# Patient Record
Sex: MALE | Race: WHITE | NOT HISPANIC OR LATINO | URBAN - METROPOLITAN AREA
[De-identification: names, ages, dates, MRNs, and addresses within clinical notes are randomized per-mention and may not be internally consistent; named-entity substitution may affect disease eponyms.]

---

## 2017-05-11 ENCOUNTER — OUTPATIENT (OUTPATIENT)
Dept: OUTPATIENT SERVICES | Facility: HOSPITAL | Age: 82
LOS: 1 days | Discharge: HOME | End: 2017-05-11

## 2017-06-28 DIAGNOSIS — M54.5 LOW BACK PAIN: ICD-10-CM

## 2017-07-13 ENCOUNTER — INPATIENT (INPATIENT)
Facility: HOSPITAL | Age: 82
LOS: 25 days | Discharge: NURSING, REHAB OR LONG-TERM CARE | End: 2017-08-08
Attending: INTERNAL MEDICINE

## 2017-08-08 PROBLEM — Z00.00 ENCOUNTER FOR PREVENTIVE HEALTH EXAMINATION: Status: ACTIVE | Noted: 2017-08-08

## 2017-08-11 DIAGNOSIS — I63.9 CEREBRAL INFARCTION, UNSPECIFIED: ICD-10-CM

## 2017-08-11 DIAGNOSIS — I63.232 CEREBRAL INFARCTION DUE TO UNSPECIFIED OCCLUSION OR STENOSIS OF LEFT CAROTID ARTERIES: ICD-10-CM

## 2017-08-11 DIAGNOSIS — J69.0 PNEUMONITIS DUE TO INHALATION OF FOOD AND VOMIT: ICD-10-CM

## 2017-08-11 DIAGNOSIS — R51 HEADACHE: ICD-10-CM

## 2017-08-11 DIAGNOSIS — R13.10 DYSPHAGIA, UNSPECIFIED: ICD-10-CM

## 2017-08-11 DIAGNOSIS — R47.01 APHASIA: ICD-10-CM

## 2017-08-11 DIAGNOSIS — E78.5 HYPERLIPIDEMIA, UNSPECIFIED: ICD-10-CM

## 2017-08-11 DIAGNOSIS — R47.81 SLURRED SPEECH: ICD-10-CM

## 2017-08-11 DIAGNOSIS — R29.810 FACIAL WEAKNESS: ICD-10-CM

## 2017-08-11 DIAGNOSIS — N40.0 BENIGN PROSTATIC HYPERPLASIA WITHOUT LOWER URINARY TRACT SYMPTOMS: ICD-10-CM

## 2017-08-11 DIAGNOSIS — H40.9 UNSPECIFIED GLAUCOMA: ICD-10-CM

## 2017-08-11 DIAGNOSIS — Z95.1 PRESENCE OF AORTOCORONARY BYPASS GRAFT: ICD-10-CM

## 2017-08-11 DIAGNOSIS — I25.10 ATHEROSCLEROTIC HEART DISEASE OF NATIVE CORONARY ARTERY WITHOUT ANGINA PECTORIS: ICD-10-CM

## 2017-08-11 DIAGNOSIS — G20 PARKINSON'S DISEASE: ICD-10-CM

## 2017-08-11 DIAGNOSIS — J96.01 ACUTE RESPIRATORY FAILURE WITH HYPOXIA: ICD-10-CM

## 2017-08-12 DIAGNOSIS — E46 UNSPECIFIED PROTEIN-CALORIE MALNUTRITION: ICD-10-CM

## 2017-08-12 DIAGNOSIS — R74.0 NONSPECIFIC ELEVATION OF LEVELS OF TRANSAMINASE AND LACTIC ACID DEHYDROGENASE [LDH]: ICD-10-CM

## 2017-08-12 DIAGNOSIS — R33.8 OTHER RETENTION OF URINE: ICD-10-CM

## 2017-08-12 DIAGNOSIS — N40.1 BENIGN PROSTATIC HYPERPLASIA WITH LOWER URINARY TRACT SYMPTOMS: ICD-10-CM

## 2017-08-12 DIAGNOSIS — Z99.11 DEPENDENCE ON RESPIRATOR [VENTILATOR] STATUS: ICD-10-CM

## 2017-08-14 DIAGNOSIS — I65.22 OCCLUSION AND STENOSIS OF LEFT CAROTID ARTERY: ICD-10-CM

## 2017-08-14 DIAGNOSIS — G81.91 HEMIPLEGIA, UNSPECIFIED AFFECTING RIGHT DOMINANT SIDE: ICD-10-CM

## 2017-09-06 ENCOUNTER — EMERGENCY (EMERGENCY)
Facility: HOSPITAL | Age: 82
LOS: 1 days | Discharge: HOME | End: 2017-09-06
Attending: EMERGENCY MEDICINE | Admitting: EMERGENCY MEDICINE
Payer: MEDICARE

## 2017-09-06 ENCOUNTER — TRANSCRIPTION ENCOUNTER (OUTPATIENT)
Age: 82
End: 2017-09-06

## 2017-09-06 VITALS
OXYGEN SATURATION: 100 % | HEART RATE: 64 BPM | DIASTOLIC BLOOD PRESSURE: 67 MMHG | SYSTOLIC BLOOD PRESSURE: 112 MMHG | RESPIRATION RATE: 16 BRPM

## 2017-09-06 VITALS
HEART RATE: 65 BPM | DIASTOLIC BLOOD PRESSURE: 70 MMHG | RESPIRATION RATE: 16 BRPM | TEMPERATURE: 98 F | OXYGEN SATURATION: 99 % | SYSTOLIC BLOOD PRESSURE: 109 MMHG

## 2017-09-06 DIAGNOSIS — Z93.1 GASTROSTOMY STATUS: Chronic | ICD-10-CM

## 2017-09-06 PROCEDURE — 99285 EMERGENCY DEPT VISIT HI MDM: CPT | Mod: GC

## 2017-09-06 PROCEDURE — 74000: CPT | Mod: 26,76

## 2017-09-06 NOTE — ED PROVIDER NOTE - OBJECTIVE STATEMENT
83 y/o M with h/o CVA, HTN, trach, PEG BIBEMS from NH for PEG tube dislodgement. Patient was dislodged PEG tube during therapy 2-3 hours prior to arrival. A lubin catheter was placed into the stoma to keep the stoma patent. Has 18 Albanian PEG. 83 y/o M with h/o CVA one month ago with residual R sided weakness, HTN, trach, PEG BIBEMS from NH for PEG tube dislodgement. Patient was dislodged PEG tube during therapy 2-3 hours prior to arrival. A lubin catheter was placed into the stoma to keep the stoma patent. Has 18 Venezuelan PEG. Patient denies any abdominal pain, nausea, vomiting or diarrhea.

## 2017-09-06 NOTE — ED PROVIDER NOTE - ATTENDING CONTRIBUTION TO CARE
Clarissa; PEG placement following stroke one month ago, dislodged after vigorous PT, Alexander in place currently. Sent for replacement. Site without erythema.

## 2017-09-06 NOTE — ED PROVIDER NOTE - MEDICAL DECISION MAKING DETAILS
81 y/o M with h/o CVA, HTN, trach, PEG presents with PEG tube dislodgement with 18 Fr lubin in place to keep stoma patent. Otherwise patient well appearing. Benign abdominal exam. Will replace PEG and get abdominal xray for confirmation.

## 2017-09-06 NOTE — ED ADULT TRIAGE NOTE - CHIEF COMPLAINT QUOTE
Pt from NH sent for pt pulling out peg tube Pt from NH sent for pt pulling out peg tube.  Pt has trach

## 2017-09-06 NOTE — ED ADULT NURSE NOTE - OBJECTIVE STATEMENT
Patient received in RM 24 A&Ox3. Arrives from NH with HHA and wife. Sent in for peg tube dislodgement during therapy today. Patient arrives with temporary lubin catheter in stoma to keep patent. Patient denies any abdominal pain, n/v. Providers at bedside. NAD at time. PE and history as noted below.

## 2017-09-06 NOTE — ED PROVIDER NOTE - GASTROINTESTINAL, MLM
Abdomen soft, non-tender, no guarding. PEG stoma patent with no surrounding erythema or warmth. No purulent discharge. 18 Uzbek lubin in place removed and 18 Fr PEG easily replaced

## 2017-09-24 ENCOUNTER — INPATIENT (INPATIENT)
Facility: HOSPITAL | Age: 82
LOS: 8 days | Discharge: NURSING, REHAB OR LONG-TERM CARE | End: 2017-10-03
Attending: INTERNAL MEDICINE | Admitting: INTERNAL MEDICINE
Payer: MEDICARE

## 2017-09-24 VITALS
HEART RATE: 92 BPM | SYSTOLIC BLOOD PRESSURE: 77 MMHG | OXYGEN SATURATION: 95 % | RESPIRATION RATE: 38 BRPM | DIASTOLIC BLOOD PRESSURE: 47 MMHG | WEIGHT: 147.05 LBS | TEMPERATURE: 104 F

## 2017-09-24 DIAGNOSIS — Z93.1 GASTROSTOMY STATUS: Chronic | ICD-10-CM

## 2017-09-24 DIAGNOSIS — A41.9 SEPSIS, UNSPECIFIED ORGANISM: ICD-10-CM

## 2017-09-24 LAB
ALBUMIN SERPL ELPH-MCNC: 2.4 G/DL — LOW (ref 3.3–5)
ALP SERPL-CCNC: 83 U/L — SIGNIFICANT CHANGE UP (ref 40–120)
ALT FLD-CCNC: 6 U/L — SIGNIFICANT CHANGE UP (ref 4–41)
APPEARANCE UR: SIGNIFICANT CHANGE UP
APTT BLD: 28.8 SEC — SIGNIFICANT CHANGE UP (ref 27.5–37.4)
AST SERPL-CCNC: 25 U/L — SIGNIFICANT CHANGE UP (ref 4–40)
BACTERIA # UR AUTO: HIGH
BASE EXCESS BLDA CALC-SCNC: -4.3 MMOL/L — SIGNIFICANT CHANGE UP
BASE EXCESS BLDA CALC-SCNC: -4.9 MMOL/L — SIGNIFICANT CHANGE UP
BASE EXCESS BLDV CALC-SCNC: -2 MMOL/L — SIGNIFICANT CHANGE UP
BASE EXCESS BLDV CALC-SCNC: -3.3 MMOL/L — SIGNIFICANT CHANGE UP
BASOPHILS # BLD AUTO: 0.04 K/UL — SIGNIFICANT CHANGE UP (ref 0–0.2)
BASOPHILS NFR BLD AUTO: 0.2 % — SIGNIFICANT CHANGE UP (ref 0–2)
BASOPHILS NFR SPEC: 0 % — SIGNIFICANT CHANGE UP (ref 0–2)
BILIRUB SERPL-MCNC: 0.9 MG/DL — SIGNIFICANT CHANGE UP (ref 0.2–1.2)
BILIRUB UR-MCNC: NEGATIVE — SIGNIFICANT CHANGE UP
BLD GP AB SCN SERPL QL: NEGATIVE — SIGNIFICANT CHANGE UP
BLOOD GAS VENOUS - CREATININE: 1.99 MG/DL — HIGH (ref 0.5–1.3)
BLOOD GAS VENOUS - CREATININE: 2.11 MG/DL — HIGH (ref 0.5–1.3)
BLOOD UR QL VISUAL: HIGH
BUN SERPL-MCNC: 53 MG/DL — HIGH (ref 7–23)
BURR CELLS BLD QL SMEAR: PRESENT — SIGNIFICANT CHANGE UP
CALCIUM SERPL-MCNC: 8.2 MG/DL — LOW (ref 8.4–10.5)
CHLORIDE BLDA-SCNC: 108 MMOL/L — SIGNIFICANT CHANGE UP (ref 96–108)
CHLORIDE BLDA-SCNC: 110 MMOL/L — HIGH (ref 96–108)
CHLORIDE BLDV-SCNC: 105 MMOL/L — SIGNIFICANT CHANGE UP (ref 96–108)
CHLORIDE BLDV-SCNC: 99 MMOL/L — SIGNIFICANT CHANGE UP (ref 96–108)
CHLORIDE SERPL-SCNC: 97 MMOL/L — LOW (ref 98–107)
CO2 SERPL-SCNC: 19 MMOL/L — LOW (ref 22–31)
COLOR SPEC: HIGH
CREAT BLDA-MCNC: 1.08 MG/DL — SIGNIFICANT CHANGE UP (ref 0.5–1.3)
CREAT BLDA-MCNC: 1.34 MG/DL — HIGH (ref 0.5–1.3)
CREAT SERPL-MCNC: 2.07 MG/DL — HIGH (ref 0.5–1.3)
EOSINOPHIL # BLD AUTO: 0 K/UL — SIGNIFICANT CHANGE UP (ref 0–0.5)
EOSINOPHIL NFR BLD AUTO: 0 % — SIGNIFICANT CHANGE UP (ref 0–6)
EOSINOPHIL NFR FLD: 0 % — SIGNIFICANT CHANGE UP (ref 0–6)
GAS PNL BLDV: 128 MMOL/L — LOW (ref 136–146)
GAS PNL BLDV: 130 MMOL/L — LOW (ref 136–146)
GIANT PLATELETS BLD QL SMEAR: PRESENT — SIGNIFICANT CHANGE UP
GLUCOSE BLDA-MCNC: 119 MG/DL — HIGH (ref 70–99)
GLUCOSE BLDA-MCNC: 126 MG/DL — HIGH (ref 70–99)
GLUCOSE BLDV-MCNC: 171 — HIGH (ref 70–99)
GLUCOSE BLDV-MCNC: 171 — HIGH (ref 70–99)
GLUCOSE SERPL-MCNC: 167 MG/DL — HIGH (ref 70–99)
GLUCOSE UR-MCNC: NEGATIVE — SIGNIFICANT CHANGE UP
HCO3 BLDA-SCNC: 21 MMOL/L — LOW (ref 22–26)
HCO3 BLDA-SCNC: 22 MMOL/L — SIGNIFICANT CHANGE UP (ref 22–26)
HCO3 BLDV-SCNC: 21 MMOL/L — SIGNIFICANT CHANGE UP (ref 20–27)
HCO3 BLDV-SCNC: 23 MMOL/L — SIGNIFICANT CHANGE UP (ref 20–27)
HCT VFR BLD CALC: 36.3 % — LOW (ref 39–50)
HCT VFR BLD CALC: 42.5 % — SIGNIFICANT CHANGE UP (ref 39–50)
HCT VFR BLDA CALC: 37.9 % — LOW (ref 39–51)
HCT VFR BLDA CALC: 38.5 % — LOW (ref 39–51)
HCT VFR BLDV CALC: 37.8 % — LOW (ref 39–51)
HCT VFR BLDV CALC: 44.8 % — SIGNIFICANT CHANGE UP (ref 39–51)
HGB BLD-MCNC: 12.4 G/DL — LOW (ref 13–17)
HGB BLD-MCNC: 14.5 G/DL — SIGNIFICANT CHANGE UP (ref 13–17)
HGB BLDA-MCNC: 12.3 G/DL — LOW (ref 13–17)
HGB BLDA-MCNC: 12.5 G/DL — LOW (ref 13–17)
HGB BLDV-MCNC: 12.3 G/DL — LOW (ref 13–17)
HGB BLDV-MCNC: 14.6 G/DL — SIGNIFICANT CHANGE UP (ref 13–17)
IMM GRANULOCYTES # BLD AUTO: 0.27 # — SIGNIFICANT CHANGE UP
IMM GRANULOCYTES NFR BLD AUTO: 1.3 % — SIGNIFICANT CHANGE UP (ref 0–1.5)
INR BLD: 1.31 — HIGH (ref 0.88–1.17)
KETONES UR-MCNC: NEGATIVE — SIGNIFICANT CHANGE UP
LACTATE BLDA-SCNC: 2 MMOL/L — SIGNIFICANT CHANGE UP (ref 0.5–2)
LACTATE BLDA-SCNC: 2.3 MMOL/L — HIGH (ref 0.5–2)
LACTATE BLDV-MCNC: 2.9 MMOL/L — HIGH (ref 0.5–2)
LACTATE BLDV-MCNC: 4.1 MMOL/L — CRITICAL HIGH (ref 0.5–2)
LEUKOCYTE ESTERASE UR-ACNC: HIGH
LYMPHOCYTES # BLD AUTO: 0.64 K/UL — LOW (ref 1–3.3)
LYMPHOCYTES # BLD AUTO: 3.2 % — LOW (ref 13–44)
LYMPHOCYTES NFR SPEC AUTO: 1.7 % — LOW (ref 13–44)
MCHC RBC-ENTMCNC: 31.1 PG — SIGNIFICANT CHANGE UP (ref 27–34)
MCHC RBC-ENTMCNC: 31.5 PG — SIGNIFICANT CHANGE UP (ref 27–34)
MCHC RBC-ENTMCNC: 34.1 % — SIGNIFICANT CHANGE UP (ref 32–36)
MCHC RBC-ENTMCNC: 34.2 % — SIGNIFICANT CHANGE UP (ref 32–36)
MCV RBC AUTO: 91.2 FL — SIGNIFICANT CHANGE UP (ref 80–100)
MCV RBC AUTO: 92.1 FL — SIGNIFICANT CHANGE UP (ref 80–100)
MONOCYTES # BLD AUTO: 1.04 K/UL — HIGH (ref 0–0.9)
MONOCYTES NFR BLD AUTO: 5.2 % — SIGNIFICANT CHANGE UP (ref 2–14)
MONOCYTES NFR BLD: 7 % — SIGNIFICANT CHANGE UP (ref 2–9)
MUCOUS THREADS # UR AUTO: SIGNIFICANT CHANGE UP
MYELOCYTES NFR BLD: 0.9 % — HIGH (ref 0–0)
NEUTROPHIL AB SER-ACNC: 81.6 % — HIGH (ref 43–77)
NEUTROPHILS # BLD AUTO: 18.11 K/UL — HIGH (ref 1.8–7.4)
NEUTROPHILS NFR BLD AUTO: 90.1 % — HIGH (ref 43–77)
NEUTS BAND # BLD: 8.8 % — HIGH (ref 0–6)
NITRITE UR-MCNC: NEGATIVE — SIGNIFICANT CHANGE UP
NRBC # FLD: 0 — SIGNIFICANT CHANGE UP
NRBC # FLD: 0 — SIGNIFICANT CHANGE UP
OB PNL STL: POSITIVE — SIGNIFICANT CHANGE UP
PCO2 BLDA: 24 MMHG — LOW (ref 35–48)
PCO2 BLDA: 25 MMHG — LOW (ref 35–48)
PCO2 BLDV: 27 MMHG — LOW (ref 41–51)
PCO2 BLDV: 36 MMHG — LOW (ref 41–51)
PH BLDA: 7.49 PH — HIGH (ref 7.35–7.45)
PH BLDA: 7.49 PH — HIGH (ref 7.35–7.45)
PH BLDV: 7.39 PH — SIGNIFICANT CHANGE UP (ref 7.32–7.43)
PH BLDV: 7.5 PH — HIGH (ref 7.32–7.43)
PH UR: 8.5 — HIGH (ref 4.6–8)
PLATELET # BLD AUTO: 206 K/UL — SIGNIFICANT CHANGE UP (ref 150–400)
PLATELET # BLD AUTO: 259 K/UL — SIGNIFICANT CHANGE UP (ref 150–400)
PLATELET COUNT - ESTIMATE: NORMAL — SIGNIFICANT CHANGE UP
PMV BLD: 10.2 FL — SIGNIFICANT CHANGE UP (ref 7–13)
PMV BLD: 10.3 FL — SIGNIFICANT CHANGE UP (ref 7–13)
PO2 BLDA: 114 MMHG — HIGH (ref 83–108)
PO2 BLDA: 91 MMHG — SIGNIFICANT CHANGE UP (ref 83–108)
PO2 BLDV: 24 MMHG — LOW (ref 35–40)
PO2 BLDV: 42 MMHG — HIGH (ref 35–40)
POIKILOCYTOSIS BLD QL AUTO: SLIGHT — SIGNIFICANT CHANGE UP
POLYCHROMASIA BLD QL SMEAR: SLIGHT — SIGNIFICANT CHANGE UP
POTASSIUM BLDA-SCNC: 4 MMOL/L — SIGNIFICANT CHANGE UP (ref 3.4–4.5)
POTASSIUM BLDA-SCNC: 4.2 MMOL/L — SIGNIFICANT CHANGE UP (ref 3.4–4.5)
POTASSIUM BLDV-SCNC: 4 MMOL/L — SIGNIFICANT CHANGE UP (ref 3.4–4.5)
POTASSIUM BLDV-SCNC: 5.1 MMOL/L — HIGH (ref 3.4–4.5)
POTASSIUM SERPL-MCNC: 5.5 MMOL/L — HIGH (ref 3.5–5.3)
POTASSIUM SERPL-SCNC: 5.5 MMOL/L — HIGH (ref 3.5–5.3)
PROT SERPL-MCNC: 6.2 G/DL — SIGNIFICANT CHANGE UP (ref 6–8.3)
PROT UR-MCNC: >600 — SIGNIFICANT CHANGE UP
PROTHROM AB SERPL-ACNC: 14.7 SEC — HIGH (ref 9.8–13.1)
RBC # BLD: 3.94 M/UL — LOW (ref 4.2–5.8)
RBC # BLD: 4.66 M/UL — SIGNIFICANT CHANGE UP (ref 4.2–5.8)
RBC # FLD: 15.1 % — HIGH (ref 10.3–14.5)
RBC # FLD: 15.2 % — HIGH (ref 10.3–14.5)
RBC CASTS # UR COMP ASSIST: >50 — HIGH (ref 0–?)
REVIEW TO FOLLOW: YES — SIGNIFICANT CHANGE UP
RH IG SCN BLD-IMP: POSITIVE — SIGNIFICANT CHANGE UP
SAO2 % BLDA: 98.1 % — SIGNIFICANT CHANGE UP (ref 95–99)
SAO2 % BLDA: 99.1 % — HIGH (ref 95–99)
SAO2 % BLDV: 34.1 % — LOW (ref 60–85)
SAO2 % BLDV: 76.8 % — SIGNIFICANT CHANGE UP (ref 60–85)
SODIUM BLDA-SCNC: 130 MMOL/L — LOW (ref 136–146)
SODIUM BLDA-SCNC: 133 MMOL/L — LOW (ref 136–146)
SODIUM SERPL-SCNC: 133 MMOL/L — LOW (ref 135–145)
SP GR SPEC: 1.02 — SIGNIFICANT CHANGE UP (ref 1–1.03)
SQUAMOUS # UR AUTO: SIGNIFICANT CHANGE UP
TOXIC GRANULES BLD QL SMEAR: PRESENT — SIGNIFICANT CHANGE UP
TRI-PHOS CRY # UR COMP ASSIST: SIGNIFICANT CHANGE UP (ref 0–0)
UROBILINOGEN FLD QL: NORMAL E.U. — SIGNIFICANT CHANGE UP (ref 0.1–0.2)
WBC # BLD: 12.68 K/UL — HIGH (ref 3.8–10.5)
WBC # BLD: 20.1 K/UL — HIGH (ref 3.8–10.5)
WBC # FLD AUTO: 12.68 K/UL — HIGH (ref 3.8–10.5)
WBC # FLD AUTO: 20.1 K/UL — HIGH (ref 3.8–10.5)
WBC UR QL: >50 — HIGH (ref 0–?)

## 2017-09-24 PROCEDURE — 71010: CPT | Mod: 26

## 2017-09-24 RX ORDER — HEPARIN SODIUM 5000 [USP'U]/ML
5000 INJECTION INTRAVENOUS; SUBCUTANEOUS EVERY 8 HOURS
Qty: 0 | Refills: 0 | Status: DISCONTINUED | OUTPATIENT
Start: 2017-09-24 | End: 2017-09-28

## 2017-09-24 RX ORDER — CEFEPIME 1 G/1
2000 INJECTION, POWDER, FOR SOLUTION INTRAMUSCULAR; INTRAVENOUS ONCE
Qty: 0 | Refills: 0 | Status: COMPLETED | OUTPATIENT
Start: 2017-09-24 | End: 2017-09-24

## 2017-09-24 RX ORDER — ACETAMINOPHEN 500 MG
1000 TABLET ORAL ONCE
Qty: 0 | Refills: 0 | Status: COMPLETED | OUTPATIENT
Start: 2017-09-24 | End: 2017-09-24

## 2017-09-24 RX ORDER — LATANOPROST 0.05 MG/ML
1 SOLUTION/ DROPS OPHTHALMIC; TOPICAL AT BEDTIME
Qty: 0 | Refills: 0 | Status: DISCONTINUED | OUTPATIENT
Start: 2017-09-24 | End: 2017-10-03

## 2017-09-24 RX ORDER — DORZOLAMIDE HYDROCHLORIDE TIMOLOL MALEATE 20; 5 MG/ML; MG/ML
1 SOLUTION/ DROPS OPHTHALMIC
Qty: 0 | Refills: 0 | Status: DISCONTINUED | OUTPATIENT
Start: 2017-09-24 | End: 2017-09-26

## 2017-09-24 RX ORDER — INFLUENZA VIRUS VACCINE 15; 15; 15; 15 UG/.5ML; UG/.5ML; UG/.5ML; UG/.5ML
0.5 SUSPENSION INTRAMUSCULAR ONCE
Qty: 0 | Refills: 0 | Status: COMPLETED | OUTPATIENT
Start: 2017-09-24 | End: 2017-10-03

## 2017-09-24 RX ORDER — CEFEPIME 1 G/1
2000 INJECTION, POWDER, FOR SOLUTION INTRAMUSCULAR; INTRAVENOUS EVERY 24 HOURS
Qty: 0 | Refills: 0 | Status: DISCONTINUED | OUTPATIENT
Start: 2017-09-25 | End: 2017-09-25

## 2017-09-24 RX ORDER — SODIUM CHLORIDE 9 MG/ML
1000 INJECTION INTRAMUSCULAR; INTRAVENOUS; SUBCUTANEOUS ONCE
Qty: 0 | Refills: 0 | Status: COMPLETED | OUTPATIENT
Start: 2017-09-24 | End: 2017-09-24

## 2017-09-24 RX ORDER — METOPROLOL TARTRATE 50 MG
12.5 TABLET ORAL
Qty: 0 | Refills: 0 | Status: DISCONTINUED | OUTPATIENT
Start: 2017-09-24 | End: 2017-09-24

## 2017-09-24 RX ORDER — PROPOFOL 10 MG/ML
40 INJECTION, EMULSION INTRAVENOUS
Qty: 1000 | Refills: 0 | Status: DISCONTINUED | OUTPATIENT
Start: 2017-09-24 | End: 2017-09-26

## 2017-09-24 RX ORDER — PROPOFOL 10 MG/ML
30 INJECTION, EMULSION INTRAVENOUS
Qty: 1000 | Refills: 0 | Status: DISCONTINUED | OUTPATIENT
Start: 2017-09-24 | End: 2017-09-24

## 2017-09-24 RX ORDER — CARBIDOPA AND LEVODOPA 25; 100 MG/1; MG/1
1.5 TABLET ORAL THREE TIMES A DAY
Qty: 0 | Refills: 0 | Status: DISCONTINUED | OUTPATIENT
Start: 2017-09-24 | End: 2017-10-03

## 2017-09-24 RX ORDER — VANCOMYCIN HCL 1 G
1000 VIAL (EA) INTRAVENOUS ONCE
Qty: 0 | Refills: 0 | Status: COMPLETED | OUTPATIENT
Start: 2017-09-24 | End: 2017-09-24

## 2017-09-24 RX ORDER — ATORVASTATIN CALCIUM 80 MG/1
80 TABLET, FILM COATED ORAL AT BEDTIME
Qty: 0 | Refills: 0 | Status: DISCONTINUED | OUTPATIENT
Start: 2017-09-24 | End: 2017-10-03

## 2017-09-24 RX ORDER — PILOCARPINE HCL 4 %
1 DROPS OPHTHALMIC (EYE)
Qty: 0 | Refills: 0 | Status: DISCONTINUED | OUTPATIENT
Start: 2017-09-24 | End: 2017-10-03

## 2017-09-24 RX ORDER — SODIUM CHLORIDE 9 MG/ML
2000 INJECTION INTRAMUSCULAR; INTRAVENOUS; SUBCUTANEOUS ONCE
Qty: 0 | Refills: 0 | Status: COMPLETED | OUTPATIENT
Start: 2017-09-24 | End: 2017-09-24

## 2017-09-24 RX ORDER — NOREPINEPHRINE BITARTRATE/D5W 8 MG/250ML
0.01 PLASTIC BAG, INJECTION (ML) INTRAVENOUS
Qty: 8 | Refills: 0 | Status: DISCONTINUED | OUTPATIENT
Start: 2017-09-24 | End: 2017-09-25

## 2017-09-24 RX ADMIN — CARBIDOPA AND LEVODOPA 1.5 TABLET(S): 25; 100 TABLET ORAL at 22:06

## 2017-09-24 RX ADMIN — CEFEPIME 100 MILLIGRAM(S): 1 INJECTION, POWDER, FOR SOLUTION INTRAMUSCULAR; INTRAVENOUS at 11:22

## 2017-09-24 RX ADMIN — Medication 400 MILLIGRAM(S): at 10:11

## 2017-09-24 RX ADMIN — Medication 1 DROP(S): at 21:02

## 2017-09-24 RX ADMIN — LATANOPROST 1 DROP(S): 0.05 SOLUTION/ DROPS OPHTHALMIC; TOPICAL at 22:07

## 2017-09-24 RX ADMIN — SODIUM CHLORIDE 2000 MILLILITER(S): 9 INJECTION INTRAMUSCULAR; INTRAVENOUS; SUBCUTANEOUS at 11:39

## 2017-09-24 RX ADMIN — HEPARIN SODIUM 5000 UNIT(S): 5000 INJECTION INTRAVENOUS; SUBCUTANEOUS at 16:20

## 2017-09-24 RX ADMIN — HEPARIN SODIUM 5000 UNIT(S): 5000 INJECTION INTRAVENOUS; SUBCUTANEOUS at 22:07

## 2017-09-24 RX ADMIN — Medication 1.25 MICROGRAM(S)/KG/MIN: at 12:39

## 2017-09-24 RX ADMIN — Medication 1.25 MICROGRAM(S)/KG/MIN: at 21:02

## 2017-09-24 RX ADMIN — ATORVASTATIN CALCIUM 80 MILLIGRAM(S): 80 TABLET, FILM COATED ORAL at 22:06

## 2017-09-24 RX ADMIN — SODIUM CHLORIDE 2000 MILLILITER(S): 9 INJECTION INTRAMUSCULAR; INTRAVENOUS; SUBCUTANEOUS at 10:11

## 2017-09-24 RX ADMIN — PROPOFOL 12.6 MICROGRAM(S)/KG/MIN: 10 INJECTION, EMULSION INTRAVENOUS at 22:30

## 2017-09-24 RX ADMIN — Medication 250 MILLIGRAM(S): at 11:22

## 2017-09-24 NOTE — ED PROVIDER NOTE - OBJECTIVE STATEMENT
82 M hx CVA/PEG tube/trach/lubin cath/aspiration pna p/w fever for 2 days. Wife reports he has also become more confused, and has an episode of coffee ground emesis this AM. BP 80s/40s upon EMS arrival. Pt is a resident at a long-term rehab facility s/p CVA w/ chronic R sided deficits. Lubin was last changed 1 month ago. PEG tube place 8/6/17, requiring open approach d/t intra-op difficulties. Pt unable to provide any additional history.

## 2017-09-24 NOTE — H&P ADULT - HISTORY OF PRESENT ILLNESS
83 yo M with hx of CVA s/p PEG, trach, presents for a fever from ([facility]) presents for a fever. Patient is altered currently and 83 yo M with hx of CVA with R. sided residual deficits, s/p PEG, trach, CAD s/p CABG (28yo), Parkinson's disease, presents for a fever from ([facility]) presents for a fever. Patient is altered currently and history obtained from chart review and family at bedside. Per family, patient was independent until two months ago when he had a stroke. At that hospitalization, he had an aspiration PNA, which family said he needed to get the trach at that time and PEG. Patient's baseline mental status waxes and wanes, at times patient is reading a magazine. Patient had been at [facility] since last hospitalization and receiving PT. Per family, patient did not seem at baseline yesterday, when he seemed "gray" per daughter and not well. Today, patient had an episode of coffee ground emesis.   VS at NH: BP: 108/67, HR of 94, T of 101.6F, RR of 21.   Patient arrived in the ED: VS: 72/47, HR of 92, T of 103.7F, RR of 38 with O2 saturation of 95%. She is s/p cefepime, vancomycin, NaCl 1L x3, and tylenol.

## 2017-09-24 NOTE — H&P ADULT - NSHPLABSRESULTS_GEN_ALL_CORE
LABS:                        14.5   20.10 )-----------( 259      ( 24 Sep 2017 10:04 )             42.5     WBC Trend: 20.10<--      133<L>  |  97<L>  |  53<H>  ----------------------------<  167<H>  5.5<H>   |  19<L>  |  2.07<H>    Ca    8.2<L>      24 Sep 2017 10:04    TPro  6.2  /  Alb  2.4<L>  /  TBili  0.9  /  DBili  x   /  AST  25  /  ALT  6   /  AlkPhos  83      Creatinine Trend: 2.07<--  PT/INR - ( 24 Sep 2017 10:04 )   PT: 14.7 SEC;   INR: 1.31          PTT - ( 24 Sep 2017 10:04 )  PTT:28.8 SEC      Urinalysis Basic - ( 24 Sep 2017 10:05 )    Color: RED / Appearance: TURBID / S.018 / pH: 8.5  Gluc: NEGATIVE / Ketone: NEGATIVE  / Bili: NEGATIVE / Urobili: NORMAL E.U.   Blood: LARGE / Protein: >600 / Nitrite: NEGATIVE   Leuk Esterase: LARGE / RBC: >50 / WBC >50   Sq Epi: FEW / Non Sq Epi: x / Bacteria: MODERATE          RADIOLOGY & ADDITIONAL TESTS:    Imaging Personally Reviewed:    Consultant(s) Notes Reviewed:      Care Discussed with Consultants/Other Providers:

## 2017-09-24 NOTE — ED PROVIDER NOTE - ATTENDING CONTRIBUTION TO CARE
ann: hx from pt, transfer note.  PMH includes CVA with hemiparesis and trach placement. Currently in rehab at [facility].   Usually awake and able to cooperate with PT. yesterday became weaker and today vomited coffee grounds. temp ann: hx from pt, transfer note.  PMH includes CVA with hemiparesis and trach placement. Currently in rehab at [facility].   Usually awake and able to cooperate with PT. yesterday became weaker and today vomited coffee grounds. temp 101.6 and pt transferred to ED; on route EMS found pt to be hypotensive.   In ED temp 103 and systolic BP 70.  Pt awake but not communicating. abd distended and tender. sacral ulcer stage 2. staples/scar to abdomen from tube placement procedure more than one month ago. skin not appear infected.    CXR: no pneumonia noted. suction ?feedings from trach.   elevated wbc and lactate. pyuria.  lubin replaced and more than one liter thick cloudy urine passed before lubin clamped.   impression: sepsis, urine currently as most likely source. After lubin replaced, abd no longer tender or distended.   recc: fluids, antibiotics started soon after arrival. PCN allergy many years ago with unknown reaction. started vanco and cefipime as cross reactivity unlikely.   will monitor BP and if needed start pressors.

## 2017-09-24 NOTE — ED ADULT NURSE NOTE - OBJECTIVE STATEMENT
pt is an 82 yr old male sent from nh d/t ams/ decreased loc/ abd pain. pt p/t ed with low bp, elevated temp, lubin with minimal dark beba urine, peg tube with mild redness around site, abd staples with approximated wound- according to wife surgery done in august 2017. pt trached with 40% O2 at this time. lubin changed, piv x 2 placed, ivf infusing, labs done and sent. pt guacac done. pt suctioned for thick bown sputum- tube feed like. productive cough noted with stimulation. pt opening eyes, weakly responsive to commands. wife at bedside, pt's son called, pt full code. will ctm

## 2017-09-24 NOTE — ED ADULT NURSE NOTE - ED STAT RN HANDOFF DETAILS
report given to micu rn, pt remains on levophed, rate increased to .06 mcg/ kg/ min to maintain bp > 90. pt stable for transport

## 2017-09-24 NOTE — H&P ADULT - ATTENDING COMMENTS
Pt with hx of CVA with rigth hemiparesis 2 months ago, s/p trach/PEG, has been eating by mouth regular food per family, mentating well. Trach collar capped 24/7,was last on vent support ~last week of july, and TC doing well. Admitted with urosepsis, hypotensive shock requiring pressor support, also SHREYAS, possibly postobstructive uropathy - chronic lubin changed and drained out purulent urine. Broad spectrum antibiotics - cefepime and vanco.. ?coffee-ground emesis and + FOBT - will check CBC Q8.   Tachypnea, trach Shiley 6 cuffless changed to shiley 6 cuffed and pt placed on ventilatory support for work of breathing.  GOC discussion with family - pt is full code as he has good mental status.

## 2017-09-24 NOTE — ED PROVIDER NOTE - CRITICAL CARE PROVIDED
direct patient care (not related to procedure)/consult w/ pt's family directly relating to pts condition/interpretation of diagnostic studies/additional history taking/consultation with other physicians/documentation

## 2017-09-24 NOTE — ED ADULT NURSE REASSESSMENT NOTE - NS ED NURSE REASSESS COMMENT FT1
pt received 3 l Ns, started on levophed for sbp <90, pt suctioned for minimal brown sputum, pt remains on 40% O2 via trach collar, rr 30s, temp decreasing s/p tylenol. family at bedside updated to poc, micu resident eval in progress. plan to repeat vbg. will ctm

## 2017-09-24 NOTE — H&P ADULT - NSHPPHYSICALEXAM_GEN_ALL_CORE
I&O's Summary    24 Sep 2017 07:01  -  24 Sep 2017 15:53  --------------------------------------------------------  IN: 9 mL / OUT: 134 mL / NET: -125 mL    ICU Vital Signs Last 24 Hrs  T(C): 37.9 (24 Sep 2017 12:40), Max: 39.8 (24 Sep 2017 09:40)  T(F): 100.2 (24 Sep 2017 12:40), Max: 103.7 (24 Sep 2017 09:40)  HR: 91 (24 Sep 2017 15:32) (86 - 96)  BP: 94/53 (24 Sep 2017 15:00) (66/39 - 104/51)  BP(mean): 63 (24 Sep 2017 15:00) (63 - 67)  ABP: --  ABP(mean): --  RR: 32 (24 Sep 2017 15:00) (28 - 40)  SpO2: 99% (24 Sep 2017 15:32) (92% - 99%)      PHYSICAL EXAM:  GENERAL: ill-appearing  HEAD:  Atraumatic, Normocephalic  NECK: Supple  CHEST/LUNG: Mild scattered rhonchi  HEART: Regular rate and rhythm; No murmurs, rubs, or gallops  ABDOMEN: Soft, Nontender, BS present  EXTREMITIES:  No clubbing, cyanosis, or edema  NEUROLOGY: non-focal, AA0x0 -   SKIN: No rashes or lesions

## 2017-09-24 NOTE — ED ADULT TRIAGE NOTE - CHIEF COMPLAINT QUOTE
pt ENMANUEL from [facility], as per EMS, pt has been AMS x 3 days, vomiting coffee grounds, and tachypneic.  pt has a trach to 10 liters O2.  pt is responsive to painful stimuli.  pt taken directly to TR B

## 2017-09-24 NOTE — H&P ADULT - ASSESSMENT
83 yo M with hx of CVA with R. sided residual deficits, s/p PEG, trach, CAD s/p CABG (26yo), Parkinson's disease, presents for a fever from ([facility]) presents for a fever, admitted with septic shock likely 2/2 UTI.    #Neuro: Patient currently with encephalopathy, likely 2/2 sepsis. Family says patient waxes and wanes, per patient is not at baseline. Continue to monitor while infection is treated.  #CV: Patient with septic shock. Continue with levophed at this time for MAP>65. Will continue to monitor VS closely.   #Pulm: Patient with trach uncapped, and will connect to mechanical vent. Continue with monitoring respiratory status closely.   #ID: Patient with septic shock likely 2/2 UTI. Patient with cloudy urine and urine with signs of UTI. Will treat broadly with cefepime and vancomycin.  #Renal: Patient with SHREYAS, likely 2/2 sepsis ATN vs pre-renal. F/u urine lytes. Trend BMP daily, avoid nephrotoxins, renally dose medications.  #Heme: Patient with h/H currently stable. Continue to trend qdaily.   #GI: Holding TF at this time. Patient with occult positive but no melanotic stools. Will trend CBC.  #DVT ppx: HSQ at this time. Will continue to trend CBC closely, if any signs of bleeding, will d/c HSQ.     Emilie Rosas MD  PGY3 Internal Medicine Resident  Pager: [phone]

## 2017-09-24 NOTE — ED PROVIDER NOTE - PROGRESS NOTE DETAILS
Jonathan Weil, PGY1 - BP remains 80s/40s after 2L NS. Will give additional fluids and start norepi gtt. MICU consulted for septic shock w/ suspected urinary source.

## 2017-09-24 NOTE — ED PROVIDER NOTE - MEDICAL DECISION MAKING DETAILS
81 yo M w/ CVA/lubin/PEG/trach febrile T103 rectally, tachypneic, hypotensive to 60s/30s on arrival. Abdomen distended and diffusely tender, improved after exchanging lubin catheter which had blood at the meatus, drained 1L cloudy red urine. BP still 81 yo M w/ CVA/lubin/PEG/trach febrile T103 rectally, tachypneic, hypotensive to 60s/30s on arrival. Abdomen distended and diffusely tender, improved after exchanging lubin catheter which had blood at the meatus, drained 1L cloudy red urine. BP 80s/40s after 2L NS. Started norepi gtt w/ titration to MAP > 65. Vancomycin and cefepime (penicillin allergy noted) given for broad spectrum coverage of suspected UTI w/ septic shock. MICU consulted for admission.

## 2017-09-25 DIAGNOSIS — I63.9 CEREBRAL INFARCTION, UNSPECIFIED: ICD-10-CM

## 2017-09-25 DIAGNOSIS — A41.9 SEPSIS, UNSPECIFIED ORGANISM: ICD-10-CM

## 2017-09-25 DIAGNOSIS — J96.01 ACUTE RESPIRATORY FAILURE WITH HYPOXIA: ICD-10-CM

## 2017-09-25 LAB
BACTERIA UR CULT: SIGNIFICANT CHANGE UP
BASE EXCESS BLDA CALC-SCNC: -3.2 MMOL/L — SIGNIFICANT CHANGE UP
BASOPHILS # BLD AUTO: 0.03 K/UL — SIGNIFICANT CHANGE UP (ref 0–0.2)
BASOPHILS NFR BLD AUTO: 0.2 % — SIGNIFICANT CHANGE UP (ref 0–2)
BUN SERPL-MCNC: 43 MG/DL — HIGH (ref 7–23)
CALCIUM SERPL-MCNC: 7.9 MG/DL — LOW (ref 8.4–10.5)
CHLORIDE BLDA-SCNC: 112 MMOL/L — HIGH (ref 96–108)
CHLORIDE SERPL-SCNC: 109 MMOL/L — HIGH (ref 98–107)
CO2 SERPL-SCNC: 21 MMOL/L — LOW (ref 22–31)
CREAT BLDA-MCNC: 0.82 MG/DL — SIGNIFICANT CHANGE UP (ref 0.5–1.3)
CREAT SERPL-MCNC: 0.94 MG/DL — SIGNIFICANT CHANGE UP (ref 0.5–1.3)
CULTURE - ACID FAST SMEAR CONCENTRATED: SIGNIFICANT CHANGE UP
EOSINOPHIL # BLD AUTO: 0.01 K/UL — SIGNIFICANT CHANGE UP (ref 0–0.5)
EOSINOPHIL NFR BLD AUTO: 0.1 % — SIGNIFICANT CHANGE UP (ref 0–6)
GLUCOSE BLDA-MCNC: 113 MG/DL — HIGH (ref 70–99)
GLUCOSE SERPL-MCNC: 113 MG/DL — HIGH (ref 70–99)
HCO3 BLDA-SCNC: 22 MMOL/L — SIGNIFICANT CHANGE UP (ref 22–26)
HCT VFR BLD CALC: 37 % — LOW (ref 39–50)
HCT VFR BLDA CALC: 35.5 % — LOW (ref 39–51)
HGB BLD-MCNC: 12.3 G/DL — LOW (ref 13–17)
HGB BLDA-MCNC: 11.5 G/DL — LOW (ref 13–17)
IMM GRANULOCYTES # BLD AUTO: 0.13 # — SIGNIFICANT CHANGE UP
IMM GRANULOCYTES NFR BLD AUTO: 0.8 % — SIGNIFICANT CHANGE UP (ref 0–1.5)
LACTATE BLDA-SCNC: 1.2 MMOL/L — SIGNIFICANT CHANGE UP (ref 0.5–2)
LACTATE SERPL-SCNC: 1.8 MMOL/L — SIGNIFICANT CHANGE UP (ref 0.5–2)
LYMPHOCYTES # BLD AUTO: 1.16 K/UL — SIGNIFICANT CHANGE UP (ref 1–3.3)
LYMPHOCYTES # BLD AUTO: 6.9 % — LOW (ref 13–44)
MAGNESIUM SERPL-MCNC: 2 MG/DL — SIGNIFICANT CHANGE UP (ref 1.6–2.6)
MCHC RBC-ENTMCNC: 30.7 PG — SIGNIFICANT CHANGE UP (ref 27–34)
MCHC RBC-ENTMCNC: 33.2 % — SIGNIFICANT CHANGE UP (ref 32–36)
MCV RBC AUTO: 92.3 FL — SIGNIFICANT CHANGE UP (ref 80–100)
MONOCYTES # BLD AUTO: 1.25 K/UL — HIGH (ref 0–0.9)
MONOCYTES NFR BLD AUTO: 7.4 % — SIGNIFICANT CHANGE UP (ref 2–14)
NEUTROPHILS # BLD AUTO: 14.35 K/UL — HIGH (ref 1.8–7.4)
NEUTROPHILS NFR BLD AUTO: 84.6 % — HIGH (ref 43–77)
NRBC # FLD: 0 — SIGNIFICANT CHANGE UP
PCO2 BLDA: 31 MMHG — LOW (ref 35–48)
PH BLDA: 7.43 PH — SIGNIFICANT CHANGE UP (ref 7.35–7.45)
PHOSPHATE SERPL-MCNC: 3.9 MG/DL — SIGNIFICANT CHANGE UP (ref 2.5–4.5)
PLATELET # BLD AUTO: 226 K/UL — SIGNIFICANT CHANGE UP (ref 150–400)
PMV BLD: 10.2 FL — SIGNIFICANT CHANGE UP (ref 7–13)
PO2 BLDA: 129 MMHG — HIGH (ref 83–108)
POTASSIUM BLDA-SCNC: 4 MMOL/L — SIGNIFICANT CHANGE UP (ref 3.4–4.5)
POTASSIUM SERPL-MCNC: 4.4 MMOL/L — SIGNIFICANT CHANGE UP (ref 3.5–5.3)
POTASSIUM SERPL-SCNC: 4.4 MMOL/L — SIGNIFICANT CHANGE UP (ref 3.5–5.3)
RBC # BLD: 4.01 M/UL — LOW (ref 4.2–5.8)
RBC # FLD: 15.4 % — HIGH (ref 10.3–14.5)
SAO2 % BLDA: 99.3 % — HIGH (ref 95–99)
SODIUM BLDA-SCNC: 136 MMOL/L — SIGNIFICANT CHANGE UP (ref 136–146)
SODIUM SERPL-SCNC: 140 MMOL/L — SIGNIFICANT CHANGE UP (ref 135–145)
SPECIMEN SOURCE: SIGNIFICANT CHANGE UP
VANCOMYCIN FLD-MCNC: 6.1 UG/ML — SIGNIFICANT CHANGE UP
WBC # BLD: 16.93 K/UL — HIGH (ref 3.8–10.5)
WBC # FLD AUTO: 16.93 K/UL — HIGH (ref 3.8–10.5)

## 2017-09-25 PROCEDURE — 99291 CRITICAL CARE FIRST HOUR: CPT | Mod: 25

## 2017-09-25 PROCEDURE — 93308 TTE F-UP OR LMTD: CPT | Mod: 26

## 2017-09-25 PROCEDURE — 76604 US EXAM CHEST: CPT | Mod: 26

## 2017-09-25 RX ORDER — LOTEPREDNOL ETABONATE 2 MG/ML
1 SUSPENSION/ DROPS OPHTHALMIC
Qty: 0 | Refills: 0 | Status: DISCONTINUED | OUTPATIENT
Start: 2017-09-25 | End: 2017-10-03

## 2017-09-25 RX ORDER — AZITHROMYCIN 500 MG/1
500 TABLET, FILM COATED ORAL EVERY 24 HOURS
Qty: 0 | Refills: 0 | Status: DISCONTINUED | OUTPATIENT
Start: 2017-09-26 | End: 2017-09-26

## 2017-09-25 RX ORDER — VANCOMYCIN HCL 1 G
1000 VIAL (EA) INTRAVENOUS EVERY 12 HOURS
Qty: 0 | Refills: 0 | Status: DISCONTINUED | OUTPATIENT
Start: 2017-09-25 | End: 2017-09-28

## 2017-09-25 RX ORDER — MEROPENEM 1 G/30ML
1000 INJECTION INTRAVENOUS EVERY 8 HOURS
Qty: 0 | Refills: 0 | Status: DISCONTINUED | OUTPATIENT
Start: 2017-09-25 | End: 2017-10-03

## 2017-09-25 RX ORDER — AZITHROMYCIN 500 MG/1
500 TABLET, FILM COATED ORAL ONCE
Qty: 0 | Refills: 0 | Status: COMPLETED | OUTPATIENT
Start: 2017-09-25 | End: 2017-09-25

## 2017-09-25 RX ORDER — PANTOPRAZOLE SODIUM 20 MG/1
40 TABLET, DELAYED RELEASE ORAL EVERY 12 HOURS
Qty: 0 | Refills: 0 | Status: DISCONTINUED | OUTPATIENT
Start: 2017-09-25 | End: 2017-09-26

## 2017-09-25 RX ORDER — MEROPENEM 1 G/30ML
1000 INJECTION INTRAVENOUS ONCE
Qty: 0 | Refills: 0 | Status: COMPLETED | OUTPATIENT
Start: 2017-09-25 | End: 2017-09-25

## 2017-09-25 RX ORDER — VANCOMYCIN HCL 1 G
1000 VIAL (EA) INTRAVENOUS ONCE
Qty: 0 | Refills: 0 | Status: COMPLETED | OUTPATIENT
Start: 2017-09-25 | End: 2017-09-25

## 2017-09-25 RX ORDER — AZITHROMYCIN 500 MG/1
TABLET, FILM COATED ORAL
Qty: 0 | Refills: 0 | Status: DISCONTINUED | OUTPATIENT
Start: 2017-09-25 | End: 2017-09-26

## 2017-09-25 RX ORDER — PHENYLEPHRINE HYDROCHLORIDE 10 MG/ML
0.5 INJECTION INTRAVENOUS
Qty: 80 | Refills: 0 | Status: DISCONTINUED | OUTPATIENT
Start: 2017-09-25 | End: 2017-09-28

## 2017-09-25 RX ORDER — MEROPENEM 1 G/30ML
INJECTION INTRAVENOUS
Qty: 0 | Refills: 0 | Status: DISCONTINUED | OUTPATIENT
Start: 2017-09-25 | End: 2017-10-03

## 2017-09-25 RX ADMIN — Medication 1 APPLICATION(S): at 17:36

## 2017-09-25 RX ADMIN — PROPOFOL 16.8 MICROGRAM(S)/KG/MIN: 10 INJECTION, EMULSION INTRAVENOUS at 21:14

## 2017-09-25 RX ADMIN — HEPARIN SODIUM 5000 UNIT(S): 5000 INJECTION INTRAVENOUS; SUBCUTANEOUS at 05:24

## 2017-09-25 RX ADMIN — MEROPENEM 200 MILLIGRAM(S): 1 INJECTION INTRAVENOUS at 22:09

## 2017-09-25 RX ADMIN — CARBIDOPA AND LEVODOPA 1.5 TABLET(S): 25; 100 TABLET ORAL at 05:20

## 2017-09-25 RX ADMIN — Medication 1.25 MICROGRAM(S)/KG/MIN: at 08:24

## 2017-09-25 RX ADMIN — LATANOPROST 1 DROP(S): 0.05 SOLUTION/ DROPS OPHTHALMIC; TOPICAL at 22:12

## 2017-09-25 RX ADMIN — LOTEPREDNOL ETABONATE 1 DROP(S): 2 SUSPENSION/ DROPS OPHTHALMIC at 22:11

## 2017-09-25 RX ADMIN — HEPARIN SODIUM 5000 UNIT(S): 5000 INJECTION INTRAVENOUS; SUBCUTANEOUS at 22:10

## 2017-09-25 RX ADMIN — PHENYLEPHRINE HYDROCHLORIDE 13.12 MICROGRAM(S)/KG/MIN: 10 INJECTION INTRAVENOUS at 21:14

## 2017-09-25 RX ADMIN — PHENYLEPHRINE HYDROCHLORIDE 13.12 MICROGRAM(S)/KG/MIN: 10 INJECTION INTRAVENOUS at 13:54

## 2017-09-25 RX ADMIN — AZITHROMYCIN 250 MILLIGRAM(S): 500 TABLET, FILM COATED ORAL at 13:52

## 2017-09-25 RX ADMIN — PANTOPRAZOLE SODIUM 40 MILLIGRAM(S): 20 TABLET, DELAYED RELEASE ORAL at 17:37

## 2017-09-25 RX ADMIN — ATORVASTATIN CALCIUM 80 MILLIGRAM(S): 80 TABLET, FILM COATED ORAL at 22:09

## 2017-09-25 RX ADMIN — Medication 1 DROP(S): at 05:20

## 2017-09-25 RX ADMIN — MEROPENEM 200 MILLIGRAM(S): 1 INJECTION INTRAVENOUS at 13:06

## 2017-09-25 RX ADMIN — PROPOFOL 16.8 MICROGRAM(S)/KG/MIN: 10 INJECTION, EMULSION INTRAVENOUS at 08:24

## 2017-09-25 RX ADMIN — CARBIDOPA AND LEVODOPA 1.5 TABLET(S): 25; 100 TABLET ORAL at 22:09

## 2017-09-25 RX ADMIN — Medication 1 DROP(S): at 17:38

## 2017-09-25 RX ADMIN — HEPARIN SODIUM 5000 UNIT(S): 5000 INJECTION INTRAVENOUS; SUBCUTANEOUS at 13:13

## 2017-09-25 RX ADMIN — CARBIDOPA AND LEVODOPA 1.5 TABLET(S): 25; 100 TABLET ORAL at 13:53

## 2017-09-25 RX ADMIN — Medication 250 MILLIGRAM(S): at 06:34

## 2017-09-25 RX ADMIN — CEFEPIME 100 MILLIGRAM(S): 1 INJECTION, POWDER, FOR SOLUTION INTRAMUSCULAR; INTRAVENOUS at 09:28

## 2017-09-25 RX ADMIN — Medication 250 MILLIGRAM(S): at 17:36

## 2017-09-25 NOTE — PROGRESS NOTE ADULT - SUBJECTIVE AND OBJECTIVE BOX
CHIEF COMPLAINT:    Interval Events:  Patient's Trach uncapped, now on vent support , increased prop, with improved respiratory alkalosis. No additional GIB. HG stable.   REVIEW OF SYSTEMS:  Constitutional: [ ] negative [ ] fevers [ ] chills [ ] weight loss [ ] weight gain  HEENT: [ ] negative [ ] dry eyes [ ] eye irritation [ ] postnasal drip [ ] nasal congestion  CV: [ ] negative  [ ] chest pain [ ] orthopnea [ ] palpitations [ ] murmur  Resp: [ ] negative [ ] cough [ ] shortness of breath [ ] dyspnea [ ] wheezing [ ] sputum [ ] hemoptysis  GI: [ ] negative [ ] nausea [ ] vomiting [ ] diarrhea [ ] constipation [ ] abd pain [ ] dysphagia   : [ ] negative [ ] dysuria [ ] nocturia [ ] hematuria [ ] increased urinary frequency  Musculoskeletal: [ ] negative [ ] back pain [ ] myalgias [ ] arthralgias [ ] fracture  Skin: [ ] negative [ ] rash [ ] itch  Neurological: [ ] negative [ ] headache [ ] dizziness [ ] syncope [ ] weakness [ ] numbness  Psychiatric: [ ] negative [ ] anxiety [ ] depression  Endocrine: [ ] negative [ ] diabetes [ ] thyroid problem  Hematologic/Lymphatic: [ ] negative [ ] anemia [ ] bleeding problem  Allergic/Immunologic: [ ] negative [ ] itchy eyes [ ] nasal discharge [ ] hives [ ] angioedema  [ ] All other systems negative  [x] Unable to assess ROS because Patient is intubated and sedated.     OBJECTIVE:  ICU Vital Signs Last 24 Hrs  T(C): 37.3 (25 Sep 2017 04:00), Max: 39.8 (24 Sep 2017 09:40)  T(F): 99.1 (25 Sep 2017 04:00), Max: 103.7 (24 Sep 2017 09:40)  HR: 96 (25 Sep 2017 07:06) (86 - 113)  BP: 105/61 (25 Sep 2017 07:00) (66/39 - 111/68)  BP(mean): 71 (25 Sep 2017 07:00) (59 - 79)  ABP: --  ABP(mean): --  RR: 18 (25 Sep 2017 07:00) (17 - 40)  SpO2: 98% (25 Sep 2017 07:06) (92% - 100%)    Mode: AC/ CMV (Assist Control/ Continuous Mandatory Ventilation), RR (machine): 12, TV (machine): 500, FiO2: 40, PEEP: 5, MAP: 8, PIP: 18    - @ 07:01  -   @ 07:00  --------------------------------------------------------  IN: 767.7 mL / OUT: 1990 mL / NET: -1222.3 mL      CAPILLARY BLOOD GLUCOSE          PHYSICAL EXAM:  PHYSICAL EXAM:    Constitutional: well-developed; well-groomed; thin male; no distress,  Eyes: PERRL; Pinpoint.   ENMT: Normal oropharnxy, no oral lesions, no erythema, no exudates  Neck:  Supple; no JVD; normal thyroid gland  MSK/Back: normal shape; No rigidity, no tenderenss, no joint erythema, no effusions. No tremors.   Respiratory: airway patent; breath sounds equal; good air movement, no wheezing, no crackes, no rhonchi. no increase in WOB  Cardiovascular: regular rate and rhythm  no rub , no murmur, no gallops.   Gastrointestinal: soft; no distention, normal BS, no TTP, no rebound, no guarding, no mass, no organomegaly, no ascites.  Genitourinary:  Extremities: no clubbing; no cyanosis; no pedal edema, non-tender to palpation, DP and Radial pulses intact.  Neurological: Does not follow commands. Only responsive to pain/ grimace no withdrawing of limbs.   Skin: warm and dry; color normal: no rash: no ulcers   Psychiatric: Calm, no SI/HI        LINES:    HOSPITAL MEDICATIONS:  heparin  Injectable 5000 Unit(s) SubCutaneous every 8 hours    cefepime  IVPB 2000 milliGRAM(s) IV Intermittent every 24 hours    norepinephrine Infusion 0.01 MICROgram(s)/kG/Min IV Continuous <Continuous>    atorvastatin 80 milliGRAM(s) Oral at bedtime    carbidopa/levodopa  25/100 1.5 Tablet(s) Oral three times a day  propofol Infusion 40 MICROgram(s)/kG/Min IV Continuous <Continuous>    influenza   Vaccine 0.5 milliLiter(s) IntraMuscular once    pilocarpine 4% Solution 1 Drop(s) Left EYE two times a day  latanoprost 0.005% Ophthalmic Solution 1 Drop(s) Left EYE at bedtime  dorzolamide 2%/timolol 0.5% Ophthalmic Solution 1 Drop(s) Left EYE two times a day        LABS:                        12.3   16.93 )-----------( 226      ( 25 Sep 2017 03:30 )             37.0     Hgb Trend: 12.3<--, 12.4<--, 14.5<--      140  |  109<H>  |  43<H>  ----------------------------<  113<H>  4.4   |  21<L>  |  0.94    Ca    7.9<L>      25 Sep 2017 03:30  Phos  3.9       Mg     2.0         TPro  6.2  /  Alb  2.4<L>  /  TBili  0.9  /  DBili  x   /  AST  25  /  ALT  6   /  AlkPhos  83      Creatinine Trend: 0.94<--, 2.07<--  PT/INR - ( 24 Sep 2017 10:04 )   PT: 14.7 SEC;   INR: 1.31          PTT - ( 24 Sep 2017 10:04 )  PTT:28.8 SEC  Urinalysis Basic - ( 24 Sep 2017 10:05 )    Color: RED / Appearance: TURBID / S.018 / pH: 8.5  Gluc: NEGATIVE / Ketone: NEGATIVE  / Bili: NEGATIVE / Urobili: NORMAL E.U.   Blood: LARGE / Protein: >600 / Nitrite: NEGATIVE   Leuk Esterase: LARGE / RBC: >50 / WBC >50   Sq Epi: FEW / Non Sq Epi: x / Bacteria: MODERATE      Arterial Blood Gas:   @ 05:50  7.43/31/129/22/99.3/-3.2  ABG lactate: 1.2  Arterial Blood Gas:   @ 22:10  7.49/25/114/22/99.1/-4.3  ABG lactate: 2.0  Arterial Blood Gas:   @ 17:30  7.49/24/91/21/98.1/-4.9  ABG lactate: 2.3    Venous Blood Gas:   @ 12:00  7.39/36/24/21/34.1  VBG Lactate: 2.9  Venous Blood Gas:   @ 10:04  7.50//42/23/76.8  VBG Lactate: 4.1      MICROBIOLOGY:     RADIOLOGY:  [ ] Reviewed and interpreted by me    EKG: CHIEF COMPLAINT:    Interval Events:  Patient's Trach uncapped, now on vent support , increased prop, with improved respiratory alkalosis. No additional GIB. HG stable.   REVIEW OF SYSTEMS:  Constitutional: [ ] negative [ ] fevers [ ] chills [ ] weight loss [ ] weight gain  HEENT: [ ] negative [ ] dry eyes [ ] eye irritation [ ] postnasal drip [ ] nasal congestion  CV: [ ] negative  [ ] chest pain [ ] orthopnea [ ] palpitations [ ] murmur  Resp: [ ] negative [ ] cough [ ] shortness of breath [ ] dyspnea [ ] wheezing [ ] sputum [ ] hemoptysis  GI: [ ] negative [ ] nausea [ ] vomiting [ ] diarrhea [ ] constipation [ ] abd pain [ ] dysphagia   : [ ] negative [ ] dysuria [ ] nocturia [ ] hematuria [ ] increased urinary frequency  Musculoskeletal: [ ] negative [ ] back pain [ ] myalgias [ ] arthralgias [ ] fracture  Skin: [ ] negative [ ] rash [ ] itch  Neurological: [ ] negative [ ] headache [ ] dizziness [ ] syncope [ ] weakness [ ] numbness  Psychiatric: [ ] negative [ ] anxiety [ ] depression  Endocrine: [ ] negative [ ] diabetes [ ] thyroid problem  Hematologic/Lymphatic: [ ] negative [ ] anemia [ ] bleeding problem  Allergic/Immunologic: [ ] negative [ ] itchy eyes [ ] nasal discharge [ ] hives [ ] angioedema  [ ] All other systems negative  [x] Unable to assess ROS because Patient is intubated and sedated.     OBJECTIVE:  ICU Vital Signs Last 24 Hrs  T(C): 37.3 (25 Sep 2017 04:00), Max: 39.8 (24 Sep 2017 09:40)  T(F): 99.1 (25 Sep 2017 04:00), Max: 103.7 (24 Sep 2017 09:40)  HR: 96 (25 Sep 2017 07:06) (86 - 113)  BP: 105/61 (25 Sep 2017 07:00) (66/39 - 111/68)  BP(mean): 71 (25 Sep 2017 07:00) (59 - 79)  ABP: --  ABP(mean): --  RR: 18 (25 Sep 2017 07:00) (17 - 40)  SpO2: 98% (25 Sep 2017 07:06) (92% - 100%)    Mode: AC/ CMV (Assist Control/ Continuous Mandatory Ventilation), RR (machine): 12, TV (machine): 500, FiO2: 40, PEEP: 5, MAP: 8, PIP: 18    - @ 07:01  -   @ 07:00  --------------------------------------------------------  IN: 767.7 mL / OUT: 1990 mL / NET: -1222.3 mL      CAPILLARY BLOOD GLUCOSE          PHYSICAL EXAM:    Constitutional: well-developed; well-groomed; thin male; no distress,  Eyes: PERRL; Pinpoint.   ENMT: Normal oropharnxy, no oral lesions, no erythema, no exudates, pin point pupils.   Neck:  Supple; no JVD; normal thyroid gland  MSK/Back: normal shape; No rigidity, no tenderenss, no joint erythema, no effusions. No tremors.   Respiratory: airway patent; breath sounds equal; good air movement, no wheezing, no crackes, no rhonchi. no increase in WOB  Cardiovascular: regular rate and rhythm  no rub , no murmur, no gallops.   Gastrointestinal: soft; no distention, normal BS, no TTP, no rebound, no guarding, no mass, no organomegaly, no ascites. Peg in plcea d/c/i  Genitourinary: Alexander in place  Extremities: no clubbing; no cyanosis; no pedal edema, non-tender to palpation, DP and Radial pulses intact.  Neurological: Does not follow commands. Only responsive to pain/ grimace no withdrawing of limbs.   Skin: warm and dry; color normal: no rash: no ulcers   Psychiatric: Calm, no SI/HI        LINES:    HOSPITAL MEDICATIONS:  heparin  Injectable 5000 Unit(s) SubCutaneous every 8 hours    cefepime  IVPB 2000 milliGRAM(s) IV Intermittent every 24 hours    norepinephrine Infusion 0.01 MICROgram(s)/kG/Min IV Continuous <Continuous>    atorvastatin 80 milliGRAM(s) Oral at bedtime    carbidopa/levodopa  25/100 1.5 Tablet(s) Oral three times a day  propofol Infusion 40 MICROgram(s)/kG/Min IV Continuous <Continuous>    influenza   Vaccine 0.5 milliLiter(s) IntraMuscular once    pilocarpine 4% Solution 1 Drop(s) Left EYE two times a day  latanoprost 0.005% Ophthalmic Solution 1 Drop(s) Left EYE at bedtime  dorzolamide 2%/timolol 0.5% Ophthalmic Solution 1 Drop(s) Left EYE two times a day        LABS:                        12.3   16.93 )-----------( 226      ( 25 Sep 2017 03:30 )             37.0     Hgb Trend: 12.3<--, 12.4<--, 14.5<--      140  |  109<H>  |  43<H>  ----------------------------<  113<H>  4.4   |  21<L>  |  0.94    Ca    7.9<L>      25 Sep 2017 03:30  Phos  3.9       Mg     2.0         TPro  6.2  /  Alb  2.4<L>  /  TBili  0.9  /  DBili  x   /  AST  25  /  ALT  6   /  AlkPhos  83      Creatinine Trend: 0.94<--, 2.07<--  PT/INR - ( 24 Sep 2017 10:04 )   PT: 14.7 SEC;   INR: 1.31          PTT - ( 24 Sep 2017 10:04 )  PTT:28.8 SEC  Urinalysis Basic - ( 24 Sep 2017 10:05 )    Color: RED / Appearance: TURBID / S.018 / pH: 8.5  Gluc: NEGATIVE / Ketone: NEGATIVE  / Bili: NEGATIVE / Urobili: NORMAL E.U.   Blood: LARGE / Protein: >600 / Nitrite: NEGATIVE   Leuk Esterase: LARGE / RBC: >50 / WBC >50   Sq Epi: FEW / Non Sq Epi: x / Bacteria: MODERATE      Arterial Blood Gas:   @ 05:50  7.43/31/129/22/99.3/-3.2  ABG lactate: 1.2  Arterial Blood Gas:   @ 22:10  7.49/25/114/22/99.1/-4.3  ABG lactate: 2.0  Arterial Blood Gas:   @ 17:30  7.49/24/91/21/98.1/-4.9  ABG lactate: 2.3    Venous Blood Gas:   @ 12:00  7.39/36/24/21/34.1  VBG Lactate: 2.9  Venous Blood Gas:   @ 10:04  7.50/27/42/23/76.8  VBG Lactate: 4.1      MICROBIOLOGY:     RADIOLOGY:  [ ] Reviewed and interpreted by me    EKG:

## 2017-09-25 NOTE — PROGRESS NOTE ADULT - ASSESSMENT
83 yo M with hx of CVA with R. sided residual deficits, s/p PEG, trach, CAD s/p CABG (28yo), Parkinson's disease, presents for a fever from ([facility]) presents for a fever, admitted with septic shock likely 2/2 UTI.

## 2017-09-25 NOTE — PROGRESS NOTE ADULT - SUBJECTIVE AND OBJECTIVE BOX
:  Dr. La    INDICATION: CP Failure    PROCEDURE:  [X] LIMITED ECHO  [X] LIMITED CHEST  [ ] LIMITED RETROPERITONEAL  [ ] LIMITED ABDOMINAL  [ ] LIMITED DVT  [ ] NEEDLE GUIDANCE VASCULAR  [ ] NEEDLE GUIDANCE THORACENTESIS  [ ] NEEDLE GUIDANCE PARACENTESIS  [ ] NEEDLE GUIDANCE PERICARDIOCENTESIS  [ ] OTHER    FINDINGS:     LUNG: Predominately A-line pattern, LLL consolidation with small PLEFF,   CARDIAC: Normal LV function, No RV enlargement, No PEF, distal ventricular septal wall abnormality seen in apical 4      INTERPRETATION:  Normal aeration pattern with LLL consolidation and distal septal ventricular wall abnormality, otherwise no cardiac limitation. :  Dr. La    INDICATION: CP Failure    PROCEDURE:  [X] LIMITED ECHO  [X] LIMITED CHEST  [ ] LIMITED RETROPERITONEAL  [ ] LIMITED ABDOMINAL  [ ] LIMITED DVT  [ ] NEEDLE GUIDANCE VASCULAR  [ ] NEEDLE GUIDANCE THORACENTESIS  [ ] NEEDLE GUIDANCE PARACENTESIS  [ ] NEEDLE GUIDANCE PERICARDIOCENTESIS  [ ] OTHER    FINDINGS:     LUNG: Predominately A-line pattern, LLL consolidation with small PLEFF  CARDIAC: Normal LV function, No RV enlargement, No PEF, distal ventricular septal wall abnormality seen in apical 4      INTERPRETATION:  Normal aeration pattern with LLL consolidation and distal septal ventricular wall abnormality, otherwise no cardiac limitation. :  Dr. La    INDICATION: Cardiopulmonary Failure    PROCEDURE:  [X] LIMITED ECHO  [X] LIMITED CHEST  [ ] LIMITED RETROPERITONEAL  [ ] LIMITED ABDOMINAL  [ ] LIMITED DVT  [ ] NEEDLE GUIDANCE VASCULAR  [ ] NEEDLE GUIDANCE THORACENTESIS  [ ] NEEDLE GUIDANCE PARACENTESIS  [ ] NEEDLE GUIDANCE PERICARDIOCENTESIS  [ ] OTHER    FINDINGS:     LUNG: Predominately A-line pattern, LLL consolidation with small PLEFF  CARDIAC: Normal LV function, No RV enlargement, No PEF, distal ventricular septal wall abnormality seen in apical 4      INTERPRETATION:  Normal aeration pattern with LLL consolidation consistent with PNA.  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

## 2017-09-25 NOTE — PROGRESS NOTE ADULT - SUBJECTIVE AND OBJECTIVE BOX
Patient is a 82y old  Male who presents with a chief complaint of Emesis (24 Sep 2017 13:49)    pt. seen and examined, remained on [facility] vent     INTERVAL HPI/OVERNIGHT EVENTS:  T(C): 37.1 (17 @ 16:00), Max: 37.4 (17 @ 00:00)  HR: 71 (17 @ 19:19) (63 - 113)  BP: 93/50 (17 @ 19:00) (82/49 - 122/72)  RR: 20 (17 @ 19:00) (15 - 27)  SpO2: 100% (17 @ 19:19) (86% - 100%)  Wt(kg): --  I&O's Summary    24 Sep 2017 07:  -  25 Sep 2017 07:00  --------------------------------------------------------  IN: 767.7 mL / OUT: 1990 mL / NET: -1222.3 mL    25 Sep 2017 07:  -  25 Sep 2017 20:26  --------------------------------------------------------  IN: 821.5 mL / OUT: 610 mL / NET: 211.5 mL        PAST MEDICAL & SURGICAL HISTORY:  Tracheostomy in place  Hypertension  CVA (cerebral vascular accident)  S/P percutaneous endoscopic gastrostomy (PEG) tube placement      SOCIAL HISTORY  Alcohol:  Tobacco:  Illicit substance use:    FAMILY HISTORY:    REVIEW OF SYSTEMS:  CONSTITUTIONAL: No fever, weight loss, or fatigue  EYES: No eye pain, visual disturbances, or discharge  ENMT:  No difficulty hearing, tinnitus, vertigo; No sinus or throat pain  NECK: No pain or stiffness trach+  RESPIRATORY: No cough, wheezing, chills or hemoptysis; No shortness of breath  CARDIOVASCULAR: No chest pain, palpitations, dizziness, or leg swelling  GASTROINTESTINAL: No abdominal or epigastric pain. No nausea, vomiting, or hematemesis; No diarrhea or constipation. No melena or hematochezia.  GENITOURINARY: No dysuria, frequency, hematuria, or incontinence  NEUROLOGICAL: No headaches, memory loss, loss of strength, numbness, or tremors  SKIN: No itching, burning, rashes, or lesions   LYMPH NODES: No enlarged glands  ENDOCRINE: No heat or cold intolerance; No hair loss  MUSCULOSKELETAL: No joint pain or swelling; No muscle, back, or extremity pain  PSYCHIATRIC: No depression, anxiety, mood swings, or difficulty sleeping  HEME/LYMPH: No easy bruising, or bleeding gums  ALLERY AND IMMUNOLOGIC: No hives or eczema    RADIOLOGY & ADDITIONAL TESTS:    Imaging Personally Reviewed:  [ ] YES  [ ] NO    Consultant(s) Notes Reviewed:  [ ] YES  [ ] NO    PHYSICAL EXAM:  GENERAL: NAD, well-groomed, well-developed  HEAD:  Atraumatic, Normocephalic  EYES: EOMI, PERRLA, conjunctiva and sclera clear  ENMT: No tonsillar erythema, exudates, or enlargement; Moist mucous membranes, Good dentition, No lesions  NECK: Supple, No JVD, Normal thyroid  NERVOUS SYSTEM:  Alert & Oriented X3, Good concentration; Motor Strength 5/5 B/L upper and lower extremities; DTRs 2+ intact and symmetric  CHEST/LUNG: B/L mech BS+  HEART: Regular rate and rhythm; No murmurs, rubs, or gallops  ABDOMEN: Soft, Nontender, Nondistended; Bowel sounds present  EXTREMITIES:  2+ Peripheral Pulses, No clubbing, cyanosis, or edema  LYMPH: No lymphadenopathy noted  SKIN: No rashes or lesions    LABS:                        12.3   16.93 )-----------( 226      ( 25 Sep 2017 03:30 )             37.0     -    140  |  109<H>  |  43<H>  ----------------------------<  113<H>  4.4   |  21<L>  |  0.94    Ca    7.9<L>      25 Sep 2017 03:30  Phos  3.9       Mg     2.0         TPro  6.2  /  Alb  2.4<L>  /  TBili  0.9  /  DBili  x   /  AST  25  /  ALT  6   /  AlkPhos  83      PT/INR - ( 24 Sep 2017 10:04 )   PT: 14.7 SEC;   INR: 1.31          PTT - ( 24 Sep 2017 10:04 )  PTT:28.8 SEC  Urinalysis Basic - ( 24 Sep 2017 10:05 )    Color: RED / Appearance: TURBID / S.018 / pH: 8.5  Gluc: NEGATIVE / Ketone: NEGATIVE  / Bili: NEGATIVE / Urobili: NORMAL E.U.   Blood: LARGE / Protein: >600 / Nitrite: NEGATIVE   Leuk Esterase: LARGE / RBC: >50 / WBC >50   Sq Epi: FEW / Non Sq Epi: x / Bacteria: MODERATE      CAPILLARY BLOOD GLUCOSE        ABG - ( 25 Sep 2017 05:50 )  pH: 7.43  /  pCO2: 31    /  pO2: 129   / HCO3: 22    / Base Excess: -3.2  /  SaO2: 99.3                Urinalysis Basic - ( 24 Sep 2017 10:05 )    Color: RED / Appearance: TURBID / S.018 / pH: 8.5  Gluc: NEGATIVE / Ketone: NEGATIVE  / Bili: NEGATIVE / Urobili: NORMAL E.U.   Blood: LARGE / Protein: >600 / Nitrite: NEGATIVE   Leuk Esterase: LARGE / RBC: >50 / WBC >50   Sq Epi: FEW / Non Sq Epi: x / Bacteria: MODERATE        MEDICATIONS  (STANDING):  heparin  Injectable 5000 Unit(s) SubCutaneous every 8 hours  influenza   Vaccine 0.5 milliLiter(s) IntraMuscular once  atorvastatin 80 milliGRAM(s) Oral at bedtime  carbidopa/levodopa  25/100 1.5 Tablet(s) Oral three times a day  pilocarpine 4% Solution 1 Drop(s) Left EYE two times a day  latanoprost 0.005% Ophthalmic Solution 1 Drop(s) Left EYE at bedtime  dorzolamide 2%/timolol 0.5% Ophthalmic Solution 1 Drop(s) Left EYE two times a day  propofol Infusion 40 MICROgram(s)/kG/Min (16.8 mL/Hr) IV Continuous <Continuous>  pantoprazole  Injectable 40 milliGRAM(s) IV Push every 12 hours  vancomycin  IVPB 1000 milliGRAM(s) IV Intermittent every 12 hours  meropenem IVPB      azithromycin  IVPB      meropenem IVPB 1000 milliGRAM(s) IV Intermittent every 8 hours  petrolatum Ophthalmic Ointment 1 Application(s) Both EYES two times a day  phenylephrine    Infusion 0.5 MICROgram(s)/kG/Min (13.125 mL/Hr) IV Continuous <Continuous>  loteprednol 0.5% Ophthalmic Suspension 1 Drop(s) Right EYE <User Schedule>    MEDICATIONS  (PRN):      Care Discussed with Consultants/Other Providers [ ] YES  [ ] NO

## 2017-09-26 LAB
BASOPHILS # BLD AUTO: 0.02 K/UL — SIGNIFICANT CHANGE UP (ref 0–0.2)
BASOPHILS NFR BLD AUTO: 0.1 % — SIGNIFICANT CHANGE UP (ref 0–2)
BUN SERPL-MCNC: 33 MG/DL — HIGH (ref 7–23)
CALCIUM SERPL-MCNC: 7.9 MG/DL — LOW (ref 8.4–10.5)
CHLORIDE SERPL-SCNC: 109 MMOL/L — HIGH (ref 98–107)
CO2 SERPL-SCNC: 18 MMOL/L — LOW (ref 22–31)
CREAT SERPL-MCNC: 0.62 MG/DL — SIGNIFICANT CHANGE UP (ref 0.5–1.3)
CULTURE - ACID FAST SMEAR CONCENTRATED: SIGNIFICANT CHANGE UP
EOSINOPHIL # BLD AUTO: 0.46 K/UL — SIGNIFICANT CHANGE UP (ref 0–0.5)
EOSINOPHIL NFR BLD AUTO: 3.3 % — SIGNIFICANT CHANGE UP (ref 0–6)
GLUCOSE SERPL-MCNC: 84 MG/DL — SIGNIFICANT CHANGE UP (ref 70–99)
GRAM STN SPT: SIGNIFICANT CHANGE UP
HCT VFR BLD CALC: 30.4 % — LOW (ref 39–50)
HGB BLD-MCNC: 10 G/DL — LOW (ref 13–17)
IMM GRANULOCYTES # BLD AUTO: 0.22 # — SIGNIFICANT CHANGE UP
IMM GRANULOCYTES NFR BLD AUTO: 1.6 % — HIGH (ref 0–1.5)
L PNEUMO AG UR QL: NEGATIVE — SIGNIFICANT CHANGE UP
LYMPHOCYTES # BLD AUTO: 0.88 K/UL — LOW (ref 1–3.3)
LYMPHOCYTES # BLD AUTO: 6.3 % — LOW (ref 13–44)
MAGNESIUM SERPL-MCNC: 1.4 MG/DL — LOW (ref 1.6–2.6)
MCHC RBC-ENTMCNC: 30.5 PG — SIGNIFICANT CHANGE UP (ref 27–34)
MCHC RBC-ENTMCNC: 32.9 % — SIGNIFICANT CHANGE UP (ref 32–36)
MCV RBC AUTO: 92.7 FL — SIGNIFICANT CHANGE UP (ref 80–100)
MONOCYTES # BLD AUTO: 1.31 K/UL — HIGH (ref 0–0.9)
MONOCYTES NFR BLD AUTO: 9.4 % — SIGNIFICANT CHANGE UP (ref 2–14)
NEUTROPHILS # BLD AUTO: 11.08 K/UL — HIGH (ref 1.8–7.4)
NEUTROPHILS NFR BLD AUTO: 79.3 % — HIGH (ref 43–77)
NRBC # FLD: 0 — SIGNIFICANT CHANGE UP
PHOSPHATE SERPL-MCNC: 2.5 MG/DL — SIGNIFICANT CHANGE UP (ref 2.5–4.5)
PLATELET # BLD AUTO: 217 K/UL — SIGNIFICANT CHANGE UP (ref 150–400)
PMV BLD: 10 FL — SIGNIFICANT CHANGE UP (ref 7–13)
POTASSIUM SERPL-MCNC: 3.8 MMOL/L — SIGNIFICANT CHANGE UP (ref 3.5–5.3)
POTASSIUM SERPL-SCNC: 3.8 MMOL/L — SIGNIFICANT CHANGE UP (ref 3.5–5.3)
RBC # BLD: 3.28 M/UL — LOW (ref 4.2–5.8)
RBC # FLD: 15.4 % — HIGH (ref 10.3–14.5)
SODIUM SERPL-SCNC: 139 MMOL/L — SIGNIFICANT CHANGE UP (ref 135–145)
SPECIMEN SOURCE: SIGNIFICANT CHANGE UP
VANCOMYCIN TROUGH SERPL-MCNC: 16.7 UG/ML — SIGNIFICANT CHANGE UP (ref 10–20)
WBC # BLD: 13.97 K/UL — HIGH (ref 3.8–10.5)
WBC # FLD AUTO: 13.97 K/UL — HIGH (ref 3.8–10.5)

## 2017-09-26 PROCEDURE — 99291 CRITICAL CARE FIRST HOUR: CPT

## 2017-09-26 RX ORDER — DORZOLAMIDE HYDROCHLORIDE TIMOLOL MALEATE 20; 5 MG/ML; MG/ML
1 SOLUTION/ DROPS OPHTHALMIC
Qty: 0 | Refills: 0 | Status: DISCONTINUED | OUTPATIENT
Start: 2017-09-26 | End: 2017-10-03

## 2017-09-26 RX ORDER — SODIUM CHLORIDE 9 MG/ML
3 INJECTION INTRAMUSCULAR; INTRAVENOUS; SUBCUTANEOUS
Qty: 0 | Refills: 0 | Status: CANCELLED | OUTPATIENT
Start: 2018-08-26 | End: 2017-10-03

## 2017-09-26 RX ORDER — DOCUSATE SODIUM 100 MG
100 CAPSULE ORAL THREE TIMES A DAY
Qty: 0 | Refills: 0 | Status: DISCONTINUED | OUTPATIENT
Start: 2017-09-26 | End: 2017-09-27

## 2017-09-26 RX ORDER — PANTOPRAZOLE SODIUM 20 MG/1
40 TABLET, DELAYED RELEASE ORAL
Qty: 0 | Refills: 0 | Status: DISCONTINUED | OUTPATIENT
Start: 2017-09-26 | End: 2017-10-03

## 2017-09-26 RX ORDER — SODIUM CHLORIDE 9 MG/ML
3 INJECTION INTRAMUSCULAR; INTRAVENOUS; SUBCUTANEOUS DAILY
Qty: 0 | Refills: 0 | Status: DISCONTINUED | OUTPATIENT
Start: 2017-09-26 | End: 2017-09-26

## 2017-09-26 RX ORDER — MAGNESIUM SULFATE 500 MG/ML
2 VIAL (ML) INJECTION ONCE
Qty: 0 | Refills: 0 | Status: COMPLETED | OUTPATIENT
Start: 2017-09-26 | End: 2017-09-26

## 2017-09-26 RX ORDER — PANTOPRAZOLE SODIUM 20 MG/1
40 TABLET, DELAYED RELEASE ORAL DAILY
Qty: 0 | Refills: 0 | Status: DISCONTINUED | OUTPATIENT
Start: 2017-09-26 | End: 2017-09-26

## 2017-09-26 RX ORDER — SENNA PLUS 8.6 MG/1
2 TABLET ORAL AT BEDTIME
Qty: 0 | Refills: 0 | Status: DISCONTINUED | OUTPATIENT
Start: 2017-09-26 | End: 2017-10-03

## 2017-09-26 RX ORDER — CHLORHEXIDINE GLUCONATE 213 G/1000ML
1 SOLUTION TOPICAL DAILY
Qty: 0 | Refills: 0 | Status: DISCONTINUED | OUTPATIENT
Start: 2017-09-26 | End: 2017-09-28

## 2017-09-26 RX ORDER — POLYETHYLENE GLYCOL 3350 17 G/17G
17 POWDER, FOR SOLUTION ORAL
Qty: 0 | Refills: 0 | Status: DISCONTINUED | OUTPATIENT
Start: 2017-09-26 | End: 2017-10-03

## 2017-09-26 RX ADMIN — MEROPENEM 200 MILLIGRAM(S): 1 INJECTION INTRAVENOUS at 22:22

## 2017-09-26 RX ADMIN — SENNA PLUS 2 TABLET(S): 8.6 TABLET ORAL at 22:23

## 2017-09-26 RX ADMIN — Medication 100 MILLIGRAM(S): at 13:54

## 2017-09-26 RX ADMIN — CARBIDOPA AND LEVODOPA 1.5 TABLET(S): 25; 100 TABLET ORAL at 06:02

## 2017-09-26 RX ADMIN — HEPARIN SODIUM 5000 UNIT(S): 5000 INJECTION INTRAVENOUS; SUBCUTANEOUS at 06:05

## 2017-09-26 RX ADMIN — PANTOPRAZOLE SODIUM 40 MILLIGRAM(S): 20 TABLET, DELAYED RELEASE ORAL at 06:02

## 2017-09-26 RX ADMIN — ATORVASTATIN CALCIUM 80 MILLIGRAM(S): 80 TABLET, FILM COATED ORAL at 22:22

## 2017-09-26 RX ADMIN — HEPARIN SODIUM 5000 UNIT(S): 5000 INJECTION INTRAVENOUS; SUBCUTANEOUS at 22:22

## 2017-09-26 RX ADMIN — DORZOLAMIDE HYDROCHLORIDE TIMOLOL MALEATE 1 DROP(S): 20; 5 SOLUTION/ DROPS OPHTHALMIC at 17:08

## 2017-09-26 RX ADMIN — CHLORHEXIDINE GLUCONATE 1 APPLICATION(S): 213 SOLUTION TOPICAL at 11:37

## 2017-09-26 RX ADMIN — PANTOPRAZOLE SODIUM 40 MILLIGRAM(S): 20 TABLET, DELAYED RELEASE ORAL at 17:09

## 2017-09-26 RX ADMIN — Medication 50 GRAM(S): at 04:45

## 2017-09-26 RX ADMIN — Medication 1 DROP(S): at 06:07

## 2017-09-26 RX ADMIN — POLYETHYLENE GLYCOL 3350 17 GRAM(S): 17 POWDER, FOR SOLUTION ORAL at 17:07

## 2017-09-26 RX ADMIN — MEROPENEM 200 MILLIGRAM(S): 1 INJECTION INTRAVENOUS at 13:54

## 2017-09-26 RX ADMIN — Medication 1 DROP(S): at 17:07

## 2017-09-26 RX ADMIN — Medication 250 MILLIGRAM(S): at 06:08

## 2017-09-26 RX ADMIN — CARBIDOPA AND LEVODOPA 1.5 TABLET(S): 25; 100 TABLET ORAL at 22:23

## 2017-09-26 RX ADMIN — LATANOPROST 1 DROP(S): 0.05 SOLUTION/ DROPS OPHTHALMIC; TOPICAL at 22:25

## 2017-09-26 RX ADMIN — Medication 250 MILLIGRAM(S): at 18:01

## 2017-09-26 RX ADMIN — MEROPENEM 200 MILLIGRAM(S): 1 INJECTION INTRAVENOUS at 05:41

## 2017-09-26 RX ADMIN — PHENYLEPHRINE HYDROCHLORIDE 13.12 MICROGRAM(S)/KG/MIN: 10 INJECTION INTRAVENOUS at 08:18

## 2017-09-26 RX ADMIN — Medication 1 APPLICATION(S): at 06:07

## 2017-09-26 RX ADMIN — HEPARIN SODIUM 5000 UNIT(S): 5000 INJECTION INTRAVENOUS; SUBCUTANEOUS at 13:54

## 2017-09-26 RX ADMIN — PHENYLEPHRINE HYDROCHLORIDE 13.12 MICROGRAM(S)/KG/MIN: 10 INJECTION INTRAVENOUS at 22:22

## 2017-09-26 RX ADMIN — CARBIDOPA AND LEVODOPA 1.5 TABLET(S): 25; 100 TABLET ORAL at 13:54

## 2017-09-26 RX ADMIN — Medication 1 APPLICATION(S): at 17:07

## 2017-09-26 NOTE — PROGRESS NOTE ADULT - ASSESSMENT
#Neuro:  #CV:   #Pulm:   #ID:   #Renal/Fluid:   #Heme:   #Endo:   #GI:    #DVT ppx:   # Dispo:     Dae Hyeon Kim MD-PGY3  Department of Internal Medicine  Pager [phone] #Neuro: Off sedation, likely in worsening mental status 2/2 to PD flare, has been getting meds but may not be absorbing. Will consider increasing parkinson's meds. Possible recovery with correction of underlying sepsis.   #CV: On increasing dose of josh, no stable at near 1. Likely 2/2 to vasoplegic shock from sepsis. Will check IVC, may need fluids. Was in afib, yesterday in setting of levophed. history of stroke with elevated chads 2.   #Pulm:   #ID:   #Renal/Fluid:   #Heme:   #Endo:   #GI:    #DVT ppx:   # Dispo:     Dae Hyeon Kim MD-PGY3  Department of Internal Medicine  Pager [phone] #Neuro: Off sedation, likely in worsening mental status 2/2 to PD flare, has been getting meds but may not be absorbing. Will consider increasing parkinson's meds. Possible recovery with correction of underlying sepsis.   #CV: On increasing dose of josh, no stable at near 1. Likely 2/2 to vasoplegic shock from sepsis. Will check IVC, may need fluids. Was in afib, yesterday in setting of levophed. history of stroke with elevated [id number]. Will hold a/c for now.   #Pulm: On vent, likely needed 2/2 to PD/sepsis. C/W current settings.   #ID: Consolidation on U/S, positive UA, urine culture grossly contaminated, repeat with new Alexander pending.   #Renal/Fluid:   #Heme:   #Endo:   #GI:    #DVT ppx:   # Dispo:     Dae Hyeon Kim MD-PGY3  Department of Internal Medicine  Pager [phone] #Neuro: Off sedation, likely in worsening mental status 2/2 to PD flare, has been getting meds but may not be absorbing. Will consider increasing parkinson's meds. Possible recovery with correction of underlying sepsis.   #CV: On increasing dose of josh, no stable at near 1. Likely 2/2 to vasoplegic shock from sepsis. Will check IVC, may need fluids. Was in afib, yesterday in setting of levophed. history of stroke with elevated [id number]. Will hold a/c for now.   #Pulm: On vent, likely needed 2/2 to PD/sepsis. C/W current settings.   #ID: Consolidation on U/S, positive UA, urine culture grossly contaminated, repeat with new Alexander pending. Now on Vanc and meropenem. d/c azithromycin given urine legionella is negative.   #Renal/Fluid: Cr, stable, will consider standing fluids net positive likely now resolving.   #Heme:   #Endo:   #GI:    #DVT ppx:   # Dispo:     Dae Hyeon Kim MD-PGY3  Department of Internal Medicine  Pager [phone] #Neuro: Off sedation, likely in worsening mental status 2/2 to PD flare, has been getting meds but may not be absorbing. Will consider increasing parkinson's meds. Possible recovery with correction of underlying sepsis.   #CV: On increasing dose of josh, no stable at near 1. Likely 2/2 to vasoplegic shock from sepsis. Will check IVC, may need fluids. Was in afib, yesterday in setting of levophed. history of stroke with elevated [id number]. Will hold a/c for now.   #Pulm: On vent, likely needed 2/2 to PD/sepsis. C/W current settings.   #ID: Consolidation on U/S, positive UA, urine culture grossly contaminated, repeat with new Alexander pending. Now on Vanc and meropenem. d/c azithromycin given urine legionella is negative. WBC downtrending  #Renal/Fluid: Cr, stable, will consider standing fluids net positive likely now resolving.   #Heme: developing anemia, likely 2/2 to sepsis, no evidence of GIB currently. FOBT was positive no GIB. WIll need GI eval once stable on floors. c/w PPI bid for now.   #Endo: No issues currently.   #GI:  Will start feeds today, c/w ppi for anemia and possible hx GIB.   #DVT ppx: SQH  # Dispo: Floors when stable. Full code currently. Was having improving functional status as out patient.     Dae Hyeon Kim MD-PGY3  Department of Internal Medicine  Pager [phone]

## 2017-09-26 NOTE — DIETITIAN INITIAL EVALUATION ADULT. - OTHER INFO
Pt referred for nutrition consult 2/2 chewing/swallowing issues, Stage II or greater pressure ulcer.  Pt w/Stage II and DTI on sacrum.  Pt transferred from NH, where he was receiving PEG feeds of Jevity 1.2 1400ml/d delivered @65ml/h starting 4PM until completion + Chopped diet with thin consistency fluids + LPS protein supplement 2 x/d.  TF provided 1680 kcal/d  w/107gms/d.  Pt also received 40ml/h H2O flushes.  Wife denies pt w/food allergies  Wt loss since pt had stroke in July 2017.

## 2017-09-26 NOTE — CHART NOTE - NSCHARTNOTEFT_GEN_A_CORE
NUTRITION SERVICES     Upon Nutritional Assessment by the Registered Dietitian your patient was determined to meet criteria/ has evidence of the following diagnosis/diagnoses:  [ ] Mild Protein Calorie Malnutrition   [ ] Moderate Protein Calorie Malnutrition   [x ] Severe Protein Calorie Malnutrition   [ ] Unspecified Protein Calorie Malnutrition   [ ] Underweight / BMI <19  [ ] Morbid Obesity / BMI >40    Findings as based on:  •  Comprehensive nutrition assessment and consultation

## 2017-09-26 NOTE — PROGRESS NOTE ADULT - SUBJECTIVE AND OBJECTIVE BOX
Patient is a 82y old  Male who presents with a chief complaint of Emesis (24 Sep 2017 13:49)    pt. seen and examined , condition unchanged    INTERVAL HPI/OVERNIGHT EVENTS:  T(C): 37.7 (09-26-17 @ 20:00), Max: 38 (09-26-17 @ 08:00)  HR: 57 (09-26-17 @ 23:18) (56 - 74)  BP: 108/56 (09-26-17 @ 23:00) (82/45 - 132/80)  RR: 17 (09-26-17 @ 23:00) (14 - 31)  SpO2: 100% (09-26-17 @ 23:18) (94% - 100%)  Wt(kg): --  I&O's Summary    25 Sep 2017 07:01  -  26 Sep 2017 07:00  --------------------------------------------------------  IN: 1917.2 mL / OUT: 1077 mL / NET: 840.2 mL    26 Sep 2017 07:01  -  27 Sep 2017 00:02  --------------------------------------------------------  IN: 721 mL / OUT: 564 mL / NET: 157 mL        PAST MEDICAL & SURGICAL HISTORY:  Tracheostomy in place  Hypertension  CVA (cerebral vascular accident)  S/P percutaneous endoscopic gastrostomy (PEG) tube placement      SOCIAL HISTORY  Alcohol:  Tobacco:  Illicit substance use:    FAMILY HISTORY:    REVIEW OF SYSTEMS:  CONSTITUTIONAL: No fever, weight loss, or fatigue  EYES: No eye pain, visual disturbances, or discharge  ENMT:  No difficulty hearing, tinnitus, vertigo; No sinus or throat pain  NECK: No pain or stiffness  RESPIRATORY: No cough, wheezing, chills or hemoptysis; No shortness of breath  CARDIOVASCULAR: No chest pain, palpitations, dizziness, or leg swelling  GASTROINTESTINAL: No abdominal or epigastric pain. No nausea, vomiting, or hematemesis; No diarrhea or constipation. No melena or hematochezia.  GENITOURINARY: No dysuria, frequency, hematuria, or incontinence  NEUROLOGICAL: No headaches, memory loss, loss of strength, numbness, or tremors  SKIN: No itching, burning, rashes, or lesions   LYMPH NODES: No enlarged glands  ENDOCRINE: No heat or cold intolerance; No hair loss  MUSCULOSKELETAL: No joint pain or swelling; No muscle, back, or extremity pain  PSYCHIATRIC: No depression, anxiety, mood swings, or difficulty sleeping  HEME/LYMPH: No easy bruising, or bleeding gums  ALLERY AND IMMUNOLOGIC: No hives or eczema    RADIOLOGY & ADDITIONAL TESTS:    Imaging Personally Reviewed:  [ ] YES  [ ] NO    Consultant(s) Notes Reviewed:  [ ] YES  [ ] NO    PHYSICAL EXAM:  GENERAL: NAD, well-groomed, well-developed  HEAD:  Atraumatic, Normocephalic  EYES: EOMI, PERRLA, conjunctiva and sclera clear  ENMT: No tonsillar erythema, exudates, or enlargement; Moist mucous membranes, Good dentition, No lesions  NECK: Supple, No JVD, Normal thyroid  NERVOUS SYSTEM:  Alert & Oriented X3, Good concentration; Motor Strength 5/5 B/L upper and lower extremities; DTRs 2+ intact and symmetric  CHEST/LUNG: Clear to percussion bilaterally; No rales, rhonchi, wheezing, or rubs  HEART: Regular rate and rhythm; No murmurs, rubs, or gallops  ABDOMEN: Soft, Nontender, Nondistended; Bowel sounds present  EXTREMITIES:  2+ Peripheral Pulses, No clubbing, cyanosis, or edema  LYMPH: No lymphadenopathy noted  SKIN: No rashes or lesions    LABS:                        10.0   13.97 )-----------( 217      ( 26 Sep 2017 03:48 )             30.4     09-26    139  |  109<H>  |  33<H>  ----------------------------<  84  3.8   |  18<L>  |  0.62    Ca    7.9<L>      26 Sep 2017 03:48  Phos  2.5     09-26  Mg     1.4     09-26          CAPILLARY BLOOD GLUCOSE        ABG - ( 25 Sep 2017 05:50 )  pH: 7.43  /  pCO2: 31    /  pO2: 129   / HCO3: 22    / Base Excess: -3.2  /  SaO2: 99.3                    MEDICATIONS  (STANDING):  heparin  Injectable 5000 Unit(s) SubCutaneous every 8 hours  influenza   Vaccine 0.5 milliLiter(s) IntraMuscular once  atorvastatin 80 milliGRAM(s) Oral at bedtime  carbidopa/levodopa  25/100 1.5 Tablet(s) Oral three times a day  pilocarpine 4% Solution 1 Drop(s) Left EYE two times a day  latanoprost 0.005% Ophthalmic Solution 1 Drop(s) Left EYE at bedtime  vancomycin  IVPB 1000 milliGRAM(s) IV Intermittent every 12 hours  meropenem IVPB      meropenem IVPB 1000 milliGRAM(s) IV Intermittent every 8 hours  petrolatum Ophthalmic Ointment 1 Application(s) Both EYES two times a day  phenylephrine    Infusion 0.5 MICROgram(s)/kG/Min (13.125 mL/Hr) IV Continuous <Continuous>  loteprednol 0.5% Ophthalmic Suspension 1 Drop(s) Right EYE <User Schedule>  chlorhexidine 4% Liquid 1 Application(s) Topical daily  polyethylene glycol 3350 17 Gram(s) Oral two times a day  pantoprazole   Suspension 40 milliGRAM(s) Oral before breakfast  senna 2 Tablet(s) Oral at bedtime  docusate sodium 100 milliGRAM(s) Oral three times a day  dorzolamide 2%/timolol 0.5% Ophthalmic Solution 1 Drop(s) Left EYE two times a day    MEDICATIONS  (PRN):      Care Discussed with Consultants/Other Providers [ ] YES  [ ] NO

## 2017-09-26 NOTE — PROGRESS NOTE ADULT - SUBJECTIVE AND OBJECTIVE BOX
CHIEF COMPLAINT:    Interval Events:    Sedation d/c patient more awake but now more rigid. Had afib with RVR, known stroke in the past but in setting of coffee ground emesis and levophed not being currently a/c.     REVIEW OF SYSTEMS:  Constitutional: [ ] negative [ ] fevers [ ] chills [ ] weight loss [ ] weight gain  HEENT: [ ] negative [ ] dry eyes [ ] eye irritation [ ] postnasal drip [ ] nasal congestion  CV: [ ] negative  [ ] chest pain [ ] orthopnea [ ] palpitations [ ] murmur  Resp: [ ] negative [ ] cough [ ] shortness of breath [ ] dyspnea [ ] wheezing [ ] sputum [ ] hemoptysis  GI: [ ] negative [ ] nausea [ ] vomiting [ ] diarrhea [ ] constipation [ ] abd pain [ ] dysphagia   : [ ] negative [ ] dysuria [ ] nocturia [ ] hematuria [ ] increased urinary frequency  Musculoskeletal: [ ] negative [ ] back pain [ ] myalgias [ ] arthralgias [ ] fracture  Skin: [ ] negative [ ] rash [ ] itch  Neurological: [ ] negative [ ] headache [ ] dizziness [ ] syncope [ ] weakness [ ] numbness  Psychiatric: [ ] negative [ ] anxiety [ ] depression  Endocrine: [ ] negative [ ] diabetes [ ] thyroid problem  Hematologic/Lymphatic: [ ] negative [ ] anemia [ ] bleeding problem  Allergic/Immunologic: [ ] negative [ ] itchy eyes [ ] nasal discharge [ ] hives [ ] angioedema  [ ] All other systems negative  [x] Unable to assess ROS because AMS, just coming off sedation.     OBJECTIVE:  ICU Vital Signs Last 24 Hrs  T(C): 38 (26 Sep 2017 08:00), Max: 38 (26 Sep 2017 08:00)  T(F): 100.4 (26 Sep 2017 08:00), Max: 100.4 (26 Sep 2017 08:00)  HR: 70 (26 Sep 2017 08:00) (63 - 104)  BP: 118/69 (26 Sep 2017 08:00) (82/45 - 122/72)  BP(mean): 79 (26 Sep 2017 08:00) (53 - 701)  ABP: --  ABP(mean): --  RR: 21 (26 Sep 2017 08:00) (14 - 31)  SpO2: 100% (26 Sep 2017 08:00) (86% - 100%)    Mode: AC/ CMV (Assist Control/ Continuous Mandatory Ventilation), RR (machine): 12, TV (machine): 500, FiO2: 40, PEEP: 5, MAP: 8, PIP: 20     @ 07:01  -   @ 07:00  --------------------------------------------------------  IN: 1917.2 mL / OUT: 1077 mL / NET: 840.2 mL      CAPILLARY BLOOD GLUCOSE      PHYSICAL EXAM:  General:   HEENT:   Lymph Nodes:  Neck:   Respiratory:   Cardiovascular:   Abdomen:   Extremities:   Skin:   Neurological:  Psychiatry:    LINES:    HOSPITAL MEDICATIONS:  heparin  Injectable 5000 Unit(s) SubCutaneous every 8 hours    vancomycin  IVPB 1000 milliGRAM(s) IV Intermittent every 12 hours  meropenem IVPB      azithromycin  IVPB      meropenem IVPB 1000 milliGRAM(s) IV Intermittent every 8 hours  azithromycin  IVPB 500 milliGRAM(s) IV Intermittent every 24 hours    phenylephrine    Infusion 0.5 MICROgram(s)/kG/Min IV Continuous <Continuous>    atorvastatin 80 milliGRAM(s) Oral at bedtime      carbidopa/levodopa  25/100 1.5 Tablet(s) Oral three times a day  propofol Infusion 40 MICROgram(s)/kG/Min IV Continuous <Continuous>    pantoprazole  Injectable 40 milliGRAM(s) IV Push every 12 hours          influenza   Vaccine 0.5 milliLiter(s) IntraMuscular once    pilocarpine 4% Solution 1 Drop(s) Left EYE two times a day  latanoprost 0.005% Ophthalmic Solution 1 Drop(s) Left EYE at bedtime  dorzolamide 2%/timolol 0.5% Ophthalmic Solution 1 Drop(s) Left EYE two times a day  petrolatum Ophthalmic Ointment 1 Application(s) Both EYES two times a day  loteprednol 0.5% Ophthalmic Suspension 1 Drop(s) Right EYE <User Schedule>  chlorhexidine 4% Liquid 1 Application(s) Topical daily        LABS:                        10.0   13.97 )-----------( 217      ( 26 Sep 2017 03:48 )             30.4     Hgb Trend: 10.0<--, 12.3<--, 12.4<--, 14.5<--      139  |  109<H>  |  33<H>  ----------------------------<  84  3.8   |  18<L>  |  0.62    Ca    7.9<L>      26 Sep 2017 03:48  Phos  2.5       Mg     1.4         TPro  6.2  /  Alb  2.4<L>  /  TBili  0.9  /  DBili  x   /  AST  25  /  ALT  6   /  AlkPhos  83      Creatinine Trend: 0.62<--, 0.94<--, 2.07<--  PT/INR - ( 24 Sep 2017 10:04 )   PT: 14.7 SEC;   INR: 1.31          PTT - ( 24 Sep 2017 10:04 )  PTT:28.8 SEC  Urinalysis Basic - ( 24 Sep 2017 10:05 )    Color: RED / Appearance: TURBID / S.018 / pH: 8.5  Gluc: NEGATIVE / Ketone: NEGATIVE  / Bili: NEGATIVE / Urobili: NORMAL E.U.   Blood: LARGE / Protein: >600 / Nitrite: NEGATIVE   Leuk Esterase: LARGE / RBC: >50 / WBC >50   Sq Epi: FEW / Non Sq Epi: x / Bacteria: MODERATE      Arterial Blood Gas:   @ 05:50  7.43/31/129/22/99.3/-3.2  ABG lactate: 1.2  Arterial Blood Gas:   @ 22:10  7.49/25/114/22/99.1/-4.3  ABG lactate: 2.0  Arterial Blood Gas:   @ 17:30  7.49/24/91/21/98.1/-4.9  ABG lactate: 2.3    Venous Blood Gas:   @ 12:00  7.39/36/24/21/34.1  VBG Lactate: 2.9  Venous Blood Gas:   @ 10:04  7.50/27/42/23/76.8  VBG Lactate: 4.1      MICROBIOLOGY:     RADIOLOGY:  [ ] Reviewed and interpreted by me    EKG: CHIEF COMPLAINT:    Interval Events:    Sedation d/c patient more awake but now more rigid. Had afib with RVR, known stroke in the past but in setting of coffee ground emesis and levophed not being currently a/c.     REVIEW OF SYSTEMS:  Constitutional: [ ] negative [ ] fevers [ ] chills [ ] weight loss [ ] weight gain  HEENT: [ ] negative [ ] dry eyes [ ] eye irritation [ ] postnasal drip [ ] nasal congestion  CV: [ ] negative  [ ] chest pain [ ] orthopnea [ ] palpitations [ ] murmur  Resp: [ ] negative [ ] cough [ ] shortness of breath [ ] dyspnea [ ] wheezing [ ] sputum [ ] hemoptysis  GI: [ ] negative [ ] nausea [ ] vomiting [ ] diarrhea [ ] constipation [ ] abd pain [ ] dysphagia   : [ ] negative [ ] dysuria [ ] nocturia [ ] hematuria [ ] increased urinary frequency  Musculoskeletal: [ ] negative [ ] back pain [ ] myalgias [ ] arthralgias [ ] fracture  Skin: [ ] negative [ ] rash [ ] itch  Neurological: [ ] negative [ ] headache [ ] dizziness [ ] syncope [ ] weakness [ ] numbness  Psychiatric: [ ] negative [ ] anxiety [ ] depression  Endocrine: [ ] negative [ ] diabetes [ ] thyroid problem  Hematologic/Lymphatic: [ ] negative [ ] anemia [ ] bleeding problem  Allergic/Immunologic: [ ] negative [ ] itchy eyes [ ] nasal discharge [ ] hives [ ] angioedema  [ ] All other systems negative  [x] Unable to assess ROS because AMS, just coming off sedation.     OBJECTIVE:  ICU Vital Signs Last 24 Hrs  T(C): 38 (26 Sep 2017 08:00), Max: 38 (26 Sep 2017 08:00)  T(F): 100.4 (26 Sep 2017 08:00), Max: 100.4 (26 Sep 2017 08:00)  HR: 70 (26 Sep 2017 08:00) (63 - 104)  BP: 118/69 (26 Sep 2017 08:00) (82/45 - 122/72)  BP(mean): 79 (26 Sep 2017 08:00) (53 - 701)  ABP: --  ABP(mean): --  RR: 21 (26 Sep 2017 08:00) (14 - 31)  SpO2: 100% (26 Sep 2017 08:00) (86% - 100%)    Mode: AC/ CMV (Assist Control/ Continuous Mandatory Ventilation), RR (machine): 12, TV (machine): 500, FiO2: 40, PEEP: 5, MAP: 8, PIP: 20    - @ 07:01  -   @ 07:00  --------------------------------------------------------  IN: 1917.2 mL / OUT: 1077 mL / NET: 840.2 mL      CAPILLARY BLOOD GLUCOSE      PHYSICAL EXAM:  Constitutional: well-developed; well-groomed; thin male; no distress,  Eyes: PERRL; EMOI  ENMT: Normal oropharnxy, no oral lesions, no erythema, no exudates, pin point pupils.   Neck:  Supple; no JVD; normal thyroid gland. Vent in place.   MSK/Back: normal shape; no tenderenss, no joint erythema, no effusions. No tremors. Cogwheel rigidity.  Respiratory: airway patent; breath sounds equal; good air movement, no wheezing, no crackles, no rhonchi. no increase in WOB  Cardiovascular: regular rate and rhythm  no rub , no murmur, no gallops.   Gastrointestinal: soft; no distention, normal BS, no TTP, no rebound, no guarding, no mass, no organomegaly, no ascites. Peg in place d/c/i  Genitourinary: Alexander in place, chronic  Extremities: no clubbing; no cyanosis; no pedal edema, non-tender to palpation, DP and Radial pulses intact.  Neurological: Only responsive to pain/ grimace no withdrawing of limbs. Delay in following commands, Bradykinesis.   Skin: warm and dry; color normal: no rash: no ulcers       LINES:    HOSPITAL MEDICATIONS:  heparin  Injectable 5000 Unit(s) SubCutaneous every 8 hours    vancomycin  IVPB 1000 milliGRAM(s) IV Intermittent every 12 hours  meropenem IVPB      azithromycin  IVPB      meropenem IVPB 1000 milliGRAM(s) IV Intermittent every 8 hours  azithromycin  IVPB 500 milliGRAM(s) IV Intermittent every 24 hours    phenylephrine    Infusion 0.5 MICROgram(s)/kG/Min IV Continuous <Continuous>    atorvastatin 80 milliGRAM(s) Oral at bedtime      carbidopa/levodopa  25/100 1.5 Tablet(s) Oral three times a day  propofol Infusion 40 MICROgram(s)/kG/Min IV Continuous <Continuous>    pantoprazole  Injectable 40 milliGRAM(s) IV Push every 12 hours          influenza   Vaccine 0.5 milliLiter(s) IntraMuscular once    pilocarpine 4% Solution 1 Drop(s) Left EYE two times a day  latanoprost 0.005% Ophthalmic Solution 1 Drop(s) Left EYE at bedtime  dorzolamide 2%/timolol 0.5% Ophthalmic Solution 1 Drop(s) Left EYE two times a day  petrolatum Ophthalmic Ointment 1 Application(s) Both EYES two times a day  loteprednol 0.5% Ophthalmic Suspension 1 Drop(s) Right EYE <User Schedule>  chlorhexidine 4% Liquid 1 Application(s) Topical daily        LABS:                        10.0   13.97 )-----------( 217      ( 26 Sep 2017 03:48 )             30.4     Hgb Trend: 10.0<--, 12.3<--, 12.4<--, 14.5<--      139  |  109<H>  |  33<H>  ----------------------------<  84  3.8   |  18<L>  |  0.62    Ca    7.9<L>      26 Sep 2017 03:48  Phos  2.5       Mg     1.4         TPro  6.2  /  Alb  2.4<L>  /  TBili  0.9  /  DBili  x   /  AST  25  /  ALT  6   /  AlkPhos  83      Creatinine Trend: 0.62<--, 0.94<--, 2.07<--  PT/INR - ( 24 Sep 2017 10:04 )   PT: 14.7 SEC;   INR: 1.31          PTT - ( 24 Sep 2017 10:04 )  PTT:28.8 SEC  Urinalysis Basic - ( 24 Sep 2017 10:05 )    Color: RED / Appearance: TURBID / S.018 / pH: 8.5  Gluc: NEGATIVE / Ketone: NEGATIVE  / Bili: NEGATIVE / Urobili: NORMAL E.U.   Blood: LARGE / Protein: >600 / Nitrite: NEGATIVE   Leuk Esterase: LARGE / RBC: >50 / WBC >50   Sq Epi: FEW / Non Sq Epi: x / Bacteria: MODERATE      Arterial Blood Gas:   @ 05:50  7.43/31/129/22/99.3/-3.2  ABG lactate: 1.2  Arterial Blood Gas:   @ 22:10  7.49/25/114/22/99.1/-4.3  ABG lactate: 2.0  Arterial Blood Gas:   @ 17:30  7.49/24/91/21/98.1/-4.9  ABG lactate: 2.3    Venous Blood Gas:   @ 12:00  7.39/36/24/21/34.1  VBG Lactate: 2.9  Venous Blood Gas:   @ 10:04  7.50/27/42/23/76.8  VBG Lactate: 4.1      MICROBIOLOGY:     RADIOLOGY:  [ ] Reviewed and interpreted by me    EKG:

## 2017-09-26 NOTE — CHART NOTE - NSCHARTNOTEFT_GEN_A_CORE
:  Dr. La    INDICATION:  Respiratory failure    PROCEDURE:  [ ] LIMITED ECHO  [X] LIMITED CHEST  [ ] LIMITED RETROPERITONEAL  [ ] LIMITED ABDOMINAL  [ ] LIMITED DVT  [ ] NEEDLE GUIDANCE VASCULAR  [ ] NEEDLE GUIDANCE THORACENTESIS  [ ] NEEDLE GUIDANCE PARACENTESIS  [ ] NEEDLE GUIDANCE PERICARDIOCENTESIS  [ ] OTHER    FINDINGS:  LUNGS:       INTERPRETATION: :  Dr. La    INDICATION:  Respiratory failure    PROCEDURE:  [ ] LIMITED ECHO  [X] LIMITED CHEST  [ ] LIMITED RETROPERITONEAL  [ ] LIMITED ABDOMINAL  [ ] LIMITED DVT  [ ] NEEDLE GUIDANCE VASCULAR  [ ] NEEDLE GUIDANCE THORACENTESIS  [ ] NEEDLE GUIDANCE PARACENTESIS  [ ] NEEDLE GUIDANCE PERICARDIOCENTESIS  [ ] OTHER    FINDINGS:  LUNGS:  A-line predominant.  B-lines posterior with small consolidation.       INTERPRETATION:  B-lines and small consolidation likely 2/2 to PNA. :  Dr. La    INDICATION:  Respiratory failure    PROCEDURE:  [ ] LIMITED ECHO  [X] LIMITED CHEST  [ ] LIMITED RETROPERITONEAL  [ ] LIMITED ABDOMINAL  [ ] LIMITED DVT  [ ] NEEDLE GUIDANCE VASCULAR  [ ] NEEDLE GUIDANCE THORACENTESIS  [ ] NEEDLE GUIDANCE PARACENTESIS  [ ] NEEDLE GUIDANCE PERICARDIOCENTESIS  [ ] OTHER    FINDINGS:  LUNGS:  A-line predominant.  Bilateral B-lines posteriorly with small right-sided consolidation.       INTERPRETATION:  B-lines and small consolidation likely 2/2 to PNA in stetting of leukocytosis, fevers, shock state. :  Dr. La    INDICATION:  Respiratory failure    PROCEDURE:  [ ] LIMITED ECHO  [X] LIMITED CHEST  [ ] LIMITED RETROPERITONEAL  [ ] LIMITED ABDOMINAL  [ ] LIMITED DVT  [ ] NEEDLE GUIDANCE VASCULAR  [ ] NEEDLE GUIDANCE THORACENTESIS  [ ] NEEDLE GUIDANCE PARACENTESIS  [ ] NEEDLE GUIDANCE PERICARDIOCENTESIS  [ ] OTHER    FINDINGS:  LUNGS:  A-line predominant.  Bilateral B-lines posteriorly with small right-sided consolidation.       INTERPRETATION:  B-lines and small consolidation likely 2/2 to PNA in setting of leukocytosis, fevers, shock state.

## 2017-09-26 NOTE — DIETITIAN INITIAL EVALUATION ADULT. - MD RECOMMEND
Initiate EN support w/Jevity 1.2 @20ml/h and advance as tolerated to goal of 60ml/h to [id number] kcal w/70gm protein and add 2 packs prosource/d to increase protein intake to 109gms/d to meet current needs./other

## 2017-09-27 LAB
-  AMIKACIN: SIGNIFICANT CHANGE UP
-  AMPICILLIN/SULBACTAM: SIGNIFICANT CHANGE UP
-  AMPICILLIN: SIGNIFICANT CHANGE UP
-  AZTREONAM: SIGNIFICANT CHANGE UP
-  CEFAZOLIN: SIGNIFICANT CHANGE UP
-  CEFEPIME: SIGNIFICANT CHANGE UP
-  CEFOXITIN: SIGNIFICANT CHANGE UP
-  CEFTAZIDIME: SIGNIFICANT CHANGE UP
-  CEFTRIAXONE: SIGNIFICANT CHANGE UP
-  CIPROFLOXACIN: SIGNIFICANT CHANGE UP
-  ERTAPENEM: SIGNIFICANT CHANGE UP
-  IMIPENEM: SIGNIFICANT CHANGE UP
-  LEVOFLOXACIN: SIGNIFICANT CHANGE UP
-  MEROPENEM: SIGNIFICANT CHANGE UP
-  NITROFURANTOIN: SIGNIFICANT CHANGE UP
-  PIPERACILLIN/TAZOBACTAM: SIGNIFICANT CHANGE UP
-  TIGECYCLINE: SIGNIFICANT CHANGE UP
-  TRIMETHOPRIM/SULFAMETHOXAZOLE: SIGNIFICANT CHANGE UP
BACTERIA UR CULT: SIGNIFICANT CHANGE UP
BASOPHILS # BLD AUTO: 0.02 K/UL — SIGNIFICANT CHANGE UP (ref 0–0.2)
BASOPHILS NFR BLD AUTO: 0.2 % — SIGNIFICANT CHANGE UP (ref 0–2)
BUN SERPL-MCNC: 27 MG/DL — HIGH (ref 7–23)
CALCIUM SERPL-MCNC: 7.5 MG/DL — LOW (ref 8.4–10.5)
CHLORIDE SERPL-SCNC: 104 MMOL/L — SIGNIFICANT CHANGE UP (ref 98–107)
CO2 SERPL-SCNC: 21 MMOL/L — LOW (ref 22–31)
CREAT SERPL-MCNC: 0.57 MG/DL — SIGNIFICANT CHANGE UP (ref 0.5–1.3)
EOSINOPHIL # BLD AUTO: 0.41 K/UL — SIGNIFICANT CHANGE UP (ref 0–0.5)
EOSINOPHIL NFR BLD AUTO: 4.2 % — SIGNIFICANT CHANGE UP (ref 0–6)
FERRITIN SERPL-MCNC: 1390 NG/ML — HIGH (ref 30–400)
GLUCOSE SERPL-MCNC: 77 MG/DL — SIGNIFICANT CHANGE UP (ref 70–99)
HCT VFR BLD CALC: 31.1 % — LOW (ref 39–50)
HGB BLD-MCNC: 10.1 G/DL — LOW (ref 13–17)
IMM GRANULOCYTES # BLD AUTO: 0.17 # — SIGNIFICANT CHANGE UP
IMM GRANULOCYTES NFR BLD AUTO: 1.8 % — HIGH (ref 0–1.5)
IRON SATN MFR SERPL: 110 UG/DL — LOW (ref 155–535)
IRON SATN MFR SERPL: 33 UG/DL — LOW (ref 45–165)
LYMPHOCYTES # BLD AUTO: 0.96 K/UL — LOW (ref 1–3.3)
LYMPHOCYTES # BLD AUTO: 9.9 % — LOW (ref 13–44)
MAGNESIUM SERPL-MCNC: 1.9 MG/DL — SIGNIFICANT CHANGE UP (ref 1.6–2.6)
MCHC RBC-ENTMCNC: 30.8 PG — SIGNIFICANT CHANGE UP (ref 27–34)
MCHC RBC-ENTMCNC: 32.5 % — SIGNIFICANT CHANGE UP (ref 32–36)
MCV RBC AUTO: 94.8 FL — SIGNIFICANT CHANGE UP (ref 80–100)
METHOD TYPE: SIGNIFICANT CHANGE UP
MONOCYTES # BLD AUTO: 1.07 K/UL — HIGH (ref 0–0.9)
MONOCYTES NFR BLD AUTO: 11 % — SIGNIFICANT CHANGE UP (ref 2–14)
NEUTROPHILS # BLD AUTO: 7.06 K/UL — SIGNIFICANT CHANGE UP (ref 1.8–7.4)
NEUTROPHILS NFR BLD AUTO: 72.9 % — SIGNIFICANT CHANGE UP (ref 43–77)
NRBC # FLD: 0 — SIGNIFICANT CHANGE UP
ORGANISM # SPEC MICROSCOPIC CNT: SIGNIFICANT CHANGE UP
ORGANISM # SPEC MICROSCOPIC CNT: SIGNIFICANT CHANGE UP
PHOSPHATE SERPL-MCNC: 2.5 MG/DL — SIGNIFICANT CHANGE UP (ref 2.5–4.5)
PLATELET # BLD AUTO: 169 K/UL — SIGNIFICANT CHANGE UP (ref 150–400)
PMV BLD: 11 FL — SIGNIFICANT CHANGE UP (ref 7–13)
POTASSIUM SERPL-MCNC: 3.9 MMOL/L — SIGNIFICANT CHANGE UP (ref 3.5–5.3)
POTASSIUM SERPL-SCNC: 3.9 MMOL/L — SIGNIFICANT CHANGE UP (ref 3.5–5.3)
RBC # BLD: 3.28 M/UL — LOW (ref 4.2–5.8)
RBC # FLD: 15.1 % — HIGH (ref 10.3–14.5)
SODIUM SERPL-SCNC: 137 MMOL/L — SIGNIFICANT CHANGE UP (ref 135–145)
UIBC SERPL-MCNC: 77 UG/DL — LOW (ref 110–370)
WBC # BLD: 9.69 K/UL — SIGNIFICANT CHANGE UP (ref 3.8–10.5)
WBC # FLD AUTO: 9.69 K/UL — SIGNIFICANT CHANGE UP (ref 3.8–10.5)

## 2017-09-27 PROCEDURE — 99291 CRITICAL CARE FIRST HOUR: CPT

## 2017-09-27 RX ADMIN — ATORVASTATIN CALCIUM 80 MILLIGRAM(S): 80 TABLET, FILM COATED ORAL at 22:35

## 2017-09-27 RX ADMIN — DORZOLAMIDE HYDROCHLORIDE TIMOLOL MALEATE 1 DROP(S): 20; 5 SOLUTION/ DROPS OPHTHALMIC at 05:55

## 2017-09-27 RX ADMIN — Medication 1 APPLICATION(S): at 05:57

## 2017-09-27 RX ADMIN — POLYETHYLENE GLYCOL 3350 17 GRAM(S): 17 POWDER, FOR SOLUTION ORAL at 17:44

## 2017-09-27 RX ADMIN — DORZOLAMIDE HYDROCHLORIDE TIMOLOL MALEATE 1 DROP(S): 20; 5 SOLUTION/ DROPS OPHTHALMIC at 17:46

## 2017-09-27 RX ADMIN — Medication 250 MILLIGRAM(S): at 05:51

## 2017-09-27 RX ADMIN — CHLORHEXIDINE GLUCONATE 1 APPLICATION(S): 213 SOLUTION TOPICAL at 12:07

## 2017-09-27 RX ADMIN — CARBIDOPA AND LEVODOPA 1.5 TABLET(S): 25; 100 TABLET ORAL at 22:35

## 2017-09-27 RX ADMIN — PANTOPRAZOLE SODIUM 40 MILLIGRAM(S): 20 TABLET, DELAYED RELEASE ORAL at 07:11

## 2017-09-27 RX ADMIN — HEPARIN SODIUM 5000 UNIT(S): 5000 INJECTION INTRAVENOUS; SUBCUTANEOUS at 13:17

## 2017-09-27 RX ADMIN — SENNA PLUS 2 TABLET(S): 8.6 TABLET ORAL at 22:35

## 2017-09-27 RX ADMIN — LOTEPREDNOL ETABONATE 1 DROP(S): 2 SUSPENSION/ DROPS OPHTHALMIC at 12:07

## 2017-09-27 RX ADMIN — Medication 1 DROP(S): at 05:58

## 2017-09-27 RX ADMIN — MEROPENEM 200 MILLIGRAM(S): 1 INJECTION INTRAVENOUS at 22:34

## 2017-09-27 RX ADMIN — PHENYLEPHRINE HYDROCHLORIDE 13.12 MICROGRAM(S)/KG/MIN: 10 INJECTION INTRAVENOUS at 22:35

## 2017-09-27 RX ADMIN — MEROPENEM 200 MILLIGRAM(S): 1 INJECTION INTRAVENOUS at 05:52

## 2017-09-27 RX ADMIN — HEPARIN SODIUM 5000 UNIT(S): 5000 INJECTION INTRAVENOUS; SUBCUTANEOUS at 22:35

## 2017-09-27 RX ADMIN — CARBIDOPA AND LEVODOPA 1.5 TABLET(S): 25; 100 TABLET ORAL at 05:57

## 2017-09-27 RX ADMIN — HEPARIN SODIUM 5000 UNIT(S): 5000 INJECTION INTRAVENOUS; SUBCUTANEOUS at 05:52

## 2017-09-27 RX ADMIN — PHENYLEPHRINE HYDROCHLORIDE 13.12 MICROGRAM(S)/KG/MIN: 10 INJECTION INTRAVENOUS at 12:07

## 2017-09-27 RX ADMIN — LATANOPROST 1 DROP(S): 0.05 SOLUTION/ DROPS OPHTHALMIC; TOPICAL at 22:36

## 2017-09-27 RX ADMIN — MEROPENEM 200 MILLIGRAM(S): 1 INJECTION INTRAVENOUS at 13:17

## 2017-09-27 RX ADMIN — Medication 250 MILLIGRAM(S): at 17:46

## 2017-09-27 RX ADMIN — POLYETHYLENE GLYCOL 3350 17 GRAM(S): 17 POWDER, FOR SOLUTION ORAL at 05:52

## 2017-09-27 RX ADMIN — Medication 1 DROP(S): at 17:47

## 2017-09-27 RX ADMIN — CARBIDOPA AND LEVODOPA 1.5 TABLET(S): 25; 100 TABLET ORAL at 13:18

## 2017-09-27 NOTE — PROGRESS NOTE ADULT - ASSESSMENT
81 yo M with hx of CVA with R. sided residual deficits, s/p PEG, trach, CAD s/p CABG (28yo), Parkinson's disease, presents for a fever from ([facility]) presents for a fever, admitted with septic shock likely 2/2 UTI.

## 2017-09-27 NOTE — PROGRESS NOTE ADULT - ASSESSMENT
81 yo M with hx of CVA with R. sided residual deficits, s/p PEG, trach, CAD s/p CABG (26yo), Parkinson's disease, presents for a fever from ([facility]) presents for a fever, admitted with septic shock likely 2/2 UTI and PNA.    #Neuro: Off sedation, likely in worsening mental status 2/2 to PD flare, has been getting meds but may not be absorbing. Will consider increasing parkinson's meds. Possible recovery with correction of underlying sepsis.   #CV: On increasing dose of josh, no stable at near 1. Likely 2/2 to vasoplegic shock from sepsis. Will check IVC, may need fluids. Was in afib, yesterday in setting of levophed. history of stroke with elevated [id number]. Will hold a/c for now.   #Pulm: On vent, likely needed 2/2 to PD/sepsis. C/W current settings.   #ID: Consolidation on U/S, positive UA, urine culture grossly contaminated, repeat with new Alexander pending. Now on Vanc and meropenem. d/c azithromycin given urine legionella is negative. WBC downtrending  #Renal/Fluid: Cr, stable, will consider standing fluids net positive likely now resolving.   #Heme: developing anemia, likely 2/2 to sepsis, no evidence of GIB currently. FOBT was positive no GIB. WIll need GI eval once stable on floors. c/w PPI bid for now.   #Endo: No issues currently.   #GI:  Will start feeds today, c/w ppi for anemia and possible hx GIB.   #DVT ppx: SQH  # Dispo: Floors when stable. Full code currently. Was having improving functional status as out patient. 83 yo M with hx of CVA with R. sided residual deficits, s/p PEG, trach, CAD s/p CABG (28yo), Parkinson's disease, presents for a fever from ([facility]) presents for a fever, admitted with septic shock likely 2/2 UTI and PNA.    #Neuro: Off sedation, improving mental status, more awake. Continue to monitor with improvement of underlying infection. Patient with underlying PD, and will c/w sinemet.   #CV: On decreasing dose of josh, 2/2 septic shock. Will continue to wean down josh down as tolerated. Patient during hospitalization was in afib in setting of levophed, with hx of stroke with elevated [id number]. Will hold a/c for now. Patient with also multiple PVCs, EKG checked and electrolytes wnl.  #Pulm: On vent, likely needed 2/2 to PD/sepsis. C/W current settings.   #ID: Consolidation on U/S, positive UA, urine culture grossly contaminated. Second Ucx showing 10-50k GNR. Continue with Vanc and meropenem at this time. Azithromycin had been d/c given urine legionella is negative. WBC wnl. Sputum cx with no speciation.   #Renal/Fluid: Cr, stable, continue to closely monitor volume status.   #Heme: Patient with anemia, currently stable. Labs consistent with ACD, but ferritin may also be elevated 2/2 inflammation state. FOBT positive, but no evidence of GIB. C/w PPI qd, no ac  #Endo: No issues currently.   #GI:  Will start feeds today, c/w ppi for anemia and possible hx GIB.   #DVT ppx: HSQ  # Dispo: Floors when stable. Full code currently. Was having improving functional status as out patient.     Emilie Rosas MD  PGY3 Internal Medicine Resident  Pager: [phone]

## 2017-09-27 NOTE — PROGRESS NOTE ADULT - SUBJECTIVE AND OBJECTIVE BOX
Patient is a 82y old  Male who presents with a chief complaint of Emesis (24 Sep 2017 13:49)    pt. seen and examined. remained in MICU      INTERVAL HPI/OVERNIGHT EVENTS:  T(C): 36.6 (09-27-17 @ 23:00), Max: 37.2 (09-27-17 @ 08:00)  HR: 57 (09-28-17 @ 01:00) (53 - 65)  BP: 111/53 (09-28-17 @ 01:00) (85/52 - 131/53)  RR: 24 (09-28-17 @ 01:00) (13 - 24)  SpO2: 100% (09-28-17 @ 01:00) (98% - 100%)  Wt(kg): --  I&O's Summary    26 Sep 2017 07:01  -  27 Sep 2017 07:00  --------------------------------------------------------  IN: 1123.5 mL / OUT: 814 mL / NET: 309.5 mL    27 Sep 2017 07:01  -  28 Sep 2017 02:08  --------------------------------------------------------  IN: 1055 mL / OUT: 480 mL / NET: 575 mL        PAST MEDICAL & SURGICAL HISTORY:  Tracheostomy in place  Hypertension  CVA (cerebral vascular accident)  S/P percutaneous endoscopic gastrostomy (PEG) tube placement      SOCIAL HISTORY  Alcohol:  Tobacco:  Illicit substance use:    FAMILY HISTORY:    REVIEW OF SYSTEMS:  CONSTITUTIONAL: No fever, weight loss, or fatigue  EYES: No eye pain, visual disturbances, or discharge  ENMT:  No difficulty hearing, tinnitus, vertigo; No sinus or throat pain  NECK: No pain or stiffness  RESPIRATORY: No cough, wheezing, chills or hemoptysis; No shortness of breath  CARDIOVASCULAR: No chest pain, palpitations, dizziness, or leg swelling  GASTROINTESTINAL: No abdominal or epigastric pain. No nausea, vomiting, or hematemesis; No diarrhea or constipation. No melena or hematochezia.  GENITOURINARY: No dysuria, frequency, hematuria, or incontinence  NEUROLOGICAL: No headaches, memory loss, loss of strength, numbness, or tremors  SKIN: No itching, burning, rashes, or lesions   LYMPH NODES: No enlarged glands  ENDOCRINE: No heat or cold intolerance; No hair loss  MUSCULOSKELETAL: No joint pain or swelling; No muscle, back, or extremity pain  PSYCHIATRIC: No depression, anxiety, mood swings, or difficulty sleeping  HEME/LYMPH: No easy bruising, or bleeding gums  ALLERY AND IMMUNOLOGIC: No hives or eczema    RADIOLOGY & ADDITIONAL TESTS:    Imaging Personally Reviewed:  [ ] YES  [ ] NO    Consultant(s) Notes Reviewed:  [ ] YES  [ ] NO    PHYSICAL EXAM:  GENERAL: NAD, well-groomed, well-developed  HEAD:  Atraumatic, Normocephalic  EYES: EOMI, PERRLA, conjunctiva and sclera clear  ENMT: No tonsillar erythema, exudates, or enlargement; Moist mucous membranes, Good dentition, No lesions  NECK: Supple, No JVD, Normal thyroid  NERVOUS SYSTEM:  Alert & Oriented X3, Good concentration; Motor Strength 5/5 B/L upper and lower extremities; DTRs 2+ intact and symmetric  CHEST/LUNG: Clear to percussion bilaterally; No rales, rhonchi, wheezing, or rubs  HEART: Regular rate and rhythm; No murmurs, rubs, or gallops  ABDOMEN: Soft, Nontender, Nondistended; Bowel sounds present  EXTREMITIES:  2+ Peripheral Pulses, No clubbing, cyanosis, or edema  LYMPH: No lymphadenopathy noted  SKIN: No rashes or lesions    LABS:                        10.1   9.69  )-----------( 169      ( 27 Sep 2017 02:36 )             31.1     09-27    137  |  104  |  27<H>  ----------------------------<  77  3.9   |  21<L>  |  0.57    Ca    7.5<L>      27 Sep 2017 02:36  Phos  2.5     09-27  Mg     1.9     09-27          CAPILLARY BLOOD GLUCOSE                MEDICATIONS  (STANDING):  heparin  Injectable 5000 Unit(s) SubCutaneous every 8 hours  influenza   Vaccine 0.5 milliLiter(s) IntraMuscular once  atorvastatin 80 milliGRAM(s) Oral at bedtime  carbidopa/levodopa  25/100 1.5 Tablet(s) Oral three times a day  pilocarpine 4% Solution 1 Drop(s) Left EYE two times a day  latanoprost 0.005% Ophthalmic Solution 1 Drop(s) Left EYE at bedtime  vancomycin  IVPB 1000 milliGRAM(s) IV Intermittent every 12 hours  meropenem IVPB      meropenem IVPB 1000 milliGRAM(s) IV Intermittent every 8 hours  phenylephrine    Infusion 0.5 MICROgram(s)/kG/Min (13.125 mL/Hr) IV Continuous <Continuous>  loteprednol 0.5% Ophthalmic Suspension 1 Drop(s) Right EYE <User Schedule>  chlorhexidine 4% Liquid 1 Application(s) Topical daily  polyethylene glycol 3350 17 Gram(s) Oral two times a day  pantoprazole   Suspension 40 milliGRAM(s) Oral before breakfast  senna 2 Tablet(s) Oral at bedtime  dorzolamide 2%/timolol 0.5% Ophthalmic Solution 1 Drop(s) Left EYE two times a day    MEDICATIONS  (PRN):      Care Discussed with Consultants/Other Providers [ ] YES  [ ] NO

## 2017-09-27 NOTE — PROGRESS NOTE ADULT - SUBJECTIVE AND OBJECTIVE BOX
Patient is a 82y old  Male who presents with a chief complaint of Emesis (24 Sep 2017 13:49)      SUBJECTIVE / OVERNIGHT EVENTS:  Patient seen and examined at bedside. No acute events overnight.     MEDICATIONS  (STANDING):  heparin  Injectable 5000 Unit(s) SubCutaneous every 8 hours  influenza   Vaccine 0.5 milliLiter(s) IntraMuscular once  atorvastatin 80 milliGRAM(s) Oral at bedtime  carbidopa/levodopa  25/100 1.5 Tablet(s) Oral three times a day  pilocarpine 4% Solution 1 Drop(s) Left EYE two times a day  latanoprost 0.005% Ophthalmic Solution 1 Drop(s) Left EYE at bedtime  vancomycin  IVPB 1000 milliGRAM(s) IV Intermittent every 12 hours  meropenem IVPB      meropenem IVPB 1000 milliGRAM(s) IV Intermittent every 8 hours  petrolatum Ophthalmic Ointment 1 Application(s) Both EYES two times a day  phenylephrine    Infusion 0.5 MICROgram(s)/kG/Min (13.125 mL/Hr) IV Continuous <Continuous>  loteprednol 0.5% Ophthalmic Suspension 1 Drop(s) Right EYE <User Schedule>  chlorhexidine 4% Liquid 1 Application(s) Topical daily  polyethylene glycol 3350 17 Gram(s) Oral two times a day  pantoprazole   Suspension 40 milliGRAM(s) Oral before breakfast  senna 2 Tablet(s) Oral at bedtime  docusate sodium 100 milliGRAM(s) Oral three times a day  dorzolamide 2%/timolol 0.5% Ophthalmic Solution 1 Drop(s) Left EYE two times a day    MEDICATIONS  (PRN):        CAPILLARY BLOOD GLUCOSE        I&O's Summary    26 Sep 2017 07:01  -  27 Sep 2017 07:00  --------------------------------------------------------  IN: 1123.5 mL / OUT: 814 mL / NET: 309.5 mL    ICU Vital Signs Last 24 Hrs  T(C): 37.7 (26 Sep 2017 20:00), Max: 38 (26 Sep 2017 08:00)  T(F): 99.8 (26 Sep 2017 20:00), Max: 100.4 (26 Sep 2017 08:00)  HR: 57 (27 Sep 2017 07:01) (54 - 71)  BP: 107/57 (27 Sep 2017 07:00) (94/54 - 132/80)  BP(mean): 68 (27 Sep 2017 07:00) (64 - 104)  ABP: --  ABP(mean): --  RR: 14 (27 Sep 2017 07:00) (13 - 30)  SpO2: 100% (27 Sep 2017 07:01) (94% - 100%)      PHYSICAL EXAM:  GENERAL: NAD, well-developed  HEAD:  Atraumatic, Normocephalic  EYES: EOMI,  conjunctiva and sclera clear  NECK: Supple  CHEST/LUNG: Clear to auscultation bilaterally; No wheeze  HEART: Regular rate and rhythm; No murmurs, rubs, or gallops  ABDOMEN: Soft, Nontender, Nondistended; Bowel sounds present  EXTREMITIES:  No clubbing, cyanosis, or edema  PSYCH: AAOx3  NEUROLOGY: non-focal  SKIN: No rashes or lesions    LABS:                        10.1   9.69  )-----------( 169      ( 27 Sep 2017 02:36 )             31.1     WBC Trend: 9.69<--, 13.97<--, 16.93<--  09-27    137  |  104  |  27<H>  ----------------------------<  77  3.9   |  21<L>  |  0.57    Ca    7.5<L>      27 Sep 2017 02:36  Phos  2.5     09-27  Mg     1.9     09-27      Creatinine Trend: 0.57<--, 0.62<--, 0.94<--, 2.07<--    Culture:   Sputum: WBC, many cells  Ucx: 10-50k Patient is a 82y old  Male who presents with a chief complaint of Emesis (24 Sep 2017 13:49)      SUBJECTIVE / OVERNIGHT EVENTS:  Patient seen and examined at bedside. No acute events overnight. Patient seems awake and alert, tracking movements, but not following commands at bedside.     MEDICATIONS  (STANDING):  heparin  Injectable 5000 Unit(s) SubCutaneous every 8 hours  influenza   Vaccine 0.5 milliLiter(s) IntraMuscular once  atorvastatin 80 milliGRAM(s) Oral at bedtime  carbidopa/levodopa  25/100 1.5 Tablet(s) Oral three times a day  pilocarpine 4% Solution 1 Drop(s) Left EYE two times a day  latanoprost 0.005% Ophthalmic Solution 1 Drop(s) Left EYE at bedtime  vancomycin  IVPB 1000 milliGRAM(s) IV Intermittent every 12 hours  meropenem IVPB      meropenem IVPB 1000 milliGRAM(s) IV Intermittent every 8 hours  petrolatum Ophthalmic Ointment 1 Application(s) Both EYES two times a day  phenylephrine    Infusion 0.5 MICROgram(s)/kG/Min (13.125 mL/Hr) IV Continuous <Continuous>  loteprednol 0.5% Ophthalmic Suspension 1 Drop(s) Right EYE <User Schedule>  chlorhexidine 4% Liquid 1 Application(s) Topical daily  polyethylene glycol 3350 17 Gram(s) Oral two times a day  pantoprazole   Suspension 40 milliGRAM(s) Oral before breakfast  senna 2 Tablet(s) Oral at bedtime  docusate sodium 100 milliGRAM(s) Oral three times a day  dorzolamide 2%/timolol 0.5% Ophthalmic Solution 1 Drop(s) Left EYE two times a day    MEDICATIONS  (PRN):        CAPILLARY BLOOD GLUCOSE        I&O's Summary    26 Sep 2017 07:01  -  27 Sep 2017 07:00  --------------------------------------------------------  IN: 1123.5 mL / OUT: 814 mL / NET: 309.5 mL    ICU Vital Signs Last 24 Hrs  T(C): 37.7 (26 Sep 2017 20:00), Max: 38 (26 Sep 2017 08:00)  T(F): 99.8 (26 Sep 2017 20:00), Max: 100.4 (26 Sep 2017 08:00)  HR: 57 (27 Sep 2017 07:01) (54 - 71)  BP: 107/57 (27 Sep 2017 07:00) (94/54 - 132/80)  BP(mean): 68 (27 Sep 2017 07:00) (64 - 104)  ABP: --  ABP(mean): --  RR: 14 (27 Sep 2017 07:00) (13 - 30)  SpO2: 100% (27 Sep 2017 07:01) (94% - 100%)      PHYSICAL EXAM:  GENERAL: NAD, chronically ill-appearing  HEAD:  Atraumatic, Normocephalic  EYES: EOMI,  conjunctiva and sclera clear  NECK: Supple  CHEST/LUNG: Clear to auscultation anteriorly   HEART: +S1, S2, RRR  ABDOMEN: Soft, Nontender, Nondistended; Bowel sounds present  EXTREMITIES:  No clubbing, cyanosis, or edema  NEUROLOGY: alert, awake, tracking movements, but not following commands    LABS:                        10.1   9.69  )-----------( 169      ( 27 Sep 2017 02:36 )             31.1     WBC Trend: 9.69<--, 13.97<--, 16.93<--  09-27    137  |  104  |  27<H>  ----------------------------<  77  3.9   |  21<L>  |  0.57    Ca    7.5<L>      27 Sep 2017 02:36  Phos  2.5     09-27  Mg     1.9     09-27      Creatinine Trend: 0.57<--, 0.62<--, 0.94<--, 2.07<--    Culture:   Sputum: WBC, many cells  Ucx: 10-50k GNR

## 2017-09-28 DIAGNOSIS — R13.10 DYSPHAGIA, UNSPECIFIED: ICD-10-CM

## 2017-09-28 DIAGNOSIS — Z51.5 ENCOUNTER FOR PALLIATIVE CARE: ICD-10-CM

## 2017-09-28 DIAGNOSIS — R53.81 OTHER MALAISE: ICD-10-CM

## 2017-09-28 DIAGNOSIS — G20 PARKINSON'S DISEASE: ICD-10-CM

## 2017-09-28 LAB
BASOPHILS # BLD AUTO: 0.02 K/UL — SIGNIFICANT CHANGE UP (ref 0–0.2)
BASOPHILS NFR BLD AUTO: 0.2 % — SIGNIFICANT CHANGE UP (ref 0–2)
BUN SERPL-MCNC: 29 MG/DL — HIGH (ref 7–23)
CALCIUM SERPL-MCNC: 7.3 MG/DL — LOW (ref 8.4–10.5)
CHLORIDE SERPL-SCNC: 105 MMOL/L — SIGNIFICANT CHANGE UP (ref 98–107)
CO2 SERPL-SCNC: 22 MMOL/L — SIGNIFICANT CHANGE UP (ref 22–31)
CREAT SERPL-MCNC: 0.49 MG/DL — LOW (ref 0.5–1.3)
EOSINOPHIL # BLD AUTO: 0.36 K/UL — SIGNIFICANT CHANGE UP (ref 0–0.5)
EOSINOPHIL NFR BLD AUTO: 4.4 % — SIGNIFICANT CHANGE UP (ref 0–6)
GLUCOSE SERPL-MCNC: 122 MG/DL — HIGH (ref 70–99)
HCT VFR BLD CALC: 30.4 % — LOW (ref 39–50)
HGB BLD-MCNC: 10 G/DL — LOW (ref 13–17)
IMM GRANULOCYTES # BLD AUTO: 0.31 # — SIGNIFICANT CHANGE UP
IMM GRANULOCYTES NFR BLD AUTO: 3.8 % — HIGH (ref 0–1.5)
LYMPHOCYTES # BLD AUTO: 1.01 K/UL — SIGNIFICANT CHANGE UP (ref 1–3.3)
LYMPHOCYTES # BLD AUTO: 12.4 % — LOW (ref 13–44)
MAGNESIUM SERPL-MCNC: 1.8 MG/DL — SIGNIFICANT CHANGE UP (ref 1.6–2.6)
MANUAL SMEAR VERIFICATION: SIGNIFICANT CHANGE UP
MCHC RBC-ENTMCNC: 30.9 PG — SIGNIFICANT CHANGE UP (ref 27–34)
MCHC RBC-ENTMCNC: 32.9 % — SIGNIFICANT CHANGE UP (ref 32–36)
MCV RBC AUTO: 93.8 FL — SIGNIFICANT CHANGE UP (ref 80–100)
MONOCYTES # BLD AUTO: 0.94 K/UL — HIGH (ref 0–0.9)
MONOCYTES NFR BLD AUTO: 11.5 % — SIGNIFICANT CHANGE UP (ref 2–14)
NEUTROPHILS # BLD AUTO: 5.5 K/UL — SIGNIFICANT CHANGE UP (ref 1.8–7.4)
NEUTROPHILS NFR BLD AUTO: 67.7 % — SIGNIFICANT CHANGE UP (ref 43–77)
NRBC # FLD: 0 — SIGNIFICANT CHANGE UP
PHOSPHATE SERPL-MCNC: 2 MG/DL — LOW (ref 2.5–4.5)
PLATELET # BLD AUTO: 176 K/UL — SIGNIFICANT CHANGE UP (ref 150–400)
PMV BLD: 10.6 FL — SIGNIFICANT CHANGE UP (ref 7–13)
POTASSIUM SERPL-MCNC: 4.1 MMOL/L — SIGNIFICANT CHANGE UP (ref 3.5–5.3)
POTASSIUM SERPL-SCNC: 4.1 MMOL/L — SIGNIFICANT CHANGE UP (ref 3.5–5.3)
RBC # BLD: 3.24 M/UL — LOW (ref 4.2–5.8)
RBC # FLD: 14.9 % — HIGH (ref 10.3–14.5)
SODIUM SERPL-SCNC: 137 MMOL/L — SIGNIFICANT CHANGE UP (ref 135–145)
VANCOMYCIN TROUGH SERPL-MCNC: 16.7 UG/ML — SIGNIFICANT CHANGE UP (ref 10–20)
WBC # BLD: 8.14 K/UL — SIGNIFICANT CHANGE UP (ref 3.8–10.5)
WBC # FLD AUTO: 8.14 K/UL — SIGNIFICANT CHANGE UP (ref 3.8–10.5)

## 2017-09-28 PROCEDURE — 99233 SBSQ HOSP IP/OBS HIGH 50: CPT

## 2017-09-28 RX ORDER — SODIUM CHLORIDE 9 MG/ML
500 INJECTION INTRAMUSCULAR; INTRAVENOUS; SUBCUTANEOUS ONCE
Qty: 0 | Refills: 0 | Status: COMPLETED | OUTPATIENT
Start: 2017-09-28 | End: 2017-09-28

## 2017-09-28 RX ORDER — SODIUM,POTASSIUM PHOSPHATES 278-250MG
1 POWDER IN PACKET (EA) ORAL
Qty: 0 | Refills: 0 | Status: DISCONTINUED | OUTPATIENT
Start: 2017-09-28 | End: 2017-09-28

## 2017-09-28 RX ORDER — ENOXAPARIN SODIUM 100 MG/ML
40 INJECTION SUBCUTANEOUS DAILY
Qty: 0 | Refills: 0 | Status: DISCONTINUED | OUTPATIENT
Start: 2017-09-28 | End: 2017-10-03

## 2017-09-28 RX ORDER — SODIUM,POTASSIUM PHOSPHATES 278-250MG
1 POWDER IN PACKET (EA) ORAL EVERY 4 HOURS
Qty: 0 | Refills: 0 | Status: COMPLETED | OUTPATIENT
Start: 2017-09-28 | End: 2017-09-28

## 2017-09-28 RX ADMIN — MEROPENEM 200 MILLIGRAM(S): 1 INJECTION INTRAVENOUS at 05:10

## 2017-09-28 RX ADMIN — Medication 1 TABLET(S): at 05:10

## 2017-09-28 RX ADMIN — POLYETHYLENE GLYCOL 3350 17 GRAM(S): 17 POWDER, FOR SOLUTION ORAL at 05:10

## 2017-09-28 RX ADMIN — Medication 1 DROP(S): at 05:11

## 2017-09-28 RX ADMIN — LATANOPROST 1 DROP(S): 0.05 SOLUTION/ DROPS OPHTHALMIC; TOPICAL at 21:14

## 2017-09-28 RX ADMIN — CARBIDOPA AND LEVODOPA 1.5 TABLET(S): 25; 100 TABLET ORAL at 06:43

## 2017-09-28 RX ADMIN — Medication 250 MILLIGRAM(S): at 05:10

## 2017-09-28 RX ADMIN — CHLORHEXIDINE GLUCONATE 1 APPLICATION(S): 213 SOLUTION TOPICAL at 17:34

## 2017-09-28 RX ADMIN — MEROPENEM 200 MILLIGRAM(S): 1 INJECTION INTRAVENOUS at 21:13

## 2017-09-28 RX ADMIN — ENOXAPARIN SODIUM 40 MILLIGRAM(S): 100 INJECTION SUBCUTANEOUS at 12:18

## 2017-09-28 RX ADMIN — DORZOLAMIDE HYDROCHLORIDE TIMOLOL MALEATE 1 DROP(S): 20; 5 SOLUTION/ DROPS OPHTHALMIC at 17:34

## 2017-09-28 RX ADMIN — HEPARIN SODIUM 5000 UNIT(S): 5000 INJECTION INTRAVENOUS; SUBCUTANEOUS at 05:10

## 2017-09-28 RX ADMIN — DORZOLAMIDE HYDROCHLORIDE TIMOLOL MALEATE 1 DROP(S): 20; 5 SOLUTION/ DROPS OPHTHALMIC at 06:44

## 2017-09-28 RX ADMIN — MEROPENEM 200 MILLIGRAM(S): 1 INJECTION INTRAVENOUS at 14:10

## 2017-09-28 RX ADMIN — CARBIDOPA AND LEVODOPA 1.5 TABLET(S): 25; 100 TABLET ORAL at 21:13

## 2017-09-28 RX ADMIN — Medication 1 TABLET(S): at 10:14

## 2017-09-28 RX ADMIN — POLYETHYLENE GLYCOL 3350 17 GRAM(S): 17 POWDER, FOR SOLUTION ORAL at 17:34

## 2017-09-28 RX ADMIN — Medication 1 DROP(S): at 17:33

## 2017-09-28 RX ADMIN — SODIUM CHLORIDE 1000 MILLILITER(S): 9 INJECTION INTRAMUSCULAR; INTRAVENOUS; SUBCUTANEOUS at 10:14

## 2017-09-28 RX ADMIN — SENNA PLUS 2 TABLET(S): 8.6 TABLET ORAL at 21:13

## 2017-09-28 RX ADMIN — PANTOPRAZOLE SODIUM 40 MILLIGRAM(S): 20 TABLET, DELAYED RELEASE ORAL at 06:44

## 2017-09-28 RX ADMIN — CARBIDOPA AND LEVODOPA 1.5 TABLET(S): 25; 100 TABLET ORAL at 14:10

## 2017-09-28 RX ADMIN — ATORVASTATIN CALCIUM 80 MILLIGRAM(S): 80 TABLET, FILM COATED ORAL at 21:14

## 2017-09-28 NOTE — CONSULT NOTE ADULT - SUBJECTIVE AND OBJECTIVE BOX
HPI:  82 year old man with CVA with R. sided residual deficits, s/p PEG, trach, CAD s/p CABG (26yo), Parkinson's disease, who presents for a fever from ([facility]).  As per chart review patient was independent up until he had a stroke. While he was hospitalized for the stroke he had an aspiration PNA and family stated that he needed a trach and PEG. Patient examined while in bed with trach collar.       PERTINENT PMH REVIEWED:  [x ] YES [ ] NO           SOCIAL HISTORY:  Significant other/partner:  [ ] YES  [ ] NO            Children:  [ x] YES  [ ] NO                   Pentecostalism/Spirituality: Shinto  Subtance hx:  [ ] YES   [x ] NO           Tobacco hx:  [ ] YES  [ x] NO             Alcohol hx: [ ] YES  [x ] NO        Home Opioid hx:  [ ] YES  [x ] NO   Living Situation: [ ] Home  [x ] Long term care  [ ] Rehab    REFERRALS:   [ ] Chaplaincy  [ ] Hospice  [ ] Child Life  [ ] Social Work  [ ] Case management [ ] Holistic Therapy     FAMILY HISTORY:  No pertinent family history in first degree relatives    [ ] Family history non contributory     BASELINE ADLs (prior to admission):  Independent [ ] moderately [ ] fully   Dependent   [x ] moderately [ ] fully    ADVANCE DIRECTIVES:  [ ] YES [ x] NO   DNR [ ] YES [x ] NO                      MOLST  [ ] YES [x ] NO    Living Will  [ ] YES [x ] NO    Health Care Proxy [ ] YES  [ x] NO      [x ] Surrogate  [ ] HCP  [ ] Guardian:      Hodan Santana                              Phone#: [phone]    Allergies    penicillin (Hives)    Intolerances    MEDICATIONS  (STANDING):  influenza   Vaccine 0.5 milliLiter(s) IntraMuscular once  atorvastatin 80 milliGRAM(s) Oral at bedtime  carbidopa/levodopa  25/100 1.5 Tablet(s) Oral three times a day  pilocarpine 4% Solution 1 Drop(s) Left EYE two times a day  latanoprost 0.005% Ophthalmic Solution 1 Drop(s) Left EYE at bedtime  meropenem IVPB      meropenem IVPB 1000 milliGRAM(s) IV Intermittent every 8 hours  loteprednol 0.5% Ophthalmic Suspension 1 Drop(s) Right EYE <User Schedule>  chlorhexidine 4% Liquid 1 Application(s) Topical daily  polyethylene glycol 3350 17 Gram(s) Oral two times a day  pantoprazole   Suspension 40 milliGRAM(s) Oral before breakfast  senna 2 Tablet(s) Oral at bedtime  dorzolamide 2%/timolol 0.5% Ophthalmic Solution 1 Drop(s) Left EYE two times a day  enoxaparin Injectable 40 milliGRAM(s) SubCutaneous daily    MEDICATIONS  (PRN):  bisacodyl Suppository 10 milliGRAM(s) Rectal daily PRN Constipation      PRESENT SYMPTOMS:  Source: [ ] Patient   [ ] Family   [ ] Team     Pain: [ ] YES [] NO  OLDCARTS:     Dyspnea: [ ] YES [ ] NO   Anxiety: [ ] YES [ ] NO  Fatigue: [ ] YES [ ] NO   Nausea: [ ] YES [ ] NO  Loss of appetite: [ ] YES [ ] NO   Constipation: [ ] YES [ ] NO     Other Symptoms:  [ ] All other review of systems negative   [x] Unable to obtain due to poor mentation     Karnofsky Performance Score/Palliative Performance Status Version 2:        40 %  Protein Calorie Malnutrition:  [ ] Mild   [ ] Moderate   [ ] Severe     Vital Signs Last 24 Hrs  T(C): 36.6 (28 Sep 2017 16:00), Max: 37 (28 Sep 2017 08:00)  T(F): 97.8 (28 Sep 2017 16:00), Max: 98.6 (28 Sep 2017 08:00)  HR: 74 (28 Sep 2017 16:00) (54 - 74)  BP: 102/52 (28 Sep 2017 16:00) (84/46 - 125/99)  BP(mean): 65 (28 Sep 2017 16:00) (55 - 105)  RR: 12 (28 Sep 2017 16:00) (0 - 25)  SpO2: 99% (28 Sep 2017 16:00) (97% - 100%)    Physical Exam:    General: [ x] Alert,  A&O x     [ ] lethargic   [ ] Agitated   [ ] Cachexia   HEENT: [ ] Normal   [ ] Dry mouth   [ ] ET Tube    [x ] Trach   Lungs: [x ] Clear [ ] Rhonchi  [ ] Crackles [ ] Wheezing [ ] Tachypnea  [ ] Audible excessive secretions   Cardiovascular:  [x ] Regular rate and rhythm  [ ] Irregular [ ] Tachycardia   [ ] Bradycardia   Abdomen: [ ] Soft  [ ] Distended  [ ]  [ ] +BS  [ ] Non tender [ ] Tender  [x ]PEG   [ ] NGT   Last BM:     Genitourinary: [ ] Normal   [ ] Incontinent   [ ] Oliguria/Anuria   [x ] Alexander  Musculoskeletal:  [ ] Normal   [ ] Generalized weakness  [x ] Bedbound   Neurological: [ ] No focal deficits  [x ] Cognitive impairment     Skin: [x ] Normal   [ ] Pressure ulcers     LABS:                        10.0   8.14  )-----------( 176      ( 28 Sep 2017 02:00 )             30.4     09-28    137  |  105  |  29<H>  ----------------------------<  122<H>  4.1   |  22  |  0.49<L>    Ca    7.3<L>      28 Sep 2017 03:00  Phos  2.0     09-28  Mg     1.8     09-28          I&O's Summary    27 Sep 2017 07:01  -  28 Sep 2017 07:00  --------------------------------------------------------  IN: 1307 mL / OUT: 700 mL / NET: 607 mL    28 Sep 2017 07:01  -  28 Sep 2017 16:44  --------------------------------------------------------  IN: 770 mL / OUT: 370 mL / NET: 400 mL        RADIOLOGY & ADDITIONAL STUDIES:  < from: Xray Chest 1 View AP- PORTABLE-Urgent (09.24.17 @ 10:20) >  EXAM:  RAD CHEST PORTABLE URGENT        PROCEDURE DATE:  Sep 24 2017 IMPRESSION:  Patient's overlying chin obscures left apex. Skinfolds superimpose medial   right and peripheral mid left lung.    Tracheostomy tube present with tip over tracheal air column.     Dense left basilar/retrocardiac opacification could be due to combination   of pleural effusion and/or underlying airspace consolidation including   pneumonia in the proper clinical context versus other parenchymal   pathology including a space-occupying mass lesion, chest CT would be   helpful for more definitive evaluation.    Sharp right CP angle. Clear remaining visualized lungs. No pneumothorax.    Sternal wires and mediastinal surgical clips present. Prominent appearing   cardiac and mediastinal silhouettes however inaccurately assessed on this   projection.    Generalized osteopenia and spinal degenerative changes.

## 2017-09-28 NOTE — CHART NOTE - NSCHARTNOTEFT_GEN_A_CORE
81 yo M with hx of CVA with R. sided residual deficits, s/p PEG, trach, CAD s/p CABG (28yo), Parkinson's disease, presents for a fever from ([facility]) presents for a fever. patient was found to be in shock, 81 yo M with hx of CVA with R. sided residual deficits, s/p PEG, trach, CAD s/p CABG (26yo), Parkinson's disease, presents for a fever from ([facility]) presents for a fever. patient was found to be in shock and was admitted to MICU. 83 yo M with hx of CVA with R. sided residual deficits, s/p PEG, trach, CAD s/p CABG (26yo), Parkinson's disease, presents for a fever from ([facility]) presents for a fever. patient was found to be in shock and was admitted to MICU.  Patient required pressors, initially on levophed started on atb. Was initially on vanc/cefepime/azithro for consolidation found on u.s and UTI. Initial urine cultures showed multiple bugs. 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 Sputum showed pseudomonas pna. Patient was also found to have AFIB with given strokes in the past has a [id number] but given possible coffee ground emesis x1 initially fobt and also in setting of levophed was not a/c. Changed to josh and no longer had any afib. Patient was also placed on vent 2/2 to sepsis and pna. Was also placed on a trach collar without need for pressure support.   - C/W meropenem for total of 7-10 days  - Monitor for bowel movements  - c/w parkinson meds to prevent locking in, monitor mental status  - Pt eval  - monitor respiratory status  - consider starting a/c for afib if h/h stable.     Dae Hyeon Kim [id number]  Department of Internal Medicine  Pager [phone]

## 2017-09-28 NOTE — PROGRESS NOTE ADULT - ASSESSMENT
83 yo M with hx of CVA with R. sided residual deficits, s/p PEG, trach, CAD s/p CABG (26yo), Parkinson's disease, presents for a fever from ([facility]) presents for a fever, admitted with septic shock likely 2/2 UTI.

## 2017-09-28 NOTE — PROGRESS NOTE ADULT - SUBJECTIVE AND OBJECTIVE BOX
CHIEF COMPLAINT:    Interval Events:    REVIEW OF SYSTEMS:  Constitutional: [ ] negative [ ] fevers [ ] chills [ ] weight loss [ ] weight gain  HEENT: [ ] negative [ ] dry eyes [ ] eye irritation [ ] postnasal drip [ ] nasal congestion  CV: [ ] negative  [ ] chest pain [ ] orthopnea [ ] palpitations [ ] murmur  Resp: [ ] negative [ ] cough [ ] shortness of breath [ ] dyspnea [ ] wheezing [ ] sputum [ ] hemoptysis  GI: [ ] negative [ ] nausea [ ] vomiting [ ] diarrhea [ ] constipation [ ] abd pain [ ] dysphagia   : [ ] negative [ ] dysuria [ ] nocturia [ ] hematuria [ ] increased urinary frequency  Musculoskeletal: [ ] negative [ ] back pain [ ] myalgias [ ] arthralgias [ ] fracture  Skin: [ ] negative [ ] rash [ ] itch  Neurological: [ ] negative [ ] headache [ ] dizziness [ ] syncope [ ] weakness [ ] numbness  Psychiatric: [ ] negative [ ] anxiety [ ] depression  Endocrine: [ ] negative [ ] diabetes [ ] thyroid problem  Hematologic/Lymphatic: [ ] negative [ ] anemia [ ] bleeding problem  Allergic/Immunologic: [ ] negative [ ] itchy eyes [ ] nasal discharge [ ] hives [ ] angioedema  [ ] All other systems negative  [ ] Unable to assess ROS because ________    OBJECTIVE:  ICU Vital Signs Last 24 Hrs  T(C): 36.7 (28 Sep 2017 04:00), Max: 37.2 (27 Sep 2017 08:00)  T(F): 98 (28 Sep 2017 04:00), Max: 99 (27 Sep 2017 08:00)  HR: 60 (28 Sep 2017 06:19) (53 - 65)  BP: 90/46 (28 Sep 2017 06:19) (84/46 - 127/65)  BP(mean): 56 (28 Sep 2017 06:19) (55 - 81)  ABP: --  ABP(mean): --  RR: 17 (28 Sep 2017 06:19) (13 - 24)  SpO2: 100% (28 Sep 2017 06:19) (98% - 100%)    Mode: CPAP with PS, FiO2: 40, PEEP: 5, PS: 8, MAP: 7    09-27 @ 07:01  -  09-28 @ 07:00  --------------------------------------------------------  IN: 1307 mL / OUT: 700 mL / NET: 607 mL      CAPILLARY BLOOD GLUCOSE          PHYSICAL EXAM:  General:   HEENT:   Lymph Nodes:  Neck:   Respiratory:   Cardiovascular:   Abdomen:   Extremities:   Skin:   Neurological:  Psychiatry:    LINES:    HOSPITAL MEDICATIONS:  heparin  Injectable 5000 Unit(s) SubCutaneous every 8 hours    vancomycin  IVPB 1000 milliGRAM(s) IV Intermittent every 12 hours  meropenem IVPB      meropenem IVPB 1000 milliGRAM(s) IV Intermittent every 8 hours    phenylephrine    Infusion 0.5 MICROgram(s)/kG/Min IV Continuous <Continuous>    atorvastatin 80 milliGRAM(s) Oral at bedtime      carbidopa/levodopa  25/100 1.5 Tablet(s) Oral three times a day    polyethylene glycol 3350 17 Gram(s) Oral two times a day  pantoprazole   Suspension 40 milliGRAM(s) Oral before breakfast  senna 2 Tablet(s) Oral at bedtime  bisacodyl Suppository 10 milliGRAM(s) Rectal daily PRN        potassium acid phosphate/sodium acid phosphate tablet (K-PHOS No. 2) 1 Tablet(s) Oral every 4 hours    influenza   Vaccine 0.5 milliLiter(s) IntraMuscular once    pilocarpine 4% Solution 1 Drop(s) Left EYE two times a day  latanoprost 0.005% Ophthalmic Solution 1 Drop(s) Left EYE at bedtime  loteprednol 0.5% Ophthalmic Suspension 1 Drop(s) Right EYE <User Schedule>  chlorhexidine 4% Liquid 1 Application(s) Topical daily  dorzolamide 2%/timolol 0.5% Ophthalmic Solution 1 Drop(s) Left EYE two times a day        LABS:                        10.0   8.14  )-----------( 176      ( 28 Sep 2017 02:00 )             30.4     Hgb Trend: 10.0<--, 10.1<--, 10.0<--, 12.3<--, 12.4<--  09-28    137  |  105  |  29<H>  ----------------------------<  122<H>  4.1   |  22  |  0.49<L>    Ca    7.3<L>      28 Sep 2017 03:00  Phos  2.0     09-28  Mg     1.8     09-28      Creatinine Trend: 0.49<--, 0.57<--, 0.62<--, 0.94<--, 2.07<--            MICROBIOLOGY:     RADIOLOGY:  [ ] Reviewed and interpreted by me    EKG: CHIEF COMPLAINT: Septic shock, ams    Interval Events:    Cpaping well overnight, was off of josh for several hours, now back on. mental status improving, no documented bm's.     REVIEW OF SYSTEMS:  Constitutional: [ ] negative [ ] fevers [ ] chills [ ] weight loss [ ] weight gain  HEENT: [ ] negative [ ] dry eyes [ ] eye irritation [ ] postnasal drip [ ] nasal congestion  CV: [ ] negative  [ ] chest pain [ ] orthopnea [ ] palpitations [ ] murmur  Resp: [ ] negative [ ] cough [ ] shortness of breath [ ] dyspnea [ ] wheezing [ ] sputum [ ] hemoptysis  GI: [ ] negative [ ] nausea [ ] vomiting [ ] diarrhea [ ] constipation [ ] abd pain [ ] dysphagia   : [ ] negative [ ] dysuria [ ] nocturia [ ] hematuria [ ] increased urinary frequency  Musculoskeletal: [ ] negative [ ] back pain [ ] myalgias [ ] arthralgias [ ] fracture  Skin: [ ] negative [ ] rash [ ] itch  Neurological: [ ] negative [ ] headache [ ] dizziness [ ] syncope [ ] weakness [ ] numbness  Psychiatric: [ ] negative [ ] anxiety [ ] depression  Endocrine: [ ] negative [ ] diabetes [ ] thyroid problem  Hematologic/Lymphatic: [ ] negative [ ] anemia [ ] bleeding problem  Allergic/Immunologic: [ ] negative [ ] itchy eyes [ ] nasal discharge [ ] hives [ ] angioedema  [ ] All other systems negative  [x] Unable to assess ROS because AMS    OBJECTIVE:  ICU Vital Signs Last 24 Hrs  T(C): 36.7 (28 Sep 2017 04:00), Max: 37.2 (27 Sep 2017 08:00)  T(F): 98 (28 Sep 2017 04:00), Max: 99 (27 Sep 2017 08:00)  HR: 60 (28 Sep 2017 06:19) (53 - 65)  BP: 90/46 (28 Sep 2017 06:19) (84/46 - 127/65)  BP(mean): 56 (28 Sep 2017 06:19) (55 - 81)  ABP: --  ABP(mean): --  RR: 17 (28 Sep 2017 06:19) (13 - 24)  SpO2: 100% (28 Sep 2017 06:19) (98% - 100%)    Mode: CPAP with PS, FiO2: 40, PEEP: 5, PS: 8, MAP: 7    09-27 @ 07:01  -  09-28 @ 07:00  --------------------------------------------------------  IN: 1307 mL / OUT: 700 mL / NET: 607 mL      CAPILLARY BLOOD GLUCOSE          PHYSICAL EXAM:  Constitutional: well-developed; well-groomed; thin male; no distress,  Eyes: PERRL; EMOI  ENMT: Normal oropharnxy, no oral lesions, no erythema, no exudates  Neck:  Supple; no JVD; normal thyroid gland. Vent in place.   MSK/Back: normal shape; no tenderenss, no joint erythema, no effusions. No tremors. Rigidity improved.   Respiratory: airway patent; breath sounds equal; good air movement, no wheezing, no crackles, no rhonchi. no increase in WOB  Cardiovascular: regular rate and rhythm  no rub , no murmur, no gallops.   Gastrointestinal: soft; no distention, normal BS, no TTP, no rebound, no guarding, no mass, no organomegaly, no ascites. Peg in place d/c/i, stables in place.   Genitourinary: Alexander in place, chronic  Extremities: no clubbing; no cyanosis; no pedal edema, non-tender to palpation, DP and Radial pulses intact.  Neurological: Only responsive to pain/ grimace no withdrawing of limbs. Delay in following commands, Bradykinesis.   Skin: warm and dry; color normal: no rash: no ulcers     LINES:    HOSPITAL MEDICATIONS:  heparin  Injectable 5000 Unit(s) SubCutaneous every 8 hours    vancomycin  IVPB 1000 milliGRAM(s) IV Intermittent every 12 hours  meropenem IVPB      meropenem IVPB 1000 milliGRAM(s) IV Intermittent every 8 hours    phenylephrine    Infusion 0.5 MICROgram(s)/kG/Min IV Continuous <Continuous>    atorvastatin 80 milliGRAM(s) Oral at bedtime      carbidopa/levodopa  25/100 1.5 Tablet(s) Oral three times a day    polyethylene glycol 3350 17 Gram(s) Oral two times a day  pantoprazole   Suspension 40 milliGRAM(s) Oral before breakfast  senna 2 Tablet(s) Oral at bedtime  bisacodyl Suppository 10 milliGRAM(s) Rectal daily PRN        potassium acid phosphate/sodium acid phosphate tablet (K-PHOS No. 2) 1 Tablet(s) Oral every 4 hours    influenza   Vaccine 0.5 milliLiter(s) IntraMuscular once    pilocarpine 4% Solution 1 Drop(s) Left EYE two times a day  latanoprost 0.005% Ophthalmic Solution 1 Drop(s) Left EYE at bedtime  loteprednol 0.5% Ophthalmic Suspension 1 Drop(s) Right EYE <User Schedule>  chlorhexidine 4% Liquid 1 Application(s) Topical daily  dorzolamide 2%/timolol 0.5% Ophthalmic Solution 1 Drop(s) Left EYE two times a day      LABS:                        10.0   8.14  )-----------( 176      ( 28 Sep 2017 02:00 )             30.4     Hgb Trend: 10.0<--, 10.1<--, 10.0<--, 12.3<--, 12.4<--  09-28    137  |  105  |  29<H>  ----------------------------<  122<H>  4.1   |  22  |  0.49<L>    Ca    7.3<L>      28 Sep 2017 03:00  Phos  2.0     09-28  Mg     1.8     09-28      Creatinine Trend: 0.49<--, 0.57<--, 0.62<--, 0.94<--, 2.07<--    MICROBIOLOGY:    RADIOLOGY:  [ ] Reviewed and interpreted by me    EKG:

## 2017-09-28 NOTE — PROGRESS NOTE ADULT - ASSESSMENT
83 yo M with hx of CVA with R. sided residual deficits, s/p PEG, trach, CAD s/p CABG (28yo), Parkinson's disease, presents for a fever from ([facility] presents for a fever, admitted with septic shock likely 2/2 UTI and PNA, acute respiratory failure.     #Neuro: Mental status improving.  Possible recovery with correction of underlying sepsis. C/W PD meds, rigidity improving, more awake.   #CV: hypotension likely 2/2 to vasoplegic shock from sepsis. Will check IVC, may need fluids. Was in afib, briefly history of stroke with elevated [id number]. Will hold a/c for now. Real was titrated off, now back on real. Will try IVF if bedside us exam is normal.   #Pulm: On vent, likely needed 2/2 to PD/sepsis. C/W current settings. Cpaping well. Will try off vent in the afternoon.   #ID: Consolidation on U/S, positive UA, urine culture grossly contaminated, repeat with new Alexander pending showing providenca stuartii Vanc d/c, white count improving, no growth in the sputum. likely treat pna and uti for at least 7-10 days.   #Renal/Fluid: Cr, stable, will consider standing fluids net positive, leandra now resolving.   #Heme: developing anemia, likely 2/2 to sepsis, no evidence of GIB currently. FOBT was positive no GIB. WIll need GI eval once stable on floors. PPI.  #Endo: No issues currently.   #GI:  Will start feeds today, c/w ppi for anemia and possible hx GIB currently no active bleeding. Added ducolax for bowel movements.   #DVT ppx: SQH  # Dispo: Floors when stable. Full code currently. Was having improving functional status as out patient.     Dae Hyeon Kim [id number]  Department of Internal Medicine  Pager [phone]

## 2017-09-28 NOTE — CHART NOTE - NSCHARTNOTEFT_GEN_A_CORE
Patient is a 83 yo M w/ CVA w/ residual R deficit, s/p PEG, s/p Trach, CAD s/p CABG, Parkinson's initially presented to hospital on 9/24/17 for fever and admitted to MICU for septic shock. Urine positive for resistant Provendecia. Sputum positive for pseudomonas. POCUS showed consolidation. Patient currently on Day 4 of Meropenem. Course c/b Afib although not on AC currently despite CHADSVASC 2 due to ? coffee ground emesis and intial positive FOBT. Leukocytosis resolved. HD stable off pressors    To Do  - c/w Meropenem for total 7 to 10 days  - c/w Parkinsons meds  - PT eval  - Consider AC if HH stable given CHADSVASC 2    Dayton Kim PGY 3  [id number]

## 2017-09-28 NOTE — PROGRESS NOTE ADULT - SUBJECTIVE AND OBJECTIVE BOX
Patient is a 82y old  Male who presents with a chief complaint of Emesis (24 Sep 2017 13:49)    pt. seen and examined.     INTERVAL HPI/OVERNIGHT EVENTS:  T(C): 36.8 (09-28-17 @ 22:16), Max: 37 (09-28-17 @ 08:00)  HR: 60 (09-28-17 @ 22:16) (54 - 74)  BP: 117/67 (09-28-17 @ 22:16) (84/46 - 125/99)  RR: 25 (09-28-17 @ 22:16) (0 - 27)  SpO2: 98% (09-28-17 @ 22:16) (97% - 100%)  Wt(kg): --  I&O's Summary    27 Sep 2017 07:01  -  28 Sep 2017 07:00  --------------------------------------------------------  IN: 1307 mL / OUT: 700 mL / NET: 607 mL    28 Sep 2017 07:01  -  29 Sep 2017 03:10  --------------------------------------------------------  IN: 940 mL / OUT: 525 mL / NET: 415 mL        PAST MEDICAL & SURGICAL HISTORY:  Tracheostomy in place  Hypertension  CVA (cerebral vascular accident)  S/P percutaneous endoscopic gastrostomy (PEG) tube placement      SOCIAL HISTORY  Alcohol:  Tobacco:  Illicit substance use:    FAMILY HISTORY:    REVIEW OF SYSTEMS:  CONSTITUTIONAL: No fever, weight loss, or fatigue  EYES: No eye pain, visual disturbances, or discharge  ENMT:  No difficulty hearing, tinnitus, vertigo; No sinus or throat pain  NECK: No pain or stiffness  RESPIRATORY: No cough, wheezing, chills or hemoptysis; No shortness of breath  CARDIOVASCULAR: No chest pain, palpitations, dizziness, or leg swelling  GASTROINTESTINAL: No abdominal or epigastric pain. No nausea, vomiting, or hematemesis; No diarrhea or constipation. No melena or hematochezia.  GENITOURINARY: No dysuria, frequency, hematuria, or incontinence  NEUROLOGICAL: No headaches, memory loss, loss of strength, numbness, or tremors  SKIN: No itching, burning, rashes, or lesions   LYMPH NODES: No enlarged glands  ENDOCRINE: No heat or cold intolerance; No hair loss  MUSCULOSKELETAL: No joint pain or swelling; No muscle, back, or extremity pain  PSYCHIATRIC: No depression, anxiety, mood swings, or difficulty sleeping  HEME/LYMPH: No easy bruising, or bleeding gums  ALLERY AND IMMUNOLOGIC: No hives or eczema    RADIOLOGY & ADDITIONAL TESTS:    Imaging Personally Reviewed:  [ ] YES  [ ] NO    Consultant(s) Notes Reviewed:  [ ] YES  [ ] NO    PHYSICAL EXAM:  GENERAL: NAD, well-groomed, well-developed  HEAD:  Atraumatic, Normocephalic  EYES: EOMI, PERRLA, conjunctiva and sclera clear  ENMT: No tonsillar erythema, exudates, or enlargement; Moist mucous membranes, Good dentition, No lesions  NECK: Supple, No JVD, Normal thyroid  NERVOUS SYSTEM:  Alert & Oriented X3, Good concentration; Motor Strength 5/5 B/L upper and lower extremities; DTRs 2+ intact and symmetric  CHEST/LUNG: Clear to percussion bilaterally; No rales, rhonchi, wheezing, or rubs  HEART: Regular rate and rhythm; No murmurs, rubs, or gallops  ABDOMEN: Soft, Nontender, Nondistended; Bowel sounds present  EXTREMITIES:  2+ Peripheral Pulses, No clubbing, cyanosis, or edema  LYMPH: No lymphadenopathy noted  SKIN: No rashes or lesions    LABS:                        10.0   8.14  )-----------( 176      ( 28 Sep 2017 02:00 )             30.4     09-28    137  |  105  |  29<H>  ----------------------------<  122<H>  4.1   |  22  |  0.49<L>    Ca    7.3<L>      28 Sep 2017 03:00  Phos  2.0     09-28  Mg     1.8     09-28          CAPILLARY BLOOD GLUCOSE                MEDICATIONS  (STANDING):  influenza   Vaccine 0.5 milliLiter(s) IntraMuscular once  atorvastatin 80 milliGRAM(s) Oral at bedtime  carbidopa/levodopa  25/100 1.5 Tablet(s) Oral three times a day  pilocarpine 4% Solution 1 Drop(s) Left EYE two times a day  latanoprost 0.005% Ophthalmic Solution 1 Drop(s) Left EYE at bedtime  meropenem IVPB      meropenem IVPB 1000 milliGRAM(s) IV Intermittent every 8 hours  loteprednol 0.5% Ophthalmic Suspension 1 Drop(s) Right EYE <User Schedule>  polyethylene glycol 3350 17 Gram(s) Oral two times a day  pantoprazole   Suspension 40 milliGRAM(s) Oral before breakfast  senna 2 Tablet(s) Oral at bedtime  dorzolamide 2%/timolol 0.5% Ophthalmic Solution 1 Drop(s) Left EYE two times a day  enoxaparin Injectable 40 milliGRAM(s) SubCutaneous daily    MEDICATIONS  (PRN):  bisacodyl Suppository 10 milliGRAM(s) Rectal daily PRN Constipation      Care Discussed with Consultants/Other Providers [ ] YES  [ ] NO

## 2017-09-29 DIAGNOSIS — Z93.0 TRACHEOSTOMY STATUS: ICD-10-CM

## 2017-09-29 LAB
-  AMIKACIN: SIGNIFICANT CHANGE UP
-  AZTREONAM: SIGNIFICANT CHANGE UP
-  CEFEPIME: SIGNIFICANT CHANGE UP
-  CEFTAZIDIME: SIGNIFICANT CHANGE UP
-  CIPROFLOXACIN: SIGNIFICANT CHANGE UP
-  GENTAMICIN: SIGNIFICANT CHANGE UP
-  IMIPENEM: SIGNIFICANT CHANGE UP
-  LEVOFLOXACIN: SIGNIFICANT CHANGE UP
-  MEROPENEM: SIGNIFICANT CHANGE UP
-  PIPERACILLIN/TAZOBACTAM: SIGNIFICANT CHANGE UP
-  TOBRAMYCIN: SIGNIFICANT CHANGE UP
BACTERIA BLD CULT: SIGNIFICANT CHANGE UP
BACTERIA BLD CULT: SIGNIFICANT CHANGE UP
BACTERIA SPT RESP CULT: SIGNIFICANT CHANGE UP
BASOPHILS # BLD AUTO: 0.02 K/UL — SIGNIFICANT CHANGE UP (ref 0–0.2)
BASOPHILS NFR BLD AUTO: 0.3 % — SIGNIFICANT CHANGE UP (ref 0–2)
BUN SERPL-MCNC: 20 MG/DL — SIGNIFICANT CHANGE UP (ref 7–23)
CALCIUM SERPL-MCNC: 7.4 MG/DL — LOW (ref 8.4–10.5)
CHLORIDE SERPL-SCNC: 106 MMOL/L — SIGNIFICANT CHANGE UP (ref 98–107)
CO2 SERPL-SCNC: 24 MMOL/L — SIGNIFICANT CHANGE UP (ref 22–31)
CREAT SERPL-MCNC: 0.38 MG/DL — LOW (ref 0.5–1.3)
EOSINOPHIL # BLD AUTO: 0.56 K/UL — HIGH (ref 0–0.5)
EOSINOPHIL NFR BLD AUTO: 8 % — HIGH (ref 0–6)
GLUCOSE SERPL-MCNC: 110 MG/DL — HIGH (ref 70–99)
GRAM STN SPT: SIGNIFICANT CHANGE UP
HCT VFR BLD CALC: 31.5 % — LOW (ref 39–50)
HGB BLD-MCNC: 10.2 G/DL — LOW (ref 13–17)
IMM GRANULOCYTES # BLD AUTO: 0.26 # — SIGNIFICANT CHANGE UP
IMM GRANULOCYTES NFR BLD AUTO: 3.7 % — HIGH (ref 0–1.5)
LYMPHOCYTES # BLD AUTO: 0.95 K/UL — LOW (ref 1–3.3)
LYMPHOCYTES # BLD AUTO: 13.6 % — SIGNIFICANT CHANGE UP (ref 13–44)
MAGNESIUM SERPL-MCNC: 1.6 MG/DL — SIGNIFICANT CHANGE UP (ref 1.6–2.6)
MCHC RBC-ENTMCNC: 30.2 PG — SIGNIFICANT CHANGE UP (ref 27–34)
MCHC RBC-ENTMCNC: 32.4 % — SIGNIFICANT CHANGE UP (ref 32–36)
MCV RBC AUTO: 93.2 FL — SIGNIFICANT CHANGE UP (ref 80–100)
METHOD TYPE: SIGNIFICANT CHANGE UP
MONOCYTES # BLD AUTO: 0.74 K/UL — SIGNIFICANT CHANGE UP (ref 0–0.9)
MONOCYTES NFR BLD AUTO: 10.6 % — SIGNIFICANT CHANGE UP (ref 2–14)
NEUTROPHILS # BLD AUTO: 4.44 K/UL — SIGNIFICANT CHANGE UP (ref 1.8–7.4)
NEUTROPHILS NFR BLD AUTO: 63.8 % — SIGNIFICANT CHANGE UP (ref 43–77)
NRBC # FLD: 0 — SIGNIFICANT CHANGE UP
ORGANISM # SPEC MICROSCOPIC CNT: SIGNIFICANT CHANGE UP
ORGANISM # SPEC MICROSCOPIC CNT: SIGNIFICANT CHANGE UP
PHOSPHATE SERPL-MCNC: 2.2 MG/DL — LOW (ref 2.5–4.5)
PLATELET # BLD AUTO: 168 K/UL — SIGNIFICANT CHANGE UP (ref 150–400)
PMV BLD: 10 FL — SIGNIFICANT CHANGE UP (ref 7–13)
POTASSIUM SERPL-MCNC: 3.5 MMOL/L — SIGNIFICANT CHANGE UP (ref 3.5–5.3)
POTASSIUM SERPL-SCNC: 3.5 MMOL/L — SIGNIFICANT CHANGE UP (ref 3.5–5.3)
RBC # BLD: 3.38 M/UL — LOW (ref 4.2–5.8)
RBC # FLD: 15 % — HIGH (ref 10.3–14.5)
SODIUM SERPL-SCNC: 139 MMOL/L — SIGNIFICANT CHANGE UP (ref 135–145)
WBC # BLD: 6.97 K/UL — SIGNIFICANT CHANGE UP (ref 3.8–10.5)
WBC # FLD AUTO: 6.97 K/UL — SIGNIFICANT CHANGE UP (ref 3.8–10.5)

## 2017-09-29 PROCEDURE — 99233 SBSQ HOSP IP/OBS HIGH 50: CPT | Mod: GC

## 2017-09-29 RX ADMIN — MEROPENEM 200 MILLIGRAM(S): 1 INJECTION INTRAVENOUS at 05:57

## 2017-09-29 RX ADMIN — ATORVASTATIN CALCIUM 80 MILLIGRAM(S): 80 TABLET, FILM COATED ORAL at 21:53

## 2017-09-29 RX ADMIN — LOTEPREDNOL ETABONATE 1 DROP(S): 2 SUSPENSION/ DROPS OPHTHALMIC at 11:30

## 2017-09-29 RX ADMIN — MEROPENEM 200 MILLIGRAM(S): 1 INJECTION INTRAVENOUS at 14:49

## 2017-09-29 RX ADMIN — DORZOLAMIDE HYDROCHLORIDE TIMOLOL MALEATE 1 DROP(S): 20; 5 SOLUTION/ DROPS OPHTHALMIC at 17:51

## 2017-09-29 RX ADMIN — POLYETHYLENE GLYCOL 3350 17 GRAM(S): 17 POWDER, FOR SOLUTION ORAL at 05:55

## 2017-09-29 RX ADMIN — CARBIDOPA AND LEVODOPA 1.5 TABLET(S): 25; 100 TABLET ORAL at 14:48

## 2017-09-29 RX ADMIN — Medication 1 DROP(S): at 05:55

## 2017-09-29 RX ADMIN — MEROPENEM 200 MILLIGRAM(S): 1 INJECTION INTRAVENOUS at 21:53

## 2017-09-29 RX ADMIN — PANTOPRAZOLE SODIUM 40 MILLIGRAM(S): 20 TABLET, DELAYED RELEASE ORAL at 05:56

## 2017-09-29 RX ADMIN — LATANOPROST 1 DROP(S): 0.05 SOLUTION/ DROPS OPHTHALMIC; TOPICAL at 21:53

## 2017-09-29 RX ADMIN — ENOXAPARIN SODIUM 40 MILLIGRAM(S): 100 INJECTION SUBCUTANEOUS at 11:30

## 2017-09-29 RX ADMIN — POLYETHYLENE GLYCOL 3350 17 GRAM(S): 17 POWDER, FOR SOLUTION ORAL at 17:51

## 2017-09-29 RX ADMIN — Medication 1 DROP(S): at 17:51

## 2017-09-29 RX ADMIN — DORZOLAMIDE HYDROCHLORIDE TIMOLOL MALEATE 1 DROP(S): 20; 5 SOLUTION/ DROPS OPHTHALMIC at 05:55

## 2017-09-29 RX ADMIN — CARBIDOPA AND LEVODOPA 1 TABLET(S): 25; 100 TABLET ORAL at 05:55

## 2017-09-29 RX ADMIN — SENNA PLUS 2 TABLET(S): 8.6 TABLET ORAL at 21:53

## 2017-09-29 RX ADMIN — CARBIDOPA AND LEVODOPA 1.5 TABLET(S): 25; 100 TABLET ORAL at 21:53

## 2017-09-29 NOTE — PROGRESS NOTE ADULT - ASSESSMENT
83 yo M with hx of CVA with R. sided residual deficits, s/p PEG, trach, CAD s/p CABG (26yo), Parkinson's disease, presents for a fever from ([facility]) presents for a fever, admitted with septic shock likely 2/2 UTI and PNA, acute respiratory failure.

## 2017-09-29 NOTE — PROGRESS NOTE ADULT - SUBJECTIVE AND OBJECTIVE BOX
CHIEF COMPLAINT:    Interval Events:    REVIEW OF SYSTEMS:  Constitutional:   Eyes:  ENT:  CV:  Resp:  GI:  :  MSK:  Integumentary:  Neurological:  Psychiatric:  Endocrine:  Hematologic/Lymphatic:  Allergic/Immunologic:  [ ] All other systems negative  [ ] Unable to assess ROS because ________    OBJECTIVE:  ICU Vital Signs Last 24 Hrs  T(C): 36.9 (29 Sep 2017 05:50), Max: 36.9 (29 Sep 2017 05:50)  T(F): 98.4 (29 Sep 2017 05:50), Max: 98.4 (29 Sep 2017 05:50)  HR: 51 (29 Sep 2017 05:50) (46 - 74)  BP: 109/58 (29 Sep 2017 05:50) (91/57 - 125/99)  BP(mean): 62 (28 Sep 2017 20:00) (61 - 105)  ABP: --  ABP(mean): --  RR: 20 (29 Sep 2017 06:52) (0 - 27)  SpO2: 99% (29 Sep 2017 06:52) (97% - 100%)        09-28 @ 07:01  -  09-29 @ 07:00  --------------------------------------------------------  IN: 1280 mL / OUT: 725 mL / NET: 555 mL      CAPILLARY BLOOD GLUCOSE          PHYSICAL EXAM:  General:   HEENT:   Lymph Nodes:  Neck:   Respiratory:   Cardiovascular:   Abdomen:   Extremities:   Skin:   Neurological:  Psychiatry:    HOSPITAL MEDICATIONS:  MEDICATIONS  (STANDING):  influenza   Vaccine 0.5 milliLiter(s) IntraMuscular once  atorvastatin 80 milliGRAM(s) Oral at bedtime  carbidopa/levodopa  25/100 1.5 Tablet(s) Oral three times a day  pilocarpine 4% Solution 1 Drop(s) Left EYE two times a day  latanoprost 0.005% Ophthalmic Solution 1 Drop(s) Left EYE at bedtime  meropenem IVPB      meropenem IVPB 1000 milliGRAM(s) IV Intermittent every 8 hours  loteprednol 0.5% Ophthalmic Suspension 1 Drop(s) Right EYE <User Schedule>  polyethylene glycol 3350 17 Gram(s) Oral two times a day  pantoprazole   Suspension 40 milliGRAM(s) Oral before breakfast  senna 2 Tablet(s) Oral at bedtime  dorzolamide 2%/timolol 0.5% Ophthalmic Solution 1 Drop(s) Left EYE two times a day  enoxaparin Injectable 40 milliGRAM(s) SubCutaneous daily    MEDICATIONS  (PRN):  bisacodyl Suppository 10 milliGRAM(s) Rectal daily PRN Constipation      LABS:                        10.2   6.97  )-----------( 168      ( 29 Sep 2017 06:00 )             31.5     09-29    139  |  106  |  20  ----------------------------<  110<H>  3.5   |  24  |  0.38<L>    Ca    7.4<L>      29 Sep 2017 06:00  Phos  2.2     09-29  Mg     1.6     09-29                MICROBIOLOGY:     RADIOLOGY:  [ ] Reviewed and interpreted by me    PULMONARY FUNCTION TESTS:    EKG: CHIEF COMPLAINT: Patient is a 82y old  Male who presents with a chief complaint of Emesis (24 Sep 2017 13:49)    Interval Events: From MICU overnight    REVIEW OF SYSTEMS:  Constitutional:   Eyes:  ENT:  CV: Denies CP  Resp: Denies SOB  GI:  :  MSK:  Integumentary:  Neurological:  Psychiatric:  Endocrine:  Hematologic/Lymphatic:  Allergic/Immunologic:  [x] All other systems negative  [ ] Unable to assess ROS because ________    OBJECTIVE:  ICU Vital Signs Last 24 Hrs  T(C): 36.9 (29 Sep 2017 05:50), Max: 36.9 (29 Sep 2017 05:50)  T(F): 98.4 (29 Sep 2017 05:50), Max: 98.4 (29 Sep 2017 05:50)  HR: 51 (29 Sep 2017 05:50) (46 - 74)  BP: 109/58 (29 Sep 2017 05:50) (91/57 - 125/99)  BP(mean): 62 (28 Sep 2017 20:00) (61 - 105)  ABP: --  ABP(mean): --  RR: 20 (29 Sep 2017 06:52) (0 - 27)  SpO2: 99% (29 Sep 2017 06:52) (97% - 100%)        09-28 @ 07:01  -  09-29 @ 07:00  --------------------------------------------------------  IN: 1280 mL / OUT: 725 mL / NET: 555 mL      HOSPITAL MEDICATIONS:  MEDICATIONS  (STANDING):  influenza   Vaccine 0.5 milliLiter(s) IntraMuscular once  atorvastatin 80 milliGRAM(s) Oral at bedtime  carbidopa/levodopa  25/100 1.5 Tablet(s) Oral three times a day  pilocarpine 4% Solution 1 Drop(s) Left EYE two times a day  latanoprost 0.005% Ophthalmic Solution 1 Drop(s) Left EYE at bedtime  meropenem IVPB      meropenem IVPB 1000 milliGRAM(s) IV Intermittent every 8 hours  loteprednol 0.5% Ophthalmic Suspension 1 Drop(s) Right EYE <User Schedule>  polyethylene glycol 3350 17 Gram(s) Oral two times a day  pantoprazole   Suspension 40 milliGRAM(s) Oral before breakfast  senna 2 Tablet(s) Oral at bedtime  dorzolamide 2%/timolol 0.5% Ophthalmic Solution 1 Drop(s) Left EYE two times a day  enoxaparin Injectable 40 milliGRAM(s) SubCutaneous daily    MEDICATIONS  (PRN):  bisacodyl Suppository 10 milliGRAM(s) Rectal daily PRN Constipation      LABS:                        10.2   6.97  )-----------( 168      ( 29 Sep 2017 06:00 )             31.5     09-29    139  |  106  |  20  ----------------------------<  110<H>  3.5   |  24  |  0.38<L>    Ca    7.4<L>      29 Sep 2017 06:00  Phos  2.2     09-29  Mg     1.6     09-29                MICROBIOLOGY:     RADIOLOGY:  [ ] Reviewed and interpreted by me    PULMONARY FUNCTION TESTS:    EKG:

## 2017-09-29 NOTE — PROGRESS NOTE ADULT - SUBJECTIVE AND OBJECTIVE BOX
Patient is a 82y old  Male who presents with a chief complaint of Emesis (24 Sep 2017 13:49)    pt. seen and examined, NAD    INTERVAL HPI/OVERNIGHT EVENTS:  T(C): 36.9 (09-29-17 @ 14:45), Max: 36.9 (09-29-17 @ 05:50)  HR: 55 (09-29-17 @ 14:45) (46 - 60)  BP: 130/70 (09-29-17 @ 14:45) (104/50 - 130/70)  RR: 18 (09-29-17 @ 14:45) (18 - 26)  SpO2: 100% (09-29-17 @ 14:45) (97% - 100%)  Wt(kg): --  I&O's Summary    28 Sep 2017 07:01  -  29 Sep 2017 07:00  --------------------------------------------------------  IN: 1280 mL / OUT: 725 mL / NET: 555 mL        PAST MEDICAL & SURGICAL HISTORY:  Tracheostomy in place  Hypertension  CVA (cerebral vascular accident)  S/P percutaneous endoscopic gastrostomy (PEG) tube placement      SOCIAL HISTORY  Alcohol:  Tobacco:  Illicit substance use:    FAMILY HISTORY:    REVIEW OF SYSTEMS:  CONSTITUTIONAL: No fever, weight loss, or fatigue  EYES: No eye pain, visual disturbances, or discharge  ENMT:  No difficulty hearing, tinnitus, vertigo; No sinus or throat pain  NECK: No pain or stiffness  RESPIRATORY: No cough, wheezing, chills or hemoptysis; No shortness of breath  CARDIOVASCULAR: No chest pain, palpitations, dizziness, or leg swelling  GASTROINTESTINAL: No abdominal or epigastric pain. No nausea, vomiting, or hematemesis; No diarrhea or constipation. No melena or hematochezia.  GENITOURINARY: No dysuria, frequency, hematuria, or incontinence  NEUROLOGICAL: No headaches, memory loss, loss of strength, numbness, or tremors  SKIN: No itching, burning, rashes, or lesions   LYMPH NODES: No enlarged glands  ENDOCRINE: No heat or cold intolerance; No hair loss  MUSCULOSKELETAL: No joint pain or swelling; No muscle, back, or extremity pain  PSYCHIATRIC: No depression, anxiety, mood swings, or difficulty sleeping  HEME/LYMPH: No easy bruising, or bleeding gums  ALLERY AND IMMUNOLOGIC: No hives or eczema    RADIOLOGY & ADDITIONAL TESTS:    Imaging Personally Reviewed:  [ ] YES  [ ] NO    Consultant(s) Notes Reviewed:  [ ] YES  [ ] NO    PHYSICAL EXAM:  GENERAL: NAD, well-groomed, well-developed  HEAD:  Atraumatic, Normocephalic  EYES: EOMI, PERRLA, conjunctiva and sclera clear  ENMT: No tonsillar erythema, exudates, or enlargement; Moist mucous membranes, Good dentition, No lesions  NECK: Supple, No JVD, Normal thyroid  NERVOUS SYSTEM:  Alert & Oriented X3, Good concentration; Motor Strength 5/5 B/L upper and lower extremities; DTRs 2+ intact and symmetric  CHEST/LUNG: Clear to percussion bilaterally; No rales, rhonchi, wheezing, or rubs  HEART: Regular rate and rhythm; No murmurs, rubs, or gallops  ABDOMEN: Soft, Nontender, Nondistended; Bowel sounds present  EXTREMITIES:  2+ Peripheral Pulses, No clubbing, cyanosis, or edema  LYMPH: No lymphadenopathy noted  SKIN: No rashes or lesions    LABS:                        10.2   6.97  )-----------( 168      ( 29 Sep 2017 06:00 )             31.5     09-29    139  |  106  |  20  ----------------------------<  110<H>  3.5   |  24  |  0.38<L>    Ca    7.4<L>      29 Sep 2017 06:00  Phos  2.2     09-29  Mg     1.6     09-29          CAPILLARY BLOOD GLUCOSE                MEDICATIONS  (STANDING):  influenza   Vaccine 0.5 milliLiter(s) IntraMuscular once  atorvastatin 80 milliGRAM(s) Oral at bedtime  carbidopa/levodopa  25/100 1.5 Tablet(s) Oral three times a day  pilocarpine 4% Solution 1 Drop(s) Left EYE two times a day  latanoprost 0.005% Ophthalmic Solution 1 Drop(s) Left EYE at bedtime  meropenem IVPB      meropenem IVPB 1000 milliGRAM(s) IV Intermittent every 8 hours  loteprednol 0.5% Ophthalmic Suspension 1 Drop(s) Right EYE <User Schedule>  polyethylene glycol 3350 17 Gram(s) Oral two times a day  pantoprazole   Suspension 40 milliGRAM(s) Oral before breakfast  senna 2 Tablet(s) Oral at bedtime  dorzolamide 2%/timolol 0.5% Ophthalmic Solution 1 Drop(s) Left EYE two times a day  enoxaparin Injectable 40 milliGRAM(s) SubCutaneous daily    MEDICATIONS  (PRN):  bisacodyl Suppository 10 milliGRAM(s) Rectal daily PRN Constipation      Care Discussed with Consultants/Other Providers [ ] YES  [ ] NO

## 2017-09-29 NOTE — PHYSICAL THERAPY INITIAL EVALUATION ADULT - PERTINENT HX OF CURRENT PROBLEM, REHAB EVAL
83 yo M with hx of CVA with R. sided residual deficits, s/p PEG, trach, CAD s/p CABG (28yo), Parkinson's disease, presents for a fever from ([facility]) presents for a fever.

## 2017-09-30 PROCEDURE — 99232 SBSQ HOSP IP/OBS MODERATE 35: CPT

## 2017-09-30 RX ORDER — SODIUM,POTASSIUM PHOSPHATES 278-250MG
1 POWDER IN PACKET (EA) ORAL
Qty: 0 | Refills: 0 | Status: COMPLETED | OUTPATIENT
Start: 2017-09-30 | End: 2017-10-01

## 2017-09-30 RX ADMIN — ATORVASTATIN CALCIUM 80 MILLIGRAM(S): 80 TABLET, FILM COATED ORAL at 22:11

## 2017-09-30 RX ADMIN — MEROPENEM 200 MILLIGRAM(S): 1 INJECTION INTRAVENOUS at 05:19

## 2017-09-30 RX ADMIN — Medication 1 TABLET(S): at 17:22

## 2017-09-30 RX ADMIN — DORZOLAMIDE HYDROCHLORIDE TIMOLOL MALEATE 1 DROP(S): 20; 5 SOLUTION/ DROPS OPHTHALMIC at 05:19

## 2017-09-30 RX ADMIN — Medication 1 TABLET(S): at 14:01

## 2017-09-30 RX ADMIN — POLYETHYLENE GLYCOL 3350 17 GRAM(S): 17 POWDER, FOR SOLUTION ORAL at 17:22

## 2017-09-30 RX ADMIN — Medication 1 TABLET(S): at 22:56

## 2017-09-30 RX ADMIN — Medication 1 DROP(S): at 05:19

## 2017-09-30 RX ADMIN — Medication 1 DROP(S): at 17:23

## 2017-09-30 RX ADMIN — CARBIDOPA AND LEVODOPA 1.5 TABLET(S): 25; 100 TABLET ORAL at 05:18

## 2017-09-30 RX ADMIN — DORZOLAMIDE HYDROCHLORIDE TIMOLOL MALEATE 1 DROP(S): 20; 5 SOLUTION/ DROPS OPHTHALMIC at 17:23

## 2017-09-30 RX ADMIN — LATANOPROST 1 DROP(S): 0.05 SOLUTION/ DROPS OPHTHALMIC; TOPICAL at 22:11

## 2017-09-30 RX ADMIN — CARBIDOPA AND LEVODOPA 1.5 TABLET(S): 25; 100 TABLET ORAL at 14:01

## 2017-09-30 RX ADMIN — ENOXAPARIN SODIUM 40 MILLIGRAM(S): 100 INJECTION SUBCUTANEOUS at 14:00

## 2017-09-30 RX ADMIN — CARBIDOPA AND LEVODOPA 1.5 TABLET(S): 25; 100 TABLET ORAL at 22:11

## 2017-09-30 RX ADMIN — MEROPENEM 200 MILLIGRAM(S): 1 INJECTION INTRAVENOUS at 14:04

## 2017-09-30 RX ADMIN — MEROPENEM 200 MILLIGRAM(S): 1 INJECTION INTRAVENOUS at 22:11

## 2017-09-30 RX ADMIN — POLYETHYLENE GLYCOL 3350 17 GRAM(S): 17 POWDER, FOR SOLUTION ORAL at 05:18

## 2017-09-30 RX ADMIN — SENNA PLUS 2 TABLET(S): 8.6 TABLET ORAL at 22:11

## 2017-09-30 RX ADMIN — PANTOPRAZOLE SODIUM 40 MILLIGRAM(S): 20 TABLET, DELAYED RELEASE ORAL at 05:18

## 2017-09-30 NOTE — PROGRESS NOTE ADULT - ASSESSMENT
83 yo M with hx of CVA with R. sided residual deficits, s/p PEG, trach, CAD s/p CABG (28yo), Parkinson's disease, presents for a fever from ([facility]) presents for a fever, admitted with septic shock likely 2/2 UTI and PNA, acute respiratory failure.

## 2017-09-30 NOTE — PROGRESS NOTE ADULT - PROBLEM SELECTOR PLAN 2
Continue current medications  PT evaluation Continue current medications  PT recommending return to rehab facility

## 2017-09-30 NOTE — PROGRESS NOTE ADULT - PROBLEM SELECTOR PLAN 4
Providencia UTI.  Chronic lubin  Complete course of meropenem Providencia UTI, pseudomonas in urine  Complete course of meropenem

## 2017-09-30 NOTE — PROGRESS NOTE ADULT - SUBJECTIVE AND OBJECTIVE BOX
Patient is a 82y old  Male who presents with a chief complaint of Emesis (24 Sep 2017 13:49)    pt. seen and examined, NAD    INTERVAL HPI/OVERNIGHT EVENTS:  T(C): 36.9 (09-30-17 @ 13:56), Max: 36.9 (09-30-17 @ 13:56)  HR: 66 (09-30-17 @ 13:56) (57 - 66)  BP: 126/74 (09-30-17 @ 13:56) (124/65 - 136/78)  RR: 18 (09-30-17 @ 14:44) (18 - 20)  SpO2: 100% (09-30-17 @ 14:44) (98% - 100%)  Wt(kg): --  I&O's Summary    29 Sep 2017 07:01  -  30 Sep 2017 07:00  --------------------------------------------------------  IN: 680 mL / OUT: 950 mL / NET: -270 mL    30 Sep 2017 07:01  -  30 Sep 2017 18:55  --------------------------------------------------------  IN: 0 mL / OUT: 500 mL / NET: -500 mL        PAST MEDICAL & SURGICAL HISTORY:  Tracheostomy in place  Hypertension  CVA (cerebral vascular accident)  S/P percutaneous endoscopic gastrostomy (PEG) tube placement      SOCIAL HISTORY  Alcohol:  Tobacco:  Illicit substance use:    FAMILY HISTORY:    REVIEW OF SYSTEMS:  CONSTITUTIONAL: No fever, weight loss, or fatigue  EYES: No eye pain, visual disturbances, or discharge  ENMT:  No difficulty hearing, tinnitus, vertigo; No sinus or throat pain  NECK: No pain or stiffness  RESPIRATORY: No cough, wheezing, chills or hemoptysis; No shortness of breath  CARDIOVASCULAR: No chest pain, palpitations, dizziness, or leg swelling  GASTROINTESTINAL: No abdominal or epigastric pain. No nausea, vomiting, or hematemesis; No diarrhea or constipation. No melena or hematochezia.  GENITOURINARY: No dysuria, frequency, hematuria, or incontinence  NEUROLOGICAL: No headaches, memory loss, loss of strength, numbness, or tremors  SKIN: No itching, burning, rashes, or lesions   LYMPH NODES: No enlarged glands  ENDOCRINE: No heat or cold intolerance; No hair loss  MUSCULOSKELETAL: No joint pain or swelling; No muscle, back, or extremity pain  PSYCHIATRIC: No depression, anxiety, mood swings, or difficulty sleeping  HEME/LYMPH: No easy bruising, or bleeding gums  ALLERY AND IMMUNOLOGIC: No hives or eczema    RADIOLOGY & ADDITIONAL TESTS:    Imaging Personally Reviewed:  [ ] YES  [ ] NO    Consultant(s) Notes Reviewed:  [ ] YES  [ ] NO    PHYSICAL EXAM:  GENERAL: NAD, well-groomed, well-developed  HEAD:  Atraumatic, Normocephalic  EYES: EOMI, PERRLA, conjunctiva and sclera clear  ENMT: No tonsillar erythema, exudates, or enlargement; Moist mucous membranes, Good dentition, No lesions  NECK: Supple, No JVD, Normal thyroid  NERVOUS SYSTEM:  Alert & Oriented X3, Good concentration; Motor Strength 5/5 B/L upper and lower extremities; DTRs 2+ intact and symmetric  CHEST/LUNG: Clear to percussion bilaterally; No rales, rhonchi, wheezing, or rubs  HEART: Regular rate and rhythm; No murmurs, rubs, or gallops  ABDOMEN: Soft, Nontender, Nondistended; Bowel sounds present  EXTREMITIES:  2+ Peripheral Pulses, No clubbing, cyanosis, or edema  LYMPH: No lymphadenopathy noted  SKIN: No rashes or lesions    LABS:                        10.2   6.97  )-----------( 168      ( 29 Sep 2017 06:00 )             31.5     09-29    139  |  106  |  20  ----------------------------<  110<H>  3.5   |  24  |  0.38<L>    Ca    7.4<L>      29 Sep 2017 06:00  Phos  2.2     09-29  Mg     1.6     09-29          CAPILLARY BLOOD GLUCOSE                MEDICATIONS  (STANDING):  influenza   Vaccine 0.5 milliLiter(s) IntraMuscular once  atorvastatin 80 milliGRAM(s) Oral at bedtime  carbidopa/levodopa  25/100 1.5 Tablet(s) Oral three times a day  pilocarpine 4% Solution 1 Drop(s) Left EYE two times a day  latanoprost 0.005% Ophthalmic Solution 1 Drop(s) Left EYE at bedtime  meropenem IVPB      meropenem IVPB 1000 milliGRAM(s) IV Intermittent every 8 hours  loteprednol 0.5% Ophthalmic Suspension 1 Drop(s) Right EYE <User Schedule>  polyethylene glycol 3350 17 Gram(s) Oral two times a day  pantoprazole   Suspension 40 milliGRAM(s) Oral before breakfast  senna 2 Tablet(s) Oral at bedtime  dorzolamide 2%/timolol 0.5% Ophthalmic Solution 1 Drop(s) Left EYE two times a day  enoxaparin Injectable 40 milliGRAM(s) SubCutaneous daily  potassium acid phosphate/sodium acid phosphate tablet (K-PHOS No. 2) 1 Tablet(s) Oral four times a day with meals    MEDICATIONS  (PRN):  bisacodyl Suppository 10 milliGRAM(s) Rectal daily PRN Constipation      Care Discussed with Consultants/Other Providers [ ] YES  [ ] NO

## 2017-09-30 NOTE — PROGRESS NOTE ADULT - SUBJECTIVE AND OBJECTIVE BOX
CHIEF COMPLAINT:    Interval Events:    REVIEW OF SYSTEMS:  Constitutional:   Eyes:  ENT:  CV:  Resp:  GI:  :  MSK:  Integumentary:  Neurological:  Psychiatric:  Endocrine:  Hematologic/Lymphatic:  Allergic/Immunologic:  [ ] All other systems negative  [ ] Unable to assess ROS because ________    OBJECTIVE:  ICU Vital Signs Last 24 Hrs  T(C): 36.2 (30 Sep 2017 05:15), Max: 36.9 (29 Sep 2017 14:45)  T(F): 97.2 (30 Sep 2017 05:15), Max: 98.5 (29 Sep 2017 14:45)  HR: 64 (30 Sep 2017 05:15) (55 - 64)  BP: 136/78 (30 Sep 2017 05:15) (124/65 - 136/78)  BP(mean): --  ABP: --  ABP(mean): --  RR: 20 (30 Sep 2017 05:15) (18 - 20)  SpO2: 99% (30 Sep 2017 05:15) (98% - 100%)        09-29 @ 07:01  -  09-30 @ 07:00  --------------------------------------------------------  IN: 680 mL / OUT: 950 mL / NET: -270 mL      CAPILLARY BLOOD GLUCOSE            HOSPITAL MEDICATIONS:  MEDICATIONS  (STANDING):  influenza   Vaccine 0.5 milliLiter(s) IntraMuscular once  atorvastatin 80 milliGRAM(s) Oral at bedtime  carbidopa/levodopa  25/100 1.5 Tablet(s) Oral three times a day  pilocarpine 4% Solution 1 Drop(s) Left EYE two times a day  latanoprost 0.005% Ophthalmic Solution 1 Drop(s) Left EYE at bedtime  meropenem IVPB      meropenem IVPB 1000 milliGRAM(s) IV Intermittent every 8 hours  loteprednol 0.5% Ophthalmic Suspension 1 Drop(s) Right EYE <User Schedule>  polyethylene glycol 3350 17 Gram(s) Oral two times a day  pantoprazole   Suspension 40 milliGRAM(s) Oral before breakfast  senna 2 Tablet(s) Oral at bedtime  dorzolamide 2%/timolol 0.5% Ophthalmic Solution 1 Drop(s) Left EYE two times a day  enoxaparin Injectable 40 milliGRAM(s) SubCutaneous daily    MEDICATIONS  (PRN):  bisacodyl Suppository 10 milliGRAM(s) Rectal daily PRN Constipation      LABS:                        10.2   6.97  )-----------( 168      ( 29 Sep 2017 06:00 )             31.5     09-29    139  |  106  |  20  ----------------------------<  110<H>  3.5   |  24  |  0.38<L>    Ca    7.4<L>      29 Sep 2017 06:00  Phos  2.2     09-29  Mg     1.6     09-29                MICROBIOLOGY:     RADIOLOGY:  [ ] Reviewed and interpreted by me    PULMONARY FUNCTION TESTS:    EKG: CHIEF COMPLAINT:  no complaints    Interval Events: stable overnight on trach collar    REVIEW OF SYSTEMS:  CV: denies  Resp: denies  [x] All other systems negative      OBJECTIVE:  ICU Vital Signs Last 24 Hrs  T(C): 36.2 (30 Sep 2017 05:15), Max: 36.9 (29 Sep 2017 14:45)  T(F): 97.2 (30 Sep 2017 05:15), Max: 98.5 (29 Sep 2017 14:45)  HR: 64 (30 Sep 2017 05:15) (55 - 64)  BP: 136/78 (30 Sep 2017 05:15) (124/65 - 136/78)  RR: 20 (30 Sep 2017 05:15) (18 - 20)  SpO2: 99% (30 Sep 2017 05:15) (98% - 100%)        09-29 @ 07:01  -  09-30 @ 07:00  --------------------------------------------------------  IN: 680 mL / OUT: 950 mL / NET: -270 mL      HOSPITAL MEDICATIONS:  MEDICATIONS  (STANDING):  influenza   Vaccine 0.5 milliLiter(s) IntraMuscular once  atorvastatin 80 milliGRAM(s) Oral at bedtime  carbidopa/levodopa  25/100 1.5 Tablet(s) Oral three times a day  pilocarpine 4% Solution 1 Drop(s) Left EYE two times a day  latanoprost 0.005% Ophthalmic Solution 1 Drop(s) Left EYE at bedtime  meropenem IVPB      meropenem IVPB 1000 milliGRAM(s) IV Intermittent every 8 hours  loteprednol 0.5% Ophthalmic Suspension 1 Drop(s) Right EYE <User Schedule>  polyethylene glycol 3350 17 Gram(s) Oral two times a day  pantoprazole   Suspension 40 milliGRAM(s) Oral before breakfast  senna 2 Tablet(s) Oral at bedtime  dorzolamide 2%/timolol 0.5% Ophthalmic Solution 1 Drop(s) Left EYE two times a day  enoxaparin Injectable 40 milliGRAM(s) SubCutaneous daily    MEDICATIONS  (PRN):  bisacodyl Suppository 10 milliGRAM(s) Rectal daily PRN Constipation      LABS: no labs today

## 2017-10-01 LAB
BUN SERPL-MCNC: 12 MG/DL — SIGNIFICANT CHANGE UP (ref 7–23)
CALCIUM SERPL-MCNC: 7.5 MG/DL — LOW (ref 8.4–10.5)
CHLORIDE SERPL-SCNC: 102 MMOL/L — SIGNIFICANT CHANGE UP (ref 98–107)
CO2 SERPL-SCNC: 29 MMOL/L — SIGNIFICANT CHANGE UP (ref 22–31)
CREAT SERPL-MCNC: 0.44 MG/DL — LOW (ref 0.5–1.3)
GLUCOSE SERPL-MCNC: 105 MG/DL — HIGH (ref 70–99)
HCT VFR BLD CALC: 33.3 % — LOW (ref 39–50)
HGB BLD-MCNC: 10.8 G/DL — LOW (ref 13–17)
MAGNESIUM SERPL-MCNC: 1.8 MG/DL — SIGNIFICANT CHANGE UP (ref 1.6–2.6)
MCHC RBC-ENTMCNC: 30.4 PG — SIGNIFICANT CHANGE UP (ref 27–34)
MCHC RBC-ENTMCNC: 32.4 % — SIGNIFICANT CHANGE UP (ref 32–36)
MCV RBC AUTO: 93.8 FL — SIGNIFICANT CHANGE UP (ref 80–100)
NRBC # FLD: 0 — SIGNIFICANT CHANGE UP
PHOSPHATE SERPL-MCNC: 2.5 MG/DL — SIGNIFICANT CHANGE UP (ref 2.5–4.5)
PLATELET # BLD AUTO: 210 K/UL — SIGNIFICANT CHANGE UP (ref 150–400)
PMV BLD: 10.7 FL — SIGNIFICANT CHANGE UP (ref 7–13)
POTASSIUM SERPL-MCNC: 3.9 MMOL/L — SIGNIFICANT CHANGE UP (ref 3.5–5.3)
POTASSIUM SERPL-SCNC: 3.9 MMOL/L — SIGNIFICANT CHANGE UP (ref 3.5–5.3)
RBC # BLD: 3.55 M/UL — LOW (ref 4.2–5.8)
RBC # FLD: 14.7 % — HIGH (ref 10.3–14.5)
SODIUM SERPL-SCNC: 140 MMOL/L — SIGNIFICANT CHANGE UP (ref 135–145)
WBC # BLD: 6.81 K/UL — SIGNIFICANT CHANGE UP (ref 3.8–10.5)
WBC # FLD AUTO: 6.81 K/UL — SIGNIFICANT CHANGE UP (ref 3.8–10.5)

## 2017-10-01 PROCEDURE — 99232 SBSQ HOSP IP/OBS MODERATE 35: CPT

## 2017-10-01 RX ADMIN — SENNA PLUS 2 TABLET(S): 8.6 TABLET ORAL at 22:25

## 2017-10-01 RX ADMIN — POLYETHYLENE GLYCOL 3350 17 GRAM(S): 17 POWDER, FOR SOLUTION ORAL at 17:57

## 2017-10-01 RX ADMIN — CARBIDOPA AND LEVODOPA 1.5 TABLET(S): 25; 100 TABLET ORAL at 22:25

## 2017-10-01 RX ADMIN — MEROPENEM 200 MILLIGRAM(S): 1 INJECTION INTRAVENOUS at 22:38

## 2017-10-01 RX ADMIN — DORZOLAMIDE HYDROCHLORIDE TIMOLOL MALEATE 1 DROP(S): 20; 5 SOLUTION/ DROPS OPHTHALMIC at 05:51

## 2017-10-01 RX ADMIN — Medication 1 TABLET(S): at 10:35

## 2017-10-01 RX ADMIN — POLYETHYLENE GLYCOL 3350 17 GRAM(S): 17 POWDER, FOR SOLUTION ORAL at 05:50

## 2017-10-01 RX ADMIN — Medication 1 DROP(S): at 17:57

## 2017-10-01 RX ADMIN — ENOXAPARIN SODIUM 40 MILLIGRAM(S): 100 INJECTION SUBCUTANEOUS at 12:48

## 2017-10-01 RX ADMIN — CARBIDOPA AND LEVODOPA 1.5 TABLET(S): 25; 100 TABLET ORAL at 05:50

## 2017-10-01 RX ADMIN — MEROPENEM 200 MILLIGRAM(S): 1 INJECTION INTRAVENOUS at 05:52

## 2017-10-01 RX ADMIN — LATANOPROST 1 DROP(S): 0.05 SOLUTION/ DROPS OPHTHALMIC; TOPICAL at 22:25

## 2017-10-01 RX ADMIN — PANTOPRAZOLE SODIUM 40 MILLIGRAM(S): 20 TABLET, DELAYED RELEASE ORAL at 05:52

## 2017-10-01 RX ADMIN — Medication 1 DROP(S): at 05:51

## 2017-10-01 RX ADMIN — ATORVASTATIN CALCIUM 80 MILLIGRAM(S): 80 TABLET, FILM COATED ORAL at 22:25

## 2017-10-01 RX ADMIN — DORZOLAMIDE HYDROCHLORIDE TIMOLOL MALEATE 1 DROP(S): 20; 5 SOLUTION/ DROPS OPHTHALMIC at 17:56

## 2017-10-01 RX ADMIN — CARBIDOPA AND LEVODOPA 1.5 TABLET(S): 25; 100 TABLET ORAL at 15:38

## 2017-10-01 RX ADMIN — MEROPENEM 200 MILLIGRAM(S): 1 INJECTION INTRAVENOUS at 15:39

## 2017-10-01 NOTE — PROGRESS NOTE ADULT - SUBJECTIVE AND OBJECTIVE BOX
CHIEF COMPLAINT:    Interval Events:    REVIEW OF SYSTEMS:  Constitutional:   Eyes:  ENT:  CV:  Resp:  GI:  :  MSK:  Integumentary:  Neurological:  Psychiatric:  Endocrine:  Hematologic/Lymphatic:  Allergic/Immunologic:  [ ] All other systems negative  [ ] Unable to assess ROS because ________    OBJECTIVE:  ICU Vital Signs Last 24 Hrs  T(C): 36.8 (01 Oct 2017 05:47), Max: 36.9 (30 Sep 2017 13:56)  T(F): 98.2 (01 Oct 2017 05:47), Max: 98.5 (30 Sep 2017 13:56)  HR: 72 (01 Oct 2017 05:47) (56 - 72)  BP: 136/76 (01 Oct 2017 05:47) (112/63 - 136/76)  BP(mean): --  ABP: --  ABP(mean): --  RR: 67 (01 Oct 2017 05:47) (18 - 67)  SpO2: 98% (01 Oct 2017 05:47) (97% - 100%)        09-30 @ 07:01  -  10-01 @ 07:00  --------------------------------------------------------  IN: 1140 mL / OUT: 1700 mL / NET: -560 mL      CAPILLARY BLOOD GLUCOSE          PHYSICAL EXAM:  General:   HEENT:   Lymph Nodes:  Neck:   Respiratory:   Cardiovascular:   Abdomen:   Extremities:   Skin:   Neurological:  Psychiatry:    HOSPITAL MEDICATIONS:  MEDICATIONS  (STANDING):  influenza   Vaccine 0.5 milliLiter(s) IntraMuscular once  atorvastatin 80 milliGRAM(s) Oral at bedtime  carbidopa/levodopa  25/100 1.5 Tablet(s) Oral three times a day  pilocarpine 4% Solution 1 Drop(s) Left EYE two times a day  latanoprost 0.005% Ophthalmic Solution 1 Drop(s) Left EYE at bedtime  meropenem IVPB      meropenem IVPB 1000 milliGRAM(s) IV Intermittent every 8 hours  loteprednol 0.5% Ophthalmic Suspension 1 Drop(s) Right EYE <User Schedule>  polyethylene glycol 3350 17 Gram(s) Oral two times a day  pantoprazole   Suspension 40 milliGRAM(s) Oral before breakfast  senna 2 Tablet(s) Oral at bedtime  dorzolamide 2%/timolol 0.5% Ophthalmic Solution 1 Drop(s) Left EYE two times a day  enoxaparin Injectable 40 milliGRAM(s) SubCutaneous daily  potassium acid phosphate/sodium acid phosphate tablet (K-PHOS No. 2) 1 Tablet(s) Oral four times a day with meals    MEDICATIONS  (PRN):  bisacodyl Suppository 10 milliGRAM(s) Rectal daily PRN Constipation      LABS:                    MICROBIOLOGY:     RADIOLOGY:  [ ] Reviewed and interpreted by me    PULMONARY FUNCTION TESTS:    EKG: CHIEF COMPLAINT: Patient is a 82y old  Male who presents with a chief complaint of Emesis (24 Sep 2017 13:49)    Interval Events: No acute events overnight    REVIEW OF SYSTEMS:  Constitutional:   Eyes:  ENT:  CV:  Resp:  GI:  :  MSK:  Integumentary:  Neurological:  Psychiatric:  Endocrine:  Hematologic/Lymphatic:  Allergic/Immunologic:  [ ] All other systems negative  [x] Unable to assess ROS because _____not making needs known, not answering questions    OBJECTIVE:  ICU Vital Signs Last 24 Hrs  T(C): 36.8 (01 Oct 2017 05:47), Max: 36.9 (30 Sep 2017 13:56)  T(F): 98.2 (01 Oct 2017 05:47), Max: 98.5 (30 Sep 2017 13:56)  HR: 72 (01 Oct 2017 05:47) (56 - 72)  BP: 136/76 (01 Oct 2017 05:47) (112/63 - 136/76)  BP(mean): --  ABP: --  ABP(mean): --  RR: 67 (01 Oct 2017 05:47) (18 - 67)  SpO2: 98% (01 Oct 2017 05:47) (97% - 100%)        09-30 @ 07:01  -  10-01 @ 07:00  --------------------------------------------------------  IN: 1140 mL / OUT: 1700 mL / NET: -560 mL      CAPILLARY BLOOD GLUCOSE          HOSPITAL MEDICATIONS:  MEDICATIONS  (STANDING):  influenza   Vaccine 0.5 milliLiter(s) IntraMuscular once  atorvastatin 80 milliGRAM(s) Oral at bedtime  carbidopa/levodopa  25/100 1.5 Tablet(s) Oral three times a day  pilocarpine 4% Solution 1 Drop(s) Left EYE two times a day  latanoprost 0.005% Ophthalmic Solution 1 Drop(s) Left EYE at bedtime  meropenem IVPB      meropenem IVPB 1000 milliGRAM(s) IV Intermittent every 8 hours  loteprednol 0.5% Ophthalmic Suspension 1 Drop(s) Right EYE <User Schedule>  polyethylene glycol 3350 17 Gram(s) Oral two times a day  pantoprazole   Suspension 40 milliGRAM(s) Oral before breakfast  senna 2 Tablet(s) Oral at bedtime  dorzolamide 2%/timolol 0.5% Ophthalmic Solution 1 Drop(s) Left EYE two times a day  enoxaparin Injectable 40 milliGRAM(s) SubCutaneous daily  potassium acid phosphate/sodium acid phosphate tablet (K-PHOS No. 2) 1 Tablet(s) Oral four times a day with meals    MEDICATIONS  (PRN):  bisacodyl Suppository 10 milliGRAM(s) Rectal daily PRN Constipation      LABS:                    MICROBIOLOGY:     RADIOLOGY:  [ ] Reviewed and interpreted by me    PULMONARY FUNCTION TESTS:    EKG:

## 2017-10-01 NOTE — PROGRESS NOTE ADULT - ASSESSMENT
81 yo M with hx of CVA with R. sided residual deficits, s/p PEG, trach, CAD s/p CABG (26yo), Parkinson's disease, presents for a fever from ([facility]) presents for a fever, admitted with septic shock likely 2/2 UTI and PNA, acute respiratory failure.

## 2017-10-01 NOTE — PROGRESS NOTE ADULT - SUBJECTIVE AND OBJECTIVE BOX
Patient is a 82y old  Male who presents with a chief complaint of Emesis (24 Sep 2017 13:49)      INTERVAL HPI/OVERNIGHT EVENTS:  T(C): 36.3 (10-01-17 @ 14:45), Max: 36.8 (09-30-17 @ 22:07)  HR: 72 (10-01-17 @ 16:11) (56 - 72)  BP: 110/70 (10-01-17 @ 14:45) (110/70 - 136/76)  RR: 20 (10-01-17 @ 16:11) (16 - 67)  SpO2: 100% (10-01-17 @ 16:11) (96% - 100%)  Wt(kg): --  I&O's Summary    30 Sep 2017 07:01  -  01 Oct 2017 07:00  --------------------------------------------------------  IN: 1140 mL / OUT: 1700 mL / NET: -560 mL    01 Oct 2017 07:01  -  01 Oct 2017 20:22  --------------------------------------------------------  IN: 0 mL / OUT: 700 mL / NET: -700 mL        PAST MEDICAL & SURGICAL HISTORY:  Tracheostomy in place  Hypertension  CVA (cerebral vascular accident)  S/P percutaneous endoscopic gastrostomy (PEG) tube placement      SOCIAL HISTORY  Alcohol:  Tobacco:  Illicit substance use:    FAMILY HISTORY:    REVIEW OF SYSTEMS:  CONSTITUTIONAL: No fever, weight loss, or fatigue  EYES: No eye pain, visual disturbances, or discharge  ENMT:  No difficulty hearing, tinnitus, vertigo; No sinus or throat pain  NECK: No pain or stiffness  RESPIRATORY: No cough, wheezing, chills or hemoptysis; No shortness of breath  CARDIOVASCULAR: No chest pain, palpitations, dizziness, or leg swelling  GASTROINTESTINAL: No abdominal or epigastric pain. No nausea, vomiting, or hematemesis; No diarrhea or constipation. No melena or hematochezia.  GENITOURINARY: No dysuria, frequency, hematuria, or incontinence  NEUROLOGICAL: No headaches, memory loss, loss of strength, numbness, or tremors  SKIN: No itching, burning, rashes, or lesions   LYMPH NODES: No enlarged glands  ENDOCRINE: No heat or cold intolerance; No hair loss  MUSCULOSKELETAL: No joint pain or swelling; No muscle, back, or extremity pain  PSYCHIATRIC: No depression, anxiety, mood swings, or difficulty sleeping  HEME/LYMPH: No easy bruising, or bleeding gums  ALLERY AND IMMUNOLOGIC: No hives or eczema    RADIOLOGY & ADDITIONAL TESTS:    Imaging Personally Reviewed:  [ ] YES  [ ] NO    Consultant(s) Notes Reviewed:  [ ] YES  [ ] NO    PHYSICAL EXAM:  GENERAL: NAD, well-groomed, well-developed  HEAD:  Atraumatic, Normocephalic  EYES: EOMI, PERRLA, conjunctiva and sclera clear  ENMT: No tonsillar erythema, exudates, or enlargement; Moist mucous membranes, Good dentition, No lesions  NECK: Supple, No JVD, Normal thyroid  NERVOUS SYSTEM:  Alert & Oriented X3, Good concentration; Motor Strength 5/5 B/L upper and lower extremities; DTRs 2+ intact and symmetric  CHEST/LUNG: Clear to percussion bilaterally; No rales, rhonchi, wheezing, or rubs  HEART: Regular rate and rhythm; No murmurs, rubs, or gallops  ABDOMEN: Soft, Nontender, Nondistended; Bowel sounds present  EXTREMITIES:  2+ Peripheral Pulses, No clubbing, cyanosis, or edema  LYMPH: No lymphadenopathy noted  SKIN: No rashes or lesions    LABS:                        10.8   6.81  )-----------( 210      ( 01 Oct 2017 07:00 )             33.3     10-01    140  |  102  |  12  ----------------------------<  105<H>  3.9   |  29  |  0.44<L>    Ca    7.5<L>      01 Oct 2017 07:00  Phos  2.5     10-01  Mg     1.8     10-01          CAPILLARY BLOOD GLUCOSE                MEDICATIONS  (STANDING):  influenza   Vaccine 0.5 milliLiter(s) IntraMuscular once  atorvastatin 80 milliGRAM(s) Oral at bedtime  carbidopa/levodopa  25/100 1.5 Tablet(s) Oral three times a day  pilocarpine 4% Solution 1 Drop(s) Left EYE two times a day  latanoprost 0.005% Ophthalmic Solution 1 Drop(s) Left EYE at bedtime  meropenem IVPB      meropenem IVPB 1000 milliGRAM(s) IV Intermittent every 8 hours  loteprednol 0.5% Ophthalmic Suspension 1 Drop(s) Right EYE <User Schedule>  polyethylene glycol 3350 17 Gram(s) Oral two times a day  pantoprazole   Suspension 40 milliGRAM(s) Oral before breakfast  senna 2 Tablet(s) Oral at bedtime  dorzolamide 2%/timolol 0.5% Ophthalmic Solution 1 Drop(s) Left EYE two times a day  enoxaparin Injectable 40 milliGRAM(s) SubCutaneous daily    MEDICATIONS  (PRN):  bisacodyl Suppository 10 milliGRAM(s) Rectal daily PRN Constipation      Care Discussed with Consultants/Other Providers [ ] YES  [ ] NO

## 2017-10-01 NOTE — PROGRESS NOTE ADULT - ASSESSMENT
81 yo M with hx of CVA with R. sided residual deficits, s/p PEG, trach, CAD s/p CABG (26yo), Parkinson's disease, presents for a fever from ([facility]) presents for a fever, admitted with septic shock likely 2/2 UTI.

## 2017-10-02 LAB
BUN SERPL-MCNC: 14 MG/DL — SIGNIFICANT CHANGE UP (ref 7–23)
CALCIUM SERPL-MCNC: 7.4 MG/DL — LOW (ref 8.4–10.5)
CHLORIDE SERPL-SCNC: 103 MMOL/L — SIGNIFICANT CHANGE UP (ref 98–107)
CO2 SERPL-SCNC: 27 MMOL/L — SIGNIFICANT CHANGE UP (ref 22–31)
CREAT SERPL-MCNC: 0.47 MG/DL — LOW (ref 0.5–1.3)
GLUCOSE SERPL-MCNC: 121 MG/DL — HIGH (ref 70–99)
HCT VFR BLD CALC: 33.6 % — LOW (ref 39–50)
HGB BLD-MCNC: 10.8 G/DL — LOW (ref 13–17)
MCHC RBC-ENTMCNC: 30.2 PG — SIGNIFICANT CHANGE UP (ref 27–34)
MCHC RBC-ENTMCNC: 32.1 % — SIGNIFICANT CHANGE UP (ref 32–36)
MCV RBC AUTO: 93.9 FL — SIGNIFICANT CHANGE UP (ref 80–100)
NRBC # FLD: 0 — SIGNIFICANT CHANGE UP
PLATELET # BLD AUTO: 214 K/UL — SIGNIFICANT CHANGE UP (ref 150–400)
PMV BLD: 10.2 FL — SIGNIFICANT CHANGE UP (ref 7–13)
POTASSIUM SERPL-MCNC: 3.9 MMOL/L — SIGNIFICANT CHANGE UP (ref 3.5–5.3)
POTASSIUM SERPL-SCNC: 3.9 MMOL/L — SIGNIFICANT CHANGE UP (ref 3.5–5.3)
RBC # BLD: 3.58 M/UL — LOW (ref 4.2–5.8)
RBC # FLD: 14.9 % — HIGH (ref 10.3–14.5)
SODIUM SERPL-SCNC: 139 MMOL/L — SIGNIFICANT CHANGE UP (ref 135–145)
WBC # BLD: 6.88 K/UL — SIGNIFICANT CHANGE UP (ref 3.8–10.5)
WBC # FLD AUTO: 6.88 K/UL — SIGNIFICANT CHANGE UP (ref 3.8–10.5)

## 2017-10-02 PROCEDURE — 99233 SBSQ HOSP IP/OBS HIGH 50: CPT | Mod: GC

## 2017-10-02 RX ADMIN — ATORVASTATIN CALCIUM 80 MILLIGRAM(S): 80 TABLET, FILM COATED ORAL at 21:20

## 2017-10-02 RX ADMIN — ENOXAPARIN SODIUM 40 MILLIGRAM(S): 100 INJECTION SUBCUTANEOUS at 11:44

## 2017-10-02 RX ADMIN — POLYETHYLENE GLYCOL 3350 17 GRAM(S): 17 POWDER, FOR SOLUTION ORAL at 06:44

## 2017-10-02 RX ADMIN — CARBIDOPA AND LEVODOPA 1.5 TABLET(S): 25; 100 TABLET ORAL at 21:20

## 2017-10-02 RX ADMIN — Medication 1 DROP(S): at 17:41

## 2017-10-02 RX ADMIN — CARBIDOPA AND LEVODOPA 1.5 TABLET(S): 25; 100 TABLET ORAL at 15:10

## 2017-10-02 RX ADMIN — POLYETHYLENE GLYCOL 3350 17 GRAM(S): 17 POWDER, FOR SOLUTION ORAL at 17:40

## 2017-10-02 RX ADMIN — LOTEPREDNOL ETABONATE 1 DROP(S): 2 SUSPENSION/ DROPS OPHTHALMIC at 11:44

## 2017-10-02 RX ADMIN — MEROPENEM 200 MILLIGRAM(S): 1 INJECTION INTRAVENOUS at 15:11

## 2017-10-02 RX ADMIN — DORZOLAMIDE HYDROCHLORIDE TIMOLOL MALEATE 1 DROP(S): 20; 5 SOLUTION/ DROPS OPHTHALMIC at 06:43

## 2017-10-02 RX ADMIN — Medication 1 DROP(S): at 06:43

## 2017-10-02 RX ADMIN — MEROPENEM 200 MILLIGRAM(S): 1 INJECTION INTRAVENOUS at 21:20

## 2017-10-02 RX ADMIN — PANTOPRAZOLE SODIUM 40 MILLIGRAM(S): 20 TABLET, DELAYED RELEASE ORAL at 06:44

## 2017-10-02 RX ADMIN — MEROPENEM 200 MILLIGRAM(S): 1 INJECTION INTRAVENOUS at 06:44

## 2017-10-02 RX ADMIN — DORZOLAMIDE HYDROCHLORIDE TIMOLOL MALEATE 1 DROP(S): 20; 5 SOLUTION/ DROPS OPHTHALMIC at 17:41

## 2017-10-02 RX ADMIN — CARBIDOPA AND LEVODOPA 1.5 TABLET(S): 25; 100 TABLET ORAL at 06:43

## 2017-10-02 RX ADMIN — LATANOPROST 1 DROP(S): 0.05 SOLUTION/ DROPS OPHTHALMIC; TOPICAL at 21:20

## 2017-10-02 NOTE — PROGRESS NOTE ADULT - PROBLEM SELECTOR PLAN 4
Providencia UTI, pseudomonas in urine  Complete course of meropenem today Providencia UTI, pseudomonas in urine  Complete course of meropenem today  Alexander changed today 10/2

## 2017-10-02 NOTE — PROGRESS NOTE ADULT - RS GEN PE MLT RESP DETAILS PC
airway patent/breath sounds equal/rhonchi
rhonchi/breath sounds equal/good air movement/airway patent
breath sounds equal/airway patent/good air movement
clear to auscultation bilaterally/respirations non-labored

## 2017-10-02 NOTE — PROGRESS NOTE ADULT - SUBJECTIVE AND OBJECTIVE BOX
Patient is a 82y old  Male who presents with a chief complaint of Emesis (24 Sep 2017 13:49)      INTERVAL HPI/OVERNIGHT EVENTS:  T(C): 37.6 (10-02-17 @ 21:19), Max: 37.6 (10-02-17 @ 21:19)  HR: 64 (10-02-17 @ 21:19) (64 - 66)  BP: 113/67 (10-02-17 @ 21:19) (112/67 - 130/75)  RR: 18 (10-02-17 @ 21:19) (18 - 19)  SpO2: 100% (10-02-17 @ 21:19) (100% - 100%)  Wt(kg): --  I&O's Summary    01 Oct 2017 07:01  -  02 Oct 2017 07:00  --------------------------------------------------------  IN: 0 mL / OUT: 1800 mL / NET: -1800 mL    02 Oct 2017 07:01  -  03 Oct 2017 01:00  --------------------------------------------------------  IN: 510 mL / OUT: 1000 mL / NET: -490 mL        PAST MEDICAL & SURGICAL HISTORY:  Tracheostomy in place  Hypertension  CVA (cerebral vascular accident)  S/P percutaneous endoscopic gastrostomy (PEG) tube placement      SOCIAL HISTORY  Alcohol:  Tobacco:  Illicit substance use:    FAMILY HISTORY:    REVIEW OF SYSTEMS:  CONSTITUTIONAL: No fever, weight loss, or fatigue  EYES: No eye pain, visual disturbances, or discharge  ENMT:  No difficulty hearing, tinnitus, vertigo; No sinus or throat pain  NECK: No pain or stiffness  RESPIRATORY: No cough, wheezing, chills or hemoptysis; No shortness of breath  CARDIOVASCULAR: No chest pain, palpitations, dizziness, or leg swelling  GASTROINTESTINAL: No abdominal or epigastric pain. No nausea, vomiting, or hematemesis; No diarrhea or constipation. No melena or hematochezia.  GENITOURINARY: No dysuria, frequency, hematuria, or incontinence  NEUROLOGICAL: No headaches, memory loss, loss of strength, numbness, or tremors  SKIN: No itching, burning, rashes, or lesions   LYMPH NODES: No enlarged glands  ENDOCRINE: No heat or cold intolerance; No hair loss  MUSCULOSKELETAL: No joint pain or swelling; No muscle, back, or extremity pain  PSYCHIATRIC: No depression, anxiety, mood swings, or difficulty sleeping  HEME/LYMPH: No easy bruising, or bleeding gums  ALLERY AND IMMUNOLOGIC: No hives or eczema    RADIOLOGY & ADDITIONAL TESTS:    Imaging Personally Reviewed:  [ ] YES  [ ] NO    Consultant(s) Notes Reviewed:  [ ] YES  [ ] NO    PHYSICAL EXAM:  GENERAL: NAD, well-groomed, well-developed  HEAD:  Atraumatic, Normocephalic  EYES: EOMI, PERRLA, conjunctiva and sclera clear  ENMT: No tonsillar erythema, exudates, or enlargement; Moist mucous membranes, Good dentition, No lesions  NECK: Supple, No JVD, Normal thyroid  NERVOUS SYSTEM:  Alert & Oriented X3, Good concentration; Motor Strength 5/5 B/L upper and lower extremities; DTRs 2+ intact and symmetric  CHEST/LUNG: Clear to percussion bilaterally; No rales, rhonchi, wheezing, or rubs  HEART: Regular rate and rhythm; No murmurs, rubs, or gallops  ABDOMEN: Soft, Nontender, Nondistended; Bowel sounds present  EXTREMITIES:  2+ Peripheral Pulses, No clubbing, cyanosis, or edema  LYMPH: No lymphadenopathy noted  SKIN: No rashes or lesions    LABS:                        10.8   6.88  )-----------( 214      ( 02 Oct 2017 06:21 )             33.6     10-02    139  |  103  |  14  ----------------------------<  121<H>  3.9   |  27  |  0.47<L>    Ca    7.4<L>      02 Oct 2017 06:21  Phos  2.5     10-01  Mg     1.8     10-01          CAPILLARY BLOOD GLUCOSE                MEDICATIONS  (STANDING):  influenza   Vaccine 0.5 milliLiter(s) IntraMuscular once  atorvastatin 80 milliGRAM(s) Oral at bedtime  carbidopa/levodopa  25/100 1.5 Tablet(s) Oral three times a day  pilocarpine 4% Solution 1 Drop(s) Left EYE two times a day  latanoprost 0.005% Ophthalmic Solution 1 Drop(s) Left EYE at bedtime  meropenem IVPB      meropenem IVPB 1000 milliGRAM(s) IV Intermittent every 8 hours  loteprednol 0.5% Ophthalmic Suspension 1 Drop(s) Right EYE <User Schedule>  polyethylene glycol 3350 17 Gram(s) Oral two times a day  pantoprazole   Suspension 40 milliGRAM(s) Oral before breakfast  senna 2 Tablet(s) Oral at bedtime  dorzolamide 2%/timolol 0.5% Ophthalmic Solution 1 Drop(s) Left EYE two times a day  enoxaparin Injectable 40 milliGRAM(s) SubCutaneous daily    MEDICATIONS  (PRN):  bisacodyl Suppository 10 milliGRAM(s) Rectal daily PRN Constipation      Care Discussed with Consultants/Other Providers [ ] YES  [ ] NO

## 2017-10-02 NOTE — PROGRESS NOTE ADULT - GENITOURINARY COMMENTS
Chronic lubin
Chronic lubin. Needs to be changed before transfer back to [facility]
Chronic lubin in place. Will need to be changed prior to discharge

## 2017-10-02 NOTE — PROGRESS NOTE ADULT - SUBJECTIVE AND OBJECTIVE BOX
CHIEF COMPLAINT:    Interval Events:    REVIEW OF SYSTEMS:  Constitutional:   Eyes:  ENT:  CV:  Resp:  GI:  :  MSK:  Integumentary:  Neurological:  Psychiatric:  Endocrine:  Hematologic/Lymphatic:  Allergic/Immunologic:  [ ] All other systems negative  [ ] Unable to assess ROS because ________    OBJECTIVE:  ICU Vital Signs Last 24 Hrs  T(C): 36.3 (02 Oct 2017 06:41), Max: 36.8 (01 Oct 2017 22:24)  T(F): 97.4 (02 Oct 2017 06:41), Max: 98.2 (01 Oct 2017 22:24)  HR: 64 (02 Oct 2017 06:41) (60 - 72)  BP: 112/67 (02 Oct 2017 06:41) (104/63 - 112/67)  BP(mean): --  ABP: --  ABP(mean): --  RR: 19 (02 Oct 2017 06:41) (18 - 20)  SpO2: 100% (02 Oct 2017 06:41) (96% - 100%)        10-01 @ 07:01  -  10-02 @ 07:00  --------------------------------------------------------  IN: 0 mL / OUT: 1800 mL / NET: -1800 mL      CAPILLARY BLOOD GLUCOSE          PHYSICAL EXAM:  General:   HEENT:   Lymph Nodes:  Neck:   Respiratory:   Cardiovascular:   Abdomen:   Extremities:   Skin:   Neurological:  Psychiatry:    HOSPITAL MEDICATIONS:  MEDICATIONS  (STANDING):  influenza   Vaccine 0.5 milliLiter(s) IntraMuscular once  atorvastatin 80 milliGRAM(s) Oral at bedtime  carbidopa/levodopa  25/100 1.5 Tablet(s) Oral three times a day  pilocarpine 4% Solution 1 Drop(s) Left EYE two times a day  latanoprost 0.005% Ophthalmic Solution 1 Drop(s) Left EYE at bedtime  meropenem IVPB      meropenem IVPB 1000 milliGRAM(s) IV Intermittent every 8 hours  loteprednol 0.5% Ophthalmic Suspension 1 Drop(s) Right EYE <User Schedule>  polyethylene glycol 3350 17 Gram(s) Oral two times a day  pantoprazole   Suspension 40 milliGRAM(s) Oral before breakfast  senna 2 Tablet(s) Oral at bedtime  dorzolamide 2%/timolol 0.5% Ophthalmic Solution 1 Drop(s) Left EYE two times a day  enoxaparin Injectable 40 milliGRAM(s) SubCutaneous daily    MEDICATIONS  (PRN):  bisacodyl Suppository 10 milliGRAM(s) Rectal daily PRN Constipation      LABS:                        10.8   6.88  )-----------( 214      ( 02 Oct 2017 06:21 )             33.6     10-02    139  |  103  |  14  ----------------------------<  121<H>  3.9   |  27  |  0.47<L>    Ca    7.4<L>      02 Oct 2017 06:21  Phos  2.5     10-01  Mg     1.8     10-01                MICROBIOLOGY:     RADIOLOGY:  [ ] Reviewed and interpreted by me    PULMONARY FUNCTION TESTS:    EKG: CHIEF COMPLAINT: Patient is a 82y old  Male who presents with a chief complaint of Emesis (24 Sep 2017 13:49)    Interval Events: No acute events overnight    REVIEW OF SYSTEMS:  Constitutional:   Eyes:  ENT:  CV:  Resp:  GI:  :  MSK:  Integumentary:  Neurological:  Psychiatric:  Endocrine:  Hematologic/Lymphatic:  Allergic/Immunologic:  [ ] All other systems negative  [x] Unable to assess ROS because __communicates intermittently    OBJECTIVE:  ICU Vital Signs Last 24 Hrs  T(C): 36.3 (02 Oct 2017 06:41), Max: 36.8 (01 Oct 2017 22:24)  T(F): 97.4 (02 Oct 2017 06:41), Max: 98.2 (01 Oct 2017 22:24)  HR: 64 (02 Oct 2017 06:41) (60 - 72)  BP: 112/67 (02 Oct 2017 06:41) (104/63 - 112/67)  BP(mean): --  ABP: --  ABP(mean): --  RR: 19 (02 Oct 2017 06:41) (18 - 20)  SpO2: 100% (02 Oct 2017 06:41) (96% - 100%)        10-01 @ 07:01  -  10-02 @ 07:00  --------------------------------------------------------  IN: 0 mL / OUT: 1800 mL / NET: -1800 mL        HOSPITAL MEDICATIONS:  MEDICATIONS  (STANDING):  influenza   Vaccine 0.5 milliLiter(s) IntraMuscular once  atorvastatin 80 milliGRAM(s) Oral at bedtime  carbidopa/levodopa  25/100 1.5 Tablet(s) Oral three times a day  pilocarpine 4% Solution 1 Drop(s) Left EYE two times a day  latanoprost 0.005% Ophthalmic Solution 1 Drop(s) Left EYE at bedtime  meropenem IVPB      meropenem IVPB 1000 milliGRAM(s) IV Intermittent every 8 hours  loteprednol 0.5% Ophthalmic Suspension 1 Drop(s) Right EYE <User Schedule>  polyethylene glycol 3350 17 Gram(s) Oral two times a day  pantoprazole   Suspension 40 milliGRAM(s) Oral before breakfast  senna 2 Tablet(s) Oral at bedtime  dorzolamide 2%/timolol 0.5% Ophthalmic Solution 1 Drop(s) Left EYE two times a day  enoxaparin Injectable 40 milliGRAM(s) SubCutaneous daily    MEDICATIONS  (PRN):  bisacodyl Suppository 10 milliGRAM(s) Rectal daily PRN Constipation      LABS:                        10.8   6.88  )-----------( 214      ( 02 Oct 2017 06:21 )             33.6     10-02    139  |  103  |  14  ----------------------------<  121<H>  3.9   |  27  |  0.47<L>    Ca    7.4<L>      02 Oct 2017 06:21  Phos  2.5     10-01  Mg     1.8     10-01                MICROBIOLOGY:     RADIOLOGY:  [ ] Reviewed and interpreted by me    PULMONARY FUNCTION TESTS:    EKG:

## 2017-10-02 NOTE — PROGRESS NOTE ADULT - RESPIRATORY COMMENTS
Scattered rhonchi. Trach collar. Using passe otoniel valve intermittently.
Trach collar. Moderate amount of thick white secretions
Trach collar. Moderate amounts of thick tan secretions suctioned from trach
Trach to trach collar, Minimal beige secretions

## 2017-10-02 NOTE — PROGRESS NOTE ADULT - GASTROINTESTINAL COMMENTS
PEG with enteral feeds. Staples in abdomen to RUQ next to PEG insertion site. Wound well healed
PEG with enteral feeds
PEG with enteral feeds

## 2017-10-02 NOTE — PROGRESS NOTE ADULT - PROBLEM SELECTOR PLAN 5
Trach collar  Suction prn  Will change trach to cuffless prior to discharge Trach collar  Suction prn  Trach changed to #6 cuffless shiley without difficulty. Tolerated well

## 2017-10-03 ENCOUNTER — TRANSCRIPTION ENCOUNTER (OUTPATIENT)
Age: 82
End: 2017-10-03

## 2017-10-03 VITALS
DIASTOLIC BLOOD PRESSURE: 73 MMHG | TEMPERATURE: 98 F | OXYGEN SATURATION: 100 % | RESPIRATION RATE: 18 BRPM | HEART RATE: 76 BPM | SYSTOLIC BLOOD PRESSURE: 121 MMHG

## 2017-10-03 PROCEDURE — 99233 SBSQ HOSP IP/OBS HIGH 50: CPT | Mod: GC

## 2017-10-03 RX ORDER — PANTOPRAZOLE SODIUM 20 MG/1
40 TABLET, DELAYED RELEASE ORAL
Qty: 0 | Refills: 0 | COMMUNITY
Start: 2017-10-03

## 2017-10-03 RX ORDER — CARBIDOPA AND LEVODOPA 25; 100 MG/1; MG/1
1.5 TABLET ORAL
Qty: 0 | Refills: 0 | COMMUNITY

## 2017-10-03 RX ORDER — FINASTERIDE 5 MG/1
1 TABLET, FILM COATED ORAL
Qty: 0 | Refills: 0 | COMMUNITY

## 2017-10-03 RX ORDER — TAMSULOSIN HYDROCHLORIDE 0.4 MG/1
1 CAPSULE ORAL
Qty: 0 | Refills: 0 | COMMUNITY

## 2017-10-03 RX ORDER — DOCUSATE SODIUM 100 MG
0 CAPSULE ORAL
Qty: 0 | Refills: 0 | COMMUNITY

## 2017-10-03 RX ORDER — ACETAMINOPHEN 500 MG
0 TABLET ORAL
Qty: 0 | Refills: 0 | COMMUNITY

## 2017-10-03 RX ORDER — ASPIRIN/CALCIUM CARB/MAGNESIUM 324 MG
1 TABLET ORAL
Qty: 0 | Refills: 0 | COMMUNITY

## 2017-10-03 RX ORDER — ATORVASTATIN CALCIUM 80 MG/1
1 TABLET, FILM COATED ORAL
Qty: 0 | Refills: 0 | COMMUNITY

## 2017-10-03 RX ORDER — SENNA PLUS 8.6 MG/1
2 TABLET ORAL
Qty: 0 | Refills: 0 | COMMUNITY

## 2017-10-03 RX ADMIN — CARBIDOPA AND LEVODOPA 1.5 TABLET(S): 25; 100 TABLET ORAL at 07:21

## 2017-10-03 RX ADMIN — Medication 1 DROP(S): at 07:21

## 2017-10-03 RX ADMIN — PANTOPRAZOLE SODIUM 40 MILLIGRAM(S): 20 TABLET, DELAYED RELEASE ORAL at 07:21

## 2017-10-03 RX ADMIN — INFLUENZA VIRUS VACCINE 0.5 MILLILITER(S): 15; 15; 15; 15 SUSPENSION INTRAMUSCULAR at 11:38

## 2017-10-03 RX ADMIN — ENOXAPARIN SODIUM 40 MILLIGRAM(S): 100 INJECTION SUBCUTANEOUS at 11:42

## 2017-10-03 RX ADMIN — DORZOLAMIDE HYDROCHLORIDE TIMOLOL MALEATE 1 DROP(S): 20; 5 SOLUTION/ DROPS OPHTHALMIC at 07:21

## 2017-10-03 NOTE — DISCHARGE NOTE ADULT - CARE PLAN
Principal Discharge DX:	Septic shock  Goal:	Resolved  Instructions for follow-up, activity and diet:	Treated with 7 days of meropenem for Providencia in the urine and pseudomonas in the sputum.  Secondary Diagnosis:	CVA (cerebral vascular accident)  Goal:	Safety  Instructions for follow-up, activity and diet:	Continue current treatment  Secondary Diagnosis:	Tracheostomy in place  Goal:	Maintain optimal respiratory status  Instructions for follow-up, activity and diet:	40% trach collar

## 2017-10-03 NOTE — PROGRESS NOTE ADULT - PROBLEM SELECTOR PROBLEM 2
CVA (cerebral vascular accident)
Parkinsons

## 2017-10-03 NOTE — PROGRESS NOTE ADULT - SUBJECTIVE AND OBJECTIVE BOX
CHIEF COMPLAINT:    Interval Events:    REVIEW OF SYSTEMS:  Constitutional:   Eyes:  ENT:  CV:  Resp:  GI:  :  MSK:  Integumentary:  Neurological:  Psychiatric:  Endocrine:  Hematologic/Lymphatic:  Allergic/Immunologic:  [ ] All other systems negative  [ ] Unable to assess ROS because ________    OBJECTIVE:  ICU Vital Signs Last 24 Hrs  T(C): 36.6 (03 Oct 2017 07:19), Max: 37.6 (02 Oct 2017 21:19)  T(F): 97.8 (03 Oct 2017 07:19), Max: 99.6 (02 Oct 2017 21:19)  HR: 73 (03 Oct 2017 07:19) (64 - 73)  BP: 115/67 (03 Oct 2017 07:19) (113/67 - 130/75)  BP(mean): --  ABP: --  ABP(mean): --  RR: 18 (03 Oct 2017 07:19) (18 - 18)  SpO2: 99% (03 Oct 2017 07:19) (99% - 100%)        10-02 @ 07:01  -  10-03 @ 07:00  --------------------------------------------------------  IN: 510 mL / OUT: 1000 mL / NET: -490 mL    10-03 @ 07:01  -  10-03 @ 09:06  --------------------------------------------------------  IN: 0 mL / OUT: 500 mL / NET: -500 mL      CAPILLARY BLOOD GLUCOSE          PHYSICAL EXAM:  General:   HEENT:   Lymph Nodes:  Neck:   Respiratory:   Cardiovascular:   Abdomen:   Extremities:   Skin:   Neurological:  Psychiatry:    HOSPITAL MEDICATIONS:  MEDICATIONS  (STANDING):  atorvastatin 80 milliGRAM(s) Oral at bedtime  carbidopa/levodopa  25/100 1.5 Tablet(s) Oral three times a day  dorzolamide 2%/timolol 0.5% Ophthalmic Solution 1 Drop(s) Left EYE two times a day  enoxaparin Injectable 40 milliGRAM(s) SubCutaneous daily  influenza   Vaccine 0.5 milliLiter(s) IntraMuscular once  latanoprost 0.005% Ophthalmic Solution 1 Drop(s) Left EYE at bedtime  loteprednol 0.5% Ophthalmic Suspension 1 Drop(s) Right EYE <User Schedule>  pantoprazole   Suspension 40 milliGRAM(s) Oral before breakfast  pilocarpine 4% Solution 1 Drop(s) Left EYE two times a day  polyethylene glycol 3350 17 Gram(s) Oral two times a day  senna 2 Tablet(s) Oral at bedtime    MEDICATIONS  (PRN):  bisacodyl Suppository 10 milliGRAM(s) Rectal daily PRN Constipation      LABS:                        10.8   6.88  )-----------( 214      ( 02 Oct 2017 06:21 )             33.6     10-02    139  |  103  |  14  ----------------------------<  121<H>  3.9   |  27  |  0.47<L>    Ca    7.4<L>      02 Oct 2017 06:21                MICROBIOLOGY:     RADIOLOGY:  [ ] Reviewed and interpreted by me    PULMONARY FUNCTION TESTS:    EKG:

## 2017-10-03 NOTE — PROGRESS NOTE ADULT - SUBJECTIVE AND OBJECTIVE BOX
Patient is a 82y old  Male who presents with a chief complaint of Emesis (03 Oct 2017 10:01)    pt. seen and examined, NAD    INTERVAL HPI/OVERNIGHT EVENTS:  T(C): 36.8 (10-03-17 @ 11:36), Max: 37.1 (10-03-17 @ 03:00)  HR: 76 (10-03-17 @ 11:36) (73 - 76)  BP: 121/73 (10-03-17 @ 11:36) (115/67 - 121/73)  RR: 18 (10-03-17 @ 11:36) (18 - 18)  SpO2: 100% (10-03-17 @ 11:36) (99% - 100%)  Wt(kg): --  I&O's Summary    02 Oct 2017 07:01  -  03 Oct 2017 07:00  --------------------------------------------------------  IN: 510 mL / OUT: 1000 mL / NET: -490 mL    03 Oct 2017 07:01  -  03 Oct 2017 23:42  --------------------------------------------------------  IN: 0 mL / OUT: 500 mL / NET: -500 mL        PAST MEDICAL & SURGICAL HISTORY:  Tracheostomy in place  Hypertension  CVA (cerebral vascular accident)  S/P percutaneous endoscopic gastrostomy (PEG) tube placement      SOCIAL HISTORY  Alcohol:  Tobacco:  Illicit substance use:    FAMILY HISTORY:    REVIEW OF SYSTEMS:  CONSTITUTIONAL: No fever, weight loss, or fatigue  EYES: No eye pain, visual disturbances, or discharge  ENMT:  No difficulty hearing, tinnitus, vertigo; No sinus or throat pain  NECK: No pain or stiffness  RESPIRATORY: No cough, wheezing, chills or hemoptysis; No shortness of breath  CARDIOVASCULAR: No chest pain, palpitations, dizziness, or leg swelling  GASTROINTESTINAL: No abdominal or epigastric pain. No nausea, vomiting, or hematemesis; No diarrhea or constipation. No melena or hematochezia.  GENITOURINARY: No dysuria, frequency, hematuria, or incontinence  NEUROLOGICAL: No headaches, memory loss, loss of strength, numbness, or tremors  SKIN: No itching, burning, rashes, or lesions   LYMPH NODES: No enlarged glands  ENDOCRINE: No heat or cold intolerance; No hair loss  MUSCULOSKELETAL: No joint pain or swelling; No muscle, back, or extremity pain  PSYCHIATRIC: No depression, anxiety, mood swings, or difficulty sleeping  HEME/LYMPH: No easy bruising, or bleeding gums  ALLERY AND IMMUNOLOGIC: No hives or eczema    RADIOLOGY & ADDITIONAL TESTS:    Imaging Personally Reviewed:  [ ] YES  [ ] NO    Consultant(s) Notes Reviewed:  [ ] YES  [ ] NO    PHYSICAL EXAM:  GENERAL: NAD, well-groomed, well-developed  HEAD:  Atraumatic, Normocephalic  EYES: EOMI, PERRLA, conjunctiva and sclera clear  ENMT: No tonsillar erythema, exudates, or enlargement; Moist mucous membranes, Good dentition, No lesions  NECK: Supple, No JVD, Normal thyroid  NERVOUS SYSTEM:  Alert & Oriented X3, Good concentration; Motor Strength 5/5 B/L upper and lower extremities; DTRs 2+ intact and symmetric  CHEST/LUNG: Clear to percussion bilaterally; No rales, rhonchi, wheezing, or rubs  HEART: Regular rate and rhythm; No murmurs, rubs, or gallops  ABDOMEN: Soft, Nontender, Nondistended; Bowel sounds present  EXTREMITIES:  2+ Peripheral Pulses, No clubbing, cyanosis, or edema  LYMPH: No lymphadenopathy noted  SKIN: No rashes or lesions    LABS:                        10.8   6.88  )-----------( 214      ( 02 Oct 2017 06:21 )             33.6     10-02    139  |  103  |  14  ----------------------------<  121<H>  3.9   |  27  |  0.47<L>    Ca    7.4<L>      02 Oct 2017 06:21          CAPILLARY BLOOD GLUCOSE                MEDICATIONS  (STANDING):  atorvastatin 80 milliGRAM(s) Oral at bedtime  carbidopa/levodopa  25/100 1.5 Tablet(s) Oral three times a day  dorzolamide 2%/timolol 0.5% Ophthalmic Solution 1 Drop(s) Left EYE two times a day  enoxaparin Injectable 40 milliGRAM(s) SubCutaneous daily  latanoprost 0.005% Ophthalmic Solution 1 Drop(s) Left EYE at bedtime  loteprednol 0.5% Ophthalmic Suspension 1 Drop(s) Right EYE <User Schedule>  pantoprazole   Suspension 40 milliGRAM(s) Oral before breakfast  pilocarpine 4% Solution 1 Drop(s) Left EYE two times a day  polyethylene glycol 3350 17 Gram(s) Oral two times a day  senna 2 Tablet(s) Oral at bedtime    MEDICATIONS  (PRN):  bisacodyl Suppository 10 milliGRAM(s) Rectal daily PRN Constipation      Care Discussed with Consultants/Other Providers [ ] YES  [ ] NO

## 2017-10-03 NOTE — DISCHARGE NOTE ADULT - PLAN OF CARE
Resolved Treated with 7 days of meropenem for Providencia in the urine and pseudomonas in the sputum. Safety Continue current treatment Maintain optimal respiratory status 40% trach collar

## 2017-10-03 NOTE — PROGRESS NOTE ADULT - PROVIDER SPECIALTY LIST ADULT
Gastroenterology
Internal Medicine
MICU
Pulmonology
Internal Medicine

## 2017-10-03 NOTE — PROGRESS NOTE ADULT - PROBLEM SELECTOR PROBLEM 1
Septic shock
Dysphagia

## 2017-10-03 NOTE — PROGRESS NOTE ADULT - PROBLEM SELECTOR PROBLEM 3
Acute respiratory failure with hypoxemia
CVA (cerebral vascular accident)

## 2017-10-03 NOTE — DISCHARGE NOTE ADULT - HOSPITAL COURSE
83 yo M with hx of CVA with R. sided residual deficits, s/p PEG, trach, CAD s/p CABG (28yo), Parkinson's disease, presents for a fever from [facility]. Patient is altered currently and history obtained from chart review and family at bedside. Per family, patient was independent until two months ago when he had a stroke. At that hospitalization, he had an aspiration PNA, which family said he needed to get the trach at that time and PEG. Patient's baseline mental status waxes and wanes, at times patient is reading a magazine. Patient had been at [facility] since last hospitalization and receiving PT. Per family, patient did not seem at baseline PTA, when he seemed "gray" per daughter and not well. Day of admission, patient had an episode of coffee ground emesis. Was positive for resistant Providencia in the urine and pseudamonas in the sputum which were fully treated.

## 2017-10-03 NOTE — PROGRESS NOTE ADULT - ATTENDING COMMENTS
I have seen and examined the patient. I agree with the above history, physical exam, and plan of care except for as detailed below.    Remains stable on trach collar. No changes overnight.  Continue meropenem for UTI (complete 7 day course)  D/C planning for early this week
Tracheostomy exchange to 6 cuffless   Will remove abdominal staples  Likely d/c tomorrow
chronic trach, PEG, s/p cva  admitted with providencia UTI sepsis.  required vent on admission, now back on TC.  continue meropenem.  hemodyn stable, afebrile, improved leukocytosis  on TC , on PMV. alert, oriented to person and place.  d/c plan after the weekend when abx completed.  change to cufless trach prior to d/c if remains off vent
starting to respond to treatment, hypoxemic resp failure due to probable aspiration PNA/on vent/guarded but improved
Mr George is better/on the vent/severe PNA/sepsis resovling
hypoxemic resp failure/prob PNA/right greater than left/shock so likely septic shock on admission/guarded
septic shock/PNA with question of UTI/on vent/guarded
d/c planning
I have seen and examined the patient. I agree with the above history, physical exam, and plan of care except for as detailed below.    Remains stable on trach collar  Continue meropenem for UTI (complete 7 day course)  Diurese goal 0 to -500 cc  D/C planning for early this week

## 2017-10-03 NOTE — PROGRESS NOTE ADULT - PROBLEM SELECTOR PLAN 3
trach connected to vent  -c/w neb
Maintain patient safety

## 2017-10-03 NOTE — DISCHARGE NOTE ADULT - PATIENT PORTAL LINK FT
“You can access the [facility] Patient Portal, offered by [facility], by registering with the following website: [email or web address]”

## 2017-10-03 NOTE — DISCHARGE NOTE ADULT - MEDICATION SUMMARY - MEDICATIONS TO TAKE
I will START or STAY ON the medications listed below when I get home from the hospital:    finasteride 5 mg oral tablet  -- 1 tab(s) by gastrostomy tube once a day  -- Indication: For Alexander    aspirin 81 mg oral tablet  -- 1 tab(s) by gastrostomy tube once a day  -- Indication: For CVA (cerebral vascular accident)    Mylanta oral suspension  -- 10 milliliter(s) by gastrostomy tube 4 times a day (after meals and at bedtime)  -- Indication: For Dysphagia    tamsulosin 0.4 mg oral capsule  -- 1 cap(s) by gastrostomy tube once a day  -- Indication: For BPH    atorvastatin 80 mg oral tablet  -- 1 tab(s) by gastrostomy tube once a day (at bedtime)  -- Indication: For CVA (cerebral vascular accident)    carbidopa-levodopa 25 mg-100 mg oral tablet  -- 1.5 tab(s) by gastrostomy tube 3 times a day  -- Indication: For Parkinsons    Senna 8.6 mg oral tablet  -- 2 tab(s) by gastrostomy tube once a day (at bedtime)  -- Indication: For Constipation    Colace 10 mg/mL oral liquid  -- milliliter(s) by gastrostomy tube 2 times a day  -- Indication: For Constipation    dorzolamide-timolol 2.23%-0.68% ophthalmic solution  -- 1 drop(s) to each affected eye 2 times a day  -- Indication: For Glaucoma    Lotemax 0.5% ophthalmic suspension  -- 1 drop(s) to each affected eye every 48 hours  -- Indication: For Glaucoma    pilocarpine 4% ophthalmic solution  -- 1 drop(s) to each affected eye 2 times a day  -- Indication: For Glaucoma    Travatan Z 0.004% ophthalmic solution  -- 1 drop(s) to each affected eye once a day (in the evening)  -- Indication: For Glaucoma    pantoprazole 40 mg oral granule, enteric coated  -- 40 milligram(s) by gastrostomy tube once a day  -- Indication: For Dysphagia    VESIcare 10 mg oral tablet  -- 1 tab(s) by mouth once a day  -- Indication: For Parkinsons

## 2017-11-05 LAB — ACID FAST STN SPT: SIGNIFICANT CHANGE UP

## 2017-11-06 LAB — ACID FAST STN SPT: SIGNIFICANT CHANGE UP

## 2018-01-08 ENCOUNTER — INPATIENT (INPATIENT)
Facility: HOSPITAL | Age: 83
LOS: 8 days | Discharge: NURSING, REHAB OR LONG-TERM CARE | End: 2018-01-17
Attending: INTERNAL MEDICINE | Admitting: INTERNAL MEDICINE
Payer: MEDICARE

## 2018-01-08 VITALS
WEIGHT: 154.32 LBS | RESPIRATION RATE: 20 BRPM | DIASTOLIC BLOOD PRESSURE: 50 MMHG | SYSTOLIC BLOOD PRESSURE: 102 MMHG | HEART RATE: 80 BPM | TEMPERATURE: 99 F | OXYGEN SATURATION: 100 %

## 2018-01-08 DIAGNOSIS — T83.511A INFECTION AND INFLAMMATORY REACTION DUE TO INDWELLING URETHRAL CATHETER, INITIAL ENCOUNTER: ICD-10-CM

## 2018-01-08 DIAGNOSIS — E43 UNSPECIFIED SEVERE PROTEIN-CALORIE MALNUTRITION: ICD-10-CM

## 2018-01-08 DIAGNOSIS — Z98.890 OTHER SPECIFIED POSTPROCEDURAL STATES: Chronic | ICD-10-CM

## 2018-01-08 DIAGNOSIS — N17.9 ACUTE KIDNEY FAILURE, UNSPECIFIED: ICD-10-CM

## 2018-01-08 DIAGNOSIS — N39.0 URINARY TRACT INFECTION, SITE NOT SPECIFIED: ICD-10-CM

## 2018-01-08 DIAGNOSIS — Z93.1 GASTROSTOMY STATUS: Chronic | ICD-10-CM

## 2018-01-08 DIAGNOSIS — G93.41 METABOLIC ENCEPHALOPATHY: ICD-10-CM

## 2018-01-08 DIAGNOSIS — N40.0 BENIGN PROSTATIC HYPERPLASIA WITHOUT LOWER URINARY TRACT SYMPTOMS: ICD-10-CM

## 2018-01-08 DIAGNOSIS — G20 PARKINSON'S DISEASE: ICD-10-CM

## 2018-01-08 DIAGNOSIS — A41.9 SEPSIS, UNSPECIFIED ORGANISM: ICD-10-CM

## 2018-01-08 DIAGNOSIS — Z29.9 ENCOUNTER FOR PROPHYLACTIC MEASURES, UNSPECIFIED: ICD-10-CM

## 2018-01-08 DIAGNOSIS — I63.9 CEREBRAL INFARCTION, UNSPECIFIED: ICD-10-CM

## 2018-01-08 LAB
ALBUMIN SERPL ELPH-MCNC: 2.1 G/DL — LOW (ref 3.3–5)
ALP SERPL-CCNC: 114 U/L — SIGNIFICANT CHANGE UP (ref 40–120)
ALT FLD-CCNC: 28 U/L — SIGNIFICANT CHANGE UP (ref 4–41)
APPEARANCE UR: SIGNIFICANT CHANGE UP
APTT BLD: 29.2 SEC — SIGNIFICANT CHANGE UP (ref 27.5–37.4)
AST SERPL-CCNC: 41 U/L — HIGH (ref 4–40)
BACTERIA # UR AUTO: SIGNIFICANT CHANGE UP
BASE EXCESS BLDV CALC-SCNC: 3.6 MMOL/L — SIGNIFICANT CHANGE UP
BASOPHILS # BLD AUTO: 0.02 K/UL — SIGNIFICANT CHANGE UP (ref 0–0.2)
BASOPHILS NFR BLD AUTO: 0.1 % — SIGNIFICANT CHANGE UP (ref 0–2)
BILIRUB SERPL-MCNC: 0.5 MG/DL — SIGNIFICANT CHANGE UP (ref 0.2–1.2)
BILIRUB UR-MCNC: NEGATIVE — SIGNIFICANT CHANGE UP
BLOOD GAS VENOUS - CREATININE: 0.79 MG/DL — SIGNIFICANT CHANGE UP (ref 0.5–1.3)
BLOOD UR QL VISUAL: HIGH
BUN SERPL-MCNC: 61 MG/DL — HIGH (ref 7–23)
CALCIUM SERPL-MCNC: 7.9 MG/DL — LOW (ref 8.4–10.5)
CHLORIDE BLDV-SCNC: 104 MMOL/L — SIGNIFICANT CHANGE UP (ref 96–108)
CHLORIDE SERPL-SCNC: 99 MMOL/L — SIGNIFICANT CHANGE UP (ref 98–107)
CO2 SERPL-SCNC: 22 MMOL/L — SIGNIFICANT CHANGE UP (ref 22–31)
COLOR SPEC: HIGH
CREAT SERPL-MCNC: 0.77 MG/DL — SIGNIFICANT CHANGE UP (ref 0.5–1.3)
EOSINOPHIL # BLD AUTO: 0.05 K/UL — SIGNIFICANT CHANGE UP (ref 0–0.5)
EOSINOPHIL NFR BLD AUTO: 0.3 % — SIGNIFICANT CHANGE UP (ref 0–6)
GAS PNL BLDV: 134 MMOL/L — LOW (ref 136–146)
GLUCOSE BLDV-MCNC: 114 — HIGH (ref 70–99)
GLUCOSE SERPL-MCNC: 106 MG/DL — HIGH (ref 70–99)
GLUCOSE UR-MCNC: NEGATIVE — SIGNIFICANT CHANGE UP
HCO3 BLDV-SCNC: 28 MMOL/L — HIGH (ref 20–27)
HCT VFR BLD CALC: 31.2 % — LOW (ref 39–50)
HCT VFR BLDV CALC: 30 % — LOW (ref 39–51)
HGB BLD-MCNC: 10.1 G/DL — LOW (ref 13–17)
HGB BLDV-MCNC: 9.7 G/DL — LOW (ref 13–17)
IMM GRANULOCYTES # BLD AUTO: 0.11 # — SIGNIFICANT CHANGE UP
IMM GRANULOCYTES NFR BLD AUTO: 0.7 % — SIGNIFICANT CHANGE UP (ref 0–1.5)
INR BLD: 1.42 — HIGH (ref 0.88–1.17)
KETONES UR-MCNC: NEGATIVE — SIGNIFICANT CHANGE UP
LACTATE BLDV-MCNC: 1.9 MMOL/L — SIGNIFICANT CHANGE UP (ref 0.5–2)
LEUKOCYTE ESTERASE UR-ACNC: HIGH
LYMPHOCYTES # BLD AUTO: 0.78 K/UL — LOW (ref 1–3.3)
LYMPHOCYTES # BLD AUTO: 5.2 % — LOW (ref 13–44)
MAGNESIUM SERPL-MCNC: 2.1 MG/DL — SIGNIFICANT CHANGE UP (ref 1.6–2.6)
MCHC RBC-ENTMCNC: 27.8 PG — SIGNIFICANT CHANGE UP (ref 27–34)
MCHC RBC-ENTMCNC: 32.4 % — SIGNIFICANT CHANGE UP (ref 32–36)
MCV RBC AUTO: 86 FL — SIGNIFICANT CHANGE UP (ref 80–100)
MONOCYTES # BLD AUTO: 1.2 K/UL — HIGH (ref 0–0.9)
MONOCYTES NFR BLD AUTO: 7.9 % — SIGNIFICANT CHANGE UP (ref 2–14)
MUCOUS THREADS # UR AUTO: SIGNIFICANT CHANGE UP
NEUTROPHILS # BLD AUTO: 12.96 K/UL — HIGH (ref 1.8–7.4)
NEUTROPHILS NFR BLD AUTO: 85.8 % — HIGH (ref 43–77)
NITRITE UR-MCNC: NEGATIVE — SIGNIFICANT CHANGE UP
NRBC # FLD: 0 — SIGNIFICANT CHANGE UP
PCO2 BLDV: 32 MMHG — LOW (ref 41–51)
PH BLDV: 7.53 PH — HIGH (ref 7.32–7.43)
PH UR: 8.5 — HIGH (ref 4.6–8)
PHOSPHATE SERPL-MCNC: 3.8 MG/DL — SIGNIFICANT CHANGE UP (ref 2.5–4.5)
PLATELET # BLD AUTO: 222 K/UL — SIGNIFICANT CHANGE UP (ref 150–400)
PMV BLD: 11.4 FL — SIGNIFICANT CHANGE UP (ref 7–13)
PO2 BLDV: 76 MMHG — HIGH (ref 35–40)
POTASSIUM BLDV-SCNC: 3.8 MMOL/L — SIGNIFICANT CHANGE UP (ref 3.4–4.5)
POTASSIUM SERPL-MCNC: 4.3 MMOL/L — SIGNIFICANT CHANGE UP (ref 3.5–5.3)
POTASSIUM SERPL-SCNC: 4.3 MMOL/L — SIGNIFICANT CHANGE UP (ref 3.5–5.3)
PROT SERPL-MCNC: 6.3 G/DL — SIGNIFICANT CHANGE UP (ref 6–8.3)
PROT UR-MCNC: >600 MG/DL — SIGNIFICANT CHANGE UP
PROTHROM AB SERPL-ACNC: 16.5 SEC — HIGH (ref 9.8–13.1)
RBC # BLD: 3.63 M/UL — LOW (ref 4.2–5.8)
RBC # FLD: 17.6 % — HIGH (ref 10.3–14.5)
RBC CASTS # UR COMP ASSIST: SIGNIFICANT CHANGE UP (ref 0–?)
SAO2 % BLDV: 96.5 % — HIGH (ref 60–85)
SODIUM SERPL-SCNC: 135 MMOL/L — SIGNIFICANT CHANGE UP (ref 135–145)
SP GR SPEC: 1.02 — SIGNIFICANT CHANGE UP (ref 1–1.04)
TRI-PHOS CRY # UR COMP ASSIST: SIGNIFICANT CHANGE UP (ref 0–0)
UROBILINOGEN FLD QL: NORMAL MG/DL — SIGNIFICANT CHANGE UP
WBC # BLD: 15.12 K/UL — HIGH (ref 3.8–10.5)
WBC # FLD AUTO: 15.12 K/UL — HIGH (ref 3.8–10.5)
WBC UR QL: SIGNIFICANT CHANGE UP (ref 0–?)

## 2018-01-08 PROCEDURE — 71045 X-RAY EXAM CHEST 1 VIEW: CPT | Mod: 26

## 2018-01-08 RX ORDER — DORZOLAMIDE HYDROCHLORIDE TIMOLOL MALEATE 20; 5 MG/ML; MG/ML
1 SOLUTION/ DROPS OPHTHALMIC
Qty: 0 | Refills: 0 | COMMUNITY

## 2018-01-08 RX ORDER — PIPERACILLIN AND TAZOBACTAM 4; .5 G/20ML; G/20ML
3.38 INJECTION, POWDER, LYOPHILIZED, FOR SOLUTION INTRAVENOUS ONCE
Qty: 0 | Refills: 0 | Status: DISCONTINUED | OUTPATIENT
Start: 2018-01-08 | End: 2018-01-08

## 2018-01-08 RX ORDER — CEFEPIME 1 G/1
2000 INJECTION, POWDER, FOR SOLUTION INTRAMUSCULAR; INTRAVENOUS EVERY 12 HOURS
Qty: 0 | Refills: 0 | Status: DISCONTINUED | OUTPATIENT
Start: 2018-01-08 | End: 2018-01-11

## 2018-01-08 RX ORDER — VANCOMYCIN HCL 1 G
1000 VIAL (EA) INTRAVENOUS EVERY 12 HOURS
Qty: 0 | Refills: 0 | Status: DISCONTINUED | OUTPATIENT
Start: 2018-01-08 | End: 2018-01-09

## 2018-01-08 RX ORDER — DOCUSATE SODIUM 100 MG
200 CAPSULE ORAL AT BEDTIME
Qty: 0 | Refills: 0 | Status: DISCONTINUED | OUTPATIENT
Start: 2018-01-08 | End: 2018-01-17

## 2018-01-08 RX ORDER — LOTEPREDNOL ETABONATE 2 MG/ML
1 SUSPENSION/ DROPS OPHTHALMIC
Qty: 0 | Refills: 0 | COMMUNITY

## 2018-01-08 RX ORDER — SODIUM CHLORIDE 9 MG/ML
1000 INJECTION INTRAMUSCULAR; INTRAVENOUS; SUBCUTANEOUS
Qty: 0 | Refills: 0 | Status: DISCONTINUED | OUTPATIENT
Start: 2018-01-08 | End: 2018-01-08

## 2018-01-08 RX ORDER — CEFEPIME 1 G/1
2000 INJECTION, POWDER, FOR SOLUTION INTRAMUSCULAR; INTRAVENOUS ONCE
Qty: 0 | Refills: 0 | Status: COMPLETED | OUTPATIENT
Start: 2018-01-08 | End: 2018-01-08

## 2018-01-08 RX ORDER — COLLAGENASE CLOSTRIDIUM HIST. 250 UNIT/G
1 OINTMENT (GRAM) TOPICAL DAILY
Qty: 0 | Refills: 0 | Status: DISCONTINUED | OUTPATIENT
Start: 2018-01-08 | End: 2018-01-15

## 2018-01-08 RX ORDER — SOLIFENACIN SUCCINATE 10 MG/1
1 TABLET ORAL
Qty: 0 | Refills: 0 | COMMUNITY

## 2018-01-08 RX ORDER — PILOCARPINE HCL 4 %
1 DROPS OPHTHALMIC (EYE)
Qty: 0 | Refills: 0 | COMMUNITY

## 2018-01-08 RX ORDER — VANCOMYCIN HCL 1 G
1000 VIAL (EA) INTRAVENOUS ONCE
Qty: 0 | Refills: 0 | Status: COMPLETED | OUTPATIENT
Start: 2018-01-08 | End: 2018-01-08

## 2018-01-08 RX ORDER — LATANOPROST 0.05 MG/ML
1 SOLUTION/ DROPS OPHTHALMIC; TOPICAL AT BEDTIME
Qty: 0 | Refills: 0 | Status: DISCONTINUED | OUTPATIENT
Start: 2018-01-08 | End: 2018-01-17

## 2018-01-08 RX ORDER — ASPIRIN/CALCIUM CARB/MAGNESIUM 324 MG
81 TABLET ORAL DAILY
Qty: 0 | Refills: 0 | Status: DISCONTINUED | OUTPATIENT
Start: 2018-01-08 | End: 2018-01-17

## 2018-01-08 RX ORDER — ZINC OXIDE 200 MG/G
1 OINTMENT TOPICAL DAILY
Qty: 0 | Refills: 0 | Status: DISCONTINUED | OUTPATIENT
Start: 2018-01-08 | End: 2018-01-15

## 2018-01-08 RX ORDER — PANTOPRAZOLE SODIUM 20 MG/1
40 TABLET, DELAYED RELEASE ORAL
Qty: 0 | Refills: 0 | Status: DISCONTINUED | OUTPATIENT
Start: 2018-01-08 | End: 2018-01-17

## 2018-01-08 RX ORDER — ASPIRIN/CALCIUM CARB/MAGNESIUM 324 MG
1 TABLET ORAL
Qty: 0 | Refills: 0 | COMMUNITY

## 2018-01-08 RX ORDER — ASCORBIC ACID 60 MG
500 TABLET,CHEWABLE ORAL DAILY
Qty: 0 | Refills: 0 | Status: DISCONTINUED | OUTPATIENT
Start: 2018-01-08 | End: 2018-01-17

## 2018-01-08 RX ORDER — METOCLOPRAMIDE HCL 10 MG
5 TABLET ORAL EVERY 6 HOURS
Qty: 0 | Refills: 0 | Status: DISCONTINUED | OUTPATIENT
Start: 2018-01-08 | End: 2018-01-10

## 2018-01-08 RX ORDER — HEPARIN SODIUM 5000 [USP'U]/ML
5000 INJECTION INTRAVENOUS; SUBCUTANEOUS EVERY 8 HOURS
Qty: 0 | Refills: 0 | Status: DISCONTINUED | OUTPATIENT
Start: 2018-01-08 | End: 2018-01-11

## 2018-01-08 RX ORDER — CARBIDOPA AND LEVODOPA 25; 100 MG/1; MG/1
1.5 TABLET ORAL THREE TIMES A DAY
Qty: 0 | Refills: 0 | Status: DISCONTINUED | OUTPATIENT
Start: 2018-01-08 | End: 2018-01-17

## 2018-01-08 RX ORDER — SALICYLIC ACID 0.5 %
1 CLEANSER (GRAM) TOPICAL DAILY
Qty: 0 | Refills: 0 | Status: DISCONTINUED | OUTPATIENT
Start: 2018-01-08 | End: 2018-01-17

## 2018-01-08 RX ORDER — SENNA PLUS 8.6 MG/1
10 TABLET ORAL AT BEDTIME
Qty: 0 | Refills: 0 | Status: DISCONTINUED | OUTPATIENT
Start: 2018-01-08 | End: 2018-01-17

## 2018-01-08 RX ORDER — ACETAMINOPHEN 500 MG
1000 TABLET ORAL ONCE
Qty: 0 | Refills: 0 | Status: COMPLETED | OUTPATIENT
Start: 2018-01-08 | End: 2018-01-08

## 2018-01-08 RX ORDER — DOCUSATE SODIUM 100 MG
0 CAPSULE ORAL
Qty: 0 | Refills: 0 | COMMUNITY

## 2018-01-08 RX ORDER — BUDESONIDE, MICRONIZED 100 %
0.25 POWDER (GRAM) MISCELLANEOUS
Qty: 0 | Refills: 0 | Status: DISCONTINUED | OUTPATIENT
Start: 2018-01-08 | End: 2018-01-17

## 2018-01-08 RX ORDER — OMEPRAZOLE 10 MG/1
1 CAPSULE, DELAYED RELEASE ORAL
Qty: 0 | Refills: 0 | COMMUNITY

## 2018-01-08 RX ORDER — METOCLOPRAMIDE HCL 10 MG
1 TABLET ORAL
Qty: 0 | Refills: 0 | COMMUNITY

## 2018-01-08 RX ORDER — METOCLOPRAMIDE HCL 10 MG
5 TABLET ORAL EVERY 6 HOURS
Qty: 0 | Refills: 0 | Status: DISCONTINUED | OUTPATIENT
Start: 2018-01-08 | End: 2018-01-08

## 2018-01-08 RX ORDER — ATORVASTATIN CALCIUM 80 MG/1
80 TABLET, FILM COATED ORAL AT BEDTIME
Qty: 0 | Refills: 0 | Status: DISCONTINUED | OUTPATIENT
Start: 2018-01-08 | End: 2018-01-17

## 2018-01-08 RX ORDER — ACETAMINOPHEN 500 MG
650 TABLET ORAL EVERY 6 HOURS
Qty: 0 | Refills: 0 | Status: DISCONTINUED | OUTPATIENT
Start: 2018-01-08 | End: 2018-01-17

## 2018-01-08 RX ORDER — SODIUM CHLORIDE 9 MG/ML
2000 INJECTION INTRAMUSCULAR; INTRAVENOUS; SUBCUTANEOUS ONCE
Qty: 0 | Refills: 0 | Status: DISCONTINUED | OUTPATIENT
Start: 2018-01-08 | End: 2018-01-08

## 2018-01-08 RX ORDER — IPRATROPIUM/ALBUTEROL SULFATE 18-103MCG
3 AEROSOL WITH ADAPTER (GRAM) INHALATION EVERY 6 HOURS
Qty: 0 | Refills: 0 | Status: DISCONTINUED | OUTPATIENT
Start: 2018-01-08 | End: 2018-01-17

## 2018-01-08 RX ORDER — SENNA PLUS 8.6 MG/1
2 TABLET ORAL
Qty: 0 | Refills: 0 | COMMUNITY

## 2018-01-08 RX ORDER — TAMSULOSIN HYDROCHLORIDE 0.4 MG/1
0.4 CAPSULE ORAL AT BEDTIME
Qty: 0 | Refills: 0 | Status: DISCONTINUED | OUTPATIENT
Start: 2018-01-08 | End: 2018-01-08

## 2018-01-08 RX ORDER — PILOCARPINE HCL 4 %
1 DROPS OPHTHALMIC (EYE)
Qty: 0 | Refills: 0 | Status: DISCONTINUED | OUTPATIENT
Start: 2018-01-08 | End: 2018-01-17

## 2018-01-08 RX ORDER — TRAVOPROST 0.04 MG/ML
1 SOLUTION/ DROPS OPHTHALMIC
Qty: 0 | Refills: 0 | COMMUNITY

## 2018-01-08 RX ORDER — ASCORBIC ACID 60 MG
1 TABLET,CHEWABLE ORAL
Qty: 0 | Refills: 0 | COMMUNITY

## 2018-01-08 RX ORDER — DORZOLAMIDE HYDROCHLORIDE TIMOLOL MALEATE 20; 5 MG/ML; MG/ML
1 SOLUTION/ DROPS OPHTHALMIC
Qty: 0 | Refills: 0 | Status: DISCONTINUED | OUTPATIENT
Start: 2018-01-08 | End: 2018-01-17

## 2018-01-08 RX ORDER — SODIUM CHLORIDE 9 MG/ML
1000 INJECTION INTRAMUSCULAR; INTRAVENOUS; SUBCUTANEOUS ONCE
Qty: 0 | Refills: 0 | Status: COMPLETED | OUTPATIENT
Start: 2018-01-08 | End: 2018-01-08

## 2018-01-08 RX ORDER — DOXAZOSIN MESYLATE 4 MG
1 TABLET ORAL AT BEDTIME
Qty: 0 | Refills: 0 | Status: DISCONTINUED | OUTPATIENT
Start: 2018-01-08 | End: 2018-01-17

## 2018-01-08 RX ORDER — SODIUM CHLORIDE 9 MG/ML
1000 INJECTION INTRAMUSCULAR; INTRAVENOUS; SUBCUTANEOUS
Qty: 0 | Refills: 0 | Status: DISCONTINUED | OUTPATIENT
Start: 2018-01-08 | End: 2018-01-09

## 2018-01-08 RX ORDER — FINASTERIDE 5 MG/1
5 TABLET, FILM COATED ORAL DAILY
Qty: 0 | Refills: 0 | Status: DISCONTINUED | OUTPATIENT
Start: 2018-01-08 | End: 2018-01-17

## 2018-01-08 RX ADMIN — SENNA PLUS 10 MILLILITER(S): 8.6 TABLET ORAL at 23:35

## 2018-01-08 RX ADMIN — SODIUM CHLORIDE 150 MILLILITER(S): 9 INJECTION INTRAMUSCULAR; INTRAVENOUS; SUBCUTANEOUS at 19:55

## 2018-01-08 RX ADMIN — LATANOPROST 1 DROP(S): 0.05 SOLUTION/ DROPS OPHTHALMIC; TOPICAL at 23:40

## 2018-01-08 RX ADMIN — CARBIDOPA AND LEVODOPA 1.5 TABLET(S): 25; 100 TABLET ORAL at 23:37

## 2018-01-08 RX ADMIN — Medication 400 MILLIGRAM(S): at 16:48

## 2018-01-08 RX ADMIN — CEFEPIME 100 MILLIGRAM(S): 1 INJECTION, POWDER, FOR SOLUTION INTRAMUSCULAR; INTRAVENOUS at 17:52

## 2018-01-08 RX ADMIN — Medication 200 MILLIGRAM(S): at 23:35

## 2018-01-08 RX ADMIN — ATORVASTATIN CALCIUM 80 MILLIGRAM(S): 80 TABLET, FILM COATED ORAL at 23:35

## 2018-01-08 RX ADMIN — Medication 3 MILLILITER(S): at 23:25

## 2018-01-08 RX ADMIN — SODIUM CHLORIDE 2000 MILLILITER(S): 9 INJECTION INTRAMUSCULAR; INTRAVENOUS; SUBCUTANEOUS at 17:47

## 2018-01-08 RX ADMIN — Medication 250 MILLIGRAM(S): at 19:50

## 2018-01-08 RX ADMIN — SODIUM CHLORIDE 75 MILLILITER(S): 9 INJECTION INTRAMUSCULAR; INTRAVENOUS; SUBCUTANEOUS at 23:36

## 2018-01-08 NOTE — ED ADULT TRIAGE NOTE - CHIEF COMPLAINT QUOTE
pt from NH, was trached d/t CVA, got PNA while on tach, recently had Trach DC'd, has chronic lubin since July, peg tube. Sent from NH for UTI with bacteria resistant to abx. LS clear, DSD over trach Stoma

## 2018-01-08 NOTE — H&P ADULT - HISTORY OF PRESENT ILLNESS
Pt is an 83yo with history of CVA in July 2017 with R-sided residual deficits s/p trach and PEG (trach reversed 2 weeks PTA), Parkinson's disease and CAD s/p CABG presenting from [facility] with fever, confusion and lethargy. Pt nonverbal and history obtained from pt's wife and daughter at bedside.    Per family, pt had been in his usual state of health until 4 days ago when he was noted to have a low grade fever at his nursing home. Pt has a chronic Alexander and his urine was also noted to be dark red/brown in color. Pt appeared to be altered, more lethargic and less responsive than at baseline. The next day a urine culture was sent, which ultimately resulted today growing Proteus mirabilis resistant to fluoroquinolones, ampicillin, Macrobid and Bactrim. Pt continued to have fevers up to 102 over the weekend and was brought into the ED today after the urine culture finalized.    Pt also was hospitalized at [facility] in October 2017 for aspiration pneumonia and UTI, with urine culture growing Providencia. Pt was d/nicanor to NH where he slowly improved his functional status, being able to participate in physical therapy and having his trach removed 2 weeks ago. At baseline, pt is able to converse with some dysarthria but is able to follow commands. Pt received tube feeds through a PEG tube, however also does have limited oral intake with a pureed diet with thickened liquids.     ED vitals: Temp 98.7 HR 80 /50 RR 20 O2 100% on room air. Pt was given Tylenol 1000mg x1, Cefepime x1, vancomycin x1 and NS 1L bolus. CXR was performed with prelim read finding of LLL opacity which could represent pleural effusion or atelectasis, unable to r/o superimposed infection. UA with brown urine, large blood, large LE, 25-50 WBCs. Blood cultures x2 and urine culture sent. Pt admitted to the medicine service for further workup.

## 2018-01-08 NOTE — H&P ADULT - PROBLEM SELECTOR PLAN 2
CXR showing possible LLL consolidation  - Per pt's family, pt had been tolerating feeds by mouth at nursing home  - C/w Dottie, plan for 5-7 day course  - NPO for now, aspiration precautions  - Speech and swallow evaluation Initial urine culture from 1/5 at nursing home growing >100k CFU Proteus mirabilis, sensitive to most IV antibiotics  - Would c/w vanc and Cefepime, plan for 7-day course given male complicated UTI  - Alexander will need to be changed on admission  - F/u blood and urine cultures  - Trend WBC daily for resolution of leukocytosis Initial urine culture from 1/5 at nursing home growing >100k CFU Proteus mirabilis, sensitive to most IV antibiotics  - Would c/w vanc and Cefepime, plan for 7-day course given male complicated UTI  - Alexander will need to be changed this admission, discussed with floor RN  - F/u blood and urine cultures  - Trend WBC daily for resolution of leukocytosis

## 2018-01-08 NOTE — ED PROVIDER NOTE - OBJECTIVE STATEMENT
83 yo M with hx of CVA with R. sided residual deficits, s/p PEG, trach, CAD s/p CABG (28yo), Parkinson's disease, presents from NH for UTI resistant to antibiotics.  Patient is altered currently and history obtained from chart review and family at bedside. Per family, patient was independent until two months ago when he had a stroke. At that hospitalization, he had an aspiration PNA, which family said he needed to get the trach at that time and PEG. Patient's baseline mental status waxes and wanes, 83 yo M with hx of CVA with R. sided residual deficits, s/p PEG, trach, CAD s/p CABG (28yo), Parkinson's disease, presents from NH for proteus mirablis UTI with S&S on chart, increased lethargic and disorientation, fever to 100.3, and dark color of urine.  Patient is altered currently and history obtained from chart review and family at bedside. Per family, patient was independent until several months ago when he had a stroke. At that hospitalization, he had an aspiration PNA, which family said he needed to get the trach at that time and PEG.  S/p trach removal 2 weeks ago.  Patient's baseline mental status waxes and wanes.  Wife, here with patient, denies diarrhea, constipation, cough, n/v, rash.  PCN allergy per wife.

## 2018-01-08 NOTE — H&P ADULT - NSHPLABSRESULTS_GEN_ALL_CORE
10.1   15.12 )-----------( 222      ( 2018 16:52 )             31.2         135  |  99  |  61<H>  ----------------------------<  106<H>  4.3   |  22  |  0.77    Ca    7.9<L>      2018 16:52  Phos  3.8       Mg     2.1         TPro  6.3  /  Alb  2.1<L>  /  TBili  0.5  /  DBili  x   /  AST  41<H>  /  ALT  28  /  AlkPhos  114      Urinalysis Basic - ( 2018 18:39 )    Color: BROWN / Appearance: TURBID / S.019 / pH: 8.5  Gluc: NEGATIVE / Ketone: NEGATIVE  / Bili: NEGATIVE / Urobili: NORMAL mg/dL   Blood: MODERATE / Protein: >600 mg/dL / Nitrite: NEGATIVE   Leuk Esterase: LARGE / RBC: 25-50 / WBC 25-50   Sq Epi: x / Non Sq Epi: x / Bacteria: FEW    PT/INR - ( 2018 16:52 )   PT: 16.5 SEC;   INR: 1.42          PTT - ( 2018 16:52 )  PTT:29.2 SEC    VBG: pH 7.53/pCO2 32/pO2 76%/HCO3 28    Urine culture : (in chart)  Proteus mirabilis >100,000 CFU    Resistant to ampicillin, cipro, Levaquin, Macrobid, Bactrim  Sensitive to amikacin, Augmentin, Unasyn, cephalosporins, carbapenems, gentamicin, Zosyn, tobramycin    CXR prelim read: LLL opacity suggestive of atelectasis or pleural effusion, superimposed infection cannot be ruled out     135  |  99  |  61<H>  ----------------------------<  106<H>  4.3   |  22  |  0.77    Ca    7.9<L>      2018 16:52  Phos  3.8       Mg     2.1         TPro  6.3  /  Alb  2.1<L>  /  TBili  0.5  /  DBili  x   /  AST  41<H>  /  ALT  28  /  AlkPhos  114                          10.1   15.12 )-----------( 222      ( 2018 16:52 )             31.2     LIVER FUNCTIONS - ( 2018 16:52 )  Alb: 2.1 g/dL / Pro: 6.3 g/dL / ALK PHOS: 114 u/L / ALT: 28 u/L / AST: 41 u/L / GGT: x           PT/INR - ( 2018 16:52 )   PT: 16.5 SEC;   INR: 1.42     PTT - ( 2018 16:52 )  PTT:29.2 SEC    Urinalysis Basic - ( 2018 18:39 )  Color: BROWN / Appearance: TURBID / S.019 / pH: 8.5  Gluc: NEGATIVE / Ketone: NEGATIVE  / Bili: NEGATIVE / Urobili: NORMAL mg/dL   Blood: MODERATE / Protein: >600 mg/dL / Nitrite: NEGATIVE   Leuk Esterase: LARGE / RBC: 25-50 / WBC 25-50   Sq Epi: x / Non Sq Epi: x / Bacteria: FEW    16:52 - VBG - pH: 7.53  | pCO2: 32    | pO2: 76    | Lactate: 1.9      Urine culture : (in chart)  Proteus mirabilis >100,000 CFU    Resistant to ampicillin, cipro, Levaquin, Macrobid, Bactrim  Sensitive to amikacin, Augmentin, Unasyn, cephalosporins, carbapenems, gentamicin, Zosyn, tobramycin    CXR personally reviewed: LLL opacity suggestive of atelectasis or pleural effusion, superimposed infection cannot be ruled out, appears to be unchanged in comparison to prior however difficult to fully eval due to heart shadow

## 2018-01-08 NOTE — H&P ADULT - RS GEN PE MLT RESP DETAILS PC
respirations non-labored/good air movement/diminished breath sounds, L no rhonchi/airway patent/respirations non-labored/breath sounds equal/diminished breath sounds, L/no intercostal retractions/no rales/good air movement/clear to auscultation bilaterally

## 2018-01-08 NOTE — H&P ADULT - PROBLEM SELECTOR PROBLEM 2
R/O Aspiration pneumonia of left lower lobe, unspecified aspiration pneumonia type Urinary tract infection associated with indwelling urethral catheter, initial encounter

## 2018-01-08 NOTE — ED PROVIDER NOTE - MEDICAL DECISION MAKING DETAILS
UTI with fever, lethargy, confusion.  Will get clx's, labs, ua, give ivf, vancomycin and cefepime, ofirmev, monitor.

## 2018-01-08 NOTE — ED ADULT NURSE NOTE - OBJECTIVE STATEMENT
Pt to spot # 25 lethargic change of mental status as per family at bedside, pt with  hx of CVA with R. sided residual deficits, s/p PEG, trach, CAD s/p CABG (26yo), Parkinson's disease, presents from NH for proteus mirablis UTI with S&S on chart, increased lethargic and disorientation, fever to 100.3, and dark color of urine.  Patient is altered currently and history obtained from chart review and family at bedside. Per family, patient was independent until several months ago when he had a stroke. At that hospitalization, he had an aspiration PNA, which family said he needed to get the trach at that time and PEG.  S/p trach removal 2 weeks ago.  Patient's baseline mental status waxes and wanes.  Wife, here with patient, denies diarrhea, constipation, cough, n/v, rash.  PCN allergy per wife, 5'5 stage 3 ulcer noted to sacrum cleaned and covered with foam for protection, pt with R heeel DTI/healing ulcer covered with heel protector, sacrum reddened with no other breakdown, doley noted with dark tea urine, samples obtained and sent to lab, R upper arm US guided IV placed by MD, ABX and fluids infusing tolerating well, will monitor and follow up with additional orders as necessry

## 2018-01-08 NOTE — H&P ADULT - PROBLEM SELECTOR PLAN 3
Baseline creatinine of 0.4 from last admission in October 2017  - Likely 2/2 decreased PO intake per pt's family, also component of UTI  - S/p 1L bolus NS in ED, will hold off on IVF for now and encourage free water through PEG  - Trend creatinine daily  - Avoid nephrotoxic agents CXR showing possible LLL consolidation  - Per pt's family, pt had been tolerating feeds by mouth at nursing home  - C/w vancomycin and Cefepime, plan for 5-7 day course  - NPO for now, aspiration precautions  - Speech and swallow evaluation

## 2018-01-08 NOTE — ED PROVIDER NOTE - ATTENDING CONTRIBUTION TO CARE
Attending Statement: I have personally seen and examined this patient. I have fully participated in the care of this patient. I have reviewed all pertinent clinical information, including history physical exam, plan and the Resident's note and agree except as noted  83yo M hx of CVA w right sided residual deficits, PEG, hx of trach, CAD, CABG,  parkinson's disease, sent from NH for UTI w proteus mirabilis. Pt unable to give hx. Wife states that he has been lethargic for three days, temp at NH w max 100.8, wife states she was told she had a UTI.    no sob. no cough. no chest pain. no abdominal pain.  Wife states he did "fine with prior abx" had been on cefepime wo a reaction in the past.  Vital signs noted. nonverbal, moans to touch and when call his name. poor inspiratory effort. normal S1-S2 soft nondistended abdomen. contracted ext.   plan labs, urine, gentle IVF, IV abx, admit.

## 2018-01-08 NOTE — H&P ADULT - PROBLEM SELECTOR PLAN 5
Stable, c/w home dose of carbidopa-levodopa Baseline creatinine of 0.4 from last admission in October 2017  - Likely 2/2 decreased PO intake per pt's family, also component of sepsis 2/2 UTI  - S/p 1L bolus NS in ED, will hold off on IVF for now and encourage free water through PEG after maintenance IVF completed in AM  - Trend creatinine daily  - Avoid nephrotoxic agents Baseline creatinine of 0.4 from last admission in October 2017  - Likely 2/2 decreased PO intake per pt's family, also component of sepsis 2/2 UTI  - S/p 1L bolus NS in ED, will hold off on IVF for now and will c/w free water through PEG after maintenance IVF completed in AM  - Trend creatinine daily  - Avoid nephrotoxic agents

## 2018-01-08 NOTE — H&P ADULT - ASSESSMENT
83yo with history of CVA in July 2017 with R-sided residual deficits s/p trach and PEG (trach reversed 2 weeks PTA), Parkinson's disease and CAD s/p CABG admitted for complicated UTI +/- possible LLL pneumonia possibly 2/2 aspiration. 81yo with history of CVA in July 2017 with R-sided residual deficits s/p trach and PEG (trach reversed 2 weeks PTA), Parkinson's disease and CAD s/p CABG admitted for sepsis 2/2 complicated UTI +/- possible LLL pneumonia possibly 2/2 aspiration.

## 2018-01-08 NOTE — ED ADULT TRIAGE NOTE - NURSING HOMES
[facility]

## 2018-01-08 NOTE — H&P ADULT - PROBLEM SELECTOR PLAN 7
Prealbumin of 14 and albumin of 2.4 noted from nursing home records  - INR elevated to 1.42 on admission  - Nutrition consult  - Resume tube feeds Stable, c/w home dose of carbidopa-levodopa

## 2018-01-08 NOTE — H&P ADULT - PMH
CVA (cerebral vascular accident)    Hypertension    Parkinson disease    Tracheostomy in place CAD (coronary artery disease)    CVA (cerebral vascular accident)    Alexander catheter in place    Glaucoma    Hypertension    Oropharyngeal dysphagia    Parkinson disease    Tracheostomy in place  removed 12/2017

## 2018-01-08 NOTE — H&P ADULT - PROBLEM SELECTOR PROBLEM 1
Urinary tract infection associated with indwelling urethral catheter, initial encounter Sepsis, due to unspecified organism

## 2018-01-08 NOTE — H&P ADULT - NSHPPHYSICALEXAM_GEN_ALL_CORE
Vital Signs Last 24 Hrs  T(C): 37 (08 Jan 2018 21:19), Max: 38.1 (08 Jan 2018 16:52)  T(F): 98.6 (08 Jan 2018 21:19), Max: 100.5 (08 Jan 2018 16:52)  HR: 70 (08 Jan 2018 23:26) (70 - 86)  BP: 101/59 (09 Jan 2018 01:51) (82/51 - 108/53)  BP(mean): --  RR: 18 (08 Jan 2018 21:19) (18 - 20)  SpO2: 100% (08 Jan 2018 23:26) (98% - 100%)

## 2018-01-08 NOTE — H&P ADULT - LYMPHATIC
anterior cervical R/anterior cervical L/supraclavicular R/supraclavicular L posterior cervical R/supraclavicular R/anterior cervical R/posterior cervical L/anterior cervical L/supraclavicular L

## 2018-01-08 NOTE — H&P ADULT - PROBLEM SELECTOR PROBLEM 6
Benign prostatic hyperplasia, unspecified whether lower urinary tract symptoms present Cerebrovascular accident (CVA), unspecified mechanism

## 2018-01-08 NOTE — H&P ADULT - PROBLEM SELECTOR PLAN 4
History of CVA in July 2017 with residual R-sided deficits  - C/w ASA and Lipitor 80mg Pt nonverbal at time of exam and confused per family  - Continue to monitor and treat underlying infection Pt nonverbal at time of exam and confused per family  - Continue to monitor and treat underlying infection  - c/w Parkinson's meds, if no improvement would check CTH for further eval

## 2018-01-08 NOTE — H&P ADULT - PROBLEM SELECTOR PROBLEM 3
SHREYAS (acute kidney injury) R/O Aspiration pneumonia of left lower lobe, unspecified aspiration pneumonia type Aspiration pneumonia of left lower lobe, unspecified aspiration pneumonia type

## 2018-01-08 NOTE — H&P ADULT - PROBLEM SELECTOR PLAN 9
Prealbumin of 14 and albumin of 2.4 noted from nursing home records  - INR elevated to 1.42 on admission  - Nutrition consult  - Resume tube feeds

## 2018-01-08 NOTE — H&P ADULT - PROBLEM SELECTOR PLAN 1
Initial urine culture from 1/5 at nursing home growing >100k CFU Proteus mirabilis, sensitive to most IV antibiotics  - Would c/w Zosyn for now, plan for 7-day course given male complicated UTI  - F/u with nursing home in AM regarding last time Alexander was changed- per nursing records it should be changed monthly  - F/u blood and urine cultures  - Trend WBC daily for resolution of leukocytosis Pt meeting SIRS criteria with fever documented at nursing home as well as leukocytosis of 15 with most likely source being UTI  - S/p 1L NS, will continue NS @75cc/hr for now  - Monitor BP  - F/u blood and urine cultures  - C/w vancomycin and Cefepime Pt meeting SIRS criteria with fever 100.4 documented at nursing home as well as leukocytosis of 15 with most likely source being UTI  - S/p 1L NS, will continue NS @75cc/hr for now  - Monitor BP  - F/u blood and urine cultures  - C/w vancomycin and Cefepime  - lactate wnl

## 2018-01-08 NOTE — H&P ADULT - PROBLEM SELECTOR PLAN 6
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

## 2018-01-08 NOTE — H&P ADULT - NS MD HP SKIN WOUND STATUS
Sacral decub ulcer and right heel ulcer, erythema along sole of L foot, former trach site dressed with gauze and tape Sacral decub ulcer 8x7cm stages 1 - unstageable; right heel 2x2cm DTI central necrosis without ulceration, erythema along sole of L foot, former trach site dressed with gauze and tape

## 2018-01-08 NOTE — H&P ADULT - PROBLEM SELECTOR PROBLEM 8
Need for prophylactic measure Benign prostatic hyperplasia, unspecified whether lower urinary tract symptoms present

## 2018-01-08 NOTE — H&P ADULT - GASTROINTESTINAL DETAILS
no masses palpable/PEG intact/soft/nontender/bowel sounds normal/no distention/no rebound tenderness no rebound tenderness/normal/bowel sounds normal/no masses palpable/nontender/PEG intact/soft/no distention

## 2018-01-08 NOTE — H&P ADULT - PROBLEM SELECTOR PLAN 8
DVT ppx: HSQ  Diet: NPO with tube feeds- Jevity  Dispo: Likely back to [facility] C/w finasteride, will substitute doxazosin for Flomax through PEG tube

## 2018-01-08 NOTE — H&P ADULT - MS EXT PE MLT D E PC
no pedal edema/no clubbing/no cyanosis no clubbing/no cyanosis/contracted extremities/no pedal edema

## 2018-01-09 ENCOUNTER — TRANSCRIPTION ENCOUNTER (OUTPATIENT)
Age: 83
End: 2018-01-09

## 2018-01-09 DIAGNOSIS — Z91.89 OTHER SPECIFIED PERSONAL RISK FACTORS, NOT ELSEWHERE CLASSIFIED: ICD-10-CM

## 2018-01-09 DIAGNOSIS — J69.0 PNEUMONITIS DUE TO INHALATION OF FOOD AND VOMIT: ICD-10-CM

## 2018-01-09 LAB
ALBUMIN SERPL ELPH-MCNC: 2.1 G/DL — LOW (ref 3.3–5)
ALP SERPL-CCNC: 128 U/L — HIGH (ref 40–120)
ALT FLD-CCNC: 33 U/L — SIGNIFICANT CHANGE UP (ref 4–41)
AST SERPL-CCNC: 59 U/L — HIGH (ref 4–40)
BASOPHILS # BLD AUTO: 0.01 K/UL — SIGNIFICANT CHANGE UP (ref 0–0.2)
BASOPHILS NFR BLD AUTO: 0.1 % — SIGNIFICANT CHANGE UP (ref 0–2)
BILIRUB SERPL-MCNC: 0.7 MG/DL — SIGNIFICANT CHANGE UP (ref 0.2–1.2)
BUN SERPL-MCNC: 52 MG/DL — HIGH (ref 7–23)
CALCIUM SERPL-MCNC: 7.9 MG/DL — LOW (ref 8.4–10.5)
CHLORIDE SERPL-SCNC: 103 MMOL/L — SIGNIFICANT CHANGE UP (ref 98–107)
CO2 SERPL-SCNC: 22 MMOL/L — SIGNIFICANT CHANGE UP (ref 22–31)
CREAT SERPL-MCNC: 0.65 MG/DL — SIGNIFICANT CHANGE UP (ref 0.5–1.3)
EOSINOPHIL # BLD AUTO: 0.02 K/UL — SIGNIFICANT CHANGE UP (ref 0–0.5)
EOSINOPHIL NFR BLD AUTO: 0.2 % — SIGNIFICANT CHANGE UP (ref 0–6)
GLUCOSE SERPL-MCNC: 111 MG/DL — HIGH (ref 70–99)
HCT VFR BLD CALC: 28.4 % — LOW (ref 39–50)
HGB BLD-MCNC: 9.4 G/DL — LOW (ref 13–17)
IMM GRANULOCYTES # BLD AUTO: 0.06 # — SIGNIFICANT CHANGE UP
IMM GRANULOCYTES NFR BLD AUTO: 0.5 % — SIGNIFICANT CHANGE UP (ref 0–1.5)
INR BLD: 1.55 — HIGH (ref 0.88–1.17)
LYMPHOCYTES # BLD AUTO: 0.59 K/UL — LOW (ref 1–3.3)
LYMPHOCYTES # BLD AUTO: 5.1 % — LOW (ref 13–44)
MAGNESIUM SERPL-MCNC: 2 MG/DL — SIGNIFICANT CHANGE UP (ref 1.6–2.6)
MCHC RBC-ENTMCNC: 28 PG — SIGNIFICANT CHANGE UP (ref 27–34)
MCHC RBC-ENTMCNC: 33.1 % — SIGNIFICANT CHANGE UP (ref 32–36)
MCV RBC AUTO: 84.5 FL — SIGNIFICANT CHANGE UP (ref 80–100)
METHOD TYPE: SIGNIFICANT CHANGE UP
MONOCYTES # BLD AUTO: 0.8 K/UL — SIGNIFICANT CHANGE UP (ref 0–0.9)
MONOCYTES NFR BLD AUTO: 6.9 % — SIGNIFICANT CHANGE UP (ref 2–14)
NEUTROPHILS # BLD AUTO: 10.16 K/UL — HIGH (ref 1.8–7.4)
NEUTROPHILS NFR BLD AUTO: 87.2 % — HIGH (ref 43–77)
NRBC # FLD: 0 — SIGNIFICANT CHANGE UP
ORGANISM # SPEC MICROSCOPIC CNT: SIGNIFICANT CHANGE UP
ORGANISM # SPEC MICROSCOPIC CNT: SIGNIFICANT CHANGE UP
PHOSPHATE SERPL-MCNC: 3.1 MG/DL — SIGNIFICANT CHANGE UP (ref 2.5–4.5)
PLATELET # BLD AUTO: 189 K/UL — SIGNIFICANT CHANGE UP (ref 150–400)
PMV BLD: 11.1 FL — SIGNIFICANT CHANGE UP (ref 7–13)
POTASSIUM SERPL-MCNC: 3.8 MMOL/L — SIGNIFICANT CHANGE UP (ref 3.5–5.3)
POTASSIUM SERPL-SCNC: 3.8 MMOL/L — SIGNIFICANT CHANGE UP (ref 3.5–5.3)
PROT SERPL-MCNC: 6.2 G/DL — SIGNIFICANT CHANGE UP (ref 6–8.3)
PROTHROM AB SERPL-ACNC: 18 SEC — HIGH (ref 9.8–13.1)
RBC # BLD: 3.36 M/UL — LOW (ref 4.2–5.8)
RBC # FLD: 17.6 % — HIGH (ref 10.3–14.5)
SODIUM SERPL-SCNC: 138 MMOL/L — SIGNIFICANT CHANGE UP (ref 135–145)
SPECIMEN SOURCE: SIGNIFICANT CHANGE UP
WBC # BLD: 11.64 K/UL — HIGH (ref 3.8–10.5)
WBC # FLD AUTO: 11.64 K/UL — HIGH (ref 3.8–10.5)

## 2018-01-09 PROCEDURE — 12345: CPT | Mod: GC,NC

## 2018-01-09 PROCEDURE — 99223 1ST HOSP IP/OBS HIGH 75: CPT | Mod: GC

## 2018-01-09 RX ORDER — SODIUM CHLORIDE 9 MG/ML
1000 INJECTION INTRAMUSCULAR; INTRAVENOUS; SUBCUTANEOUS
Qty: 0 | Refills: 0 | Status: DISCONTINUED | OUTPATIENT
Start: 2018-01-09 | End: 2018-01-10

## 2018-01-09 RX ORDER — SODIUM CHLORIDE 9 MG/ML
500 INJECTION INTRAMUSCULAR; INTRAVENOUS; SUBCUTANEOUS ONCE
Qty: 0 | Refills: 0 | Status: COMPLETED | OUTPATIENT
Start: 2018-01-09 | End: 2018-01-09

## 2018-01-09 RX ADMIN — HEPARIN SODIUM 5000 UNIT(S): 5000 INJECTION INTRAVENOUS; SUBCUTANEOUS at 15:09

## 2018-01-09 RX ADMIN — Medication 250 MILLIGRAM(S): at 06:34

## 2018-01-09 RX ADMIN — Medication 3 MILLILITER(S): at 22:24

## 2018-01-09 RX ADMIN — SENNA PLUS 10 MILLILITER(S): 8.6 TABLET ORAL at 22:43

## 2018-01-09 RX ADMIN — Medication 3 MILLILITER(S): at 11:07

## 2018-01-09 RX ADMIN — FINASTERIDE 5 MILLIGRAM(S): 5 TABLET, FILM COATED ORAL at 12:09

## 2018-01-09 RX ADMIN — DORZOLAMIDE HYDROCHLORIDE TIMOLOL MALEATE 1 DROP(S): 20; 5 SOLUTION/ DROPS OPHTHALMIC at 17:21

## 2018-01-09 RX ADMIN — Medication 0.25 MILLIGRAM(S): at 11:22

## 2018-01-09 RX ADMIN — Medication 1 MILLIGRAM(S): at 00:00

## 2018-01-09 RX ADMIN — CEFEPIME 100 MILLIGRAM(S): 1 INJECTION, POWDER, FOR SOLUTION INTRAMUSCULAR; INTRAVENOUS at 17:20

## 2018-01-09 RX ADMIN — Medication 1 DROP(S): at 17:21

## 2018-01-09 RX ADMIN — Medication 1 MILLIGRAM(S): at 22:37

## 2018-01-09 RX ADMIN — CARBIDOPA AND LEVODOPA 1.5 TABLET(S): 25; 100 TABLET ORAL at 15:11

## 2018-01-09 RX ADMIN — HEPARIN SODIUM 5000 UNIT(S): 5000 INJECTION INTRAVENOUS; SUBCUTANEOUS at 05:54

## 2018-01-09 RX ADMIN — SODIUM CHLORIDE 75 MILLILITER(S): 9 INJECTION INTRAMUSCULAR; INTRAVENOUS; SUBCUTANEOUS at 15:11

## 2018-01-09 RX ADMIN — DORZOLAMIDE HYDROCHLORIDE TIMOLOL MALEATE 1 DROP(S): 20; 5 SOLUTION/ DROPS OPHTHALMIC at 05:55

## 2018-01-09 RX ADMIN — CARBIDOPA AND LEVODOPA 1.5 TABLET(S): 25; 100 TABLET ORAL at 05:54

## 2018-01-09 RX ADMIN — PANTOPRAZOLE SODIUM 40 MILLIGRAM(S): 20 TABLET, DELAYED RELEASE ORAL at 06:04

## 2018-01-09 RX ADMIN — Medication 200 MILLIGRAM(S): at 19:23

## 2018-01-09 RX ADMIN — Medication 3 MILLILITER(S): at 16:02

## 2018-01-09 RX ADMIN — ATORVASTATIN CALCIUM 80 MILLIGRAM(S): 80 TABLET, FILM COATED ORAL at 22:36

## 2018-01-09 RX ADMIN — CARBIDOPA AND LEVODOPA 1.5 TABLET(S): 25; 100 TABLET ORAL at 22:36

## 2018-01-09 RX ADMIN — Medication 1 APPLICATION(S): at 17:29

## 2018-01-09 RX ADMIN — Medication 3 MILLILITER(S): at 05:06

## 2018-01-09 RX ADMIN — Medication 200 MILLIGRAM(S): at 22:37

## 2018-01-09 RX ADMIN — Medication 1 DROP(S): at 05:54

## 2018-01-09 RX ADMIN — LATANOPROST 1 DROP(S): 0.05 SOLUTION/ DROPS OPHTHALMIC; TOPICAL at 22:38

## 2018-01-09 RX ADMIN — Medication 500 MILLIGRAM(S): at 12:09

## 2018-01-09 RX ADMIN — CEFEPIME 100 MILLIGRAM(S): 1 INJECTION, POWDER, FOR SOLUTION INTRAMUSCULAR; INTRAVENOUS at 05:52

## 2018-01-09 RX ADMIN — HEPARIN SODIUM 5000 UNIT(S): 5000 INJECTION INTRAVENOUS; SUBCUTANEOUS at 22:42

## 2018-01-09 RX ADMIN — Medication 1 TABLET(S): at 12:09

## 2018-01-09 RX ADMIN — SODIUM CHLORIDE 75 MILLILITER(S): 9 INJECTION INTRAMUSCULAR; INTRAVENOUS; SUBCUTANEOUS at 20:35

## 2018-01-09 RX ADMIN — Medication 200 MILLIGRAM(S): at 05:57

## 2018-01-09 RX ADMIN — Medication 0.25 MILLIGRAM(S): at 22:25

## 2018-01-09 RX ADMIN — SODIUM CHLORIDE 500 MILLILITER(S): 9 INJECTION INTRAMUSCULAR; INTRAVENOUS; SUBCUTANEOUS at 12:02

## 2018-01-09 RX ADMIN — ZINC OXIDE 1 APPLICATION(S): 200 OINTMENT TOPICAL at 15:10

## 2018-01-09 RX ADMIN — Medication 81 MILLIGRAM(S): at 12:09

## 2018-01-09 NOTE — PHYSICAL THERAPY INITIAL EVALUATION ADULT - ADDITIONAL COMMENTS
Pt. wife reports pt. has PT/OT/speech at NH; pt. able to  parallel bars with assist at NH. Pt left side-lying on Right side in NAD, all lines/devices intact, call bell in reach. Wife present, RN aware.

## 2018-01-09 NOTE — PHYSICAL THERAPY INITIAL EVALUATION ADULT - GENERAL OBSERVATIONS, REHAB EVAL
Consult received, chart reviewed. Patient received side-lying on RIight, NAD, +nebulizer, +lubin, +Right foot dressing c/d/i, +IV, wife present. Patient agreed to EVALUATION from Physical Therapist.

## 2018-01-09 NOTE — PROGRESS NOTE ADULT - PROBLEM SELECTOR PLAN 3
CXR showing left pleural effusion w/o consolidation or concern for pneumonia  - Per pt's family, pt had been tolerating feeds by mouth at nursing home  - NPO for now, aspiration precautions  - Speech and swallow evaluation

## 2018-01-09 NOTE — DISCHARGE NOTE ADULT - COMMUNITY RESOURCES
[facility] [address] [place] [address]  [phone]   [facility] [phone]

## 2018-01-09 NOTE — DISCHARGE NOTE ADULT - CONDITIONS AT DISCHARGE
Patient is ready for discharge back to [facility]; he is complete care for all of his needs; has the Chronic Alexander to side drainage; His Skin has IAD to perineum and Nakia Cream is applied.  Right Heel has an Unstageable  PU,  dry and intact with dry gauze and Abdominal Pad and wrapped with Kerlix .  All Summaries are transferred with him.

## 2018-01-09 NOTE — DISCHARGE NOTE ADULT - CARE PROVIDER_API CALL
Bjorn Banerjee (ANTHONY), Internal Medicine  [address] [place] [address]  Phone: [phone]  Fax: [phone]

## 2018-01-09 NOTE — DISCHARGE NOTE ADULT - PATIENT PORTAL LINK FT
“You can access the Follow[facility] Patient Portal, offered by [facility], by registering with the following website: [email or web address]”

## 2018-01-09 NOTE — PROGRESS NOTE ADULT - PROBLEM SELECTOR PLAN 6
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

## 2018-01-09 NOTE — DISCHARGE NOTE ADULT - MEDICATION SUMMARY - MEDICATIONS TO TAKE
I will START or STAY ON the medications listed below when I get home from the hospital:    finasteride 5 mg oral tablet  -- 1 tab(s) by gastrostomy tube once a day  -- Indication: For Benign prostatic hyperplasia, unspecified whether lower urinary tract symptoms present    budesonide 0.25 mg/2 mL inhalation suspension  -- 2 milliliter(s) inhaled 2 times a day  -- Indication: For Asthma    aspirin 81 mg oral tablet, chewable  -- 1 tab(s) by gastrostomy tube once a day  -- Indication: For CAD (coronary artery disease)    Tylenol 325 mg oral tablet  -- 2 tab(s) by mouth every 4 hours, As Needed  -- Indication: For Fever, as needed    tamsulosin 0.4 mg oral capsule  -- 1 cap(s) by gastrostomy tube once a day  -- Indication: For Benign prostatic hyperplasia, unspecified whether lower urinary tract symptoms present    Reglan 5 mg oral tablet  -- 1 tab(s) by gastrostomy tube 4 times a day (before meals and at bedtime), As Needed  -- Indication: For Hiccups    atorvastatin 80 mg oral tablet  -- 1 tab(s) by gastrostomy tube once a day (at bedtime)  -- Indication: For Hyperlipidemia, CAD    carbidopa-levodopa 25 mg-100 mg oral tablet  -- 1.5 tab(s) by gastrostomy tube 3 times a day  -- Indication: For Parkinson disease    DuoNeb 0.5 mg-2.5 mg/3 mL inhalation solution  -- 3 milliliter(s) inhaled 4 times a day, As Needed  -- Indication: For Asthma    Santyl 250 units/g topical ointment  -- Apply on skin to affected area once a day  -- Indication: For Wound care    vitamin A & D topical ointment  -- Apply on skin to affected area   -- Indication: For Wound care    zinc oxide 20% topical ointment  -- Indication: For Wound care    guaiFENesin 200 mg/5 mL oral liquid  -- orally 2 times a day  -- Indication: For Cough    Colace 10 mg/mL oral liquid  -- 20 milliliter(s) by gastrostomy tube once a day (at bedtime)  -- Indication: For Constipation    Senna 8.8 mg/5 mL oral syrup  -- 10 milliliter(s) by gastrostomy tube once a day (at bedtime)  -- Indication: For Constipation    dorzolamide-timolol 2.23%-0.68% ophthalmic solution  -- 1 drop(s) in the left eye 2 times a day  -- Indication: For Glaucoma    Lotemax 0.5% ophthalmic suspension  -- 1 drop(s) in the right eye every 48 hours  -- Indication: For Glaucoma    pilocarpine 4% ophthalmic solution  -- 1 drop(s) in the left eye 2 times a day  -- Indication: For Glaucoma    Travatan Z 0.004% ophthalmic solution  -- 1 drop(s) in the left eye once a day (in the evening)  -- Indication: For Glaucoma    omeprazole 20 mg oral delayed release capsule  -- 1 cap(s) by gastrostomy tube once a day  -- Indication: For GERD    Multiple Vitamins oral tablet  -- 1 tab(s) by gastrostomy tube once a day  -- Indication: For Vitamin Supplement    Vitamin C 500 mg oral tablet  -- 1 tab(s) by gastrostomy tube once a day  -- Indication: For Vitamin Supplement

## 2018-01-09 NOTE — PROGRESS NOTE ADULT - PROBLEM SELECTOR PLAN 4
Patient continue to be lethargic.   - Continue to monitor and treat underlying infection  - c/w Parkinson's meds, if no improvement would check [facility] for further eval

## 2018-01-09 NOTE — DISCHARGE NOTE ADULT - ADDITIONAL INSTRUCTIONS
Please follow the following instruction for wound care:  Topical Recommendations:  Sween 24 to bilateral upper and lower extremities daily.    Peritubular skin of PEG- Cleanse q shift with NS, apply liquid barrier film beneath luba disc.  If redness noted under luba disc bumper apply thin foam  dressing without border (Mepilex Lite)- cut to mid dressing with a 'Y' shape. Change every shift.  Secondary securement to abdomen taping in 'H' fashion with Steri-strips. Change every 5 days.    Sacrococcygeum extending to bilateral buttocks- mixed etiology evolving deep tissue pressure injury and incontinence associated dermatitis- Gently cleanse with NS. Pat dry. Apply TRIAD moisture barrier paste every shift and prn with episodes of incontinence. Note: while cleansing after episodes of incontinence gently remove soiled layer of TRIAD (do not aggressively remove remainder of paste) reapply secondary layer.    Right heel- Cleanse wound and periwound skin with NS. Pat dry. Paint with betadine, pat dry. Apply abdominal pad secure with Kerlix wrap and paper tape. Change daily.    Continue low air loss bed therapy, continue heel elevation with Z-flex fluidized positioning boots, continue to turn & reposition q2h with Z-max fluidized positioning device, soft pillow between bony prominences, continue moisture management with barrier creams & single breathable pad, continue measures to decrease friction/shear/pressure. Continue with nutritional support as per dietary/orders.    Please continue to take flagyl 500mg 1 tablet three times a day thru PEG tube for 3 days.

## 2018-01-09 NOTE — PROGRESS NOTE ADULT - PROBLEM SELECTOR PLAN 5
Baseline creatinine of 0.4 from last admission in October 2017  - Likely 2/2 decreased PO intake per pt's family, also component of sepsis 2/2 UTI  - S/p 1L bolus NS in ED, c/w IVF 75cc/hr for now, stop after 12h  - Trend creatinine daily  - Avoid nephrotoxic agents

## 2018-01-09 NOTE — PROGRESS NOTE ADULT - PROBLEM SELECTOR PLAN 2
Initial urine culture from 1/5 at nursing home growing >100k CFU Proteus mirabilis, sensitive to most IV antibiotics  - C/w Cefepime (day 2), plan for 7-day course given male complicated UTI  - C/w lubin  - F/u blood and urine cultures  - Trend WBC daily for resolution of leukocytosis

## 2018-01-09 NOTE — SWALLOW BEDSIDE ASSESSMENT ADULT - COMMENTS
Wife present at bedside, who provided case history information.  Patient placed on PEG feeding in 7/2017 following a CVA; patient then admitted to hospital on 10/2017 with aspiration pneumonia.  Prior to this admission patient received nutrition/hydration via PEG with supplemental PO feedings of puree and thickened fluids (possibly nectar thick, as per wife report).  Soft solid diet was attempted however patient reportedly had coughing episode and was consequently put back on puree.  Patient received today with noted lethargy.  Case discussed with wife and medical team.  Based on dysphagia history, MBS is recommended to objectively assess swallow function and to determine if patient tolerating PO feedings.  MBS recommended when level of alertness improves.

## 2018-01-09 NOTE — PROGRESS NOTE ADULT - SUBJECTIVE AND OBJECTIVE BOX
[facility] Progress Note- PGY1.    ===============================================================  CONTACT INFO   Aryan Perez M.D., PGY-1  Pager: NS- [phone], [facility]- [phone]    Mon-Fri: pager covered by day team 7am-7pm;   ***Academic conferences M-F 12pm-1pm- page ONLY if URGENT or if Consultant  Sat/Gaming Cross Coverage 12pm-7pm: NS- page [phone] for Team1-4, [facility]- pager forwarded to covering Resident  For Night coverage 7pm-7am:  [id number](NS)/[id number]([facility]) Team1-3, [phone] (NS)/[id number] ([facility]) Team4 & Care Model,  ===============================================================  CC: Patient is a 82y old  Male who presents with a chief complaint of Fever, UTI (2018 20:08)      HPI:  Pt is an 81yo with history of CVA in 2017 with R-sided residual deficits s/p trach and PEG (trach reversed 2 weeks PTA), Parkinson's disease and CAD s/p CABG presenting from [facility] with fever, confusion and lethargy. Pt nonverbal and history obtained from pt's wife and daughter at bedside.    Per family, pt had been in his usual state of health until 4 days ago when he was noted to have a low grade fever at his nursing home. Pt has a chronic Alexander and his urine was also noted to be dark red/brown in color. Pt appeared to be altered, more lethargic and less responsive than at baseline. The next day a urine culture was sent, which ultimately resulted today growing Proteus mirabilis resistant to fluoroquinolones, ampicillin, Macrobid and Bactrim. Pt continued to have fevers up to 102 over the weekend and was brought into the ED today after the urine culture finalized.    Pt also was hospitalized at [facility] in 2017 for aspiration pneumonia and UTI, with urine culture growing Providencia. Pt was d/nicanor to NH where he slowly improved his functional status, being able to participate in physical therapy and having his trach removed 2 weeks ago. At baseline, pt is able to converse with some dysarthria but is able to follow commands. Pt received tube feeds through a PEG tube, however also does have limited oral intake with a pureed diet with thickened liquids.     ED vitals: Temp 98.7 HR 80 /50 RR 20 O2 100% on room air. Pt was given Tylenol 1000mg x1, Cefepime x1, vancomycin x1 and NS 1L bolus. CXR was performed with prelim read finding of LLL opacity which could represent pleural effusion or atelectasis, unable to r/o superimposed infection. UA with brown urine, large blood, large LE, 25-50 WBCs. Blood cultures x2 and urine culture sent. Pt admitted to the medicine service for further workup. (2018 20:08)      Seen and examine patient at bedside. Patient reports feeling . Denied fever, chill, n/v/d, CP, SOB, or abdominal pain.       Allergies    penicillin (Hives)    Intolerances    	    PAST MEDICAL & SURGICAL HISTORY:  Alexander catheter in place  Oropharyngeal dysphagia  Glaucoma  CAD (coronary artery disease)  Parkinson disease  Tracheostomy in place: removed 2017  Hypertension  CVA (cerebral vascular accident)  H/O tracheostomy  S/P percutaneous endoscopic gastrostomy (PEG) tube placement      FAMILY HISTORY:  No pertinent family history in first degree relatives      SOCIAL HISTORY:  Tobacco: denies  EtOH: denies  Illicit drugs: denies  Lives with    MEDICATIONS:  aspirin  chewable 81 milliGRAM(s) Oral daily  doxazosin 1 milliGRAM(s) Oral at bedtime  heparin  Injectable 5000 Unit(s) SubCutaneous every 8 hours    cefepime  IVPB 2000 milliGRAM(s) IV Intermittent every 12 hours  vancomycin  IVPB 1000 milliGRAM(s) IV Intermittent every 12 hours    ALBUTerol/ipratropium for Nebulization 3 milliLiter(s) Nebulizer every 6 hours  buDESOnide   0.25 milliGRAM(s) Respule 0.25 milliGRAM(s) Inhalation two times a day  guaiFENesin    Syrup 200 milliGRAM(s) Oral two times a day    acetaminophen    Suspension. 650 milliGRAM(s) Oral every 6 hours PRN  carbidopa/levodopa  25/100 1.5 Tablet(s) Oral three times a day    docusate sodium Liquid 200 milliGRAM(s) Oral at bedtime  metoclopramide   Syrup 5 milliGRAM(s) Oral every 6 hours PRN  pantoprazole   Suspension 40 milliGRAM(s) Oral before breakfast  senna Syrup 10 milliLiter(s) Oral at bedtime    atorvastatin 80 milliGRAM(s) Oral at bedtime  finasteride 5 milliGRAM(s) Oral daily    ascorbic acid 500 milliGRAM(s) Oral daily  collagenase Ointment 1 Application(s) Topical daily  dorzolamide 2%/timolol 0.5% Ophthalmic Solution 1 Drop(s) Left EYE two times a day  latanoprost 0.005% Ophthalmic Solution 1 Drop(s) Left EYE at bedtime  multivitamin 1 Tablet(s) Oral daily  pilocarpine 4% Solution 1 Drop(s) Left EYE two times a day  sodium chloride 0.9%. 1000 milliLiter(s) IV Continuous <Continuous>  vitamin A &amp; D Ointment 1 Application(s) Topical daily  zinc oxide 20% Ointment 1 Application(s) Topical daily      PHYSICAL EXAM:  T(C): 37.5 (18 @ 05:51), Max: 38.1 (18 @ 16:52)  HR: 71 (18 @ 05:51) (65 - 86)  BP: 94/52 (18 @ 05:51) (82/51 - 108/53)  RR: 18 (18 @ 05:51) (18 - 20)  SpO2: 95% (18 @ 05:51) (95% - 100%)  Wt(kg): --  Daily Height in cm: 170.18 (2018 21:19)    Daily Weight in k.7 (2018 21:19)  I&O's Summary    2018 07:01  -  2018 07:00  --------------------------------------------------------  IN: 1280 mL / OUT: 900 mL / NET: 380 mL        TELEMETRY:     Daily Height in cm: 170.18 (2018 21:19)    Daily Weight in k.7 (2018 21:19)   Appearance: NAD, well-developed	  HEENT:   Normal oral mucosa, PERRL, EOMI	  Neck: No JVD, no LAD, supple, trachea in midline  Cardiovascular: Normal S1 S2, No JVD, No murmurs  Respiratory: Lungs clear to auscultation, no wheezing, rhonchi  Gastrointestinal:  Soft, Non-tender, nondistended, + BS  Skin: No rashes, No ecchymoses, No cyanosis	  MSK/Extremities: Normal range of motion, 5/5 Muscle strength bilaterally. No clubbing, cyanosis or edema  Vascular: Peripheral pulses palpable 2+ bilaterally  Neurologic: Non-focal, CN II-XII intact  Psychiatry: A & O x 3, Mood & affect appropriate    LABS:	 	                        9.4    11.64 )-----------( 189      ( 2018 05:30 )             28.4         135  |  99  |  61<H>  ----------------------------<  106<H>  4.3   |  22  |  0.77    Ca    7.9<L>      2018 16:52  Phos  3.8       Mg     2.1         TPro  6.3  /  Alb  2.1<L>  /  TBili  0.5  /  DBili  x   /  AST  41<H>  /  ALT  28  /  AlkPhos  114        proBNP:   Lipid Profile:   HgA1c:   TSH:   FS: CAPILLARY BLOOD GLUCOSE        BCX/UCX:     CARDIAC MARKERS:       COAGS:  INR: 1.55 (18 @ 05:30)  INR: 1.42 (18 @ 16:52)    	    ECG:  	  RADIOLOGY:    Imaging Personally Reviewed:  [ ] YES  [ ] NO  Consultant(s) Notes Reviewed:  [X] YES  [ ] NO  Care Discussed with Consultants/Other Providers [ ] YES  [ ] NO [facility] Progress Note- PGY1.    ===============================================================  CONTACT INFO   Aryan Perez M.D., PGY-1  Pager: NS- [phone], [facility]- [phone]    Mon-Fri: pager covered by day team 7am-7pm;   ***Academic conferences M-F 12pm-1pm- page ONLY if URGENT or if Consultant  Sat/[place]am[place] Cross Coverage 12pm-7pm: NS- page [phone] for Team1-4, [facility]- pager forwarded to covering Resident  For Night coverage 7pm-7am:  [phone](NS)/[id number]([facility]) Team1-3, [phone] (NS)/[phone] ([facility]) Team4 & Care Model,  ===============================================================  CC: Patient is a 82y old  Male who presents with a chief complaint of Fever, UTI (2018 20:08)      HPI:  Pt is an 81yo with history of CVA in 2017 with R-sided residual deficits s/p trach and PEG (trach reversed 2 weeks PTA), Parkinson's disease and CAD s/p CABG presenting from [facility] with fever, confusion and lethargy. Pt nonverbal and history obtained from pt's wife and daughter at bedside.  Per family, pt had been in his usual state of health until 4 days ago when he was noted to have a low grade fever at his nursing home. Pt has a chronic Lubin and his urine was also noted to be dark red/brown in color. Pt appeared to be altered, more lethargic and less responsive than at baseline. The next day a urine culture was sent, which ultimately resulted today growing Proteus mirabilis resistant to fluoroquinolones, ampicillin, Macrobid and Bactrim. Pt continued to have fevers up to 102 over the weekend and was brought into the ED today after the urine culture finalized.  Pt also was hospitalized at [facility] in 2017 for aspiration pneumonia and UTI, with urine culture growing Providencia. Pt was d/nicanor to NH where he slowly improved his functional status, being able to participate in physical therapy and having his trach removed 2 weeks ago. At baseline, pt is able to converse with some dysarthria but is able to follow commands. Pt received tube feeds through a PEG tube, however also does have limited oral intake with a pureed diet with thickened liquids.   ED vitals: Temp 98.7 HR 80 /50 RR 20 O2 100% on room air. Pt was given Tylenol 1000mg x1, Cefepime x1, vancomycin x1 and NS 1L bolus. CXR was performed with prelim read finding of LLL opacity which could represent pleural effusion or atelectasis, unable to r/o superimposed infection. UA with brown urine, large blood, large LE, 25-50 WBCs. Blood cultures x2 and urine culture sent. Pt admitted to the medicine service for further workup. (2018 20:08)    Seen and examine patient at bedside. Patient was lethargic and unable to participate in interview.       Allergies  penicillin (Hives)  Intolerances    	  PAST MEDICAL & SURGICAL HISTORY:  Lubin catheter in place  Oropharyngeal dysphagia  Glaucoma  CAD (coronary artery disease)  Parkinson disease  Tracheostomy in place: removed 2017  Hypertension  CVA (cerebral vascular accident)  H/O tracheostomy  S/P percutaneous endoscopic gastrostomy (PEG) tube placement      FAMILY HISTORY:  No pertinent family history in first degree relatives        MEDICATIONS:  aspirin  chewable 81 milliGRAM(s) Oral daily  doxazosin 1 milliGRAM(s) Oral at bedtime  heparin  Injectable 5000 Unit(s) SubCutaneous every 8 hours  cefepime  IVPB 2000 milliGRAM(s) IV Intermittent every 12 hours  vancomycin  IVPB 1000 milliGRAM(s) IV Intermittent every 12 hours  ALBUTerol/ipratropium for Nebulization 3 milliLiter(s) Nebulizer every 6 hours  buDESOnide   0.25 milliGRAM(s) Respule 0.25 milliGRAM(s) Inhalation two times a day  guaiFENesin    Syrup 200 milliGRAM(s) Oral two times a day  acetaminophen    Suspension. 650 milliGRAM(s) Oral every 6 hours PRN  carbidopa/levodopa  25/100 1.5 Tablet(s) Oral three times a day  docusate sodium Liquid 200 milliGRAM(s) Oral at bedtime  metoclopramide   Syrup 5 milliGRAM(s) Oral every 6 hours PRN  pantoprazole   Suspension 40 milliGRAM(s) Oral before breakfast  senna Syrup 10 milliLiter(s) Oral at bedtime  atorvastatin 80 milliGRAM(s) Oral at bedtime  finasteride 5 milliGRAM(s) Oral daily  ascorbic acid 500 milliGRAM(s) Oral daily  collagenase Ointment 1 Application(s) Topical daily  dorzolamide 2%/timolol 0.5% Ophthalmic Solution 1 Drop(s) Left EYE two times a day  latanoprost 0.005% Ophthalmic Solution 1 Drop(s) Left EYE at bedtime  multivitamin 1 Tablet(s) Oral daily  pilocarpine 4% Solution 1 Drop(s) Left EYE two times a day  sodium chloride 0.9%. 1000 milliLiter(s) IV Continuous <Continuous>  vitamin A &amp; D Ointment 1 Application(s) Topical daily  zinc oxide 20% Ointment 1 Application(s) Topical daily      PHYSICAL EXAM:  T(C): 37.5 (18 @ 05:51), Max: 38.1 (18 @ 16:52)  HR: 71 (18 @ 05:51) (65 - 86)  BP: 94/52 (18 @ 05:51) (82/51 - 108/53)  RR: 18 (18 @ 05:51) (18 - 20)  SpO2: 95% (18 @ 05:51) (95% - 100%)  Wt(kg): --  Daily Height in cm: 170.18 (2018 21:19)    Daily Weight in k.7 (2018 21:19)  I&O's Summary    2018 07:01  -  2018 07:00  --------------------------------------------------------  IN: 1280 mL / OUT: 900 mL / NET: 380 mL      Daily Height in cm: 170.18 (2018 21:19)    Daily Weight in k.7 (2018 21:19)   Appearance: NAD, lethargic, open eyes for questions. 	  HEENT:   NC/AT  Neck: supple, bandage cover on previous trach site  Cardiovascular: Normal S1 S2, No JVD, No murmurs  Respiratory: Lungs clear to auscultation, no wheezing, rhonchi, decrease breath sound on left lower lung  Gastrointestinal:  Soft, nondistended, + BS  : lubin intact. draining orange urine.   Skin: No rashes, No ecchymoses, No cyanosis	  MSK/Extremities: unable to assess  Vascular: Peripheral pulses palpable 2+ bilaterally  Neurologic: unable to assess  Psychiatry: unable to assess    LABS:	 	                        9.4    11.64 )-----------( 189      ( 2018 05:30 )             28.4         135  |  99  |  61<H>  ----------------------------<  106<H>  4.3   |  22  |  0.77    Ca    7.9<L>      2018 16:52  Phos  3.8       Mg     2.1         TPro  6.3  /  Alb  2.1<L>  /  TBili  0.5  /  DBili  x   /  AST  41<H>  /  ALT  28  /  AlkPhos  114        BCX/UCX:   Urinalysis (18 @ 18:39)    Color: BROWN    Urine Appearance: TURBID    Glucose: NEGATIVE: Glucose is reported in mg/dL.  Negative is defined as < 0.03 mg/dL.    Bilirubin: NEGATIVE    Ketone - Urine: NEGATIVE    Specific Gravity: 1.019    Blood: MODERATE    pH - Urine: 8.5    Protein, Urine: >600 mg/dL    Urobilinogen: NORMAL mg/dL    Nitrite: NEGATIVE    Leukocyte Esterase Concentration: LARGE    Red Blood Cell - Urine: 25-50    White Blood Cell - Urine: 25-50    Mucus: FEW    Bacteria: FEW    Triphosphate Crystal: MANY    BCx & urine Cx pending    Previous urine cx from 18 from NH:  Proteus mirabilis >100,000 CFU    Resistant to ampicillin, cipro, Levaquin, Macrobid, Bactrim  Sensitive to amikacin, Augmentin, Unasyn, cephalosporins, carbapenems, gentamicin, Zosyn, tobramycin    COAGS:  INR: 1.55 (18 @ 05:30)  INR: 1.42 (18 @ 16:52)      RADIOLOGY:  < from: Xray Chest 1 View AP- PORTABLE-Urgent (18 @ 17:10) >  IMPRESSION:   Left lower lung opacity consistent with pleural effusion.      < end of copied text >      Imaging Personally Reviewed:  [ ] YES  [ ] NO  Consultant(s) Notes Reviewed:  [X] YES  [ ] NO  Care Discussed with Consultants/Other Providers [ ] YES  [ ] NO

## 2018-01-09 NOTE — PHYSICAL THERAPY INITIAL EVALUATION ADULT - PLANNED THERAPY INTERVENTIONS, PT EVAL
gait training/transfer training/balance training/postural re-education/bed mobility training/ROM/strengthening

## 2018-01-09 NOTE — PROGRESS NOTE ADULT - PROBLEM SELECTOR PLAN 1
-T100.4 documented at nursing home, leukocytosis of 15, likely 2/2 UTI  - S/p 1L NS, continue NS @75cc/hr for now  - CXR showed left pleural effusion, no consolidation or concern for pneumonia.  - F/u blood and urine cultures  - Will C/w Cefepime and d/c vanc

## 2018-01-09 NOTE — PHYSICAL THERAPY INITIAL EVALUATION ADULT - PASSIVE RANGE OF MOTION EXAMINATION, REHAB EVAL
Bilateral UE and LE ~1/4 range; limited by stiffness. Bilateral UE and LE contracted at hip, knee, elbow

## 2018-01-09 NOTE — PHYSICAL THERAPY INITIAL EVALUATION ADULT - LEVEL OF INDEPENDENCE: SIT/STAND, REHAB EVAL
unable to perform/At this time as pt. is unable to maintain full transfer to sit at edge of bed. Will progress to assess

## 2018-01-09 NOTE — DISCHARGE NOTE ADULT - HOSPITAL COURSE
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 Pt nonverbal and history obtained from pt's wife and daughter at bedside.  Per family, pt had been in his usual state of health until 4 days ago when he was noted to have a low grade fever at his nursing home. Pt has a chronic Alexander and his urine was also noted to be dark red/brown in color. Pt appeared to be altered, more lethargic and less responsive than at baseline. The next day a urine culture was sent, which ultimately resulted today growing Proteus mirabilis resistant to fluoroquinolones, ampicillin, Macrobid and Bactrim. Pt continued to have fevers up to 102 over the weekend and was brought into the ED today after the urine culture finalized.  Pt also was hospitalized at [facility] in October 2017 for aspiration pneumonia and UTI, with urine culture growing Providencia. Pt was d/nicanor to NH where he slowly improved his functional status, being able to participate in physical therapy and having his trach removed 2 weeks ago. At baseline, pt is able to converse with some dysarthria but is able to follow commands. Pt received tube feeds through a PEG tube, however also does have limited oral intake with a pureed diet with thickened liquids.   ED vitals: Temp 98.7 HR 80 /50 RR 20 O2 100% on room air. Pt was given Tylenol 1000mg x1, Cefepime x1, vancomycin x1 and NS 1L bolus. CXR was performed with prelim read finding of LLL opacity which could represent pleural effusion or atelectasis, unable to r/o superimposed infection. UA with brown urine, large blood, large LE, 25-50 WBCs. Blood cultures x2 and urine culture sent. Pt admitted to the medicine service for further workup. (08 Jan 2018 20:08)    Hospital Course: History of Present Illness:  Pt is an 81yo with history of CVA in July 2017 with R-sided residual deficits s/p trach and PEG (trach reversed 2 weeks PTA), Parkinson's disease and CAD s/p CABG presenting from [facility] with fever, confusion and lethargy. Pt nonverbal and history obtained from pt's wife and daughter at bedside.  Per family, pt had been in his usual state of health until 4 days ago when he was noted to have a low grade fever at his nursing home. Pt has a chronic Alexander and his urine was also noted to be dark red/brown in color. Pt appeared to be altered, more lethargic and less responsive than at baseline. The next day a urine culture was sent, which ultimately resulted today growing Proteus mirabilis resistant to fluoroquinolones, ampicillin, Macrobid and Bactrim. Pt continued to have fevers up to 102 over the weekend and was brought into the ED today after the urine culture finalized.  Pt also was hospitalized at [facility] in October 2017 for aspiration pneumonia and UTI, with urine culture growing Providencia. Pt was d/nicanor to NH where he slowly improved his functional status, being able to participate in physical therapy and having his trach removed 2 weeks ago. At baseline, pt is able to converse with some dysarthria but is able to follow commands. Pt received tube feeds through a PEG tube, however also does have limited oral intake with a pureed diet with thickened liquids.   ED vitals: Temp 98.7 HR 80 /50 RR 20 O2 100% on room air. Pt was given Tylenol 1000mg x1, Cefepime x1, vancomycin x1 and NS 1L bolus. CXR was performed with prelim read finding of LLL opacity which could represent pleural effusion or atelectasis, unable to r/o superimposed infection. UA with brown urine, large blood, large LE, 25-50 WBCs. Blood cultures x2 and urine culture sent. Pt admitted to the medicine service for further workup. (08 Jan 2018 20:08)    Hospital Course:  Patient was started on IV cefepime based on his urine culture result from nursing home. He was NPO and was seen by [facility]. He had MBS study as recommended by the [facility] and he failed the study and had high risk for aspiration. He was continued on his continuous tube feed. He was also found to have bacteremia which grew proteus likely from his UTI. On day 3, patient had low grade fever of 100.9F and repeat blood culture (X5) and urine culture was sent. ID was consulted and recommended IV ceftriaxone in place of cefepime. Repeat culture grew coag negative gram positive cocci in 1 of 5 bottle likely contaminants. His mentation improved and has been afebrile. He was found to have hypernatremia and was started on 1/2 NS fluid. History of Present Illness:  Pt is an 83yo with history of CVA in July 2017 with R-sided residual deficits s/p trach and PEG (trach reversed 2 weeks PTA), Parkinson's disease and CAD s/p CABG presenting from [facility] with fever, confusion and lethargy. Pt nonverbal and history obtained from pt's wife and daughter at bedside.  Per family, pt had been in his usual state of health until 4 days ago when he was noted to have a low grade fever at his nursing home. Pt has a chronic Alexander and his urine was also noted to be dark red/brown in color. Pt appeared to be altered, more lethargic and less responsive than at baseline. The next day a urine culture was sent, which ultimately resulted today growing Proteus mirabilis resistant to fluoroquinolones, ampicillin, Macrobid and Bactrim. Pt continued to have fevers up to 102 over the weekend and was brought into the ED today after the urine culture finalized.  Pt also was hospitalized at [facility] in October 2017 for aspiration pneumonia and UTI, with urine culture growing Providencia. Pt was d/nicanor to NH where he slowly improved his functional status, being able to participate in physical therapy and having his trach removed 2 weeks ago. At baseline, pt is able to converse with some dysarthria but is able to follow commands. Pt received tube feeds through a PEG tube, however also does have limited oral intake with a pureed diet with thickened liquids.   ED vitals: Temp 98.7 HR 80 /50 RR 20 O2 100% on room air. Pt was given Tylenol 1000mg x1, Cefepime x1, vancomycin x1 and NS 1L bolus. CXR was performed with prelim read finding of LLL opacity which could represent pleural effusion or atelectasis, unable to r/o superimposed infection. UA with brown urine, large blood, large LE, 25-50 WBCs. Blood cultures x2 and urine culture sent. Pt admitted to the medicine service for further workup. (08 Jan 2018 20:08)    Hospital Course:  Patient was started on IV cefepime based on his urine culture result from nursing home. He was NPO and was seen by [facility]. He had MBS study as recommended by the [facility] and he failed the study and had high risk for aspiration. He was continued on his continuous tube feed. He was also found to have bacteremia which grew proteus likely from his UTI. On day 3, patient had low grade fever of 100.9F and repeat blood culture (X5) and urine culture was sent. ID was consulted and recommended IV ceftriaxone in place of cefepime. Repeat culture grew coag negative gram positive cocci in 1 of 5 bottle likely contaminants. His mentation improved and has been afebrile. He was found to have hypernatremia and was started on 1/2 NS fluid and hypernatremia resolved in 3 days. He was given free water 250mg q6h afterward. He was found to have constipation and was manually disimpacted. History of Present Illness:  Pt is an 83yo with history of CVA in July 2017 with R-sided residual deficits s/p trach and PEG (trach reversed 2 weeks PTA), Parkinson's disease and CAD s/p CABG presenting from [facility] with fever, confusion and lethargy. Pt nonverbal and history obtained from pt's wife and daughter at bedside.  Per family, pt had been in his usual state of health until 4 days ago when he was noted to have a low grade fever at his nursing home. Pt has a chronic Alexander and his urine was also noted to be dark red/brown in color. Pt appeared to be altered, more lethargic and less responsive than at baseline. The next day a urine culture was sent, which ultimately resulted today growing Proteus mirabilis resistant to fluoroquinolones, ampicillin, Macrobid and Bactrim. Pt continued to have fevers up to 102 over the weekend and was brought into the ED today after the urine culture finalized.  Pt also was hospitalized at [facility] in October 2017 for aspiration pneumonia and UTI, with urine culture growing Providencia. Pt was d/nicanor to NH where he slowly improved his functional status, being able to participate in physical therapy and having his trach removed 2 weeks ago. At baseline, pt is able to converse with some dysarthria but is able to follow commands. Pt received tube feeds through a PEG tube, however also does have limited oral intake with a pureed diet with thickened liquids.   ED vitals: Temp 98.7 HR 80 /50 RR 20 O2 100% on room air. Pt was given Tylenol 1000mg x1, Cefepime x1, vancomycin x1 and NS 1L bolus. CXR was performed with prelim read finding of LLL opacity which could represent pleural effusion or atelectasis, unable to r/o superimposed infection. UA with brown urine, large blood, large LE, 25-50 WBCs. Blood cultures x2 and urine culture sent. Pt admitted to the medicine service for further workup. (08 Jan 2018 20:08)    Hospital Course:  Patient was started on IV cefepime based on his urine culture result from nursing home. He was NPO and was seen by [facility]. He had MBS study as recommended by the [facility] and he failed the study and had high risk for aspiration. He was continued on his continuous tube feed. He was also found to have bacteremia which grew proteus likely from his UTI. On day 3, patient had low grade fever of 100.9F and repeat blood culture (X5) and urine culture was sent. ID was consulted and recommended IV ceftriaxone in place of cefepime. Repeat culture grew coag negative gram positive cocci in 1 of 5 bottle likely contaminants. His mentation improved and has been afebrile. He was found to have hypernatremia and was started on 1/2 NS fluid and hypernatremia resolved in 3 days. He was given free water 250mg q6h afterward. He was found to have constipation and was manually disimpacted and had normal BM afterward. Patient was noted to have elevated WBC and developed cough while on day 8 of abx. Xray showed left pleural effusion and questionable pneumonia. He was started on IV flagyl per ID recommendation and his leukocytosis resolved. He was deemed medically stable to be discharge back to [facility] to take 3 more days of flagyl 500mg TID thru PEG tube.

## 2018-01-09 NOTE — PROGRESS NOTE ADULT - ASSESSMENT
81yo Male with history of CVA in July 2017 with R-sided residual deficits s/p trach and PEG (trach reversed 2 weeks PTA), Parkinson's disease and CAD s/p CABG admitted for sepsis 2/2 complicated UTI

## 2018-01-09 NOTE — DISCHARGE NOTE ADULT - CARE PLAN
Principal Discharge DX:	UTI (urinary tract infection)  Secondary Diagnosis:	Aspiration precautions  Secondary Diagnosis:	SHREYAS (acute kidney injury) Principal Discharge DX:	UTI (urinary tract infection)  Goal:	Resolution  Instructions for follow-up, activity and diet:	You came in with bacteria in your urine as well as in your blood. You were given antibiotics to treat your infection. Please continue to take ____ at home for ___ days. Please maintain good hygiene.  Secondary Diagnosis:	Aspiration precautions  Goal:	Continue feeding thru tube. Prevention of aspiration  Instructions for follow-up, activity and diet:	You were seen by speech and swallow and had swallow study which showed high risk for aspiration. Please continue with tube feed.  Secondary Diagnosis:	Bacteremia  Goal:	Resolution  Instructions for follow-up, activity and diet:	You were found to have bacterial infection in your blood. You were given antibiotics to treat your infection. Please continue to take ____ at home for ___ days. Principal Discharge DX:	UTI (urinary tract infection)  Goal:	Resolution  Assessment and plan of treatment:	You came in with bacteria in your urine as well as in your blood. You were given antibiotics to treat your infection. Please continue to take ____ at home for ___ days. Please maintain good hygiene.  Secondary Diagnosis:	Aspiration precautions  Goal:	Continue feeding thru tube. Prevention of aspiration  Assessment and plan of treatment:	You were seen by speech and swallow and had swallow study which showed high risk for aspiration. Please continue with tube feed.  Secondary Diagnosis:	Bacteremia  Goal:	Resolution  Assessment and plan of treatment:	You were found to have bacterial infection in your blood. You were given antibiotics to treat your infection. Please continue to take ____ at home for ___ days. Principal Discharge DX:	UTI (urinary tract infection)  Goal:	Resolution  Assessment and plan of treatment:	You came in with bacteria in your urine as well as in your blood. You were given antibiotics to treat your infection for 10 days. Please maintain good hygiene.  Secondary Diagnosis:	Aspiration precautions  Goal:	Continue feeding thru tube. Prevention of aspiration  Assessment and plan of treatment:	You were seen by speech and swallow and had swallow study which showed high risk for aspiration. Please continue with tube feed. Please continue to take Flagyl 500mg three times a day at Nursing home for 3 more days.  Secondary Diagnosis:	Bacteremia  Goal:	Resolution  Assessment and plan of treatment:	You were found to have bacterial infection in your blood. You were given antibiotics to treat your infection for 10 days.  Secondary Diagnosis:	Sacral wound  Goal:	Wound care  Assessment and plan of treatment:	Please follow the following instruction to care for your wound as recommended by our wound care team:  Topical Recommendations:  Sween 24 to bilateral upper and lower extremities daily.    Peritubular skin of PEG- Cleanse q shift with NS, apply liquid barrier film beneath luba disc.  If redness noted under luba disc bumper apply thin foam  dressing without border (Mepilex Lite)- cut to mid dressing with a 'Y' shape. Change every shift.  Secondary securement to abdomen taping in 'H' fashion with Steri-strips. Change every 5 days.    Sacrococcygeum extending to bilateral buttocks- mixed etiology evolving deep tissue pressure injury and incontinence associated dermatitis- Gently cleanse with NS. Pat dry. Apply TRIAD moisture barrier paste every shift and prn with episodes of incontinence. Note: while cleansing after episodes of incontinence gently remove soiled layer of TRIAD (do not aggressively remove remainder of paste) reapply secondary layer.    Right heel- Cleanse wound and periwound skin with NS. Pat dry. Paint with betadine, pat dry. Apply abdominal pad secure with Kerlix wrap and paper tape. Change daily.    Continue low air loss bed therapy, continue heel elevation with Z-flex fluidized positioning boots, continue to turn & reposition q2h with Z-max fluidized positioning device, soft pillow between bony prominences, continue moisture management with barrier creams & single breathable pad, continue measures to decrease friction/shear/pressure. Continue with nutritional support as per dietary/orders. Principal Discharge DX:	UTI (urinary tract infection)  Goal:	Resolution  Assessment and plan of treatment:	You came in with bacteria in your urine as well as in your blood. You were given antibiotics to treat your infection for 10 days. Please maintain good hygiene. If you begin to have fevers, weakness, cloudy urine increased confusion, chills, these might be symptoms of a returning urinary tract infection and you should have blood work and urinary studies to test for this.  Secondary Diagnosis:	Aspiration precautions  Goal:	Continue feeding thru tube. Prevention of aspiration  Assessment and plan of treatment:	You were seen by speech and swallow and had swallow study which showed high risk for aspiration. Please continue with tube feed. Please continue to take Flagyl 500mg three times a day at Nursing home for 3 more days.  Secondary Diagnosis:	Bacteremia  Goal:	Resolution  Assessment and plan of treatment:	You were found to have bacterial infection in your blood. You were given antibiotics to treat your infection for 10 days.  Secondary Diagnosis:	Sacral wound  Goal:	Wound care  Assessment and plan of treatment:	Please follow the following instruction to care for your wound as recommended by our wound care team:  Topical Recommendations:  Sween 24 to bilateral upper and lower extremities daily.    Peritubular skin of PEG- Cleanse q shift with NS, apply liquid barrier film beneath luba disc.  If redness noted under luba disc bumper apply thin foam  dressing without border (Mepilex Lite)- cut to mid dressing with a 'Y' shape. Change every shift.  Secondary securement to abdomen taping in 'H' fashion with Steri-strips. Change every 5 days.    Sacrococcygeum extending to bilateral buttocks- mixed etiology evolving deep tissue pressure injury and incontinence associated dermatitis- Gently cleanse with NS. Pat dry. Apply TRIAD moisture barrier paste every shift and prn with episodes of incontinence. Note: while cleansing after episodes of incontinence gently remove soiled layer of TRIAD (do not aggressively remove remainder of paste) reapply secondary layer.    Right heel- Cleanse wound and periwound skin with NS. Pat dry. Paint with betadine, pat dry. Apply abdominal pad secure with Kerlix wrap and paper tape. Change daily.    Continue low air loss bed therapy, continue heel elevation with Z-flex fluidized positioning boots, continue to turn & reposition q2h with Z-max fluidized positioning device, soft pillow between bony prominences, continue moisture management with barrier creams & single breathable pad, continue measures to decrease friction/shear/pressure. Continue with nutritional support as per dietary/orders. Principal Discharge DX:	UTI (urinary tract infection)  Goal:	Resolution  Assessment and plan of treatment:	You came in with bacteria in your urine as well as in your blood. You were given antibiotics to treat your infection for 10 days. Please maintain good hygiene. If you begin to have fevers, weakness, cloudy urine increased confusion, chills, these might be symptoms of a returning urinary tract infection and you should have blood work and urinary studies to test for this. Please continue to exhange your indwelling catheter at least once a month, your current catheter was placed 1/8/2017  Secondary Diagnosis:	Aspiration precautions  Goal:	Continue feeding thru tube. Prevention of aspiration  Assessment and plan of treatment:	You were seen by speech and swallow and had swallow study which showed high risk for aspiration. Please continue with tube feed. Please continue to take Flagyl 500mg three times a day at Nursing home for 3 more days.  Secondary Diagnosis:	Bacteremia  Goal:	Resolution  Assessment and plan of treatment:	You were found to have bacterial infection in your blood. You were given antibiotics to treat your infection for 10 days.  Secondary Diagnosis:	Sacral wound  Goal:	Wound care  Assessment and plan of treatment:	Please follow the following instruction to care for your wound as recommended by our wound care team:  Topical Recommendations:  Sween 24 to bilateral upper and lower extremities daily.    Peritubular skin of PEG- Cleanse q shift with NS, apply liquid barrier film beneath luba disc.  If redness noted under luba disc bumper apply thin foam  dressing without border (Mepilex Lite)- cut to mid dressing with a 'Y' shape. Change every shift.  Secondary securement to abdomen taping in 'H' fashion with Steri-strips. Change every 5 days.    Sacrococcygeum extending to bilateral buttocks- mixed etiology evolving deep tissue pressure injury and incontinence associated dermatitis- Gently cleanse with NS. Pat dry. Apply TRIAD moisture barrier paste every shift and prn with episodes of incontinence. Note: while cleansing after episodes of incontinence gently remove soiled layer of TRIAD (do not aggressively remove remainder of paste) reapply secondary layer.    Right heel- Cleanse wound and periwound skin with NS. Pat dry. Paint with betadine, pat dry. Apply abdominal pad secure with Kerlix wrap and paper tape. Change daily.    Continue low air loss bed therapy, continue heel elevation with Z-flex fluidized positioning boots, continue to turn & reposition q2h with Z-max fluidized positioning device, soft pillow between bony prominences, continue moisture management with barrier creams & single breathable pad, continue measures to decrease friction/shear/pressure. Continue with nutritional support as per dietary/orders. Principal Discharge DX:	UTI (urinary tract infection)  Goal:	Resolution  Assessment and plan of treatment:	You came in with bacteria in your urine as well as in your blood. You were given antibiotics to treat your infection for 10 days. Please maintain good hygiene. If you begin to have fevers, weakness, cloudy urine increased confusion, chills, these might be symptoms of a returning urinary tract infection and you should have blood work and urinary studies to test for this. Please exchange your catheter if it become obstructed or leaks, your current catheter was placed 1/8/2017  Secondary Diagnosis:	Aspiration precautions  Goal:	Continue feeding thru tube. Prevention of aspiration  Assessment and plan of treatment:	You were seen by speech and swallow and had swallow study which showed high risk for aspiration. Please continue with tube feed. Please continue to take Flagyl 500mg three times a day at Nursing home for 3 more days.  Secondary Diagnosis:	Bacteremia  Goal:	Resolution  Assessment and plan of treatment:	You were found to have bacterial infection in your blood. You were given antibiotics to treat your infection for 10 days.  Secondary Diagnosis:	Sacral wound  Goal:	Wound care  Assessment and plan of treatment:	Please follow the following instruction to care for your wound as recommended by our wound care team:  Topical Recommendations:  Sween 24 to bilateral upper and lower extremities daily.    Peritubular skin of PEG- Cleanse q shift with NS, apply liquid barrier film beneath luba disc.  If redness noted under luba disc bumper apply thin foam  dressing without border (Mepilex Lite)- cut to mid dressing with a 'Y' shape. Change every shift.  Secondary securement to abdomen taping in 'H' fashion with Steri-strips. Change every 5 days.    Sacrococcygeum extending to bilateral buttocks- mixed etiology evolving deep tissue pressure injury and incontinence associated dermatitis- Gently cleanse with NS. Pat dry. Apply TRIAD moisture barrier paste every shift and prn with episodes of incontinence. Note: while cleansing after episodes of incontinence gently remove soiled layer of TRIAD (do not aggressively remove remainder of paste) reapply secondary layer.    Right heel- Cleanse wound and periwound skin with NS. Pat dry. Paint with betadine, pat dry. Apply abdominal pad secure with Kerlix wrap and paper tape. Change daily.    Continue low air loss bed therapy, continue heel elevation with Z-flex fluidized positioning boots, continue to turn & reposition q2h with Z-max fluidized positioning device, soft pillow between bony prominences, continue moisture management with barrier creams & single breathable pad, continue measures to decrease friction/shear/pressure. Continue with nutritional support as per dietary/orders.

## 2018-01-09 NOTE — PHYSICAL THERAPY INITIAL EVALUATION ADULT - CRITERIA FOR SKILLED THERAPEUTIC INTERVENTIONS
impairments found/rehab potential/predicted duration of therapy intervention/therapy frequency/anticipated discharge recommendation

## 2018-01-09 NOTE — DISCHARGE NOTE ADULT - PLAN OF CARE
You were found to have bacterial infection in your blood. You were given antibiotics to treat your infection. Please continue to take ____ at home for ___ days. Resolution You came in with bacteria in your urine as well as in your blood. You were given antibiotics to treat your infection. Please continue to take ____ at home for ___ days. Please maintain good hygiene. Continue feeding thru tube. Prevention of aspiration You were seen by speech and swallow and had swallow study which showed high risk for aspiration. Please continue with tube feed. You came in with bacteria in your urine as well as in your blood. You were given antibiotics to treat your infection for 10 days. Please maintain good hygiene. You were seen by speech and swallow and had swallow study which showed high risk for aspiration. Please continue with tube feed. Please continue to take Flagyl 500mg three times a day at Nursing home for 3 more days. You were found to have bacterial infection in your blood. You were given antibiotics to treat your infection for 10 days. Wound care Please follow the following instruction to care for your wound as recommended by our wound care team:  Topical Recommendations:  Sween 24 to bilateral upper and lower extremities daily.    Peritubular skin of PEG- Cleanse q shift with NS, apply liquid barrier film beneath luba disc.  If redness noted under luba disc bumper apply thin foam  dressing without border (Mepilex Lite)- cut to mid dressing with a 'Y' shape. Change every shift.  Secondary securement to abdomen taping in 'H' fashion with Steri-strips. Change every 5 days.    Sacrococcygeum extending to bilateral buttocks- mixed etiology evolving deep tissue pressure injury and incontinence associated dermatitis- Gently cleanse with NS. Pat dry. Apply TRIAD moisture barrier paste every shift and prn with episodes of incontinence. Note: while cleansing after episodes of incontinence gently remove soiled layer of TRIAD (do not aggressively remove remainder of paste) reapply secondary layer.    Right heel- Cleanse wound and periwound skin with NS. Pat dry. Paint with betadine, pat dry. Apply abdominal pad secure with Kerlix wrap and paper tape. Change daily.    Continue low air loss bed therapy, continue heel elevation with Z-flex fluidized positioning boots, continue to turn & reposition q2h with Z-max fluidized positioning device, soft pillow between bony prominences, continue moisture management with barrier creams & single breathable pad, continue measures to decrease friction/shear/pressure. Continue with nutritional support as per dietary/orders. You came in with bacteria in your urine as well as in your blood. You were given antibiotics to treat your infection for 10 days. Please maintain good hygiene. If you begin to have fevers, weakness, cloudy urine increased confusion, chills, these might be symptoms of a returning urinary tract infection and you should have blood work and urinary studies to test for this. You came in with bacteria in your urine as well as in your blood. You were given antibiotics to treat your infection for 10 days. Please maintain good hygiene. If you begin to have fevers, weakness, cloudy urine increased confusion, chills, these might be symptoms of a returning urinary tract infection and you should have blood work and urinary studies to test for this. Please continue to exhange your indwelling catheter at least once a month, your current catheter was placed 1/8/2017 You came in with bacteria in your urine as well as in your blood. You were given antibiotics to treat your infection for 10 days. Please maintain good hygiene. If you begin to have fevers, weakness, cloudy urine increased confusion, chills, these might be symptoms of a returning urinary tract infection and you should have blood work and urinary studies to test for this. Please exchange your catheter if it become obstructed or leaks, your current catheter was placed 1/8/2017

## 2018-01-10 DIAGNOSIS — R06.6 HICCOUGH: ICD-10-CM

## 2018-01-10 DIAGNOSIS — R78.81 BACTEREMIA: ICD-10-CM

## 2018-01-10 DIAGNOSIS — A41.9 SEPSIS, UNSPECIFIED ORGANISM: ICD-10-CM

## 2018-01-10 LAB
-  AMIKACIN: SIGNIFICANT CHANGE UP
-  AMPICILLIN/SULBACTAM: SIGNIFICANT CHANGE UP
-  AMPICILLIN: SIGNIFICANT CHANGE UP
-  AZTREONAM: SIGNIFICANT CHANGE UP
-  CEFAZOLIN: SIGNIFICANT CHANGE UP
-  CEFEPIME: SIGNIFICANT CHANGE UP
-  CEFOXITIN: SIGNIFICANT CHANGE UP
-  CEFTAZIDIME: SIGNIFICANT CHANGE UP
-  CEFTRIAXONE: SIGNIFICANT CHANGE UP
-  CIPROFLOXACIN: SIGNIFICANT CHANGE UP
-  ERTAPENEM: SIGNIFICANT CHANGE UP
-  GENTAMICIN: SIGNIFICANT CHANGE UP
-  LEVOFLOXACIN: SIGNIFICANT CHANGE UP
-  MEROPENEM: SIGNIFICANT CHANGE UP
-  NITROFURANTOIN: SIGNIFICANT CHANGE UP
-  PIPERACILLIN/TAZOBACTAM: SIGNIFICANT CHANGE UP
-  TOBRAMYCIN: SIGNIFICANT CHANGE UP
-  TRIMETHOPRIM/SULFAMETHOXAZOLE: SIGNIFICANT CHANGE UP
BACTERIA UR CULT: SIGNIFICANT CHANGE UP
BASOPHILS # BLD AUTO: 0 K/UL — SIGNIFICANT CHANGE UP (ref 0–0.2)
BASOPHILS NFR BLD AUTO: 0 % — SIGNIFICANT CHANGE UP (ref 0–2)
BUN SERPL-MCNC: 45 MG/DL — HIGH (ref 7–23)
CALCIUM SERPL-MCNC: 7.5 MG/DL — LOW (ref 8.4–10.5)
CHLORIDE SERPL-SCNC: 109 MMOL/L — HIGH (ref 98–107)
CO2 SERPL-SCNC: 23 MMOL/L — SIGNIFICANT CHANGE UP (ref 22–31)
CREAT SERPL-MCNC: 0.65 MG/DL — SIGNIFICANT CHANGE UP (ref 0.5–1.3)
EOSINOPHIL # BLD AUTO: 0.07 K/UL — SIGNIFICANT CHANGE UP (ref 0–0.5)
EOSINOPHIL NFR BLD AUTO: 1 % — SIGNIFICANT CHANGE UP (ref 0–6)
GLUCOSE SERPL-MCNC: 125 MG/DL — HIGH (ref 70–99)
HCT VFR BLD CALC: 27 % — LOW (ref 39–50)
HGB BLD-MCNC: 8.6 G/DL — LOW (ref 13–17)
IMM GRANULOCYTES # BLD AUTO: 0.08 # — SIGNIFICANT CHANGE UP
IMM GRANULOCYTES NFR BLD AUTO: 1.1 % — SIGNIFICANT CHANGE UP (ref 0–1.5)
LYMPHOCYTES # BLD AUTO: 0.48 K/UL — LOW (ref 1–3.3)
LYMPHOCYTES # BLD AUTO: 6.6 % — LOW (ref 13–44)
MAGNESIUM SERPL-MCNC: 2 MG/DL — SIGNIFICANT CHANGE UP (ref 1.6–2.6)
MCHC RBC-ENTMCNC: 27.5 PG — SIGNIFICANT CHANGE UP (ref 27–34)
MCHC RBC-ENTMCNC: 31.9 % — LOW (ref 32–36)
MCV RBC AUTO: 86.3 FL — SIGNIFICANT CHANGE UP (ref 80–100)
METHOD TYPE: SIGNIFICANT CHANGE UP
MONOCYTES # BLD AUTO: 0.69 K/UL — SIGNIFICANT CHANGE UP (ref 0–0.9)
MONOCYTES NFR BLD AUTO: 9.5 % — SIGNIFICANT CHANGE UP (ref 2–14)
NEUTROPHILS # BLD AUTO: 5.98 K/UL — SIGNIFICANT CHANGE UP (ref 1.8–7.4)
NEUTROPHILS NFR BLD AUTO: 81.8 % — HIGH (ref 43–77)
NRBC # FLD: 0 — SIGNIFICANT CHANGE UP
ORGANISM # SPEC MICROSCOPIC CNT: SIGNIFICANT CHANGE UP
PHOSPHATE SERPL-MCNC: 3 MG/DL — SIGNIFICANT CHANGE UP (ref 2.5–4.5)
PLATELET # BLD AUTO: 164 K/UL — SIGNIFICANT CHANGE UP (ref 150–400)
PMV BLD: 11 FL — SIGNIFICANT CHANGE UP (ref 7–13)
POTASSIUM SERPL-MCNC: 3.8 MMOL/L — SIGNIFICANT CHANGE UP (ref 3.5–5.3)
POTASSIUM SERPL-SCNC: 3.8 MMOL/L — SIGNIFICANT CHANGE UP (ref 3.5–5.3)
RBC # BLD: 3.13 M/UL — LOW (ref 4.2–5.8)
RBC # FLD: 17.9 % — HIGH (ref 10.3–14.5)
SODIUM SERPL-SCNC: 142 MMOL/L — SIGNIFICANT CHANGE UP (ref 135–145)
WBC # BLD: 7.3 K/UL — SIGNIFICANT CHANGE UP (ref 3.8–10.5)
WBC # FLD AUTO: 7.3 K/UL — SIGNIFICANT CHANGE UP (ref 3.8–10.5)

## 2018-01-10 RX ORDER — ACETAMINOPHEN 500 MG
650 TABLET ORAL EVERY 6 HOURS
Qty: 0 | Refills: 0 | Status: DISCONTINUED | OUTPATIENT
Start: 2018-01-10 | End: 2018-01-17

## 2018-01-10 RX ORDER — SODIUM CHLORIDE 9 MG/ML
1000 INJECTION INTRAMUSCULAR; INTRAVENOUS; SUBCUTANEOUS
Qty: 0 | Refills: 0 | Status: DISCONTINUED | OUTPATIENT
Start: 2018-01-10 | End: 2018-01-11

## 2018-01-10 RX ORDER — METOCLOPRAMIDE HCL 10 MG
5 TABLET ORAL EVERY 12 HOURS
Qty: 0 | Refills: 0 | Status: DISCONTINUED | OUTPATIENT
Start: 2018-01-10 | End: 2018-01-15

## 2018-01-10 RX ADMIN — CARBIDOPA AND LEVODOPA 1.5 TABLET(S): 25; 100 TABLET ORAL at 15:57

## 2018-01-10 RX ADMIN — Medication 1 DROP(S): at 06:06

## 2018-01-10 RX ADMIN — SODIUM CHLORIDE 50 MILLILITER(S): 9 INJECTION INTRAMUSCULAR; INTRAVENOUS; SUBCUTANEOUS at 11:27

## 2018-01-10 RX ADMIN — FINASTERIDE 5 MILLIGRAM(S): 5 TABLET, FILM COATED ORAL at 11:29

## 2018-01-10 RX ADMIN — Medication 0.25 MILLIGRAM(S): at 21:32

## 2018-01-10 RX ADMIN — HEPARIN SODIUM 5000 UNIT(S): 5000 INJECTION INTRAVENOUS; SUBCUTANEOUS at 05:34

## 2018-01-10 RX ADMIN — Medication 3 MILLILITER(S): at 10:16

## 2018-01-10 RX ADMIN — Medication 1 TABLET(S): at 11:28

## 2018-01-10 RX ADMIN — Medication 81 MILLIGRAM(S): at 11:28

## 2018-01-10 RX ADMIN — Medication 650 MILLIGRAM(S): at 19:45

## 2018-01-10 RX ADMIN — CEFEPIME 100 MILLIGRAM(S): 1 INJECTION, POWDER, FOR SOLUTION INTRAMUSCULAR; INTRAVENOUS at 19:10

## 2018-01-10 RX ADMIN — Medication 3 MILLILITER(S): at 21:32

## 2018-01-10 RX ADMIN — Medication 5 MILLIGRAM(S): at 19:11

## 2018-01-10 RX ADMIN — Medication 5 MILLIGRAM(S): at 12:11

## 2018-01-10 RX ADMIN — CARBIDOPA AND LEVODOPA 1.5 TABLET(S): 25; 100 TABLET ORAL at 06:03

## 2018-01-10 RX ADMIN — Medication 200 MILLIGRAM(S): at 06:04

## 2018-01-10 RX ADMIN — SENNA PLUS 10 MILLILITER(S): 8.6 TABLET ORAL at 22:16

## 2018-01-10 RX ADMIN — CEFEPIME 100 MILLIGRAM(S): 1 INJECTION, POWDER, FOR SOLUTION INTRAMUSCULAR; INTRAVENOUS at 06:04

## 2018-01-10 RX ADMIN — Medication 1 DROP(S): at 19:12

## 2018-01-10 RX ADMIN — Medication 3 MILLILITER(S): at 03:44

## 2018-01-10 RX ADMIN — DORZOLAMIDE HYDROCHLORIDE TIMOLOL MALEATE 1 DROP(S): 20; 5 SOLUTION/ DROPS OPHTHALMIC at 19:12

## 2018-01-10 RX ADMIN — CARBIDOPA AND LEVODOPA 1.5 TABLET(S): 25; 100 TABLET ORAL at 22:16

## 2018-01-10 RX ADMIN — Medication 1 APPLICATION(S): at 19:21

## 2018-01-10 RX ADMIN — ZINC OXIDE 1 APPLICATION(S): 200 OINTMENT TOPICAL at 11:53

## 2018-01-10 RX ADMIN — Medication 200 MILLIGRAM(S): at 19:10

## 2018-01-10 RX ADMIN — Medication 3 MILLILITER(S): at 16:15

## 2018-01-10 RX ADMIN — Medication 500 MILLIGRAM(S): at 11:52

## 2018-01-10 RX ADMIN — DORZOLAMIDE HYDROCHLORIDE TIMOLOL MALEATE 1 DROP(S): 20; 5 SOLUTION/ DROPS OPHTHALMIC at 06:05

## 2018-01-10 RX ADMIN — Medication 0.25 MILLIGRAM(S): at 10:16

## 2018-01-10 RX ADMIN — PANTOPRAZOLE SODIUM 40 MILLIGRAM(S): 20 TABLET, DELAYED RELEASE ORAL at 06:06

## 2018-01-10 RX ADMIN — Medication 1 MILLIGRAM(S): at 22:16

## 2018-01-10 RX ADMIN — LATANOPROST 1 DROP(S): 0.05 SOLUTION/ DROPS OPHTHALMIC; TOPICAL at 22:17

## 2018-01-10 RX ADMIN — Medication 200 MILLIGRAM(S): at 22:15

## 2018-01-10 RX ADMIN — HEPARIN SODIUM 5000 UNIT(S): 5000 INJECTION INTRAVENOUS; SUBCUTANEOUS at 15:57

## 2018-01-10 RX ADMIN — HEPARIN SODIUM 5000 UNIT(S): 5000 INJECTION INTRAVENOUS; SUBCUTANEOUS at 22:16

## 2018-01-10 RX ADMIN — ATORVASTATIN CALCIUM 80 MILLIGRAM(S): 80 TABLET, FILM COATED ORAL at 22:16

## 2018-01-10 RX ADMIN — Medication 1 APPLICATION(S): at 19:22

## 2018-01-10 NOTE — PROGRESS NOTE ADULT - PROBLEM SELECTOR PLAN 3
- C/w Cefepime (day 3), plan for 10-14 day course given male complicated UTI and now bacteremia  - C/w lubin  - F/u urine cultures for speciation and sensitiivty

## 2018-01-10 NOTE — PROGRESS NOTE ADULT - PROBLEM SELECTOR PLAN 4
CXR showing left pleural effusion w/o consolidation or concern for pneumonia  - Per pt's family, pt had been tolerating feeds by mouth at nursing home  - NPO for now, aspiration precautions  - Speech and swallow evaluation - recommended MBS study

## 2018-01-10 NOTE — PROGRESS NOTE ADULT - PROBLEM SELECTOR PLAN 1
Improving.  Afebrile and no leukocytosis.   2/2 proteus UTI and bacteremia  F/u repeat BCx  C/w Cefepime for 10 - 14 days total  C/w IVF

## 2018-01-10 NOTE — PROGRESS NOTE ADULT - ASSESSMENT
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

## 2018-01-10 NOTE — PROGRESS NOTE ADULT - PROBLEM SELECTOR PLAN 1
- 2/2 proteus UTI and bacteremia.   - CXR showed left pleural effusion, no consolidation or concern for pneumonia.  - F/u blood and urine cultures for sensitivity  - Leukocytosis downtrending.   - Will C/w Cefepime (day 3), anticipate 10-14 days.

## 2018-01-10 NOTE — PROGRESS NOTE ADULT - PROBLEM SELECTOR PLAN 2
- Prelim BCx grew proteus, likely source from UTI. pending sensitivity  - C/w cefepime for now (day 3). anticipate 10-14 days - Prelim BCx grew proteus, likely source from UTI. pending sensitivity  - C/w cefepime for now (day 3). anticipate 10-14 days  - Recheck Blood Cx today

## 2018-01-10 NOTE — PROGRESS NOTE ADULT - PROBLEM SELECTOR PLAN 10
BPH  C/w finasteride, doxazosin through PEG tube    DVT ppx: HSQ    Diet: NPO with tube feeds- Jevity    Dispo: Likely back to [facility]  C/w finasteride, doxazosin through PEG tube    Parkinson's Disease  Stable, c/w home dose of carbidopa-levodopa    DVT ppx: HSQ    Diet: NPO with tube feeds- Jevity    Dispo: Likely back to [facility]

## 2018-01-10 NOTE — PROGRESS NOTE ADULT - PROBLEM SELECTOR PLAN 6
Baseline creatinine of 0.4 from last admission in October 2017  - Likely 2/2 decreased PO intake per pt's family, also component of sepsis 2/2 UTI   and bacteremia  - Trend creatinine daily  - Avoid nephrotoxic agents - Unknown etiology, noted to have hiccups since yesterday.  - Will switch reglan to standing dose BID to see if help.

## 2018-01-10 NOTE — PROGRESS NOTE ADULT - SUBJECTIVE AND OBJECTIVE BOX
IM Attending Covering Today For   Pt seen and examined earlier today  Case reviewed with the medical team  Medical charts reviewed  Pt with hiccups, otherwise no noted acute events overnight    Briefly, this is an 83 yo M who p/w fever and (+) UCx.  Admitted for sepsis, complicated UTI, and bacteremia, on Cefepime IVPB.     MEDICATIONS  (STANDING):  ALBUTerol/ipratropium for Nebulization 3 milliLiter(s) Nebulizer every 6 hours  ascorbic acid 500 milliGRAM(s) Oral daily  aspirin  chewable 81 milliGRAM(s) Oral daily  atorvastatin 80 milliGRAM(s) Oral at bedtime  buDESOnide   0.25 milliGRAM(s) Respule 0.25 milliGRAM(s) Inhalation two times a day  carbidopa/levodopa  25/100 1.5 Tablet(s) Oral three times a day  cefepime  IVPB 2000 milliGRAM(s) IV Intermittent every 12 hours  collagenase Ointment 1 Application(s) Topical daily  docusate sodium Liquid 200 milliGRAM(s) Oral at bedtime  dorzolamide 2%/timolol 0.5% Ophthalmic Solution 1 Drop(s) Left EYE two times a day  doxazosin 1 milliGRAM(s) Oral at bedtime  finasteride 5 milliGRAM(s) Oral daily  guaiFENesin    Syrup 200 milliGRAM(s) Oral two times a day  heparin  Injectable 5000 Unit(s) SubCutaneous every 8 hours  latanoprost 0.005% Ophthalmic Solution 1 Drop(s) Left EYE at bedtime  multivitamin 1 Tablet(s) Oral daily  pantoprazole   Suspension 40 milliGRAM(s) Oral before breakfast  pilocarpine 4% Solution 1 Drop(s) Left EYE two times a day  senna Syrup 10 milliLiter(s) Oral at bedtime  vitamin A &amp; D Ointment 1 Application(s) Topical daily  zinc oxide 20% Ointment 1 Application(s) Topical daily    MEDICATIONS  (PRN):  acetaminophen    Suspension. 650 milliGRAM(s) Oral every 6 hours PRN Mild to moderate pain  metoclopramide   Syrup 5 milliGRAM(s) Oral every 6 hours PRN Nausea or vomiting    Vital Signs Last 24 Hrs  T(C): 37.6 (10 Buddy 2018 14:02), Max: 37.7 (10 Buddy 2018 09:20)  T(F): 99.6 (10 Buddy 2018 14:02), Max: 99.9 (10 Buddy 2018 09:20)  HR: 69 (10 Buddy 2018 14:02) (65 - 81)  BP: 138/48 (10 Buddy 2018 14:02) (102/51 - 138/48)  BP(mean): --  RR: 18 (10 Buddy 2018 14:02) (18 - 19)  SpO2: 97% (10 Buddy 2018 14:02) (96% - 99%)      PHYSICAL EXAM:  Appearance: NAD  HEENT:   NC, AT  Neck: Supple.  Old colostomy site local wound care intact  Cardiovascular: Normal S1 S2, No JVD, No murmurs/rubs/gallops  Respiratory: Lungs cta b/l.  No wheezing, rhonchi. Not using accessory muscles of respiration.  Adequate airflow b/l.   Gastrointestinal:  PEG intact. Soft, nondistended, normoactive b.s.  : lubin intact draining dark yellow urine.   Vascular:  2+ pulses b/l    LABS:                        8.6    7.30  )-----------( 164      ( 10 Buddy 2018 05:10 )             27.0     01-10    142  |  109<H>  |  45<H>  ----------------------------<  125<H>  3.8   |  23  |  0.65    Ca    7.5<L>      10 Buddy 2018 05:10  Phos  3.0     01-10  Mg     2.0     -10    TPro  6.2  /  Alb  2.1<L>  /  TBili  0.7  /  DBili  x   /  AST  59<H>  /  ALT  33  /  AlkPhos  128<H>  09    LIVER FUNCTIONS - ( 2018 05:30 )  Alb: 2.1 g/dL / Pro: 6.2 g/dL / ALK PHOS: 128 u/L / ALT: 33 u/L / AST: 59 u/L / GGT: x     / T. Bili 0.7 mg/dL / D. Bili x         PT/INR - ( 2018 05:30 )   PT: 18.0 SEC;   INR: 1.55      PTT - ( 2018 16:52 )  PTT:29.2 SEC  Urinalysis Basic - ( 2018 18:39 )  Color: BROWN / Appearance: TURBID / S.019 / pH: 8.5  Gluc: NEGATIVE / Ketone: NEGATIVE  / Bili: NEGATIVE / Urobili: NORMAL mg/dL   Blood: MODERATE / Protein: >600 mg/dL / Nitrite: NEGATIVE   Leuk Esterase: LARGE / RBC: 25-50 / WBC 25-50   Sq Epi: x / Non Sq Epi: x / Bacteria: FEW    Microbiology:  Culture - Urine (18 @ 19:14)    Culture - Urine:   GNRID^Gram Neg Shad To Be Identified  COLONY COUNT: > = 100,000 CFU/ML    Specimen Source: URINE CATHETER    Culture - Blood (18 @ 17:21)    Culture - Blood:   ***Blood Panel PCR results on this specimen are available  approximately 3 hours after the Gram stain result***  Gram stain, PCR, and/or culture results may not always  correspond due to difference in methodologies  ------------------------------------------------------------  This PCR assay was performed using [employer].  The  following targets are tested for:  Enterococcus, vancomycin  resistant enterococci, Listeria monocytogenes,  coagulase  negative staphylococci, S. aureus, methicillin resistant S.  aureus, Streptococcus agalactiae (Group B), S. pneumoniae,  S. pyogenes (Group A), Acinetobacter baumannii, Enterobacter  cloacae, E. coli, Klebsiella oxytoca, K. pneumoniae, Proteus  sp., Serratia marcescens, Haemophilus influenzae, Neisseria  meningitidis, Pseudomonas aeruginosa, Candida albicans, C.  glabrata, C. krusei, C. parapsilosis, C. tropicalis and the  KPC resistance gene.    -  Proteus species: + DETECT RADHA    Specimen Source: BLOOD VENOUS    Organism: BLOOD CULTURE PCR    Gram Stain Blood:   ***** CRITICAL RESULT *****  PERSON CALLED / READ-BACK: POLA LEDEZMA RN. / Y  DATE / TIME CALLED: 18 [id number]  CALLED BY: DOMINIC UNGER^Gram Neg Rods  AFTER: 17 HOURS INCUBATION  BOTTLE: AEROBIC   ANAEROBIC BOTTLES    Organism Identification: BLOOD CULTURE PCR    Method Type: PCR    Culture - Blood (18 @ 19:01)    Specimen Source: BLOOD    Gram Stain Blood:   ***** CRITICAL RESULT *****  PERSON CALLED / READ-BACK: POLA LEDEZMA RN. / Y  DATE / TIME CALLED: 18 [id number]  CALLED BY: DOMINIC UNGER^Gram Neg Rods  AFTER: 17 HOURS INCUBATION  BOTTLE: AEROBIC   ANAEROBIC BOTTLES    Imaging Personally Reviewed:  [X] YES  [ ] NO  Consultant(s) Notes Reviewed:  [X] YES  [ ] NO

## 2018-01-10 NOTE — PROGRESS NOTE ADULT - SUBJECTIVE AND OBJECTIVE BOX
[facility] Progress Note- PGY1.    ===============================================================  CONTACT INFO   Aryan Perez M.D., PGY-1  Pager: NS- [phone], [facility]- [phone]    Mon-Fri: pager covered by day team 7am-7pm;   ***Academic conferences M-F 12pm-1pm- page ONLY if URGENT or if Consultant  Sat/Gaming Cross Coverage 12pm-7pm: NS- page [phone] for Team1-4, [facility]- pager forwarded to covering Resident  For Night coverage 7pm-7am:  [phone](NS)/[phone]([facility]) Team1-3, [phone] (NS)/[phone] ([facility]) Team4 & Care Model,  ===============================================================    Chief Complaint: Patient is a 82y old  Male who presents with a chief complaint of Fever, UTI (2018 10:47)        INTERVAL HPI/OVERNIGHT EVENTS:   No acute overnight event. Patient reports feeling_____. Denied fever, chill, nausea, vomiting, headache, CP, SOB, abdominal pain, diarrhea, or constipation.       MEDICATIONS  (STANDING):  ALBUTerol/ipratropium for Nebulization 3 milliLiter(s) Nebulizer every 6 hours  ascorbic acid 500 milliGRAM(s) Oral daily  aspirin  chewable 81 milliGRAM(s) Oral daily  atorvastatin 80 milliGRAM(s) Oral at bedtime  buDESOnide   0.25 milliGRAM(s) Respule 0.25 milliGRAM(s) Inhalation two times a day  carbidopa/levodopa  25/100 1.5 Tablet(s) Oral three times a day  cefepime  IVPB 2000 milliGRAM(s) IV Intermittent every 12 hours  collagenase Ointment 1 Application(s) Topical daily  docusate sodium Liquid 200 milliGRAM(s) Oral at bedtime  dorzolamide 2%/timolol 0.5% Ophthalmic Solution 1 Drop(s) Left EYE two times a day  doxazosin 1 milliGRAM(s) Oral at bedtime  finasteride 5 milliGRAM(s) Oral daily  guaiFENesin    Syrup 200 milliGRAM(s) Oral two times a day  heparin  Injectable 5000 Unit(s) SubCutaneous every 8 hours  latanoprost 0.005% Ophthalmic Solution 1 Drop(s) Left EYE at bedtime  multivitamin 1 Tablet(s) Oral daily  pantoprazole   Suspension 40 milliGRAM(s) Oral before breakfast  pilocarpine 4% Solution 1 Drop(s) Left EYE two times a day  senna Syrup 10 milliLiter(s) Oral at bedtime  vitamin A &amp; D Ointment 1 Application(s) Topical daily  zinc oxide 20% Ointment 1 Application(s) Topical daily    MEDICATIONS  (PRN):  acetaminophen    Suspension. 650 milliGRAM(s) Oral every 6 hours PRN Mild to moderate pain  metoclopramide   Syrup 5 milliGRAM(s) Oral every 6 hours PRN Nausea or vomiting      Vital Signs Last 24 Hrs  T(C): 37.1 (10 Buddy 2018 05:23), Max: 37.5 (2018 13:56)  T(F): 98.7 (10 Buddy 2018 05:23), Max: 99.5 (2018 13:56)  HR: 72 (10 Buddy 2018 05:23) (68 - 83)  BP: 104/52 (10 Buddy 2018 05:23) (102/51 - 104/52)  BP(mean): --  RR: 19 (10 Buddy 2018 05:23) (18 - 19)  SpO2: 98% (10 Buddy 2018 05:23) (96% - 99%)  Supplemental O2: [ ] No, on Room Air [ ] Yes,     I&O's Detail    2018 07:01  -  10 Buddy 2018 07:00  --------------------------------------------------------  IN:  Total IN: 0 mL    OUT:    Indwelling Catheter - Urethral: 1650 mL  Total OUT: 1650 mL    Total NET: -1650 mL        CAPILLARY BLOOD GLUCOSE          PHYSICAL EXAM:  Daily     Daily    Appearance: NAD, well-developed	  HEENT:   Normal oral mucosa, PERRL, EOMI	  Neck: No JVD, no LAD, supple, trachea in midline  Cardiovascular: Normal S1 S2, No JVD, No murmurs  Respiratory: Lungs clear to auscultation, no wheezing, rhonchi  Gastrointestinal:  Soft, Non-tender, nondistended, + BS  Skin: No rashes, No ecchymoses, No cyanosis	  MSK/Extremities: Normal range of motion, 5/5 Muscle strength bilaterally. No clubbing, cyanosis or edema  Vascular: Peripheral pulses palpable 2+ bilaterally  Neurologic: Non-focal, CN II-XII intact  Psychiatry: A & O x 3, Mood & affect appropriate    LABS:                        8.6    7.30  )-----------( 164      ( 10 Buddy 2018 05:10 )             27.0     01-10    142  |  109<H>  |  45<H>  ----------------------------<  125<H>  3.8   |  23  |  0.65    Ca    7.5<L>      10 Buddy 2018 05:10  Phos  3.0     01-10  Mg     2.0     -10    TPro  6.2  /  Alb  2.1<L>  /  TBili  0.7  /  DBili  x   /  AST  59<H>  /  ALT  33  /  AlkPhos  128<H>  -09    LIVER FUNCTIONS - ( 2018 05:30 )  Alb: 2.1 g/dL / Pro: 6.2 g/dL / ALK PHOS: 128 u/L / ALT: 33 u/L / AST: 59 u/L / GGT: x     / T. Bili 0.7 mg/dL / D. Bili x         PT/INR - ( 2018 05:30 )   PT: 18.0 SEC;   INR: 1.55          PTT - ( 2018 16:52 )  PTT:29.2 SEC  Urinalysis Basic - ( 2018 18:39 )    Color: BROWN / Appearance: TURBID / S.019 / pH: 8.5  Gluc: NEGATIVE / Ketone: NEGATIVE  / Bili: NEGATIVE / Urobili: NORMAL mg/dL   Blood: MODERATE / Protein: >600 mg/dL / Nitrite: NEGATIVE   Leuk Esterase: LARGE / RBC: 25-50 / WBC 25-50   Sq Epi: x / Non Sq Epi: x / Bacteria: FEW            Urinalysis Basic - ( 2018 18:39 )    Color: BROWN / Appearance: TURBID / S.019 / pH: 8.5  Gluc: NEGATIVE / Ketone: NEGATIVE  / Bili: NEGATIVE / Urobili: NORMAL mg/dL   Blood: MODERATE / Protein: >600 mg/dL / Nitrite: NEGATIVE   Leuk Esterase: LARGE / RBC: 25-50 / WBC 25-50   Sq Epi: x / Non Sq Epi: x / Bacteria: FEW      Microbiology:    RADIOLOGY & ADDITIONAL TESTS:    Xray -   CT -  MRI -     Imaging Personally Reviewed:  [ ] YES  [ ] NO  Consultant(s) Notes Reviewed:  [X] YES  [ ] NO  Care Discussed with Consultants/Other Providers [ ] YES  [ ] NO [facility] Progress Note- PGY1.    ===============================================================  CONTACT INFO   Aryan Perez M.D., PGY-1  Pager: NS- [phone], [facility]- [phone]    Mon-Fri: pager covered by day team 7am-7pm;   ***Academic conferences M-F 12pm-1pm- page ONLY if URGENT or if Consultant  Sat/Gaming Cross Coverage 12pm-7pm: NS- page [phone] for Team1-4, [facility]- pager forwarded to covering Resident  For Night coverage 7pm-7am:  [phone](NS)/[phone]([facility]) Team1-3, [phone] (NS)/[phone] ([facility]) Team4 & Care Model,  ===============================================================    Chief Complaint: Patient is a 82y old  Male who presents with a chief complaint of Fever, UTI (2018 10:47)        INTERVAL HPI/OVERNIGHT EVENTS:   No acute overnight event. Patient more alert today. Able to answer "yes" and "no". Denied fever, chill, nausea, vomiting, CP, SOB, abdominal pain      MEDICATIONS  (STANDING):  ALBUTerol/ipratropium for Nebulization 3 milliLiter(s) Nebulizer every 6 hours  ascorbic acid 500 milliGRAM(s) Oral daily  aspirin  chewable 81 milliGRAM(s) Oral daily  atorvastatin 80 milliGRAM(s) Oral at bedtime  buDESOnide   0.25 milliGRAM(s) Respule 0.25 milliGRAM(s) Inhalation two times a day  carbidopa/levodopa  25/100 1.5 Tablet(s) Oral three times a day  cefepime  IVPB 2000 milliGRAM(s) IV Intermittent every 12 hours  collagenase Ointment 1 Application(s) Topical daily  docusate sodium Liquid 200 milliGRAM(s) Oral at bedtime  dorzolamide 2%/timolol 0.5% Ophthalmic Solution 1 Drop(s) Left EYE two times a day  doxazosin 1 milliGRAM(s) Oral at bedtime  finasteride 5 milliGRAM(s) Oral daily  guaiFENesin    Syrup 200 milliGRAM(s) Oral two times a day  heparin  Injectable 5000 Unit(s) SubCutaneous every 8 hours  latanoprost 0.005% Ophthalmic Solution 1 Drop(s) Left EYE at bedtime  multivitamin 1 Tablet(s) Oral daily  pantoprazole   Suspension 40 milliGRAM(s) Oral before breakfast  pilocarpine 4% Solution 1 Drop(s) Left EYE two times a day  senna Syrup 10 milliLiter(s) Oral at bedtime  vitamin A &amp; D Ointment 1 Application(s) Topical daily  zinc oxide 20% Ointment 1 Application(s) Topical daily    MEDICATIONS  (PRN):  acetaminophen    Suspension. 650 milliGRAM(s) Oral every 6 hours PRN Mild to moderate pain  metoclopramide   Syrup 5 milliGRAM(s) Oral every 6 hours PRN Nausea or vomiting      Vital Signs Last 24 Hrs  T(C): 37.1 (10 Buddy 2018 05:23), Max: 37.5 (2018 13:56)  T(F): 98.7 (10 Buddy 2018 05:23), Max: 99.5 (2018 13:56)  HR: 72 (10 Buddy 2018 05:23) (68 - 83)  BP: 104/52 (10 Buddy 2018 05:23) (102/51 - 104/52)  BP(mean): --  RR: 19 (10 Buddy 2018 05:23) (18 - 19)  SpO2: 98% (10 Buddy 2018 05:23) (96% - 99%)  Supplemental O2: [ ] No, on Room Air [ ] Yes,     I&O's Detail    2018 07:01  -  10 Buddy 2018 07:00  --------------------------------------------------------  IN:  Total IN: 0 mL    OUT:    Indwelling Catheter - Urethral: 1650 mL  Total OUT: 1650 mL    Total NET: -1650 mL        CAPILLARY BLOOD GLUCOSE          PHYSICAL EXAM:  Appearance: NAD, more alert today. intractable hiccups  HEENT:   NC/AT  Neck: supple, bandage covered on previous trach site  Cardiovascular: Normal S1 S2, No JVD, No murmurs  Respiratory: Lungs clear to auscultation, no wheezing, rhonchi, decrease breath sound on left lower lung  Gastrointestinal:  Soft, nondistended, + BS. Intractable hiccups  : lubin intact. draining orange urine.   Skin: No rashes, No ecchymoses, No cyanosis	  MSK/Extremities: unable to assess  Vascular: Peripheral pulses palpable 2+ bilaterally  Neurologic: unable to assess  Psychiatry: more alert today. answer yes and no to questions.       LABS:                        8.6    7.30  )-----------( 164      ( 10 Buddy 2018 05:10 )             27.0     01-10    142  |  109<H>  |  45<H>  ----------------------------<  125<H>  3.8   |  23  |  0.65    Ca    7.5<L>      10 Buddy 2018 05:10  Phos  3.0     -10  Mg     2.0     10    TPro  6.2  /  Alb  2.1<L>  /  TBili  0.7  /  DBili  x   /  AST  59<H>  /  ALT  33  /  AlkPhos  128<H>  09    LIVER FUNCTIONS - ( 2018 05:30 )  Alb: 2.1 g/dL / Pro: 6.2 g/dL / ALK PHOS: 128 u/L / ALT: 33 u/L / AST: 59 u/L / GGT: x     / T. Bili 0.7 mg/dL / D. Bili x         PT/INR - ( 2018 05:30 )   PT: 18.0 SEC;   INR: 1.55          PTT - ( 2018 16:52 )  PTT:29.2 SEC  Urinalysis Basic - ( 2018 18:39 )    Color: BROWN / Appearance: TURBID / S.019 / pH: 8.5  Gluc: NEGATIVE / Ketone: NEGATIVE  / Bili: NEGATIVE / Urobili: NORMAL mg/dL   Blood: MODERATE / Protein: >600 mg/dL / Nitrite: NEGATIVE   Leuk Esterase: LARGE / RBC: 25-50 / WBC 25-50   Sq Epi: x / Non Sq Epi: x / Bacteria: FEW            Urinalysis Basic - ( 2018 18:39 )    Color: BROWN / Appearance: TURBID / S.019 / pH: 8.5  Gluc: NEGATIVE / Ketone: NEGATIVE  / Bili: NEGATIVE / Urobili: NORMAL mg/dL   Blood: MODERATE / Protein: >600 mg/dL / Nitrite: NEGATIVE   Leuk Esterase: LARGE / RBC: 25-50 / WBC 25-50   Sq Epi: x / Non Sq Epi: x / Bacteria: FEW      Microbiology:  Culture - Urine (18 @ 19:14)    Culture - Urine:   GNRID^Gram Neg Shad To Be Identified  COLONY COUNT: > = 100,000 CFU/ML    Specimen Source: URINE CATHETER      Culture - Blood (18 @ 17:21)    Culture - Blood:   ***Blood Panel PCR results on this specimen are available  approximately 3 hours after the Gram stain result***  Gram stain, PCR, and/or culture results may not always  correspond due to difference in methodologies  ------------------------------------------------------------  This PCR assay was performed using [employer].  The  following targets are tested for:  Enterococcus, vancomycin  resistant enterococci, Listeria monocytogenes,  coagulase  negative staphylococci, S. aureus, methicillin resistant S.  aureus, Streptococcus agalactiae (Group B), S. pneumoniae,  S. pyogenes (Group A), Acinetobacter baumannii, Enterobacter  cloacae, E. coli, Klebsiella oxytoca, K. pneumoniae, Proteus  sp., Serratia marcescens, Haemophilus influenzae, Neisseria  meningitidis, Pseudomonas aeruginosa, Candida albicans, C.  glabrata, C. krusei, C. parapsilosis, C. tropicalis and the  KPC resistance gene.    -  Proteus species: + DETECT RADHA    Specimen Source: BLOOD VENOUS    Organism: BLOOD CULTURE PCR    Gram Stain Blood:   ***** CRITICAL RESULT *****  PERSON CALLED / READ-BACK: POLA LEDEZMA RN. / Y  DATE / TIME CALLED: 18 1225  CALLED BY: DOMINIC UNGER^Gram Neg Rods  AFTER: 17 HOURS INCUBATION  BOTTLE: AEROBIC   ANAEROBIC BOTTLES    Organism Identification: BLOOD CULTURE PCR    Method Type: PCR      Culture - Blood (18 @ 19:01)    Specimen Source: BLOOD    Gram Stain Blood:   ***** CRITICAL RESULT *****  PERSON CALLED / READ-BACK: POLA LEDEZMA RN. / Y  DATE / TIME CALLED: 18 1227  CALLED BY: DOMINIC UNGER  GNMACARIO^Gram Neg Rods  AFTER: 17 HOURS INCUBATION  BOTTLE: AEROBIC   ANAEROBIC BOTTLES        Imaging Personally Reviewed:  [ ] YES  [ ] NO  Consultant(s) Notes Reviewed:  [X] YES  [ ] NO  Care Discussed with Consultants/Other Providers [ ] YES  [ ] NO

## 2018-01-10 NOTE — PROGRESS NOTE ADULT - PROBLEM SELECTOR PLAN 8
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

## 2018-01-10 NOTE — PROGRESS NOTE ADULT - PROBLEM SELECTOR PLAN 7
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   - Trend creatinine daily  - Avoid nephrotoxic agents

## 2018-01-10 NOTE — PROGRESS NOTE ADULT - ASSESSMENT
81yo Male with history of CVA in July 2017 with R-sided residual deficits s/p trach and PEG (trach reversed 2 weeks PTA), Parkinson's disease and CAD s/p CABG admitted for sepsis 2/2 complicated UTI and proteus bacteremia.

## 2018-01-10 NOTE — PROGRESS NOTE ADULT - PROBLEM SELECTOR PLAN 5
Patient more alert today.   - Continue to monitor and treat underlying infection  - c/w Parkinson's meds

## 2018-01-11 DIAGNOSIS — R74.0 NONSPECIFIC ELEVATION OF LEVELS OF TRANSAMINASE AND LACTIC ACID DEHYDROGENASE [LDH]: ICD-10-CM

## 2018-01-11 DIAGNOSIS — E87.0 HYPEROSMOLALITY AND HYPERNATREMIA: ICD-10-CM

## 2018-01-11 LAB
-  AMIKACIN: SIGNIFICANT CHANGE UP
-  AMPICILLIN/SULBACTAM: SIGNIFICANT CHANGE UP
-  AMPICILLIN: SIGNIFICANT CHANGE UP
-  AZTREONAM: SIGNIFICANT CHANGE UP
-  CEFAZOLIN: SIGNIFICANT CHANGE UP
-  CEFEPIME: SIGNIFICANT CHANGE UP
-  CEFOXITIN: SIGNIFICANT CHANGE UP
-  CEFTAZIDIME: SIGNIFICANT CHANGE UP
-  CEFTRIAXONE: SIGNIFICANT CHANGE UP
-  CIPROFLOXACIN: SIGNIFICANT CHANGE UP
-  ERTAPENEM: SIGNIFICANT CHANGE UP
-  GENTAMICIN: SIGNIFICANT CHANGE UP
-  LEVOFLOXACIN: SIGNIFICANT CHANGE UP
-  MEROPENEM: SIGNIFICANT CHANGE UP
-  PIPERACILLIN/TAZOBACTAM: SIGNIFICANT CHANGE UP
-  TOBRAMYCIN: SIGNIFICANT CHANGE UP
-  TRIMETHOPRIM/SULFAMETHOXAZOLE: SIGNIFICANT CHANGE UP
ALBUMIN SERPL ELPH-MCNC: 2 G/DL — LOW (ref 3.3–5)
ALP SERPL-CCNC: 128 U/L — HIGH (ref 40–120)
ALT FLD-CCNC: 53 U/L — HIGH (ref 4–41)
AST SERPL-CCNC: 67 U/L — HIGH (ref 4–40)
BACTERIA BLD CULT: SIGNIFICANT CHANGE UP
BACTERIA BLD CULT: SIGNIFICANT CHANGE UP
BILIRUB DIRECT SERPL-MCNC: 0.1 MG/DL — SIGNIFICANT CHANGE UP (ref 0.1–0.2)
BILIRUB SERPL-MCNC: 0.3 MG/DL — SIGNIFICANT CHANGE UP (ref 0.2–1.2)
BUN SERPL-MCNC: 38 MG/DL — HIGH (ref 7–23)
CALCIUM SERPL-MCNC: 7.4 MG/DL — LOW (ref 8.4–10.5)
CHLORIDE SERPL-SCNC: 112 MMOL/L — HIGH (ref 98–107)
CO2 SERPL-SCNC: 23 MMOL/L — SIGNIFICANT CHANGE UP (ref 22–31)
CREAT SERPL-MCNC: 0.59 MG/DL — SIGNIFICANT CHANGE UP (ref 0.5–1.3)
GGT SERPL-CCNC: 142 U/L — HIGH (ref 8–61)
GLUCOSE SERPL-MCNC: 123 MG/DL — HIGH (ref 70–99)
HCT VFR BLD CALC: 27 % — LOW (ref 39–50)
HGB BLD-MCNC: 8.4 G/DL — LOW (ref 13–17)
MAGNESIUM SERPL-MCNC: 2.1 MG/DL — SIGNIFICANT CHANGE UP (ref 1.6–2.6)
MCHC RBC-ENTMCNC: 27.2 PG — SIGNIFICANT CHANGE UP (ref 27–34)
MCHC RBC-ENTMCNC: 31.1 % — LOW (ref 32–36)
MCV RBC AUTO: 87.4 FL — SIGNIFICANT CHANGE UP (ref 80–100)
METHOD TYPE: SIGNIFICANT CHANGE UP
NRBC # FLD: 0 — SIGNIFICANT CHANGE UP
PHOSPHATE SERPL-MCNC: 2.9 MG/DL — SIGNIFICANT CHANGE UP (ref 2.5–4.5)
PLATELET # BLD AUTO: 175 K/UL — SIGNIFICANT CHANGE UP (ref 150–400)
PMV BLD: 11.4 FL — SIGNIFICANT CHANGE UP (ref 7–13)
POTASSIUM SERPL-MCNC: 3.8 MMOL/L — SIGNIFICANT CHANGE UP (ref 3.5–5.3)
POTASSIUM SERPL-SCNC: 3.8 MMOL/L — SIGNIFICANT CHANGE UP (ref 3.5–5.3)
PROT SERPL-MCNC: 6 G/DL — SIGNIFICANT CHANGE UP (ref 6–8.3)
PROTEUS SP DNA BLD POS QL NAA+NON-PROBE: SIGNIFICANT CHANGE UP
RBC # BLD: 3.09 M/UL — LOW (ref 4.2–5.8)
RBC # FLD: 18 % — HIGH (ref 10.3–14.5)
SODIUM SERPL-SCNC: 147 MMOL/L — HIGH (ref 135–145)
SPECIMEN SOURCE: SIGNIFICANT CHANGE UP
SPECIMEN SOURCE: SIGNIFICANT CHANGE UP
WBC # BLD: 6.91 K/UL — SIGNIFICANT CHANGE UP (ref 3.8–10.5)
WBC # FLD AUTO: 6.91 K/UL — SIGNIFICANT CHANGE UP (ref 3.8–10.5)

## 2018-01-11 PROCEDURE — 99222 1ST HOSP IP/OBS MODERATE 55: CPT | Mod: GC

## 2018-01-11 PROCEDURE — 74230 X-RAY XM SWLNG FUNCJ C+: CPT | Mod: 26

## 2018-01-11 RX ORDER — SODIUM CHLORIDE 9 MG/ML
1000 INJECTION, SOLUTION INTRAVENOUS
Qty: 0 | Refills: 0 | Status: DISCONTINUED | OUTPATIENT
Start: 2018-01-11 | End: 2018-01-13

## 2018-01-11 RX ORDER — CEFTRIAXONE 500 MG/1
1 INJECTION, POWDER, FOR SOLUTION INTRAMUSCULAR; INTRAVENOUS EVERY 24 HOURS
Qty: 0 | Refills: 0 | Status: DISCONTINUED | OUTPATIENT
Start: 2018-01-11 | End: 2018-01-17

## 2018-01-11 RX ORDER — ENOXAPARIN SODIUM 100 MG/ML
40 INJECTION SUBCUTANEOUS DAILY
Qty: 0 | Refills: 0 | Status: DISCONTINUED | OUTPATIENT
Start: 2018-01-12 | End: 2018-01-17

## 2018-01-11 RX ADMIN — Medication 1 DROP(S): at 19:19

## 2018-01-11 RX ADMIN — CARBIDOPA AND LEVODOPA 1.5 TABLET(S): 25; 100 TABLET ORAL at 05:39

## 2018-01-11 RX ADMIN — Medication 3 MILLILITER(S): at 22:29

## 2018-01-11 RX ADMIN — Medication 81 MILLIGRAM(S): at 12:28

## 2018-01-11 RX ADMIN — Medication 1 APPLICATION(S): at 12:29

## 2018-01-11 RX ADMIN — Medication 500 MILLIGRAM(S): at 12:28

## 2018-01-11 RX ADMIN — HEPARIN SODIUM 5000 UNIT(S): 5000 INJECTION INTRAVENOUS; SUBCUTANEOUS at 21:42

## 2018-01-11 RX ADMIN — Medication 0.25 MILLIGRAM(S): at 08:59

## 2018-01-11 RX ADMIN — Medication 1 TABLET(S): at 12:28

## 2018-01-11 RX ADMIN — Medication 3 MILLILITER(S): at 15:27

## 2018-01-11 RX ADMIN — DORZOLAMIDE HYDROCHLORIDE TIMOLOL MALEATE 1 DROP(S): 20; 5 SOLUTION/ DROPS OPHTHALMIC at 19:19

## 2018-01-11 RX ADMIN — CEFEPIME 100 MILLIGRAM(S): 1 INJECTION, POWDER, FOR SOLUTION INTRAMUSCULAR; INTRAVENOUS at 05:42

## 2018-01-11 RX ADMIN — Medication 5 MILLIGRAM(S): at 19:19

## 2018-01-11 RX ADMIN — Medication 3 MILLILITER(S): at 03:55

## 2018-01-11 RX ADMIN — CEFTRIAXONE 100 GRAM(S): 500 INJECTION, POWDER, FOR SOLUTION INTRAMUSCULAR; INTRAVENOUS at 19:17

## 2018-01-11 RX ADMIN — Medication 1 DROP(S): at 05:38

## 2018-01-11 RX ADMIN — Medication 3 MILLILITER(S): at 08:49

## 2018-01-11 RX ADMIN — LATANOPROST 1 DROP(S): 0.05 SOLUTION/ DROPS OPHTHALMIC; TOPICAL at 21:42

## 2018-01-11 RX ADMIN — Medication 5 MILLIGRAM(S): at 05:37

## 2018-01-11 RX ADMIN — Medication 200 MILLIGRAM(S): at 05:42

## 2018-01-11 RX ADMIN — Medication 200 MILLIGRAM(S): at 21:42

## 2018-01-11 RX ADMIN — HEPARIN SODIUM 5000 UNIT(S): 5000 INJECTION INTRAVENOUS; SUBCUTANEOUS at 14:34

## 2018-01-11 RX ADMIN — ZINC OXIDE 1 APPLICATION(S): 200 OINTMENT TOPICAL at 12:30

## 2018-01-11 RX ADMIN — SODIUM CHLORIDE 60 MILLILITER(S): 9 INJECTION, SOLUTION INTRAVENOUS at 22:50

## 2018-01-11 RX ADMIN — CARBIDOPA AND LEVODOPA 1.5 TABLET(S): 25; 100 TABLET ORAL at 21:42

## 2018-01-11 RX ADMIN — Medication 200 MILLIGRAM(S): at 19:19

## 2018-01-11 RX ADMIN — ATORVASTATIN CALCIUM 80 MILLIGRAM(S): 80 TABLET, FILM COATED ORAL at 21:42

## 2018-01-11 RX ADMIN — CARBIDOPA AND LEVODOPA 1.5 TABLET(S): 25; 100 TABLET ORAL at 14:34

## 2018-01-11 RX ADMIN — FINASTERIDE 5 MILLIGRAM(S): 5 TABLET, FILM COATED ORAL at 12:29

## 2018-01-11 RX ADMIN — HEPARIN SODIUM 5000 UNIT(S): 5000 INJECTION INTRAVENOUS; SUBCUTANEOUS at 05:43

## 2018-01-11 RX ADMIN — Medication 0.25 MILLIGRAM(S): at 22:30

## 2018-01-11 RX ADMIN — SENNA PLUS 10 MILLILITER(S): 8.6 TABLET ORAL at 21:42

## 2018-01-11 RX ADMIN — PANTOPRAZOLE SODIUM 40 MILLIGRAM(S): 20 TABLET, DELAYED RELEASE ORAL at 05:43

## 2018-01-11 RX ADMIN — Medication 1 MILLIGRAM(S): at 21:42

## 2018-01-11 RX ADMIN — DORZOLAMIDE HYDROCHLORIDE TIMOLOL MALEATE 1 DROP(S): 20; 5 SOLUTION/ DROPS OPHTHALMIC at 05:38

## 2018-01-11 NOTE — DIETITIAN INITIAL EVALUATION ADULT. - PHYSICAL APPEARANCE
Nutrition focused physical exam conducted - found signs of malnutrition [ ]absent [X ]present   Subcutaneous fat loss: [SEVERE]Orbital fat pads region,[SEVERE]Buccal fat region,[SEVERE]Triceps region, [SEVERE]Ribs region  Muscle wasting: [SEVERE]Temples region, [SEVERE]Clavicle region, [SEVERE]Shoulder region; unable to asses lower extremities at this time/underweight

## 2018-01-11 NOTE — DIETITIAN INITIAL EVALUATION ADULT. - PROBLEM SELECTOR PLAN 1
Pt meeting SIRS criteria with fever 100.4 documented at nursing home as well as leukocytosis of 15 with most likely source being UTI  - S/p 1L NS, will continue NS @75cc/hr for now  - Monitor BP  - F/u blood and urine cultures  - C/w vancomycin and Cefepime  - lactate wnl

## 2018-01-11 NOTE — SWALLOW VFSS/MBS ASSESSMENT ADULT - COMMENTS
Pt is an 83yo with history of CVA in July 2017 with R-sided residual deficits s/p trach and PEG (trach reversed 2 weeks PTA), Parkinson's disease and CAD s/p CABG presenting from [facility] with fever, confusion and lethargy. Pt nonverbal and history obtained from pt's wife and daughter at bedside.    Per family, pt had been in his usual state of health until 4 days ago when he was noted to have a low grade fever at his nursing home. Pt has a chronic Alexander and his urine was also noted to be dark red/brown in color. Pt appeared to be altered, more lethargic and less responsive than at baseline. The next day a urine culture was sent, which ultimately resulted today growing Proteus mirabilis resistant to fluoroquinolones, ampicillin, Macrobid and Bactrim. Pt continued to have fevers up to 102 over the weekend and was brought into the ED today after the urine culture finalized.    Pt also was hospitalized at [facility] in October 2017 for aspiration pneumonia and UTI, with urine culture growing Providencia. Pt was d/nicanor to NH where he slowly improved his functional status, being able to participate in physical therapy and having his trach removed 2 weeks ago. At baseline, pt is able to converse with some dysarthria but is able to follow commands. Pt received tube feeds through a PEG tube, however also does have limited oral intake with a pureed diet with thickened liquids. Pt is an 83yo with history of CVA in July 2017 with R-sided residual deficits s/p trach and PEG (trach reversed 2 weeks PTA), Parkinson's disease and CAD s/p CABG presenting from [facility] with fever, confusion and lethargy. Pt nonverbal and history obtained from pt's wife and daughter at bedside.    Per family, pt had been in his usual state of health until 4 days ago when he was noted to have a low grade fever at his nursing home. Pt has a chronic Alexander and his urine was also noted to be dark red/brown in color. Pt appeared to be altered, more lethargic and less responsive than at baseline. The next day a urine culture was sent, which ultimately resulted today growing Proteus mirabilis resistant to fluoroquinolones, ampicillin, Macrobid and Bactrim. Pt continued to have fevers up to 102 over the weekend and was brought into the ED today after the urine culture finalized.    Pt also was hospitalized at [facility] in October 2017 for aspiration pneumonia and UTI, with urine culture growing Providencia. Pt was d/nicanor to NH where he slowly improved his functional status, being able to participate in physical therapy and having his trach removed 2 weeks ago. At baseline, pt is able to converse with some dysarthria but is able to follow commands. Pt received tube feeds through a PEG tube, however also does have limited oral intake with a pureed diet with thickened liquids.    83yo with history of CVA in July 2017 with R-sided residual deficits s/p trach and PEG (trach reversed 2 weeks PTA), Parkinson's disease and CAD s/p CABG admitted for sepsis 2/2 complicated UTI +/- possible LLL pneumonia possibly 2/2 aspiration.

## 2018-01-11 NOTE — CONSULT NOTE ADULT - ATTENDING COMMENTS
-Proteus UTI/bacteremia  -DC cefepime  -Ceftriaxone 1 gm iv q24  -fever from UTI/bacteremia  -if fevers persist, may need CT abd/pelvis    Mil Farooq  Attending Physician   Division of Infectious Disease  Pager #[phone]  After 5pm/weekend or no response, call #[phone]

## 2018-01-11 NOTE — ADVANCED PRACTICE NURSE CONSULT - ASSESSMENT
General: Patient lethargic, non-verbal during assessment, shakes head no when asked if he is in pain, unable to confirm name or birthday. Right sided weakness, patient able to slightly squeeze with left hand when instructed, unable to mobilize right hand when instructed. Patient bedbound, indwelling lubin catheter in place, incontinent of urine. Previous tracheostomy site closed with 2 black sutures at 12 and 6 o'clock, madi tracheotomy skin with blanchable erythema. Skin warm, dry with increased moisture in intertriginous folds, poor skin turgor. Abdominal surgical scar. PEG in place. Bilateral upper and lower extremities with rigidity. Left heel blanchable erythema.    Vascular:  Areas of hyperpigmentation around bilateral malleoli. Dry, flaky skin. Thickened-yellow toenails. No temperature changes or edema noted.  Capillary refill >3 seconds. +2 DP/PT pulses, with          biphasic irregular doppler sounds.     Peritubular skin of PEG- with dry serous exudate, able to remove while cleansing. Hypergranulation tissue noted from 6-7o'clock. Scant serous drainage, no odor.     Sacrococcygeum extending to bilateral buttocks- mixed etiology evolving deep tissue pressure injury and incontinence associated dermatitis- patient lying on right side during assessment. Unable to visualize skin impairment when patient is left in natural anatomical position. In natural anatomical position injury measures [id number]. Irregular borders.  Base with 50% pale-pink moist, flat agranular tissue, 25% purple maroon discoloration of exposed dermis (largest area of purple-maroon discoloration over coccyx measures 1.5cmx1.5cmx0.2, unable to visualize when left in natural anatomical position), 25% scattered areas of fibrinous film. (+) Induration beneath areas of purple-maroon discoloration. Scant serous drainage, no odor. Periwound skin with macerated wound edges, hypopigmentation surrounded by hyperpigmentation circumferentially. 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 Goals of care: continue to monitor changes in tissue type, maintain moist environment to promote wound healing while protecting from fecal incontinence, protect periwound skin.     Incontinence associated dermatitis-    Right heel- Unstagable pressure injury complicated by arterial insufficiency- [id number], unable to determine true anatomical depth due to necrotic tissue. 100% black, dry firmly attached eschar. No drainage noted, no order. Periwound with purple-maroon discoloration from 12-2 o'clock measuring 1cmx0.5cmx0 extending from wound edge. Induration noted beneath eschar, no induration noted beneath purple-maroon discoloration. Additionally periwound skin with dry, flaky skin and blanchable erythema. No increased warmth, no edema noted. Goals of care: maintain stable eschar and monitor for changes in tissue type, protect periwound skin. General: Patient lethargic, non-verbal during assessment, shakes head no when asked if he is in pain, unable to confirm name or birthday. Right sided weakness, patient able to slightly squeeze with left hand when instructed, unable to mobilize right hand when instructed. Patient bedbound, indwelling lubin catheter in place, incontinent of urine. Previous tracheostomy site closed with 2 black sutures at 12 and 6 o'clock, madi tracheotomy skin with blanchable erythema. Skin warm, dry with increased moisture in intertriginous folds, poor skin turgor. Abdominal surgical scar. PEG in place. Bilateral upper and lower extremities with rigidity. Left heel blanchable erythema.    Vascular:  Areas of hyperpigmentation around bilateral malleoli. Dry, flaky skin. Thickened-yellow toenails. No temperature changes or edema noted.  Capillary refill >3 seconds. +2 DP/PT pulses, with  biphasic irregular doppler sounds.     Peritubular skin of PEG- with dry serous exudate, able to remove while cleansing. Hypergranulation tissue noted from 6-7o'clock. Scant serous drainage, no odor.     Sacrococcygeum extending to bilateral buttocks- mixed etiology evolving deep tissue pressure injury and incontinence associated dermatitis- patient lying on right side during assessment. Unable to visualize skin impairment when patient is left in natural anatomical position. In natural anatomical position injury measures [id number]. Irregular borders.  Base with 50% pale-pink moist, flat agranular tissue, 25% purple maroon discoloration of exposed dermis (largest area of purple-maroon discoloration over coccyx measures 1.5cmx1.5cmx0.2, unable to visualize when left in natural anatomical position), 25% scattered areas of fibrinous film. (+) Induration beneath areas of purple-maroon discoloration. Scant serous drainage, no odor. Periwound skin with macerated wound edges, hypopigmentation surrounded by hyperpigmentation circumferentially. 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 Goals of care: continue to monitor changes in tissue type, maintain moist environment to promote wound healing while protecting from fecal incontinence, protect periwound skin.     Right heel- Unstagable pressure injury complicated by arterial insufficiency- [id number], unable to determine true anatomical depth due to necrotic tissue. 100% black, dry firmly attached eschar. No drainage noted, no order. Periwound with purple-maroon discoloration from 12-2 o'clock measuring 1cmx0.5cmx0 extending from wound edge. Induration noted beneath eschar, no induration noted beneath purple-maroon discoloration. Additionally periwound skin with dry, flaky skin and blanchable erythema. No increased warmth, no edema noted. Goals of care: maintain stable eschar and monitor for changes in tissue type, protect periwound skin.

## 2018-01-11 NOTE — ADVANCED PRACTICE NURSE CONSULT - REASON FOR CONSULT
Patient seen on skin care rounds after wound care referral received for assessment of skin impairment and recommendations of topical management. Chart reviewed: WBC 11.64k/uL, Serum albumin 2.1g/dL, Benjamin 12, BMI 23.7kg/m2, patient interviewed. Patient H/O CVA with R-sided residual deficits s/p trach and PEG (s/p trach reversal 2 weeks PTA), Parkinson's disease, CAD s/p CABG, who is admitted for sepsis 2/2 complicated UTI and proteus bacteremia. Followed by Medicine team.

## 2018-01-11 NOTE — DIETITIAN INITIAL EVALUATION ADULT. - PROBLEM SELECTOR PLAN 5
Baseline creatinine of 0.4 from last admission in October 2017  - Likely 2/2 decreased PO intake per pt's family, also component of sepsis 2/2 UTI  - S/p 1L bolus NS in ED, will hold off on IVF for now and will c/w free water through PEG after maintenance IVF completed in AM  - Trend creatinine daily  - Avoid nephrotoxic agents

## 2018-01-11 NOTE — PROGRESS NOTE ADULT - PROBLEM SELECTOR PLAN 10
-HSQ.     Hiccups  - Resolved.   - C/w Reglan bid    BPH  C/w finasteride, doxazosin through PEG tube    Parkinson's Disease  Stable, c/w home dose of carbidopa-levodopa    -Diet: NPO for now. C/w tube feed    -Dispo: pending MBS. Back to [facility] on discharge. -HSQ.     Hiccups  - Resolved.   - C/w Reglan bid    BPH  C/w finasteride, doxazosin through PEG tube    Parkinson's Disease  Stable, c/w home dose of carbidopa-levodopa    Sacral ulcer:  -Wound c/s. Follow up recs    -Diet: NPO for now. C/w tube feed    -Dispo: pending MBS. Back to [facility] on discharge. New  F/u hepatic u/s  Trend LFT's  D/c heparin subcu for dvt ppx and start lovenox qd

## 2018-01-11 NOTE — PROGRESS NOTE ADULT - PROBLEM SELECTOR PLAN 7
Improved. Baseline Cr of 0.4  - Avoid nephrotoxic agents Improved. Baseline Cr of 0.4  Avoid nephrotoxic agents

## 2018-01-11 NOTE — PROGRESS NOTE ADULT - PROBLEM SELECTOR PLAN 4
Na of 147. Will d/c fluid. Continue to monitor. Na of 147. Likely 2/2 NS IVF.   Continue to monitor.

## 2018-01-11 NOTE — PROGRESS NOTE ADULT - PROBLEM SELECTOR PLAN 2
As above  - F/u repeat BCx  - C/w cefepime (day 4/10) As above  - F/u repeat BCx  - C/w cefepime (day 4/10). Adjust abx pending ID recs

## 2018-01-11 NOTE — CONSULT NOTE ADULT - SUBJECTIVE AND OBJECTIVE BOX
HPI:  82M with Parkinson's disease and CVA July 2017 with residual R-sided deficit s/p PEG and trach now reversed, CAD s/p CABG admitted 1/8/18 from nursing home for fevers and encephalopathy.   Fever 4 days prior   chronic Alexander change in color   Proteus mirabilis resistant to fluoroquinolones, ampicillin, Macrobid and Bactrim.   continued to have fevers up to 102 over the weekend and was brought into the ED culture finalized.        PAST MEDICAL & SURGICAL HISTORY:  Alexander catheter in place  Oropharyngeal dysphagia  Glaucoma  CAD (coronary artery disease)  Parkinson disease  Tracheostomy in place: removed 12/2017  Hypertension  CVA (cerebral vascular accident)  H/O tracheostomy  S/P percutaneous endoscopic gastrostomy (PEG) tube placement      Allergies    penicillin (Hives)    Intolerances        ANTIMICROBIALS:  cefepime  IVPB 2000 every 12 hours      MEDICATIONS  (STANDING):  ALBUTerol/ipratropium for Nebulization 3 milliLiter(s) Nebulizer every 6 hours  ascorbic acid 500 milliGRAM(s) Oral daily  aspirin  chewable 81 milliGRAM(s) Oral daily  atorvastatin 80 milliGRAM(s) Oral at bedtime  buDESOnide   0.25 milliGRAM(s) Respule 0.25 milliGRAM(s) Inhalation two times a day  carbidopa/levodopa  25/100 1.5 Tablet(s) Oral three times a day  cefepime  IVPB 2000 milliGRAM(s) IV Intermittent every 12 hours  collagenase Ointment 1 Application(s) Topical daily  docusate sodium Liquid 200 milliGRAM(s) Oral at bedtime  dorzolamide 2%/timolol 0.5% Ophthalmic Solution 1 Drop(s) Left EYE two times a day  doxazosin 1 milliGRAM(s) Oral at bedtime  finasteride 5 milliGRAM(s) Oral daily  guaiFENesin    Syrup 200 milliGRAM(s) Oral two times a day  heparin  Injectable 5000 Unit(s) SubCutaneous every 8 hours  latanoprost 0.005% Ophthalmic Solution 1 Drop(s) Left EYE at bedtime  metoclopramide   Syrup 5 milliGRAM(s) Oral every 12 hours  multivitamin 1 Tablet(s) Oral daily  pantoprazole   Suspension 40 milliGRAM(s) Oral before breakfast  pilocarpine 4% Solution 1 Drop(s) Left EYE two times a day  senna Syrup 10 milliLiter(s) Oral at bedtime  vitamin A &amp; D Ointment 1 Application(s) Topical daily  zinc oxide 20% Ointment 1 Application(s) Topical daily      Social History  Smoking:  Etoh:  Drug use:      FAMILY HISTORY:  No pertinent family history in first degree relatives      Vital Signs Last 24 Hrs  T(C): 37.2 (11 Jan 2018 15:03), Max: 38.3 (10 Buddy 2018 19:05)  T(F): 98.9 (11 Jan 2018 15:03), Max: 100.9 (10 Buddy 2018 19:05)  HR: 74 (11 Jan 2018 15:27) (63 - 94)  BP: 121/63 (11 Jan 2018 15:03) (102/51 - 128/64)  BP(mean): --  RR: 18 (11 Jan 2018 15:03) (17 - 24)  SpO2: 100% (11 Jan 2018 15:03) (94% - 100%)    CBC Full  -  ( 11 Jan 2018 06:30 )  WBC Count : 6.91 K/uL  Hemoglobin : 8.4 g/dL  Hematocrit : 27.0 %  Platelet Count - Automated : 175 K/uL  Mean Cell Volume : 87.4 fL  Mean Cell Hemoglobin : 27.2 pg  Mean Cell Hemoglobin Concentration : 31.1 %  Auto Neutrophil # : x  Auto Lymphocyte # : x  Auto Monocyte # : x  Auto Eosinophil # : x  Auto Basophil # : x  Auto Neutrophil % : x  Auto Lymphocyte % : x  Auto Monocyte % : x  Auto Eosinophil % : x  Auto Basophil % : x    01-11    147<H>  |  112<H>  |  38<H>  ----------------------------<  123<H>  3.8   |  23  |  0.59    Ca    7.4<L>      11 Jan 2018 06:30  Phos  2.9     01-11  Mg     2.1     01-11    TPro  6.0  /  Alb  2.0<L>  /  TBili  0.3  /  DBili  0.1  /  AST  67<H>  /  ALT  53<H>  /  AlkPhos  128<H>  01-11    LIVER FUNCTIONS - ( 11 Jan 2018 06:30 )  Alb: 2.0 g/dL / Pro: 6.0 g/dL / ALK PHOS: 128 u/L / ALT: 53 u/L / AST: 67 u/L / GGT: 142 u/L           MICROBIOLOGY:  URINE CATHETER  01-08-18 --  --  Proteus mirabilis      BLOOD  01-08-18 --  --  --      BLOOD VENOUS  01-08-18 --  --  BLOOD CULTURE PCR  Proteus mirabilis              v            RADIOLOGY    CXR:    CT HEAD:    CT CHEST:    CT ABDOMEN:    MRI:    OTHER: HPI:  82M with Parkinson's disease and CVA July 2017 with residual R-sided deficit s/p PEG and trach (now reversed), CAD s/p CABG, residing in a nursing home had fevers and change in urine color four days prior to presentation. He has a chronic indwelling lubin catheter due to his clinical condition. Urine culture grew Proteus mirabilis and due to continued fevers he was sent to the hospital and admitted 1/8/18.   Here both blood and urine cultures are growing Proteus. Due to continued fevers, ID was called.   He is minimally verbal, can only say yes/no, nod/shake his head to some questions. Denies pain, fevers, chills, or feeling sick.     PAST MEDICAL & SURGICAL HISTORY:  Lubin catheter in place  Oropharyngeal dysphagia  Glaucoma  CAD (coronary artery disease)  Parkinson disease  Tracheostomy in place: removed 12/2017  Hypertension  CVA (cerebral vascular accident)  H/O tracheostomy  S/P percutaneous endoscopic gastrostomy (PEG) tube placement      Allergies    penicillin (Hives)    Intolerances        ANTIMICROBIALS:  cefepime  IVPB 2000 every 12 hours      MEDICATIONS  (STANDING):  ALBUTerol/ipratropium for Nebulization 3 milliLiter(s) Nebulizer every 6 hours  ascorbic acid 500 milliGRAM(s) Oral daily  aspirin  chewable 81 milliGRAM(s) Oral daily  atorvastatin 80 milliGRAM(s) Oral at bedtime  buDESOnide   0.25 milliGRAM(s) Respule 0.25 milliGRAM(s) Inhalation two times a day  carbidopa/levodopa  25/100 1.5 Tablet(s) Oral three times a day  cefepime  IVPB 2000 milliGRAM(s) IV Intermittent every 12 hours  collagenase Ointment 1 Application(s) Topical daily  docusate sodium Liquid 200 milliGRAM(s) Oral at bedtime  dorzolamide 2%/timolol 0.5% Ophthalmic Solution 1 Drop(s) Left EYE two times a day  doxazosin 1 milliGRAM(s) Oral at bedtime  finasteride 5 milliGRAM(s) Oral daily  guaiFENesin    Syrup 200 milliGRAM(s) Oral two times a day  heparin  Injectable 5000 Unit(s) SubCutaneous every 8 hours  latanoprost 0.005% Ophthalmic Solution 1 Drop(s) Left EYE at bedtime  metoclopramide   Syrup 5 milliGRAM(s) Oral every 12 hours  multivitamin 1 Tablet(s) Oral daily  pantoprazole   Suspension 40 milliGRAM(s) Oral before breakfast  pilocarpine 4% Solution 1 Drop(s) Left EYE two times a day  senna Syrup 10 milliLiter(s) Oral at bedtime  vitamin A &amp; D Ointment 1 Application(s) Topical daily  zinc oxide 20% Ointment 1 Application(s) Topical daily      Social History: . Lives in a nursing home.     FAMILY HISTORY:  No pertinent family history in first degree relatives      Vital Signs Last 24 Hrs  T(C): 37.2 (11 Jan 2018 15:03), Max: 38.3 (10 Buddy 2018 19:05)  T(F): 98.9 (11 Jan 2018 15:03), Max: 100.9 (10 Buddy 2018 19:05)  HR: 74 (11 Jan 2018 15:27) (63 - 94)  BP: 121/63 (11 Jan 2018 15:03) (102/51 - 128/64)  BP(mean): --  RR: 18 (11 Jan 2018 15:03) (17 - 24)  SpO2: 100% (11 Jan 2018 15:03) (94% - 100%)    PHYSICAL EXAM  GEN: awake, alert, elderly, nontoxic   HEENT: normocephalic, no LAD, former tracheostomy site dressed, clean, no debris or drainage   CVS: irregular rhythm, normal rate, systolic murmur?   RESP: nonlabored on room air, good air entry and clear bilaterally   ABD: PEG tube site clean intact, nondistended, bowel sounds present, soft, nontender   : lubin draining dark clear yellow urine   MSK: no joint swelling   Skin: small dry ulcer right heel, dressed. no phlebitis   Vascular: no peripheral edema   Psych: makes appropriate eye contact, answers yes/no, shakes/nods head    CBC Full  -  ( 11 Jan 2018 06:30 )  WBC Count : 6.91 K/uL  Hemoglobin : 8.4 g/dL  Hematocrit : 27.0 %  Platelet Count - Automated : 175 K/uL  Mean Cell Volume : 87.4 fL  Mean Cell Hemoglobin : 27.2 pg  Mean Cell Hemoglobin Concentration : 31.1 %  Auto Neutrophil # : x  Auto Lymphocyte # : x  Auto Monocyte # : x  Auto Eosinophil # : x  Auto Basophil # : x  Auto Neutrophil % : x  Auto Lymphocyte % : x  Auto Monocyte % : x  Auto Eosinophil % : x  Auto Basophil % : x    01-11    147<H>  |  112<H>  |  38<H>  ----------------------------<  123<H>  3.8   |  23  |  0.59    Ca    7.4<L>      11 Jan 2018 06:30  Phos  2.9     01-11  Mg     2.1     01-11    TPro  6.0  /  Alb  2.0<L>  /  TBili  0.3  /  DBili  0.1  /  AST  67<H>  /  ALT  53<H>  /  AlkPhos  128<H>  01-11    LIVER FUNCTIONS - ( 11 Jan 2018 06:30 )  Alb: 2.0 g/dL / Pro: 6.0 g/dL / ALK PHOS: 128 u/L / ALT: 53 u/L / AST: 67 u/L / GGT: 142 u/L           MICROBIOLOGY:  URINE CATHETER  01-08-18 --  --  Proteus mirabilis      BLOOD  01-08-18 --  --  --      BLOOD VENOUS  01-08-18 --  --  BLOOD CULTURE PCR  Proteus mirabilis              v            RADIOLOGY    CXR:    CT HEAD:    CT CHEST:    CT ABDOMEN:    MRI:    OTHER: HPI:  82M with Parkinson's disease and CVA July 2017 with residual R-sided deficit s/p PEG and trach (now reversed), CAD s/p CABG, residing in a nursing home had fevers and change in urine color four days prior to presentation. He has a chronic indwelling lubin catheter due to his clinical condition. Urine culture grew Proteus mirabilis and due to continued fevers he was sent to the hospital and admitted 1/8/18.   Here both blood and urine cultures are growing Proteus. Due to continued fevers, ID was called.   He is minimally verbal, can only say yes/no, nod/shake his head to some questions. Denies pain, fevers, chills, or feeling sick.   History of septic shock in September-October, found to have Pseudomonas in respiratory cultures and Providencia in urine cultures, treated with Meropenem.     PAST MEDICAL & SURGICAL HISTORY:  Lubin catheter in place  Oropharyngeal dysphagia  Glaucoma  CAD (coronary artery disease)  Parkinson disease  Tracheostomy in place: removed 12/2017  Hypertension  CVA (cerebral vascular accident)  H/O tracheostomy  S/P percutaneous endoscopic gastrostomy (PEG) tube placement      Allergies    penicillin (Hives)    Intolerances        ANTIMICROBIALS:  cefepime  IVPB 2000 every 12 hours      MEDICATIONS  (STANDING):  ALBUTerol/ipratropium for Nebulization 3 milliLiter(s) Nebulizer every 6 hours  ascorbic acid 500 milliGRAM(s) Oral daily  aspirin  chewable 81 milliGRAM(s) Oral daily  atorvastatin 80 milliGRAM(s) Oral at bedtime  buDESOnide   0.25 milliGRAM(s) Respule 0.25 milliGRAM(s) Inhalation two times a day  carbidopa/levodopa  25/100 1.5 Tablet(s) Oral three times a day  cefepime  IVPB 2000 milliGRAM(s) IV Intermittent every 12 hours  collagenase Ointment 1 Application(s) Topical daily  docusate sodium Liquid 200 milliGRAM(s) Oral at bedtime  dorzolamide 2%/timolol 0.5% Ophthalmic Solution 1 Drop(s) Left EYE two times a day  doxazosin 1 milliGRAM(s) Oral at bedtime  finasteride 5 milliGRAM(s) Oral daily  guaiFENesin    Syrup 200 milliGRAM(s) Oral two times a day  heparin  Injectable 5000 Unit(s) SubCutaneous every 8 hours  latanoprost 0.005% Ophthalmic Solution 1 Drop(s) Left EYE at bedtime  metoclopramide   Syrup 5 milliGRAM(s) Oral every 12 hours  multivitamin 1 Tablet(s) Oral daily  pantoprazole   Suspension 40 milliGRAM(s) Oral before breakfast  pilocarpine 4% Solution 1 Drop(s) Left EYE two times a day  senna Syrup 10 milliLiter(s) Oral at bedtime  vitamin A &amp; D Ointment 1 Application(s) Topical daily  zinc oxide 20% Ointment 1 Application(s) Topical daily      Social History: . Lives in a nursing home.     FAMILY HISTORY:  No pertinent family history in first degree relatives      Vital Signs Last 24 Hrs  T(C): 37.2 (11 Jan 2018 15:03), Max: 38.3 (10 Buddy 2018 19:05)  T(F): 98.9 (11 Jan 2018 15:03), Max: 100.9 (10 Buddy 2018 19:05)  HR: 74 (11 Jan 2018 15:27) (63 - 94)  BP: 121/63 (11 Jan 2018 15:03) (102/51 - 128/64)  BP(mean): --  RR: 18 (11 Jan 2018 15:03) (17 - 24)  SpO2: 100% (11 Jan 2018 15:03) (94% - 100%)    PHYSICAL EXAM  GEN: awake, alert, elderly, nontoxic   HEENT: normocephalic, no LAD, former tracheostomy site dressed, clean, no debris or drainage   CVS: irregular rhythm, normal rate, systolic murmur?   RESP: nonlabored on room air, good air entry and clear bilaterally   ABD: PEG tube site clean intact, nondistended, bowel sounds present, soft, nontender   : lubin draining dark clear yellow urine   MSK: no joint swelling   Skin: small dry ulcer right heel, dressed. no phlebitis   Vascular: no peripheral edema   Psych: makes appropriate eye contact, answers yes/no, shakes/nods head    CBC Full  -  ( 11 Jan 2018 06:30 )  WBC Count : 6.91 K/uL  Hemoglobin : 8.4 g/dL  Hematocrit : 27.0 %  Platelet Count - Automated : 175 K/uL  Mean Cell Volume : 87.4 fL  Mean Cell Hemoglobin : 27.2 pg  Mean Cell Hemoglobin Concentration : 31.1 %  Auto Neutrophil # : x  Auto Lymphocyte # : x  Auto Monocyte # : x  Auto Eosinophil # : x  Auto Basophil # : x  Auto Neutrophil % : x  Auto Lymphocyte % : x  Auto Monocyte % : x  Auto Eosinophil % : x  Auto Basophil % : x    01-11    147<H>  |  112<H>  |  38<H>  ----------------------------<  123<H>  3.8   |  23  |  0.59    Ca    7.4<L>      11 Jan 2018 06:30  Phos  2.9     01-11  Mg     2.1     01-11    TPro  6.0  /  Alb  2.0<L>  /  TBili  0.3  /  DBili  0.1  /  AST  67<H>  /  ALT  53<H>  /  AlkPhos  128<H>  01-11    LIVER FUNCTIONS - ( 11 Jan 2018 06:30 )  Alb: 2.0 g/dL / Pro: 6.0 g/dL / ALK PHOS: 128 u/L / ALT: 53 u/L / AST: 67 u/L / GGT: 142 u/L           MICROBIOLOGY:  Culture - Urine (01.08.18 @ 19:14)  COLONY COUNT: > = 100,000 CFU/ML  Specimen Source: URINE CATHETER  Proteus mirabilis    -  Amikacin: S <=16 RADHA    -  Ampicillin: R >16 RADHA    -  Ampicillin/Sulbactam: S <=8/4 RADHA    -  Aztreonam: S <=4 RADHA    -  Cefazolin: S <=8 RADHA    -  Cefepime: S <=4 RADHA    -  Cefoxitin: S <=8 RADHA    -  Ceftazidime: S <=1 RADHA    -  Ceftriaxone: S <=1 RADHA    -  Ciprofloxacin: R >2 RADHA    -  Ertapenem: S <=1 RADHA    -  Gentamicin: S <=4 RADHA    -  Levofloxacin: R >4 RADHA    -  Meropenem: S <=1 RADHA    -  Nitrofurantoin: R >64 RADHA    -  Piperacillin/Tazobactam: S <=16 RADHA    -  Tobramycin: S <=4 RADHA    -  Trimethoprim/Sulfamethoxazole: R >2/38 RADHA    Culture - Blood (01.08.18 @ 17:21)  Proteus mirabilis  AFTER: 17 HOURS INCUBATION  BOTTLE: AEROBIC   ANAEROBIC BOTTLES    -  Amikacin: S <=16 RADHA    -  Ampicillin: R >16 RDAHA    -  Ciprofloxacin: R >2 RADHA    -  Ertapenem: S <=1 RADHA    -  Ceftriaxone: S <=1 RADHA    -  Ceftazidime: S <=1 RADHA    -  Cefepime: S <=4 RADHA    -  Cefoxitin: S <=8 RADHA    -  Cefazolin: S <=8 RADHA    -  Aztreonam: S <=4 RADHA    -  Ampicillin/Sulbactam: S <=8/4 RADHA    -  Proteus species: + DETECT RADHA    -  Tobramycin: S <=4 RADHA    -  Trimethoprim/Sulfamethoxazole: R >2/38 RADHA    -  Piperacillin/Tazobactam: S <=16 RADHA    -  Meropenem: S <=1 RADHA    -  Levofloxacin: R >4 RADHA    -  Gentamicin: S <=4 RADHA    RADIOLOGY  Xray Chest 1 View AP- PORTABLE-Urgent (01.08.18 @ 17:10)   Left lower lung opacity consistent with pleural effusion.

## 2018-01-11 NOTE — SWALLOW VFSS/MBS ASSESSMENT ADULT - PHARYNGEAL PHASE COMMENTS
delayed initiation of the pharyngeal swallow, reduced laryngeal elevation, reduced tongue base retraction, reduced epiglottic deflection, reduced pharyngeal constriction, pharyngeal clearance deficits

## 2018-01-11 NOTE — PROGRESS NOTE ADULT - PROBLEM SELECTOR PLAN 5
Aspiration precautions  - NPO for now.   - S/p S&S eval - [place] [facility] [place]  - NPO with peg feeding.

## 2018-01-11 NOTE — DIETITIAN INITIAL EVALUATION ADULT. - ETIOLOGY
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

## 2018-01-11 NOTE — PROGRESS NOTE ADULT - PROBLEM SELECTOR PLAN 1
2/2 proteus UTI and bacteremia  - Leukocytosis Improving.  Low grade T 100.9F overnight. resent blood Cx X2 and urine culture. pending result   - C/w Cefepime (day 4/10) 2/2 proteus UTI and bacteremia  - Leukocytosis Improving.  Low grade T 100.9F overnight. resent blood Cx X2 and urine culture. pending results   - C/w Cefepime (day 4/10)  - ID consulted.  Will consider adjusting abx

## 2018-01-11 NOTE — DIETITIAN INITIAL EVALUATION ADULT. - ORAL INTAKE PTA
Oral consumption of Tuna Salad [place] on most days for lunch; thickened liquids and small piece of cake for dinner/fair

## 2018-01-11 NOTE — CHART NOTE - NSCHARTNOTEFT_GEN_A_CORE
NUTRITION SERVICES                                                                                  MALNUTRITION ALERT     Attention Health Care Provider: Upon nutritional assessment by the Registered Dietitian your patient was determined to meet criteria / has evidence of the following diagnosis/diagnoses:    [ ] Mild Protein Calorie Malnutrition   [ ] Moderate Protein Calorie Malnutrition   [ X ] Severe Protein Calorie Malnutrition   [ ] Unspecified Protein Calorie Malnutrition   [ ] Underweight / BMI <19  [ ] Morbid Obesity / BMI >40      By signing this assessment you are acknowledging the diagnosis/diagnoses.       PLAN OF CARE: Refer to Initial Dietitian Evaluation or Nutrition Follow-Up Documentation for Nutritional Recommendations.     1) Recommend resume PEG feeds of Jevity 1.2 to meet 100% of estimated nutrition needs. Recommend reaching goal rate of 60ml/hr x 24 hours to provide total volume of 1440ml, 1728 kcal, 79.2g protein at this time + 1 packet Prosource (15g protein)     2) For bolus feeding regimen upon tolerance to continuous feeds achieved, may consider boluses of 360ml every 6 hours (4 times/day) for same total volume.      3) Further adjustments to rate/volume/duration/free water provision of enteral feeds will be determined in accordance with long term patient tolerance, needs and weight trends.

## 2018-01-11 NOTE — DIETITIAN INITIAL EVALUATION ADULT. - NS AS NUTRI INTERV ENTERAL NUTRITION3
1) Recommend resume PEG feeds of Jevity 1.2 to meet 100% of estimated nutrition needs. Recommend reaching goal rate of 60ml/hr x 24 hours to provide total volume of 1440ml, 1728 kcal, 79.2g protein at this time + 1 packet Prosource (15g protein)   2) For bolus feeding regimen upon tolerance to continuous feeds achieved, may consider boluses of 360ml every 6 hours (4 times/day) for same total volume.    3) Further adjustments to rate/volume/duration/free water provision of enteral feeds will be determined in accordance with long term patient tolerance, needs and weight trends.

## 2018-01-11 NOTE — ADVANCED PRACTICE NURSE CONSULT - RECOMMEDATIONS
Serum Pre-albumin with next blood draws.  Follow up with outpatient Podiatry or  [facility]: [phone]. Address: [address] [facility], for unstagable pressure injury of Right heel.     Topical Recommendations:  Sween 24 to bilateral upper and lower extremities daily.  Peritubular skin of PEG- Cleanse q shift with NS, apply liquid barrier film beneath luba disc.  If redness noted under luba disc bumper apply thin foam  dressing without border (Mepilex Lite)- cut to mid dressing with a 'Y' shape. Change every shift.  Secondary securement to abdomen taping in 'H' fashion with Steri-strips. Change every 5 days.    Sacrum-    Incontinence associated dermatitis-    Right heel- Cleanse wound and periwound skin with NS. Pat dry. Paint with betadine, pat dry. Apply abdominal pad secure with Kerlix wrap and paper tape. Change daily. Serum Pre-albumin with next blood draws.  Follow up with outpatient Podiatry or  [facility]: [phone]. Address: [address] [place] [address], for unstagable pressure injury of Right heel.     Topical Recommendations:  Sween 24 to bilateral upper and lower extremities daily.    Peritubular skin of PEG- Cleanse q shift with NS, apply liquid barrier film beneath luba disc.  If redness noted under luba disc bumper apply thin foam  dressing without border (Mepilex Lite)- cut to mid dressing with a 'Y' shape. Change every shift.  Secondary securement to abdomen taping in 'H' fashion with Steri-strips. Change every 5 days.    Sacrococcygeum extending to bilateral buttocks- mixed etiology evolving deep tissue pressure injury and incontinence associated dermatitis- Gently cleanse with NS. Pat dry. Apply TRIAD moisture barrier paste every shift and prn with episodes of incontinence. Note: while cleansing after episodes of incontinence gently remove soiled layer of TRIAD (do not aggressively remove remainder of paste) reapply secondary layer.    Right heel- Cleanse wound and periwound skin with NS. Pat dry. Paint with betadine, pat dry. Apply abdominal pad secure with Kerlix wrap and paper tape. Change daily.    Continue low air loss bed therapy, continue heel elevation with Z-flex fluidized positioning boots, continue to turn & reposition q2h with Z-max fluidized positioning device, soft pillow between bony prominences, continue moisture management with barrier creams & single breathable pad, continue measures to decrease friction/shear/pressure. Continue with nutritional support as per dietary/orders.    Finding and plan discussed with patient's spouse at bedside, present at bedside at completion of assessment, RN, and primary team. All questions and concerns addressed.     Please contact Wound Care Service Line if we can be of further assistance (ext [phone]).

## 2018-01-11 NOTE — SWALLOW VFSS/MBS ASSESSMENT ADULT - DIAGNOSTIC IMPRESSIONS
Patient presents with Moderate to Severe Oral Stage and Severe Pharyngeal Stage Dysphagia. The Oral Stage is characterized by adequate oral containment, slow stripping from teaspoon presentation, slow bolus manipulation, slow/delayed tongue motion with significantly slow anterior to posterior transfer of the bolus, premature loss/spillage for nectar thick liquids likely exacerbated by weak tongue motion/strength with reduced tongue to palate seal; with moderate severity of oral residue diffused in the oral cavity primarily on tongue surface post swallow. The Pharyngeal Stage is characterized by delayed initiation of the pharyngeal swallow (Bolus head to the pyriforms and enters into the laryngeal vestibule for Nect[place] Thick Liquids), reduced laryngeal elevation, reduced/poor tongue base retraction resulting in severe vallecular residue post swallow, reduced pharyngeal constriction resulting in moderate to severe pyriform/pharyngeal wall residue, and reduced pharyngoesophageal segment opening; and reduced soft palate elevation resulting in backflow from severity of the pharyngeal residue into the nasopharynx with retrieval.  There is severe pharyngeal clearance post swallow located diffused in the pharynx (vallecular/pyriforms/pharyngeal wall).  There was Aspiration before and during the swallow for [place] Thick Liquids. There was Aspiration during and after the swallow from severity of the pharyngeal residue entering into the laryngeal vestibule. Patient is not sensate to the Aspiration given no reflexive cough response.  Patient is not safe for an oral diet given severity of the oropharyngeal stage deficits. Patient presents with Moderate to Severe Oral Stage and Severe Pharyngeal Stage Dysphagia. The Oral Stage is characterized by adequate oral containment, slow stripping from teaspoon presentation, slow bolus manipulation, slow/delayed tongue motion with significantly slow anterior to posterior transfer of the bolus, premature loss/spillage for nectar thick liquids likely exacerbated by weak tongue motion/strength with reduced tongue to palate seal; with moderate severity of oral residue diffused in the oral cavity primarily on tongue surface post swallow. The Pharyngeal Stage is characterized by delayed initiation of the pharyngeal swallow (Bolus head to the pyriforms and enters into the laryngeal vestibule for Nectar Thick Liquids), reduced laryngeal elevation, reduced/poor tongue base retraction resulting in severe vallecular residue post swallow, reduced pharyngeal constriction resulting in moderate to severe pyriform/pharyngeal wall residue, and reduced pharyngoesophageal segment opening; and reduced soft palate elevation resulting from backflow from severity of the pharyngeal residue into the nasopharynx with retrieval during the swallow.  There is severe pharyngeal clearance post swallow located diffused in the pharynx (vallecular/pyriforms/pharyngeal wall).  There was Aspiration before and during the swallow for [place] Thick Liquids. There was Aspiration during and after the swallow from severity of the pharyngeal residue entering into the laryngeal vestibule. Patient is not sensate to the Aspiration given no reflexive cough response.  Patient is not safe for an oral diet given severity of the oropharyngeal stage deficits.

## 2018-01-11 NOTE — DIETITIAN INITIAL EVALUATION ADULT. - PROBLEM SELECTOR PLAN 2
Initial urine culture from 1/5 at nursing home growing >100k CFU Proteus mirabilis, sensitive to most IV antibiotics  - Would c/w vanc and Cefepime, plan for 7-day course given male complicated UTI  - Alexander will need to be changed this admission, discussed with floor RN  - F/u blood and urine cultures  - Trend WBC daily for resolution of leukocytosis

## 2018-01-11 NOTE — PROGRESS NOTE ADULT - SUBJECTIVE AND OBJECTIVE BOX
[facility] Progress Note- PGY1.    ===============================================================  CONTACT INFO   Aryan Perez M.D., PGY-1  Pager: NS- [phone], [facility]- [phone]    Mon-Fri: pager covered by day team 7am-7pm;   ***Academic conferences M-F 12pm-1pm- page ONLY if URGENT or if Consultant  Sat/Gaming Cross Coverage 12pm-7pm: NS- page [phone] for Team1-4, [facility]- pager forwarded to covering Resident  For Night coverage 7pm-7am:  [id number](NS)/[phone]([facility]) Team1-3, [phone] (NS)/[id number] ([facility]) Team4 & Care Model,  ===============================================================    Chief Complaint: Patient is a 82y old  Male who presents with a chief complaint of Fever, UTI (09 Jan 2018 10:47)        INTERVAL HPI/OVERNIGHT EVENTS:   No acute overnight event. Patient reports feeling_____. Denied fever, chill, nausea, vomiting, headache, CP, SOB, abdominal pain, diarrhea, or constipation.       MEDICATIONS  (STANDING):  ALBUTerol/ipratropium for Nebulization 3 milliLiter(s) Nebulizer every 6 hours  ascorbic acid 500 milliGRAM(s) Oral daily  aspirin  chewable 81 milliGRAM(s) Oral daily  atorvastatin 80 milliGRAM(s) Oral at bedtime  buDESOnide   0.25 milliGRAM(s) Respule 0.25 milliGRAM(s) Inhalation two times a day  carbidopa/levodopa  25/100 1.5 Tablet(s) Oral three times a day  cefepime  IVPB 2000 milliGRAM(s) IV Intermittent every 12 hours  collagenase Ointment 1 Application(s) Topical daily  docusate sodium Liquid 200 milliGRAM(s) Oral at bedtime  dorzolamide 2%/timolol 0.5% Ophthalmic Solution 1 Drop(s) Left EYE two times a day  doxazosin 1 milliGRAM(s) Oral at bedtime  finasteride 5 milliGRAM(s) Oral daily  guaiFENesin    Syrup 200 milliGRAM(s) Oral two times a day  heparin  Injectable 5000 Unit(s) SubCutaneous every 8 hours  latanoprost 0.005% Ophthalmic Solution 1 Drop(s) Left EYE at bedtime  metoclopramide   Syrup 5 milliGRAM(s) Oral every 12 hours  multivitamin 1 Tablet(s) Oral daily  pantoprazole   Suspension 40 milliGRAM(s) Oral before breakfast  pilocarpine 4% Solution 1 Drop(s) Left EYE two times a day  senna Syrup 10 milliLiter(s) Oral at bedtime  sodium chloride 0.9%. 1000 milliLiter(s) (50 mL/Hr) IV Continuous <Continuous>  vitamin A &amp; D Ointment 1 Application(s) Topical daily  zinc oxide 20% Ointment 1 Application(s) Topical daily    MEDICATIONS  (PRN):  acetaminophen    Suspension 650 milliGRAM(s) Oral every 6 hours PRN For Temp greater than 38 C (100.4 F)  acetaminophen    Suspension. 650 milliGRAM(s) Oral every 6 hours PRN Mild to moderate pain      Vital Signs Last 24 Hrs  T(C): 37.4 (11 Jan 2018 06:22), Max: 38.3 (10 Buddy 2018 19:05)  T(F): 99.3 (11 Jan 2018 06:22), Max: 100.9 (10 Buddy 2018 19:05)  HR: 69 (11 Jan 2018 06:22) (63 - 94)  BP: 109/51 (11 Jan 2018 06:22) (102/51 - 138/48)  BP(mean): --  RR: 17 (11 Jan 2018 06:22) (17 - 24)  SpO2: 95% (11 Jan 2018 06:22) (94% - 100%)  Supplemental O2: [ ] No, on Room Air [ ] Yes,     I&O's Detail    10 Buddy 2018 07:01  -  11 Jan 2018 07:00  --------------------------------------------------------  IN:    IV PiggyBack: 50 mL  Total IN: 50 mL    OUT:    Indwelling Catheter - Urethral: 600 mL  Total OUT: 600 mL    Total NET: -550 mL        CAPILLARY BLOOD GLUCOSE          PHYSICAL EXAM:  Daily     Daily    Appearance: NAD, well-developed	  HEENT:   Normal oral mucosa, PERRL, EOMI	  Neck: No JVD, no LAD, supple, trachea in midline  Cardiovascular: Normal S1 S2, No JVD, No murmurs  Respiratory: Lungs clear to auscultation, no wheezing, rhonchi  Gastrointestinal:  Soft, Non-tender, nondistended, + BS  Skin: No rashes, No ecchymoses, No cyanosis	  MSK/Extremities: Normal range of motion, 5/5 Muscle strength bilaterally. No clubbing, cyanosis or edema  Vascular: Peripheral pulses palpable 2+ bilaterally  Neurologic: Non-focal, CN II-XII intact  Psychiatry: A & O x 3, Mood & affect appropriate    LABS:                        8.6    7.30  )-----------( 164      ( 10 Buddy 2018 05:10 )             27.0     01-10    142  |  109<H>  |  45<H>  ----------------------------<  125<H>  3.8   |  23  |  0.65    Ca    7.5<L>      10 Buddy 2018 05:10  Phos  3.0     01-10  Mg     2.0     01-10      LIVER FUNCTIONS - ( 09 Jan 2018 05:30 )  Alb: 2.1 g/dL / Pro: 6.2 g/dL / ALK PHOS: 128 u/L / ALT: 33 u/L / AST: 59 u/L / GGT: x     / T. Bili 0.7 mg/dL / D. Bili x                     Microbiology:    RADIOLOGY & ADDITIONAL TESTS:    Xray -   CT -  MRI -     Imaging Personally Reviewed:  [ ] YES  [ ] NO  Consultant(s) Notes Reviewed:  [X] YES  [ ] NO  Care Discussed with Consultants/Other Providers [ ] YES  [ ] NO [facility] Progress Note- PGY1.    ===============================================================  CONTACT INFO   Aryan Perez M.D., PGY-1  Pager: NS- [phone], [facility]- [phone]    Mon-Fri: pager covered by day team 7am-7pm;   ***Academic conferences M-F 12pm-1pm- page ONLY if URGENT or if Consultant  Sat/Gaming Cross Coverage 12pm-7pm: NS- page [phone] for Team1-4, [facility]- pager forwarded to covering Resident  For Night coverage 7pm-7am:  [phone](NS)/[phone]([facility]) Team1-3, [phone] (NS)/[id number] ([facility]) Team4 & Care Model,  ===============================================================    Chief Complaint: Patient is a 82y old  Male who presents with a chief complaint of Fever, UTI (09 Jan 2018 10:47)        INTERVAL HPI/OVERNIGHT EVENTS:   Patient had T of 100.9F overnight. Blood and urine culture was sent and patient was given tylenol. This AM, T was 99.3F. Examined patient at bedside. Hiccups has resolved. Patient non-verbal but able to answer "yes" or "no". Denied fever, chill, nausea, vomiting, headache, CP, SOB, abdominal pain.       MEDICATIONS  (STANDING):  ALBUTerol/ipratropium for Nebulization 3 milliLiter(s) Nebulizer every 6 hours  ascorbic acid 500 milliGRAM(s) Oral daily  aspirin  chewable 81 milliGRAM(s) Oral daily  atorvastatin 80 milliGRAM(s) Oral at bedtime  buDESOnide   0.25 milliGRAM(s) Respule 0.25 milliGRAM(s) Inhalation two times a day  carbidopa/levodopa  25/100 1.5 Tablet(s) Oral three times a day  cefepime  IVPB 2000 milliGRAM(s) IV Intermittent every 12 hours  collagenase Ointment 1 Application(s) Topical daily  docusate sodium Liquid 200 milliGRAM(s) Oral at bedtime  dorzolamide 2%/timolol 0.5% Ophthalmic Solution 1 Drop(s) Left EYE two times a day  doxazosin 1 milliGRAM(s) Oral at bedtime  finasteride 5 milliGRAM(s) Oral daily  guaiFENesin    Syrup 200 milliGRAM(s) Oral two times a day  heparin  Injectable 5000 Unit(s) SubCutaneous every 8 hours  latanoprost 0.005% Ophthalmic Solution 1 Drop(s) Left EYE at bedtime  metoclopramide   Syrup 5 milliGRAM(s) Oral every 12 hours  multivitamin 1 Tablet(s) Oral daily  pantoprazole   Suspension 40 milliGRAM(s) Oral before breakfast  pilocarpine 4% Solution 1 Drop(s) Left EYE two times a day  senna Syrup 10 milliLiter(s) Oral at bedtime  sodium chloride 0.9%. 1000 milliLiter(s) (50 mL/Hr) IV Continuous <Continuous>  vitamin A &amp; D Ointment 1 Application(s) Topical daily  zinc oxide 20% Ointment 1 Application(s) Topical daily    MEDICATIONS  (PRN):  acetaminophen    Suspension 650 milliGRAM(s) Oral every 6 hours PRN For Temp greater than 38 C (100.4 F)  acetaminophen    Suspension. 650 milliGRAM(s) Oral every 6 hours PRN Mild to moderate pain      Vital Signs Last 24 Hrs  T(C): 37.4 (11 Jan 2018 06:22), Max: 38.3 (10 Buddy 2018 19:05)  T(F): 99.3 (11 Jan 2018 06:22), Max: 100.9 (10 Buddy 2018 19:05)  HR: 69 (11 Jan 2018 06:22) (63 - 94)  BP: 109/51 (11 Jan 2018 06:22) (102/51 - 138/48)  BP(mean): --  RR: 17 (11 Jan 2018 06:22) (17 - 24)  SpO2: 95% (11 Jan 2018 06:22) (94% - 100%)  Supplemental O2: [ ] No, on Room Air [ ] Yes,     I&O's Detail    10 Buddy 2018 07:01  -  11 Jan 2018 07:00  --------------------------------------------------------  IN:    IV PiggyBack: 50 mL  Total IN: 50 mL    OUT:    Indwelling Catheter - Urethral: 600 mL  Total OUT: 600 mL    Total NET: -550 mL        CAPILLARY BLOOD GLUCOSE          PHYSICAL EXAM:  Appearance: NAD, well-developed.   HEENT:   NC/AT  Neck: supple, bandage covered on previous trach site  Cardiovascular: Normal S1 S2, No JVD, No murmurs  Respiratory: Lungs clear to auscultation, no wheezing, rhonchi, decrease breath sound on left lower lung  Gastrointestinal:  Soft, nondistended, + BS.   : lubin intact. draining dark yellow urine.   Skin: No rashes, No ecchymoses, No cyanosis	  MSK/Extremities: unable to assess  Vascular: Peripheral pulses palpable 2+ bilaterally  Neurologic: unable to assess  Psychiatry: more alert today. answer yes and no to questions.     LABS:                        8.6    7.30  )-----------( 164      ( 10 Buddy 2018 05:10 )             27.0     01-10    142  |  109<H>  |  45<H>  ----------------------------<  125<H>  3.8   |  23  |  0.65    Ca    7.5<L>      10 Buddy 2018 05:10  Phos  3.0     01-10  Mg     2.0     01-10      LIVER FUNCTIONS - ( 09 Jan 2018 05:30 )  Alb: 2.1 g/dL / Pro: 6.2 g/dL / ALK PHOS: 128 u/L / ALT: 33 u/L / AST: 59 u/L / GGT: x     / T. Bili 0.7 mg/dL / D. Bili x           Microbiology:  Blood Cx X2 and urine Cx pending.       Imaging Personally Reviewed:  [ ] YES  [ ] NO  Consultant(s) Notes Reviewed:  [X] YES  [ ] NO  Care Discussed with Consultants/Other Providers [ ] YES  [ ] NO [facility] Progress Note- PGY1 cosigned with IM Attending Chente Jaime who is Covering Today For      ===============================================================  CONTACT INFO   Aryan Perez M.D., PGY-1  Pager: NS- [phone], [facility]- [phone]    Mon-Fri: pager covered by day team 7am-7pm;   ***Academic conferences M-F 12pm-1pm- page ONLY if URGENT or if Consultant  Sat/Gaming Cross Coverage 12pm-7pm: NS- page [phone] for Team1-4, [facility]- pager forwarded to covering Resident  For Night coverage 7pm-7am:  [phone](NS)/[id number]([facility]) Team1-3, [phone] (NS)/[id number] ([facility]) Team4 & Care Model,  ===============================================================    Chief Complaint: Patient is a 82y old  Male who presents with a chief complaint of Fever, UTI (09 Jan 2018 10:47)        INTERVAL HPI/OVERNIGHT EVENTS:   Patient had T of 100.9F overnight. Blood and urine culture was sent and patient was given tylenol. This AM, T was 99.3F. Examined patient at bedside. Hiccups has resolved. Patient non-verbal but able to answer "yes" or "no". Denied fever, chill, nausea, vomiting, headache, CP, SOB, abdominal pain.       MEDICATIONS  (STANDING):  ALBUTerol/ipratropium for Nebulization 3 milliLiter(s) Nebulizer every 6 hours  ascorbic acid 500 milliGRAM(s) Oral daily  aspirin  chewable 81 milliGRAM(s) Oral daily  atorvastatin 80 milliGRAM(s) Oral at bedtime  buDESOnide   0.25 milliGRAM(s) Respule 0.25 milliGRAM(s) Inhalation two times a day  carbidopa/levodopa  25/100 1.5 Tablet(s) Oral three times a day  cefepime  IVPB 2000 milliGRAM(s) IV Intermittent every 12 hours  collagenase Ointment 1 Application(s) Topical daily  docusate sodium Liquid 200 milliGRAM(s) Oral at bedtime  dorzolamide 2%/timolol 0.5% Ophthalmic Solution 1 Drop(s) Left EYE two times a day  doxazosin 1 milliGRAM(s) Oral at bedtime  finasteride 5 milliGRAM(s) Oral daily  guaiFENesin    Syrup 200 milliGRAM(s) Oral two times a day  heparin  Injectable 5000 Unit(s) SubCutaneous every 8 hours  latanoprost 0.005% Ophthalmic Solution 1 Drop(s) Left EYE at bedtime  metoclopramide   Syrup 5 milliGRAM(s) Oral every 12 hours  multivitamin 1 Tablet(s) Oral daily  pantoprazole   Suspension 40 milliGRAM(s) Oral before breakfast  pilocarpine 4% Solution 1 Drop(s) Left EYE two times a day  senna Syrup 10 milliLiter(s) Oral at bedtime  sodium chloride 0.9%. 1000 milliLiter(s) (50 mL/Hr) IV Continuous <Continuous>  vitamin A &amp; D Ointment 1 Application(s) Topical daily  zinc oxide 20% Ointment 1 Application(s) Topical daily    MEDICATIONS  (PRN):  acetaminophen    Suspension 650 milliGRAM(s) Oral every 6 hours PRN For Temp greater than 38 C (100.4 F)  acetaminophen    Suspension. 650 milliGRAM(s) Oral every 6 hours PRN Mild to moderate pain      Vital Signs Last 24 Hrs  T(C): 37.4 (11 Jan 2018 06:22), Max: 38.3 (10 Buddy 2018 19:05)  T(F): 99.3 (11 Jan 2018 06:22), Max: 100.9 (10 Buddy 2018 19:05)  HR: 69 (11 Jan 2018 06:22) (63 - 94)  BP: 109/51 (11 Jan 2018 06:22) (102/51 - 138/48)  BP(mean): --  RR: 17 (11 Jan 2018 06:22) (17 - 24)  SpO2: 95% (11 Jan 2018 06:22) (94% - 100%)  Supplemental O2: [ ] No, on Room Air [ ] Yes,     I&O's Detail    10 Buddy 2018 07:01  -  11 Jan 2018 07:00  --------------------------------------------------------  IN:    IV PiggyBack: 50 mL  Total IN: 50 mL    OUT:    Indwelling Catheter - Urethral: 600 mL  Total OUT: 600 mL    Total NET: -550 mL      PHYSICAL EXAM:  Appearance: NAD, well-developed.   HEENT:   NC/AT  Neck: supple, bandage covered on previous trach site  Cardiovascular: Normal S1 S2, No JVD, No murmurs  Respiratory: Lungs clear to auscultation, no wheezing, rhonchi, decrease breath sound on left lower lung  Gastrointestinal:  Soft, nondistended, + BS.   : lubin intact. draining dark yellow urine.   Skin: No rashes, No ecchymoses, No cyanosis	  MSK/Extremities: unable to assess  Vascular: Peripheral pulses palpable 2+ bilaterally  Neurologic: unable to assess  Psychiatry: more alert today. answer yes and no to questions.     LABS:                        8.6    7.30  )-----------( 164      ( 10 Buddy 2018 05:10 )             27.0     01-10    142  |  109<H>  |  45<H>  ----------------------------<  125<H>  3.8   |  23  |  0.65    Ca    7.5<L>      10 Buddy 2018 05:10  Phos  3.0     01-10  Mg     2.0     01-10      LIVER FUNCTIONS - ( 09 Jan 2018 05:30 )  Alb: 2.1 g/dL / Pro: 6.2 g/dL / ALK PHOS: 128 u/L / ALT: 33 u/L / AST: 59 u/L / GGT: x     / T. Bili 0.7 mg/dL / D. Bili x           Microbiology:  Repeat Blood Cx X2 and urine Cx pending.       Imaging Personally Reviewed:  [ ] YES  [ ] NO  Consultant(s) Notes Reviewed:  [X] YES  [ ] NO  Care Discussed with Consultants/Other Providers [ ] YES  [ ] NO

## 2018-01-11 NOTE — CONSULT NOTE ADULT - ASSESSMENT
82M with Parkinson's, CVA, CAD s/p CABG, history of septic shock in 9-10/2017 with Pseudomonas in respiratory cultures and Providencia in urine cultures, treated with Meropenem.   Admitted 1/8/18 from NH for fevers. Proteus UTI with bacteremia.   Clinically nontoxic. Low grade fevers, recurred three days after appropriate antibiotics.     Recommend  -deescalate to Ceftriaxone 1GM IV q24h (ordered)  -f/u repeat blood cultures (1/10 and 1/11)   -imaging US or CT to rule out nidus of infection- stone, abscess etc    Discussed with Dr Farooq, attempted to callback primary team [id number] no response

## 2018-01-11 NOTE — DIETITIAN INITIAL EVALUATION ADULT. - SIGNS/SYMPTOMS
as evidenced by 13%/ 24# weight loss over past 6-7 month, loss of lean body mass as evidenced by PEG dependant, PO contraindicated per MBS results this morning

## 2018-01-11 NOTE — SWALLOW VFSS/MBS ASSESSMENT ADULT - ORAL PREP COMMENTS
Patient is able to slowly strip from teaspoon presentation; slow bolus manipulation Patient is able to strip from teaspoon presentation, slow bolus manipulation Patient able to strip from teaspoon presentation, slow bolus manipulation

## 2018-01-11 NOTE — DIETITIAN INITIAL EVALUATION ADULT. - PROBLEM SELECTOR PLAN 3
CXR showing possible LLL consolidation  - Per pt's family, pt had been tolerating feeds by mouth at nursing home  - C/w vancomycin and Cefepime, plan for 5-7 day course  - NPO for now, aspiration precautions  - Speech and swallow evaluation

## 2018-01-11 NOTE — DIETITIAN INITIAL EVALUATION ADULT. - OTHER INFO
Nutrition consult received for Tube Feeding; admitted for UTI. S/p MBS this morning and PO remains contraindicated. Currently ordered to receive Jevity 1.2 @65mlx 18 hours to provide total volume 1170ml,  1404 kcal, 64g protein. No issues tolerating PEG feeds reported at this time. Wife at bedside explains that patient has lost a lot of weight since CVA July 2017. UBW at the time was 175#.

## 2018-01-11 NOTE — SWALLOW VFSS/MBS ASSESSMENT ADULT - RECOMMENDED CONSISTENCY
1.) Continue with Non Oral Means of Nutrition via G-Tube Feedings for caloric/hydration/medication.  2.) Strict Aspiration Precautions  3.) Maintain Good Oral Hygiene Care to reduce bacterial colonization of the oral cavity.

## 2018-01-11 NOTE — DIETITIAN INITIAL EVALUATION ADULT. - PROBLEM SELECTOR PLAN 10
DVT ppx: HSQ  Diet: NPO with tube feeds- Jevity  Dispo: Likely back to [facility]

## 2018-01-11 NOTE — PROGRESS NOTE ADULT - PROBLEM SELECTOR PLAN 9
Nutrition consult  -Pending [facility] study to guide on feeding modality  -Supplement tube feeds Supplement tube feeds

## 2018-01-11 NOTE — PROGRESS NOTE ADULT - ASSESSMENT
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 Mental status improved.

## 2018-01-11 NOTE — DIETITIAN INITIAL EVALUATION ADULT. - PROBLEM SELECTOR PLAN 4
Pt nonverbal at time of exam and confused per family  - Continue to monitor and treat underlying infection  - c/w Parkinson's meds, if no improvement would check CTH for further eval

## 2018-01-12 DIAGNOSIS — A49.8 OTHER BACTERIAL INFECTIONS OF UNSPECIFIED SITE: ICD-10-CM

## 2018-01-12 DIAGNOSIS — N39.0 URINARY TRACT INFECTION, SITE NOT SPECIFIED: ICD-10-CM

## 2018-01-12 LAB
24R-OH-CALCIDIOL SERPL-MCNC: 33.2 NG/ML — SIGNIFICANT CHANGE UP (ref 30–80)
ALBUMIN SERPL ELPH-MCNC: 2 G/DL — LOW (ref 3.3–5)
ALBUMIN SERPL ELPH-MCNC: 2 G/DL — LOW (ref 3.3–5)
ALP SERPL-CCNC: 120 U/L — SIGNIFICANT CHANGE UP (ref 40–120)
ALP SERPL-CCNC: 120 U/L — SIGNIFICANT CHANGE UP (ref 40–120)
ALT FLD-CCNC: 37 U/L — SIGNIFICANT CHANGE UP (ref 4–41)
ALT FLD-CCNC: 37 U/L — SIGNIFICANT CHANGE UP (ref 4–41)
AST SERPL-CCNC: 49 U/L — HIGH (ref 4–40)
AST SERPL-CCNC: 49 U/L — HIGH (ref 4–40)
BILIRUB DIRECT SERPL-MCNC: 0.1 MG/DL — SIGNIFICANT CHANGE UP (ref 0.1–0.2)
BILIRUB SERPL-MCNC: 0.3 MG/DL — SIGNIFICANT CHANGE UP (ref 0.2–1.2)
BILIRUB SERPL-MCNC: 0.3 MG/DL — SIGNIFICANT CHANGE UP (ref 0.2–1.2)
BUN SERPL-MCNC: 36 MG/DL — HIGH (ref 7–23)
CALCIUM SERPL-MCNC: 7.6 MG/DL — LOW (ref 8.4–10.5)
CHLORIDE SERPL-SCNC: 112 MMOL/L — HIGH (ref 98–107)
CO2 SERPL-SCNC: 25 MMOL/L — SIGNIFICANT CHANGE UP (ref 22–31)
CREAT SERPL-MCNC: 0.58 MG/DL — SIGNIFICANT CHANGE UP (ref 0.5–1.3)
FOLATE SERPL-MCNC: > 20 NG/ML — HIGH (ref 4.7–20)
GLUCOSE SERPL-MCNC: 148 MG/DL — HIGH (ref 70–99)
HCT VFR BLD CALC: 27.9 % — LOW (ref 39–50)
HGB BLD-MCNC: 8.5 G/DL — LOW (ref 13–17)
MAGNESIUM SERPL-MCNC: 2 MG/DL — SIGNIFICANT CHANGE UP (ref 1.6–2.6)
MCHC RBC-ENTMCNC: 26.9 PG — LOW (ref 27–34)
MCHC RBC-ENTMCNC: 30.5 % — LOW (ref 32–36)
MCV RBC AUTO: 88.3 FL — SIGNIFICANT CHANGE UP (ref 80–100)
METHOD TYPE: SIGNIFICANT CHANGE UP
NRBC # FLD: 0 — SIGNIFICANT CHANGE UP
ORGANISM # SPEC MICROSCOPIC CNT: SIGNIFICANT CHANGE UP
ORGANISM # SPEC MICROSCOPIC CNT: SIGNIFICANT CHANGE UP
PHOSPHATE SERPL-MCNC: 2.6 MG/DL — SIGNIFICANT CHANGE UP (ref 2.5–4.5)
PLATELET # BLD AUTO: 201 K/UL — SIGNIFICANT CHANGE UP (ref 150–400)
PMV BLD: 11.5 FL — SIGNIFICANT CHANGE UP (ref 7–13)
POTASSIUM SERPL-MCNC: 3.8 MMOL/L — SIGNIFICANT CHANGE UP (ref 3.5–5.3)
POTASSIUM SERPL-SCNC: 3.8 MMOL/L — SIGNIFICANT CHANGE UP (ref 3.5–5.3)
PROT SERPL-MCNC: 6.1 G/DL — SIGNIFICANT CHANGE UP (ref 6–8.3)
PROT SERPL-MCNC: 6.1 G/DL — SIGNIFICANT CHANGE UP (ref 6–8.3)
RBC # BLD: 3.16 M/UL — LOW (ref 4.2–5.8)
RBC # FLD: 18 % — HIGH (ref 10.3–14.5)
SODIUM SERPL-SCNC: 146 MMOL/L — HIGH (ref 135–145)
SPECIMEN SOURCE: SIGNIFICANT CHANGE UP
VIT B12 SERPL-MCNC: 988 PG/ML — HIGH (ref 200–900)
WBC # BLD: 7.89 K/UL — SIGNIFICANT CHANGE UP (ref 3.8–10.5)
WBC # FLD AUTO: 7.89 K/UL — SIGNIFICANT CHANGE UP (ref 3.8–10.5)

## 2018-01-12 PROCEDURE — 99232 SBSQ HOSP IP/OBS MODERATE 35: CPT

## 2018-01-12 RX ADMIN — DORZOLAMIDE HYDROCHLORIDE TIMOLOL MALEATE 1 DROP(S): 20; 5 SOLUTION/ DROPS OPHTHALMIC at 06:39

## 2018-01-12 RX ADMIN — CARBIDOPA AND LEVODOPA 1.5 TABLET(S): 25; 100 TABLET ORAL at 21:23

## 2018-01-12 RX ADMIN — Medication 81 MILLIGRAM(S): at 11:07

## 2018-01-12 RX ADMIN — ATORVASTATIN CALCIUM 80 MILLIGRAM(S): 80 TABLET, FILM COATED ORAL at 21:23

## 2018-01-12 RX ADMIN — Medication 500 MILLIGRAM(S): at 11:05

## 2018-01-12 RX ADMIN — Medication 200 MILLIGRAM(S): at 21:23

## 2018-01-12 RX ADMIN — SODIUM CHLORIDE 60 MILLILITER(S): 9 INJECTION, SOLUTION INTRAVENOUS at 17:52

## 2018-01-12 RX ADMIN — Medication 1 APPLICATION(S): at 18:31

## 2018-01-12 RX ADMIN — SENNA PLUS 10 MILLILITER(S): 8.6 TABLET ORAL at 21:23

## 2018-01-12 RX ADMIN — DORZOLAMIDE HYDROCHLORIDE TIMOLOL MALEATE 1 DROP(S): 20; 5 SOLUTION/ DROPS OPHTHALMIC at 17:52

## 2018-01-12 RX ADMIN — Medication 5 MILLIGRAM(S): at 06:38

## 2018-01-12 RX ADMIN — ZINC OXIDE 1 APPLICATION(S): 200 OINTMENT TOPICAL at 11:05

## 2018-01-12 RX ADMIN — SODIUM CHLORIDE 60 MILLILITER(S): 9 INJECTION, SOLUTION INTRAVENOUS at 21:23

## 2018-01-12 RX ADMIN — Medication 1 TABLET(S): at 11:04

## 2018-01-12 RX ADMIN — CEFTRIAXONE 100 GRAM(S): 500 INJECTION, POWDER, FOR SOLUTION INTRAMUSCULAR; INTRAVENOUS at 18:02

## 2018-01-12 RX ADMIN — ENOXAPARIN SODIUM 40 MILLIGRAM(S): 100 INJECTION SUBCUTANEOUS at 11:05

## 2018-01-12 RX ADMIN — FINASTERIDE 5 MILLIGRAM(S): 5 TABLET, FILM COATED ORAL at 11:04

## 2018-01-12 RX ADMIN — Medication 1 DROP(S): at 06:39

## 2018-01-12 RX ADMIN — Medication 1 DROP(S): at 17:51

## 2018-01-12 RX ADMIN — PANTOPRAZOLE SODIUM 40 MILLIGRAM(S): 20 TABLET, DELAYED RELEASE ORAL at 06:39

## 2018-01-12 RX ADMIN — Medication 3 MILLILITER(S): at 21:49

## 2018-01-12 RX ADMIN — Medication 3 MILLILITER(S): at 16:11

## 2018-01-12 RX ADMIN — Medication 200 MILLIGRAM(S): at 06:38

## 2018-01-12 RX ADMIN — Medication 200 MILLIGRAM(S): at 17:51

## 2018-01-12 RX ADMIN — CARBIDOPA AND LEVODOPA 1.5 TABLET(S): 25; 100 TABLET ORAL at 15:03

## 2018-01-12 RX ADMIN — Medication 0.25 MILLIGRAM(S): at 10:14

## 2018-01-12 RX ADMIN — Medication 1 MILLIGRAM(S): at 21:23

## 2018-01-12 RX ADMIN — Medication 1 APPLICATION(S): at 11:05

## 2018-01-12 RX ADMIN — LATANOPROST 1 DROP(S): 0.05 SOLUTION/ DROPS OPHTHALMIC; TOPICAL at 21:24

## 2018-01-12 RX ADMIN — Medication 5 MILLIGRAM(S): at 17:51

## 2018-01-12 RX ADMIN — CARBIDOPA AND LEVODOPA 1.5 TABLET(S): 25; 100 TABLET ORAL at 06:37

## 2018-01-12 RX ADMIN — Medication 3 MILLILITER(S): at 10:11

## 2018-01-12 RX ADMIN — Medication 3 MILLILITER(S): at 04:13

## 2018-01-12 RX ADMIN — Medication 0.25 MILLIGRAM(S): at 21:59

## 2018-01-12 NOTE — PROGRESS NOTE ADULT - ASSESSMENT
82M with Parkinson's, CVA, CAD s/p CABG, history of septic shock in 9-10/2017 with Pseudomonas in respiratory cultures and Providencia in urine cultures, treated with Meropenem.   Admitted 1/8/18 from NH for fevers. Proteus UTI with bacteremia.   Clinically nontoxic. Low grade fevers, recurred three days after appropriate antibiotics.

## 2018-01-12 NOTE — PROGRESS NOTE ADULT - SUBJECTIVE AND OBJECTIVE BOX
[facility] Progress Note- PGY1.    ===============================================================  CONTACT INFO   Aryan Perez M.D., PGY-1  Pager: NS- [phone], [facility]- [phone]    Mon-Fri: pager covered by day team 7am-7pm;   ***Academic conferences M-F 12pm-1pm- page ONLY if URGENT or if Consultant  Sat/Gaming Cross Coverage 12pm-7pm: NS- page [phone] for Team1-4, [facility]- pager forwarded to covering Resident  For Night coverage 7pm-7am:  [phone](NS)/[phone]([facility]) Team1-3, [phone] (NS)/[phone] ([facility]) Team4 & Care Model,  ===============================================================    Chief Complaint: Patient is a 82y old  Male who presents with a chief complaint of Fever, UTI (2018 10:47)        INTERVAL HPI/OVERNIGHT EVENTS:   No acute overnight event. Patient reports feeling_____. Denied fever, chill, nausea, vomiting, headache, CP, SOB, abdominal pain, diarrhea, or constipation.       MEDICATIONS  (STANDING):  ALBUTerol/ipratropium for Nebulization 3 milliLiter(s) Nebulizer every 6 hours  ascorbic acid 500 milliGRAM(s) Oral daily  aspirin  chewable 81 milliGRAM(s) Oral daily  atorvastatin 80 milliGRAM(s) Oral at bedtime  buDESOnide   0.25 milliGRAM(s) Respule 0.25 milliGRAM(s) Inhalation two times a day  carbidopa/levodopa  25/100 1.5 Tablet(s) Oral three times a day  cefTRIAXone   IVPB 1 Gram(s) IV Intermittent every 24 hours  collagenase Ointment 1 Application(s) Topical daily  docusate sodium Liquid 200 milliGRAM(s) Oral at bedtime  dorzolamide 2%/timolol 0.5% Ophthalmic Solution 1 Drop(s) Left EYE two times a day  doxazosin 1 milliGRAM(s) Oral at bedtime  enoxaparin Injectable 40 milliGRAM(s) SubCutaneous daily  finasteride 5 milliGRAM(s) Oral daily  guaiFENesin    Syrup 200 milliGRAM(s) Oral two times a day  latanoprost 0.005% Ophthalmic Solution 1 Drop(s) Left EYE at bedtime  metoclopramide   Syrup 5 milliGRAM(s) Oral every 12 hours  multivitamin 1 Tablet(s) Oral daily  pantoprazole   Suspension 40 milliGRAM(s) Oral before breakfast  pilocarpine 4% Solution 1 Drop(s) Left EYE two times a day  senna Syrup 10 milliLiter(s) Oral at bedtime  sodium chloride 0.45%. 1000 milliLiter(s) (60 mL/Hr) IV Continuous <Continuous>  vitamin A &amp; D Ointment 1 Application(s) Topical daily  zinc oxide 20% Ointment 1 Application(s) Topical daily    MEDICATIONS  (PRN):  acetaminophen    Suspension 650 milliGRAM(s) Oral every 6 hours PRN For Temp greater than 38 C (100.4 F)  acetaminophen    Suspension. 650 milliGRAM(s) Oral every 6 hours PRN Mild to moderate pain      Vital Signs Last 24 Hrs  T(C): 36.8 (2018 06:01), Max: 37.2 (2018 15:03)  T(F): 98.3 (2018 06:01), Max: 98.9 (2018 15:03)  HR: 66 (2018 06:01) (66 - 77)  BP: 120/62 (2018 06:01) (115/64 - 140/88)  BP(mean): --  RR: 18 (2018 06:01) (18 - 20)  SpO2: 100% (2018 06:01) (95% - 100%)  Supplemental O2: [ ] No, on Room Air [ ] Yes,     I&O's Detail    2018 07:01  -  2018 07:00  --------------------------------------------------------  IN:  Total IN: 0 mL    OUT:    Indwelling Catheter - Urethral: 460 mL  Total OUT: 460 mL    Total NET: -460 mL        CAPILLARY BLOOD GLUCOSE          PHYSICAL EXAM:  Daily     Daily Weight in k.6 (2018 11:59)   Appearance: NAD, well-developed	  HEENT:   Normal oral mucosa, PERRL, EOMI	  Neck: No JVD, no LAD, supple, trachea in midline  Cardiovascular: Normal S1 S2, No JVD, No murmurs  Respiratory: Lungs clear to auscultation, no wheezing, rhonchi  Gastrointestinal:  Soft, Non-tender, nondistended, + BS  Skin: No rashes, No ecchymoses, No cyanosis	  MSK/Extremities: Normal range of motion, 5/5 Muscle strength bilaterally. No clubbing, cyanosis or edema  Vascular: Peripheral pulses palpable 2+ bilaterally  Neurologic: Non-focal, CN II-XII intact  Psychiatry: A & O x 3, Mood & affect appropriate    LABS:                        8.5    7.89  )-----------( 201      ( 2018 05:25 )             27.9     01-11    147<H>  |  112<H>  |  38<H>  ----------------------------<  123<H>  3.8   |  23  |  0.59    Ca    7.4<L>      2018 06:30  Phos  2.9       Mg     2.1         TPro  6.0  /  Alb  2.0<L>  /  TBili  0.3  /  DBili  0.1  /  AST  67<H>  /  ALT  53<H>  /  AlkPhos  128<H>      LIVER FUNCTIONS - ( 2018 06:30 )  Alb: 2.0 g/dL / Pro: 6.0 g/dL / ALK PHOS: 128 u/L / ALT: 53 u/L / AST: 67 u/L / GGT: 142 u/L / T. Bili 0.3 mg/dL / D. Bili 0.1 mg/dL                 Microbiology:    RADIOLOGY & ADDITIONAL TESTS:    Xray -   CT -  MRI -     Imaging Personally Reviewed:  [ ] YES  [ ] NO  Consultant(s) Notes Reviewed:  [X] YES  [ ] NO  Care Discussed with Consultants/Other Providers [ ] YES  [ ] NO [facility] Progress Note- PGY1.    ===============================================================  CONTACT INFO   Aryan Perez M.D., PGY-1  Pager: NS- [phone], [facility]- [phone]    Mon-Fri: pager covered by day team 7am-7pm;   ***Academic conferences M-F 12pm-1pm- page ONLY if URGENT or if Consultant  Sat/Gaming Cross Coverage 12pm-7pm: NS- page [phone] for Team1-4, [facility]- pager forwarded to covering Resident  For Night coverage 7pm-7am:  [phone](NS)/[phone]([facility]) Team1-3, [phone] (NS)/[phone] ([facility]) Team4 & Care Model,  ===============================================================    Chief Complaint: Patient is a 82y old  Male who presents with a chief complaint of Fever, UTI (09 Jan 2018 10:47)        INTERVAL HPI/OVERNIGHT EVENTS:   No acute overnight event. Blood Cx grew coag negative staph in 1 of 5 bottles. Patient remain non-verbal, but was alert.  Denied fever, chill, CP, SOB, abdominal pain, diarrhea, or constipation.       MEDICATIONS  (STANDING):  ALBUTerol/ipratropium for Nebulization 3 milliLiter(s) Nebulizer every 6 hours  ascorbic acid 500 milliGRAM(s) Oral daily  aspirin  chewable 81 milliGRAM(s) Oral daily  atorvastatin 80 milliGRAM(s) Oral at bedtime  buDESOnide   0.25 milliGRAM(s) Respule 0.25 milliGRAM(s) Inhalation two times a day  carbidopa/levodopa  25/100 1.5 Tablet(s) Oral three times a day  cefTRIAXone   IVPB 1 Gram(s) IV Intermittent every 24 hours  collagenase Ointment 1 Application(s) Topical daily  docusate sodium Liquid 200 milliGRAM(s) Oral at bedtime  dorzolamide 2%/timolol 0.5% Ophthalmic Solution 1 Drop(s) Left EYE two times a day  doxazosin 1 milliGRAM(s) Oral at bedtime  enoxaparin Injectable 40 milliGRAM(s) SubCutaneous daily  finasteride 5 milliGRAM(s) Oral daily  guaiFENesin    Syrup 200 milliGRAM(s) Oral two times a day  latanoprost 0.005% Ophthalmic Solution 1 Drop(s) Left EYE at bedtime  metoclopramide   Syrup 5 milliGRAM(s) Oral every 12 hours  multivitamin 1 Tablet(s) Oral daily  pantoprazole   Suspension 40 milliGRAM(s) Oral before breakfast  pilocarpine 4% Solution 1 Drop(s) Left EYE two times a day  senna Syrup 10 milliLiter(s) Oral at bedtime  sodium chloride 0.45%. 1000 milliLiter(s) (60 mL/Hr) IV Continuous <Continuous>  vitamin A &amp; D Ointment 1 Application(s) Topical daily  zinc oxide 20% Ointment 1 Application(s) Topical daily    MEDICATIONS  (PRN):  acetaminophen    Suspension 650 milliGRAM(s) Oral every 6 hours PRN For Temp greater than 38 C (100.4 F)  acetaminophen    Suspension. 650 milliGRAM(s) Oral every 6 hours PRN Mild to moderate pain      Vital Signs Last 24 Hrs  T(C): 36.8 (12 Jan 2018 06:01), Max: 37.2 (11 Jan 2018 15:03)  T(F): 98.3 (12 Jan 2018 06:01), Max: 98.9 (11 Jan 2018 15:03)  HR: 66 (12 Jan 2018 06:01) (66 - 77)  BP: 120/62 (12 Jan 2018 06:01) (115/64 - 140/88)  BP(mean): --  RR: 18 (12 Jan 2018 06:01) (18 - 20)  SpO2: 100% (12 Jan 2018 06:01) (95% - 100%)  Supplemental O2: [ ] No, on Room Air [ ] Yes,     I&O's Detail    11 Jan 2018 07:01  -  12 Jan 2018 07:00  --------------------------------------------------------  IN:  Total IN: 0 mL    OUT:    Indwelling Catheter - Urethral: 460 mL  Total OUT: 460 mL    Total NET: -460 mL        CAPILLARY BLOOD GLUCOSE          PHYSICAL EXAM:  Appearance: NAD, well-developed.   HEENT:   NC/AT  Neck: supple, bandage covered on previous trach site  Cardiovascular: Normal S1 S2, No JVD, No murmurs  Respiratory: Lungs clear to auscultation, no wheezing, rhonchi, decrease breath sound on left lower lung  Gastrointestinal:  Soft, nondistended, + BS.   : lubin intact. draining yellow urine.   Skin: No rashes, No ecchymoses, No cyanosis	  MSK/Extremities: unable to assess  Vascular: Peripheral pulses palpable 2+ bilaterally  Neurologic: unable to assess  Psychiatry: alert today. answer yes and no to questions.     LABS:                        8.5    7.89  )-----------( 201      ( 12 Jan 2018 05:25 )             27.9     01-11    147<H>  |  112<H>  |  38<H>  ----------------------------<  123<H>  3.8   |  23  |  0.59    Ca    7.4<L>      11 Jan 2018 06:30  Phos  2.9     01-11  Mg     2.1     01-11    TPro  6.0  /  Alb  2.0<L>  /  TBili  0.3  /  DBili  0.1  /  AST  67<H>  /  ALT  53<H>  /  AlkPhos  128<H>  01-11    LIVER FUNCTIONS - ( 11 Jan 2018 06:30 )  Alb: 2.0 g/dL / Pro: 6.0 g/dL / ALK PHOS: 128 u/L / ALT: 53 u/L / AST: 67 u/L / GGT: 142 u/L / T. Bili 0.3 mg/dL / D. Bili 0.1 mg/dL         Microbiology:  Blood Cx with 1 out of 5 bottle grewing coagulase negative staph.     Culture - Blood (01.10.18 @ 22:11)    -  Coagulase negative Staphylococcus: + DETECT RADHA    Culture - Blood:   NO ORGANISMS ISOLATED  NO ORGANISMS ISOLATED AT 24 HOURS    Culture - Blood:   ***Blood Panel PCR results on this specimen are available  approximately 3 hours after the Gram stain result***  Gram stain, PCR, and/or culture results may not always  correspond due to difference in methodologies  ------------------------------------------------------------  This PCR assay was performed using [employer].  The  following targets are tested for:  Enterococcus, vancomycin  resistant enterococci, Listeria monocytogenes,  coagulase  negative staphylococci, S. aureus, methicillin resistant S.  aureus, Streptococcus agalactiae (Group B), S. pneumoniae,  S. pyogenes (Group A), Acinetobacter baumannii, Enterobacter  cloacae, E. coli, Klebsiella oxytoca, K. pneumoniae, Proteus  sp., Serratia marcescens, Haemophilus influenzae, Neisseria  meningitidis, Pseudomonas aeruginosa, Candida albicans, C.  glabrata, C. krusei, C. parapsilosis, C. tropicalis and the  KPC resistance gene.    Specimen Source: BLOOD PERIPHERAL    Organism: BLOOD CULTURE PCR    Gram Stain Blood:   ***** CRITICAL RESULT *****  PERSON CALLED / READ-BACK: DOMINICK REDDY.RN/Y  DATE / TIME CALLED: 01/12/18 0608  CALLED BY: DONALD MERCADO  GPCCL^Gram Pos Cocci In Clusters  AFTER: 29 HOURS INCUBATION  BOTTLE: ANAEROBIC BOTTLE    Organism Identification: BLOOD CULTURE PCR    Method Type: PCR        Imaging Personally Reviewed:  [ ] YES  [ ] NO  Consultant(s) Notes Reviewed:  [X] YES  [ ] NO  Care Discussed with Consultants/Other Providers [ ] YES  [ ] NO [facility] Progress Note- PGY1 and IM Attending Chente Jaime ([phone])    ===============================================================  CONTACT INFO   Aryan Perez M.D., PGY-1  Pager: NS- [phone], [facility]- [phone]    Mon-Fri: pager covered by day team 7am-7pm;   ***Academic conferences M-F 12pm-1pm- page ONLY if URGENT or if Consultant  Sat/Gaming Cross Coverage 12pm-7pm: NS- page [phone] for Team1-4, [facility]- pager forwarded to covering Resident  For Night coverage 7pm-7am:  [phone](NS)/[phone]([facility]) Team1-3, [phone] (NS)/[phone] ([facility]) Team4 & Care Model,  ===============================================================    Chief Complaint: Patient is a 82y old  Male who presents with a chief complaint of Fever, UTI (09 Jan 2018 10:47)        INTERVAL HPI/OVERNIGHT EVENTS:   No acute overnight event. Blood Cx grew coag negative staph in 1 of 5 bottles. Patient remain non-verbal, but was alert.  Denied fever, chill, CP, SOB, abdominal pain, diarrhea, or constipation.       MEDICATIONS  (STANDING):  ALBUTerol/ipratropium for Nebulization 3 milliLiter(s) Nebulizer every 6 hours  ascorbic acid 500 milliGRAM(s) Oral daily  aspirin  chewable 81 milliGRAM(s) Oral daily  atorvastatin 80 milliGRAM(s) Oral at bedtime  buDESOnide   0.25 milliGRAM(s) Respule 0.25 milliGRAM(s) Inhalation two times a day  carbidopa/levodopa  25/100 1.5 Tablet(s) Oral three times a day  cefTRIAXone   IVPB 1 Gram(s) IV Intermittent every 24 hours  collagenase Ointment 1 Application(s) Topical daily  docusate sodium Liquid 200 milliGRAM(s) Oral at bedtime  dorzolamide 2%/timolol 0.5% Ophthalmic Solution 1 Drop(s) Left EYE two times a day  doxazosin 1 milliGRAM(s) Oral at bedtime  enoxaparin Injectable 40 milliGRAM(s) SubCutaneous daily  finasteride 5 milliGRAM(s) Oral daily  guaiFENesin    Syrup 200 milliGRAM(s) Oral two times a day  latanoprost 0.005% Ophthalmic Solution 1 Drop(s) Left EYE at bedtime  metoclopramide   Syrup 5 milliGRAM(s) Oral every 12 hours  multivitamin 1 Tablet(s) Oral daily  pantoprazole   Suspension 40 milliGRAM(s) Oral before breakfast  pilocarpine 4% Solution 1 Drop(s) Left EYE two times a day  senna Syrup 10 milliLiter(s) Oral at bedtime  sodium chloride 0.45%. 1000 milliLiter(s) (60 mL/Hr) IV Continuous <Continuous>  vitamin A &amp; D Ointment 1 Application(s) Topical daily  zinc oxide 20% Ointment 1 Application(s) Topical daily    MEDICATIONS  (PRN):  acetaminophen    Suspension 650 milliGRAM(s) Oral every 6 hours PRN For Temp greater than 38 C (100.4 F)  acetaminophen    Suspension. 650 milliGRAM(s) Oral every 6 hours PRN Mild to moderate pain      Vital Signs Last 24 Hrs  T(C): 36.8 (12 Jan 2018 06:01), Max: 37.2 (11 Jan 2018 15:03)  T(F): 98.3 (12 Jan 2018 06:01), Max: 98.9 (11 Jan 2018 15:03)  HR: 66 (12 Jan 2018 06:01) (66 - 77)  BP: 120/62 (12 Jan 2018 06:01) (115/64 - 140/88)  BP(mean): --  RR: 18 (12 Jan 2018 06:01) (18 - 20)  SpO2: 100% (12 Jan 2018 06:01) (95% - 100%)  Supplemental O2: [ ] No, on Room Air [ ] Yes,     I&O's Detail    11 Jan 2018 07:01  -  12 Jan 2018 07:00  --------------------------------------------------------  IN:  Total IN: 0 mL    OUT:    Indwelling Catheter - Urethral: 460 mL  Total OUT: 460 mL    Total NET: -460 mL        CAPILLARY BLOOD GLUCOSE          PHYSICAL EXAM:  Appearance: NAD, well-developed.   HEENT:   NC/AT  Neck: supple, bandage covered on previous trach site  Cardiovascular: Normal S1 S2, No JVD, No murmurs  Respiratory: Lungs clear to auscultation, no wheezing, rhonchi, decrease breath sound on left lower lung  Gastrointestinal:  Soft, nondistended, + BS.   : lubin intact. draining yellow urine.   Skin: No rashes, No ecchymoses, No cyanosis	  MSK/Extremities: unable to assess  Vascular: Peripheral pulses palpable 2+ bilaterally  Neurologic: unable to assess  Psychiatry: alert today. answer yes and no to questions.     LABS:                        8.5    7.89  )-----------( 201      ( 12 Jan 2018 05:25 )             27.9     01-11    147<H>  |  112<H>  |  38<H>  ----------------------------<  123<H>  3.8   |  23  |  0.59    Ca    7.4<L>      11 Jan 2018 06:30  Phos  2.9     01-11  Mg     2.1     01-11    TPro  6.0  /  Alb  2.0<L>  /  TBili  0.3  /  DBili  0.1  /  AST  67<H>  /  ALT  53<H>  /  AlkPhos  128<H>  01-11    LIVER FUNCTIONS - ( 11 Jan 2018 06:30 )  Alb: 2.0 g/dL / Pro: 6.0 g/dL / ALK PHOS: 128 u/L / ALT: 53 u/L / AST: 67 u/L / GGT: 142 u/L / T. Bili 0.3 mg/dL / D. Bili 0.1 mg/dL         Microbiology:  Blood Cx with 1 out of 5 bottle grewing coagulase negative staph.     Culture - Blood (01.10.18 @ 22:11)    -  Coagulase negative Staphylococcus: + DETECT RADHA    Culture - Blood:   NO ORGANISMS ISOLATED  NO ORGANISMS ISOLATED AT 24 HOURS    Culture - Blood:   ***Blood Panel PCR results on this specimen are available  approximately 3 hours after the Gram stain result***  Gram stain, PCR, and/or culture results may not always  correspond due to difference in methodologies  ------------------------------------------------------------  This PCR assay was performed using [employer].  The  following targets are tested for:  Enterococcus, vancomycin  resistant enterococci, Listeria monocytogenes,  coagulase  negative staphylococci, S. aureus, methicillin resistant S.  aureus, Streptococcus agalactiae (Group B), S. pneumoniae,  S. pyogenes (Group A), Acinetobacter baumannii, Enterobacter  cloacae, E. coli, Klebsiella oxytoca, K. pneumoniae, Proteus  sp., Serratia marcescens, Haemophilus influenzae, Neisseria  meningitidis, Pseudomonas aeruginosa, Candida albicans, C.  glabrata, C. krusei, C. parapsilosis, C. tropicalis and the  KPC resistance gene.    Specimen Source: BLOOD PERIPHERAL    Organism: BLOOD CULTURE PCR    Gram Stain Blood:   ***** CRITICAL RESULT *****  PERSON CALLED / READ-BACK: DOMINICK REDDY.RN/Y  DATE / TIME CALLED: 01/12/18 0608  CALLED BY: DONALD MERCADO  GPCCL^Gram Pos Cocci In Clusters  AFTER: 29 HOURS INCUBATION  BOTTLE: ANAEROBIC BOTTLE    Organism Identification: BLOOD CULTURE PCR    Method Type: PCR        Imaging Personally Reviewed:  [ ] YES  [ ] NO  Consultant(s) Notes Reviewed:  [X] YES  [ ] NO  Care Discussed with Consultants/Other Providers [ ] YES  [ ] NO

## 2018-01-12 NOTE — PROGRESS NOTE ADULT - SUBJECTIVE AND OBJECTIVE BOX
ANILA NARVAEZ 82y [id number]    Patient is a 82y old  Male who presents with a chief complaint of Fever, UTI (09 Jan 2018 10:47)      Follow Up/CC:  bacteremia    Interval History/ROS: no acute issues    Allergies    penicillin (Hives)    Intolerances        ANTIMICROBIALS:  cefTRIAXone   IVPB 1 every 24 hours      MEDICATIONS  (STANDING):  ALBUTerol/ipratropium for Nebulization 3 milliLiter(s) Nebulizer every 6 hours  ascorbic acid 500 milliGRAM(s) Oral daily  aspirin  chewable 81 milliGRAM(s) Oral daily  atorvastatin 80 milliGRAM(s) Oral at bedtime  buDESOnide   0.25 milliGRAM(s) Respule 0.25 milliGRAM(s) Inhalation two times a day  carbidopa/levodopa  25/100 1.5 Tablet(s) Oral three times a day  cefTRIAXone   IVPB 1 Gram(s) IV Intermittent every 24 hours  collagenase Ointment 1 Application(s) Topical daily  docusate sodium Liquid 200 milliGRAM(s) Oral at bedtime  dorzolamide 2%/timolol 0.5% Ophthalmic Solution 1 Drop(s) Left EYE two times a day  doxazosin 1 milliGRAM(s) Oral at bedtime  enoxaparin Injectable 40 milliGRAM(s) SubCutaneous daily  finasteride 5 milliGRAM(s) Oral daily  guaiFENesin    Syrup 200 milliGRAM(s) Oral two times a day  latanoprost 0.005% Ophthalmic Solution 1 Drop(s) Left EYE at bedtime  metoclopramide   Syrup 5 milliGRAM(s) Oral every 12 hours  multivitamin 1 Tablet(s) Oral daily  pantoprazole   Suspension 40 milliGRAM(s) Oral before breakfast  pilocarpine 4% Solution 1 Drop(s) Left EYE two times a day  senna Syrup 10 milliLiter(s) Oral at bedtime  sodium chloride 0.45%. 1000 milliLiter(s) (60 mL/Hr) IV Continuous <Continuous>  vitamin A &amp; D Ointment 1 Application(s) Topical daily  zinc oxide 20% Ointment 1 Application(s) Topical daily    MEDICATIONS  (PRN):  acetaminophen    Suspension 650 milliGRAM(s) Oral every 6 hours PRN For Temp greater than 38 C (100.4 F)  acetaminophen    Suspension. 650 milliGRAM(s) Oral every 6 hours PRN Mild to moderate pain        Vital Signs Last 24 Hrs  T(C): 37 (12 Jan 2018 14:08), Max: 37.1 (12 Jan 2018 01:51)  T(F): 98.6 (12 Jan 2018 14:08), Max: 98.8 (12 Jan 2018 01:51)  HR: 70 (12 Jan 2018 14:08) (66 - 77)  BP: 124/60 (12 Jan 2018 14:08) (115/64 - 140/88)  BP(mean): --  RR: 18 (12 Jan 2018 14:08) (18 - 20)  SpO2: 100% (12 Jan 2018 14:08) (96% - 100%)    CBC Full  -  ( 12 Jan 2018 05:25 )  WBC Count : 7.89 K/uL  Hemoglobin : 8.5 g/dL  Hematocrit : 27.9 %  Platelet Count - Automated : 201 K/uL  Mean Cell Volume : 88.3 fL  Mean Cell Hemoglobin : 26.9 pg  Mean Cell Hemoglobin Concentration : 30.5 %  Auto Neutrophil # : x  Auto Lymphocyte # : x  Auto Monocyte # : x  Auto Eosinophil # : x  Auto Basophil # : x  Auto Neutrophil % : x  Auto Lymphocyte % : x  Auto Monocyte % : x  Auto Eosinophil % : x  Auto Basophil % : x    01-12    146<H>  |  112<H>  |  36<H>  ----------------------------<  148<H>  3.8   |  25  |  0.58    Ca    7.6<L>      12 Jan 2018 05:25  Phos  2.6     01-12  Mg     2.0     01-12    TPro  6.1  /  Alb  2.0<L>  /  TBili  0.3  /  DBili  0.1  /  AST  49<H>  /  ALT  37  /  AlkPhos  120  01-12    LIVER FUNCTIONS - ( 12 Jan 2018 05:25 )  Alb: 2.0 g/dL / Pro: 6.1 g/dL / ALK PHOS: 120 u/L / ALT: 37 u/L / AST: 49 u/L / GGT: x               MICROBIOLOGY:  BLOOD  01-11-18 --  --  --      BLOOD PERIPHERAL  01-11-18 --  --  --      BLOOD PERIPHERAL  01-10-18 --  --  BLOOD CULTURE PCR      URINE CATHETER  01-08-18 --  --  Proteus mirabilis      BLOOD  01-08-18 --  --  --      BLOOD VENOUS  01-08-18 --  --  BLOOD CULTURE PCR  Proteus mirabilis              v            RADIOLOGY

## 2018-01-12 NOTE — PROGRESS NOTE ADULT - PROBLEM SELECTOR PLAN 2
As above  - Blood Cx grew coag negative staph in 1 of 5 bottle - likely contaminant. Hold off vanc for now.   - C/w Ceftriaxone 1g qd (day 5/10).

## 2018-01-12 NOTE — PROGRESS NOTE ADULT - PROBLEM SELECTOR PLAN 4
Na of 146. Likely 2/2 NS IVF.   Continue to monitor. Na of 146. Likely 2/2 NS IVF.   - C/w 1/2 NS  Continue to monitor.

## 2018-01-12 NOTE — PROGRESS NOTE ADULT - PROBLEM SELECTOR PLAN 10
New. Unknown etiology.   - F/u hepatic u/s  - Trend LFT's    Normocytic Anemia  -Likely AoCD. No signs of bleeding  -Continue to monitor CBC.   DVT Prophylaxis  -Lovenox sq

## 2018-01-12 NOTE — PROGRESS NOTE ADULT - PROBLEM SELECTOR PLAN 1
2/2 proteus UTI and bacteremia  - Leukocytosis resolved.  afebrile in past 24 hours  - Blood Cx grew coag negative staph in 1 of 5 bottle - likely contaminant. Will hold off vanc for now and monitor clinically. If fever/clinically deteriorating, will add vanc.   - C/w ceftriaxone 1g qd (day 5/10)  - Appreciate ID rec: consider imaging if fever persist.

## 2018-01-13 LAB
-  COAGULASE NEGATIVE STAPHYLOCOCCUS: SIGNIFICANT CHANGE UP
ALBUMIN SERPL ELPH-MCNC: 2.2 G/DL — LOW (ref 3.3–5)
ALP SERPL-CCNC: 107 U/L — SIGNIFICANT CHANGE UP (ref 40–120)
ALT FLD-CCNC: 59 U/L — HIGH (ref 4–41)
AST SERPL-CCNC: 36 U/L — SIGNIFICANT CHANGE UP (ref 4–40)
BACTERIA BLD CULT: SIGNIFICANT CHANGE UP
BILIRUB SERPL-MCNC: 0.3 MG/DL — SIGNIFICANT CHANGE UP (ref 0.2–1.2)
BUN SERPL-MCNC: 30 MG/DL — HIGH (ref 7–23)
CALCIUM SERPL-MCNC: 7.5 MG/DL — LOW (ref 8.4–10.5)
CHLORIDE SERPL-SCNC: 113 MMOL/L — HIGH (ref 98–107)
CO2 SERPL-SCNC: 25 MMOL/L — SIGNIFICANT CHANGE UP (ref 22–31)
CREAT SERPL-MCNC: 0.49 MG/DL — LOW (ref 0.5–1.3)
GLUCOSE SERPL-MCNC: 131 MG/DL — HIGH (ref 70–99)
HCT VFR BLD CALC: 29.3 % — LOW (ref 39–50)
HGB BLD-MCNC: 9.3 G/DL — LOW (ref 13–17)
MAGNESIUM SERPL-MCNC: 1.9 MG/DL — SIGNIFICANT CHANGE UP (ref 1.6–2.6)
MCHC RBC-ENTMCNC: 28.4 PG — SIGNIFICANT CHANGE UP (ref 27–34)
MCHC RBC-ENTMCNC: 31.7 % — LOW (ref 32–36)
MCV RBC AUTO: 89.3 FL — SIGNIFICANT CHANGE UP (ref 80–100)
NRBC # FLD: 0 — SIGNIFICANT CHANGE UP
ORGANISM # SPEC MICROSCOPIC CNT: SIGNIFICANT CHANGE UP
PHOSPHATE SERPL-MCNC: 2.6 MG/DL — SIGNIFICANT CHANGE UP (ref 2.5–4.5)
PLATELET # BLD AUTO: 219 K/UL — SIGNIFICANT CHANGE UP (ref 150–400)
PMV BLD: 11.3 FL — SIGNIFICANT CHANGE UP (ref 7–13)
POTASSIUM SERPL-MCNC: 3.9 MMOL/L — SIGNIFICANT CHANGE UP (ref 3.5–5.3)
POTASSIUM SERPL-SCNC: 3.9 MMOL/L — SIGNIFICANT CHANGE UP (ref 3.5–5.3)
PROT SERPL-MCNC: 6.1 G/DL — SIGNIFICANT CHANGE UP (ref 6–8.3)
RBC # BLD: 3.28 M/UL — LOW (ref 4.2–5.8)
RBC # FLD: 17.7 % — HIGH (ref 10.3–14.5)
SODIUM SERPL-SCNC: 148 MMOL/L — HIGH (ref 135–145)
WBC # BLD: 9.31 K/UL — SIGNIFICANT CHANGE UP (ref 3.8–10.5)
WBC # FLD AUTO: 9.31 K/UL — SIGNIFICANT CHANGE UP (ref 3.8–10.5)

## 2018-01-13 RX ORDER — SODIUM CHLORIDE 9 MG/ML
1000 INJECTION, SOLUTION INTRAVENOUS
Qty: 0 | Refills: 0 | Status: DISCONTINUED | OUTPATIENT
Start: 2018-01-13 | End: 2018-01-15

## 2018-01-13 RX ORDER — SODIUM CHLORIDE 9 MG/ML
1000 INJECTION, SOLUTION INTRAVENOUS
Qty: 0 | Refills: 0 | Status: DISCONTINUED | OUTPATIENT
Start: 2018-01-13 | End: 2018-01-13

## 2018-01-13 RX ADMIN — Medication 0.25 MILLIGRAM(S): at 09:57

## 2018-01-13 RX ADMIN — Medication 500 MILLIGRAM(S): at 11:16

## 2018-01-13 RX ADMIN — ENOXAPARIN SODIUM 40 MILLIGRAM(S): 100 INJECTION SUBCUTANEOUS at 11:16

## 2018-01-13 RX ADMIN — SENNA PLUS 10 MILLILITER(S): 8.6 TABLET ORAL at 22:54

## 2018-01-13 RX ADMIN — Medication 1 DROP(S): at 17:54

## 2018-01-13 RX ADMIN — SODIUM CHLORIDE 60 MILLILITER(S): 9 INJECTION, SOLUTION INTRAVENOUS at 08:03

## 2018-01-13 RX ADMIN — LATANOPROST 1 DROP(S): 0.05 SOLUTION/ DROPS OPHTHALMIC; TOPICAL at 22:54

## 2018-01-13 RX ADMIN — CARBIDOPA AND LEVODOPA 1.5 TABLET(S): 25; 100 TABLET ORAL at 22:53

## 2018-01-13 RX ADMIN — CARBIDOPA AND LEVODOPA 1.5 TABLET(S): 25; 100 TABLET ORAL at 06:11

## 2018-01-13 RX ADMIN — ZINC OXIDE 1 APPLICATION(S): 200 OINTMENT TOPICAL at 11:18

## 2018-01-13 RX ADMIN — SODIUM CHLORIDE 60 MILLILITER(S): 9 INJECTION, SOLUTION INTRAVENOUS at 17:55

## 2018-01-13 RX ADMIN — ATORVASTATIN CALCIUM 80 MILLIGRAM(S): 80 TABLET, FILM COATED ORAL at 22:54

## 2018-01-13 RX ADMIN — Medication 3 MILLILITER(S): at 22:20

## 2018-01-13 RX ADMIN — Medication 1 DROP(S): at 06:12

## 2018-01-13 RX ADMIN — Medication 0.25 MILLIGRAM(S): at 22:30

## 2018-01-13 RX ADMIN — PANTOPRAZOLE SODIUM 40 MILLIGRAM(S): 20 TABLET, DELAYED RELEASE ORAL at 06:12

## 2018-01-13 RX ADMIN — Medication 1 TABLET(S): at 11:16

## 2018-01-13 RX ADMIN — Medication 3 MILLILITER(S): at 16:19

## 2018-01-13 RX ADMIN — Medication 81 MILLIGRAM(S): at 11:16

## 2018-01-13 RX ADMIN — CEFTRIAXONE 100 GRAM(S): 500 INJECTION, POWDER, FOR SOLUTION INTRAMUSCULAR; INTRAVENOUS at 18:18

## 2018-01-13 RX ADMIN — FINASTERIDE 5 MILLIGRAM(S): 5 TABLET, FILM COATED ORAL at 11:17

## 2018-01-13 RX ADMIN — Medication 1 APPLICATION(S): at 11:18

## 2018-01-13 RX ADMIN — SODIUM CHLORIDE 50 MILLILITER(S): 9 INJECTION, SOLUTION INTRAVENOUS at 23:04

## 2018-01-13 RX ADMIN — Medication 1 MILLIGRAM(S): at 22:54

## 2018-01-13 RX ADMIN — Medication 1 APPLICATION(S): at 11:17

## 2018-01-13 RX ADMIN — Medication 3 MILLILITER(S): at 04:08

## 2018-01-13 RX ADMIN — DORZOLAMIDE HYDROCHLORIDE TIMOLOL MALEATE 1 DROP(S): 20; 5 SOLUTION/ DROPS OPHTHALMIC at 06:12

## 2018-01-13 RX ADMIN — Medication 3 MILLILITER(S): at 09:58

## 2018-01-13 RX ADMIN — CARBIDOPA AND LEVODOPA 1.5 TABLET(S): 25; 100 TABLET ORAL at 13:15

## 2018-01-13 RX ADMIN — Medication 200 MILLIGRAM(S): at 22:54

## 2018-01-13 RX ADMIN — Medication 5 MILLIGRAM(S): at 17:55

## 2018-01-13 RX ADMIN — DORZOLAMIDE HYDROCHLORIDE TIMOLOL MALEATE 1 DROP(S): 20; 5 SOLUTION/ DROPS OPHTHALMIC at 17:54

## 2018-01-13 RX ADMIN — Medication 5 MILLIGRAM(S): at 06:11

## 2018-01-13 RX ADMIN — Medication 200 MILLIGRAM(S): at 17:54

## 2018-01-13 RX ADMIN — Medication 200 MILLIGRAM(S): at 06:11

## 2018-01-13 NOTE — PROGRESS NOTE ADULT - PROBLEM SELECTOR PLAN 6
Improving  - Continue to monitor and treat underlying infection Improving  Continue to monitor and treat underlying infection

## 2018-01-13 NOTE — PROGRESS NOTE ADULT - PROBLEM SELECTOR PLAN 3
- C/w ceftriaxone (day 6/10)  - Repeat Urine Cx NTD C/w ceftriaxone (day 6/10)  Alexander already replaced

## 2018-01-13 NOTE — PROGRESS NOTE ADULT - PROBLEM SELECTOR PLAN 2
As above  - Blood Cx grew coag negative staph in 1 of 5 bottle - likely contaminant. Hold off vanc for now.   - C/w Ceftriaxone 1g qd (day 6/10).  - Will d/w ID when can change to oral Abx As above  C/w Ceftriaxone 1g qd (day 6/10).

## 2018-01-13 NOTE — PROGRESS NOTE ADULT - SUBJECTIVE AND OBJECTIVE BOX
Patient is a 82y old  Male who presents with a chief complaint of Fever, UTI (09 Jan 2018 10:47)       INTERVAL HPI/OVERNIGHT EVENTS:    MEDICATIONS  (STANDING):  ALBUTerol/ipratropium for Nebulization 3 milliLiter(s) Nebulizer every 6 hours  ascorbic acid 500 milliGRAM(s) Oral daily  aspirin  chewable 81 milliGRAM(s) Oral daily  atorvastatin 80 milliGRAM(s) Oral at bedtime  buDESOnide   0.25 milliGRAM(s) Respule 0.25 milliGRAM(s) Inhalation two times a day  carbidopa/levodopa  25/100 1.5 Tablet(s) Oral three times a day  cefTRIAXone   IVPB 1 Gram(s) IV Intermittent every 24 hours  collagenase Ointment 1 Application(s) Topical daily  docusate sodium Liquid 200 milliGRAM(s) Oral at bedtime  dorzolamide 2%/timolol 0.5% Ophthalmic Solution 1 Drop(s) Left EYE two times a day  doxazosin 1 milliGRAM(s) Oral at bedtime  enoxaparin Injectable 40 milliGRAM(s) SubCutaneous daily  finasteride 5 milliGRAM(s) Oral daily  guaiFENesin    Syrup 200 milliGRAM(s) Oral two times a day  latanoprost 0.005% Ophthalmic Solution 1 Drop(s) Left EYE at bedtime  metoclopramide   Syrup 5 milliGRAM(s) Oral every 12 hours  multivitamin 1 Tablet(s) Oral daily  pantoprazole   Suspension 40 milliGRAM(s) Oral before breakfast  pilocarpine 4% Solution 1 Drop(s) Left EYE two times a day  senna Syrup 10 milliLiter(s) Oral at bedtime  sodium chloride 0.45%. 1000 milliLiter(s) (60 mL/Hr) IV Continuous <Continuous>  vitamin A &amp; D Ointment 1 Application(s) Topical daily  zinc oxide 20% Ointment 1 Application(s) Topical daily    MEDICATIONS  (PRN):  acetaminophen    Suspension 650 milliGRAM(s) Oral every 6 hours PRN For Temp greater than 38 C (100.4 F)  acetaminophen    Suspension. 650 milliGRAM(s) Oral every 6 hours PRN Mild to moderate pain      Allergies    penicillin (Hives)    Intolerances        REVIEW OF SYSTEMS:  CONSTITUTIONAL: No fever, weight loss, or fatigue  EYES: No eye pain, visual disturbances, or discharge  ENMT:  No difficulty hearing, tinnitus, vertigo; No sinus or throat pain  RESPIRATORY: No cough, wheezing, chills or hemoptysis; No shortness of breath  CARDIOVASCULAR: No chest pain, palpitations, dizziness, or leg swelling  GASTROINTESTINAL: No abdominal or epigastric pain. No nausea, vomiting, or hematemesis; No diarrhea or constipation. No melena or hematochezia.  GENITOURINARY: No dysuria, frequency, hematuria, or incontinence  NEUROLOGICAL: No headaches, loss of strength, numbness, or tremors  SKIN: No itching, burning, rashes, or lesions   MUSCULOSKELETAL: No joint pain or swelling;   PSYCHIATRIC: Denies depression, anxiety  HEME/LYMPH: No easy bruising, or bleeding gums    Vital Signs Last 24 Hrs  T(C): 37.3 (13 Jan 2018 06:10), Max: 37.3 (13 Jan 2018 06:10)  T(F): 99.1 (13 Jan 2018 06:10), Max: 99.1 (13 Jan 2018 06:10)  HR: 74 (13 Jan 2018 06:10) (61 - 77)  BP: 126/60 (13 Jan 2018 06:10) (123/58 - 137/83)  BP(mean): --  RR: 18 (13 Jan 2018 06:10) (18 - 18)  SpO2: 96% (13 Jan 2018 06:10) (95% - 100%)    PHYSICAL EXAM:  GENERAL: NAD, well-groomed, well-developed  HEAD:  Atraumatic, Normocephalic  EYES: EOMI, PERRLA, conjunctiva and sclera clear  ENMT: No tonsillar erythema, exudates, or enlargement; Moist mucous membranes, Good dentition  NECK: Supple, No JVD  NERVOUS SYSTEM: AOX3, motor and sensation grossly intact in b/l UE and b/l LE  PSYCHIATRIC: Appropriate affect and mood  CHEST/LUNG: Clear to auscultation bilaterally; No rales, rhonchi, wheezing, or rubs  HEART: Regular rate and rhythm; No murmurs, rubs, or gallops. No LE edema  ABDOMEN: Soft, Nontender, Nondistended; Bowel sounds present  EXTREMITIES:  2+ Peripheral Pulses, No clubbing, cyanosis  SKIN: No rashes or lesions    LABS:                        9.3    9.31  )-----------( 219      ( 13 Jan 2018 06:00 )             29.3     13 Jan 2018 06:00    148    |  113    |  30     ----------------------------<  131    3.9     |  25     |  0.49     Ca    7.5        13 Jan 2018 06:00  Phos  2.6       13 Jan 2018 06:00  Mg     1.9       13 Jan 2018 06:00    TPro  6.1    /  Alb  2.2    /  TBili  0.3    /  DBili  x      /  AST  36     /  ALT  59     /  AlkPhos  107    13 Jan 2018 06:00      CAPILLARY BLOOD GLUCOSE        BLOOD CULTURE  01-11 @ 07:24 --    NO ORGANISMS ISOLATED  NO ORGANISMS ISOLATED AT 48 HRS.  --  01-11 @ 07:11 --    NO ORGANISMS ISOLATED  NO ORGANISMS ISOLATED AT 48 HRS.  --  01-10 @ 22:11 --    NO ORGANISMS ISOLATED  NO ORGANISMS ISOLATED AT 24 HOURS  BLOOD CULTURE PCR    RADIOLOGY & ADDITIONAL TESTS:    Imaging Personally Reviewed:  [ ] YES     Consultant(s) Notes Reviewed:      Care Discussed with Consultants/Other Providers: Patient is a 82y old  Male who presents with a chief complaint of Fever, UTI (09 Jan 2018 10:47)       INTERVAL HPI/OVERNIGHT EVENTS:    No acute events overnight. Patient seen and examined. Denies pain, dyspnea, or other complaints at this time    MEDICATIONS  (STANDING):  ALBUTerol/ipratropium for Nebulization 3 milliLiter(s) Nebulizer every 6 hours  ascorbic acid 500 milliGRAM(s) Oral daily  aspirin  chewable 81 milliGRAM(s) Oral daily  atorvastatin 80 milliGRAM(s) Oral at bedtime  buDESOnide   0.25 milliGRAM(s) Respule 0.25 milliGRAM(s) Inhalation two times a day  carbidopa/levodopa  25/100 1.5 Tablet(s) Oral three times a day  cefTRIAXone   IVPB 1 Gram(s) IV Intermittent every 24 hours  collagenase Ointment 1 Application(s) Topical daily  docusate sodium Liquid 200 milliGRAM(s) Oral at bedtime  dorzolamide 2%/timolol 0.5% Ophthalmic Solution 1 Drop(s) Left EYE two times a day  doxazosin 1 milliGRAM(s) Oral at bedtime  enoxaparin Injectable 40 milliGRAM(s) SubCutaneous daily  finasteride 5 milliGRAM(s) Oral daily  guaiFENesin    Syrup 200 milliGRAM(s) Oral two times a day  latanoprost 0.005% Ophthalmic Solution 1 Drop(s) Left EYE at bedtime  metoclopramide   Syrup 5 milliGRAM(s) Oral every 12 hours  multivitamin 1 Tablet(s) Oral daily  pantoprazole   Suspension 40 milliGRAM(s) Oral before breakfast  pilocarpine 4% Solution 1 Drop(s) Left EYE two times a day  senna Syrup 10 milliLiter(s) Oral at bedtime  sodium chloride 0.45%. 1000 milliLiter(s) (60 mL/Hr) IV Continuous <Continuous>  vitamin A &amp; D Ointment 1 Application(s) Topical daily  zinc oxide 20% Ointment 1 Application(s) Topical daily    MEDICATIONS  (PRN):  acetaminophen    Suspension 650 milliGRAM(s) Oral every 6 hours PRN For Temp greater than 38 C (100.4 F)  acetaminophen    Suspension. 650 milliGRAM(s) Oral every 6 hours PRN Mild to moderate pain      Allergies    penicillin (Hives)    Intolerances        REVIEW OF SYSTEMS:  CONSTITUTIONAL: No fever, weight loss, or fatigue  EYES: No eye pain, visual disturbances, or discharge  ENMT:  No difficulty hearing, tinnitus, vertigo; No sinus or throat pain  RESPIRATORY: No cough, wheezing, chills or hemoptysis; No shortness of breath  CARDIOVASCULAR: No chest pain, palpitations, dizziness, or leg swelling  GASTROINTESTINAL: No abdominal or epigastric pain. No nausea, vomiting, or hematemesis; No diarrhea or constipation. No melena or hematochezia.  GENITOURINARY: No dysuria, frequency, hematuria, or incontinence  NEUROLOGICAL: No headaches, loss of strength, numbness, or tremors  SKIN: No itching, burning, rashes, or lesions   MUSCULOSKELETAL: No joint pain or swelling;   PSYCHIATRIC: Denies depression, anxiety  HEME/LYMPH: No easy bruising, or bleeding gums    Vital Signs Last 24 Hrs  T(C): 37.3 (13 Jan 2018 06:10), Max: 37.3 (13 Jan 2018 06:10)  T(F): 99.1 (13 Jan 2018 06:10), Max: 99.1 (13 Jan 2018 06:10)  HR: 74 (13 Jan 2018 06:10) (61 - 77)  BP: 126/60 (13 Jan 2018 06:10) (123/58 - 137/83)  BP(mean): --  RR: 18 (13 Jan 2018 06:10) (18 - 18)  SpO2: 96% (13 Jan 2018 06:10) (95% - 100%)    PHYSICAL EXAM:  GENERAL: NAD, Cachectic  HEAD:  Atraumatic, Normocephalic  EYES: EOMI, PERRLA, conjunctiva and sclera clear  ENMT: No tonsillar erythema, exudates, or enlargement; Moist mucous membranes,  NERVOUS SYSTEM: Unable to assess orientation as non-verbal, significant weakness b/l UE and LE 2/2 deconditioning  PSYCHIATRIC: Appropriate affect and mood  CHEST/LUNG: Clear to auscultation bilaterally; No rales, rhonchi, wheezing, or rubs  HEART: Regular rate and rhythm; Systolic murmur. No LE edema  ABDOMEN: Soft, Nontender, Nondistended; Bowel sounds present  EXTREMITIES:  2+ Peripheral Pulses, No clubbing, cyanosis  SKIN: No rashes or lesions. PEG in palce, clean insertion site    LABS:                        9.3    9.31  )-----------( 219      ( 13 Jan 2018 06:00 )             29.3     13 Jan 2018 06:00    148    |  113    |  30     ----------------------------<  131    3.9     |  25     |  0.49     Ca    7.5        13 Jan 2018 06:00  Phos  2.6       13 Jan 2018 06:00  Mg     1.9       13 Jan 2018 06:00    TPro  6.1    /  Alb  2.2    /  TBili  0.3    /  DBili  x      /  AST  36     /  ALT  59     /  AlkPhos  107    13 Jan 2018 06:00      CAPILLARY BLOOD GLUCOSE        BLOOD CULTURE  01-11 @ 07:24 --    NO ORGANISMS ISOLATED  NO ORGANISMS ISOLATED AT 48 HRS.  --  01-11 @ 07:11 --    NO ORGANISMS ISOLATED  NO ORGANISMS ISOLATED AT 48 HRS.  --  01-10 @ 22:11 --    NO ORGANISMS ISOLATED  NO ORGANISMS ISOLATED AT 24 HOURS  BLOOD CULTURE PCR    RADIOLOGY & ADDITIONAL TESTS:    Imaging Personally Reviewed:  [ ] YES     Consultant(s) Notes Reviewed:    ID  Care Discussed with Consultants/Other Providers: Patient is a 82y old  Male who presents with a chief complaint of Fever, UTI (09 Jan 2018 10:47)     INTERVAL HPI/OVERNIGHT EVENTS:    No acute events overnight. Patient seen and examined. Denies pain, dyspnea, or other complaints at this time    MEDICATIONS  (STANDING):  ALBUTerol/ipratropium for Nebulization 3 milliLiter(s) Nebulizer every 6 hours  ascorbic acid 500 milliGRAM(s) Oral daily  aspirin  chewable 81 milliGRAM(s) Oral daily  atorvastatin 80 milliGRAM(s) Oral at bedtime  buDESOnide   0.25 milliGRAM(s) Respule 0.25 milliGRAM(s) Inhalation two times a day  carbidopa/levodopa  25/100 1.5 Tablet(s) Oral three times a day  cefTRIAXone   IVPB 1 Gram(s) IV Intermittent every 24 hours  collagenase Ointment 1 Application(s) Topical daily  docusate sodium Liquid 200 milliGRAM(s) Oral at bedtime  dorzolamide 2%/timolol 0.5% Ophthalmic Solution 1 Drop(s) Left EYE two times a day  doxazosin 1 milliGRAM(s) Oral at bedtime  enoxaparin Injectable 40 milliGRAM(s) SubCutaneous daily  finasteride 5 milliGRAM(s) Oral daily  guaiFENesin    Syrup 200 milliGRAM(s) Oral two times a day  latanoprost 0.005% Ophthalmic Solution 1 Drop(s) Left EYE at bedtime  metoclopramide   Syrup 5 milliGRAM(s) Oral every 12 hours  multivitamin 1 Tablet(s) Oral daily  pantoprazole   Suspension 40 milliGRAM(s) Oral before breakfast  pilocarpine 4% Solution 1 Drop(s) Left EYE two times a day  senna Syrup 10 milliLiter(s) Oral at bedtime  sodium chloride 0.45%. 1000 milliLiter(s) (60 mL/Hr) IV Continuous <Continuous>  vitamin A &amp; D Ointment 1 Application(s) Topical daily  zinc oxide 20% Ointment 1 Application(s) Topical daily    MEDICATIONS  (PRN):  acetaminophen    Suspension 650 milliGRAM(s) Oral every 6 hours PRN For Temp greater than 38 C (100.4 F)  acetaminophen    Suspension. 650 milliGRAM(s) Oral every 6 hours PRN Mild to moderate pain      Allergies    penicillin (Hives)    Intolerances        REVIEW OF SYSTEMS:  CONSTITUTIONAL: No fever, weight loss, or fatigue  EYES: No eye pain, visual disturbances, or discharge  ENMT:  No difficulty hearing, tinnitus, vertigo; No sinus or throat pain  RESPIRATORY: No cough, wheezing, chills or hemoptysis; No shortness of breath  CARDIOVASCULAR: No chest pain, palpitations, dizziness, or leg swelling  GASTROINTESTINAL: No abdominal or epigastric pain. No nausea, vomiting, or hematemesis; No diarrhea or constipation. No melena or hematochezia.  GENITOURINARY: No dysuria, frequency, hematuria, or incontinence  NEUROLOGICAL: No headaches, loss of strength, numbness, or tremors  SKIN: No itching, burning, rashes, or lesions   MUSCULOSKELETAL: No joint pain or swelling;   PSYCHIATRIC: Denies depression, anxiety  HEME/LYMPH: No easy bruising, or bleeding gums    Vital Signs Last 24 Hrs  T(C): 37.3 (13 Jan 2018 06:10), Max: 37.3 (13 Jan 2018 06:10)  T(F): 99.1 (13 Jan 2018 06:10), Max: 99.1 (13 Jan 2018 06:10)  HR: 74 (13 Jan 2018 06:10) (61 - 77)  BP: 126/60 (13 Jan 2018 06:10) (123/58 - 137/83)  BP(mean): --  RR: 18 (13 Jan 2018 06:10) (18 - 18)  SpO2: 96% (13 Jan 2018 06:10) (95% - 100%)    PHYSICAL EXAM:  GENERAL: NAD, Cachectic  HEAD:  Atraumatic, Normocephalic  EYES: EOMI, PERRLA, conjunctiva and sclera clear  ENMT: No tonsillar erythema, exudates, or enlargement; Moist mucous membranes,  NERVOUS SYSTEM: Unable to assess orientation as non-verbal, significant weakness b/l UE and LE 2/2 deconditioning  PSYCHIATRIC: Appropriate affect and mood  CHEST/LUNG: Clear to auscultation bilaterally; No rales, rhonchi, wheezing, or rubs  HEART: Regular rate and rhythm; Systolic murmur. No LE edema  ABDOMEN: Soft, Nontender, Nondistended; Bowel sounds present  EXTREMITIES:  2+ Peripheral Pulses, No clubbing, cyanosis  SKIN: No rashes or lesions. PEG in palce, clean insertion site    LABS:                        9.3    9.31  )-----------( 219      ( 13 Jan 2018 06:00 )             29.3     13 Jan 2018 06:00    148    |  113    |  30     ----------------------------<  131    3.9     |  25     |  0.49     Ca    7.5        13 Jan 2018 06:00  Phos  2.6       13 Jan 2018 06:00  Mg     1.9       13 Jan 2018 06:00    TPro  6.1    /  Alb  2.2    /  TBili  0.3    /  DBili  x      /  AST  36     /  ALT  59     /  AlkPhos  107    13 Jan 2018 06:00      CAPILLARY BLOOD GLUCOSE        BLOOD CULTURE  01-11 @ 07:24 --    NO ORGANISMS ISOLATED  NO ORGANISMS ISOLATED AT 48 HRS.  --  01-11 @ 07:11 --    NO ORGANISMS ISOLATED  NO ORGANISMS ISOLATED AT 48 HRS.  --  01-10 @ 22:11 --    NO ORGANISMS ISOLATED  NO ORGANISMS ISOLATED AT 24 HOURS  BLOOD CULTURE PCR    RADIOLOGY & ADDITIONAL TESTS:    Imaging Personally Reviewed:  [ ] YES     Consultant(s) Notes Reviewed:    ID  Care Discussed with Consultants/Other Providers: Yes - IM Attending

## 2018-01-13 NOTE — PROGRESS NOTE ADULT - PROBLEM SELECTOR PLAN 4
Na of 146. Likely 2/2 NS IVF.   - C/w 1/2 NS  Continue to monitor. w/ hyperchloremia  Adjust IVF to D5W  Nephro on board

## 2018-01-13 NOTE — PROGRESS NOTE ADULT - PROBLEM SELECTOR PLAN 1
2/2 proteus UTI and bacteremia  - Leukocytosis resolved.  afebrile in past 24 hours  - Blood Cx grew coag negative staph in 1 of 5 bottle - likely contaminant. Will hold off vanc for now and monitor clinically. If fever/clinically deteriorating, will add vanc.   - C/w ceftriaxone 1g qd (day 6/10)  - Appreciate ID rec: consider imaging if fever persist. 2/2 proteus UTI and bacteremia  No leukocytosis. Afebrile in past 24 hours  Repeat BCx (-).   C/w ceftriaxone 1g qd (day 6/10)  Appreciate ID rec: consider imaging if fever persist.  Monitor vitals

## 2018-01-13 NOTE — PROGRESS NOTE ADULT - PROBLEM SELECTOR PLAN 10
New. Unknown etiology.   - F/u hepatic u/s  - Trend LFT's    Normocytic Anemia  -Likely AoCD. No signs of bleeding  -Continue to monitor CBC.   DVT Prophylaxis  -Lovenox sq Improved

## 2018-01-14 LAB
ALBUMIN SERPL ELPH-MCNC: 2 G/DL — LOW (ref 3.3–5)
ALP SERPL-CCNC: 98 U/L — SIGNIFICANT CHANGE UP (ref 40–120)
ALT FLD-CCNC: 51 U/L — HIGH (ref 4–41)
AST SERPL-CCNC: 28 U/L — SIGNIFICANT CHANGE UP (ref 4–40)
BILIRUB SERPL-MCNC: 0.3 MG/DL — SIGNIFICANT CHANGE UP (ref 0.2–1.2)
BUN SERPL-MCNC: 23 MG/DL — SIGNIFICANT CHANGE UP (ref 7–23)
BUN SERPL-MCNC: 23 MG/DL — SIGNIFICANT CHANGE UP (ref 7–23)
CALCIUM SERPL-MCNC: 7.6 MG/DL — LOW (ref 8.4–10.5)
CALCIUM SERPL-MCNC: 7.6 MG/DL — LOW (ref 8.4–10.5)
CHLORIDE SERPL-SCNC: 110 MMOL/L — HIGH (ref 98–107)
CHLORIDE SERPL-SCNC: 110 MMOL/L — HIGH (ref 98–107)
CO2 SERPL-SCNC: 25 MMOL/L — SIGNIFICANT CHANGE UP (ref 22–31)
CO2 SERPL-SCNC: 25 MMOL/L — SIGNIFICANT CHANGE UP (ref 22–31)
CREAT SERPL-MCNC: 0.45 MG/DL — LOW (ref 0.5–1.3)
CREAT SERPL-MCNC: 0.45 MG/DL — LOW (ref 0.5–1.3)
GLUCOSE SERPL-MCNC: 122 MG/DL — HIGH (ref 70–99)
GLUCOSE SERPL-MCNC: 122 MG/DL — HIGH (ref 70–99)
HCT VFR BLD CALC: 29.7 % — LOW (ref 39–50)
HGB BLD-MCNC: 9.2 G/DL — LOW (ref 13–17)
MAGNESIUM SERPL-MCNC: 1.9 MG/DL — SIGNIFICANT CHANGE UP (ref 1.6–2.6)
MCHC RBC-ENTMCNC: 27.6 PG — SIGNIFICANT CHANGE UP (ref 27–34)
MCHC RBC-ENTMCNC: 31 % — LOW (ref 32–36)
MCV RBC AUTO: 89.2 FL — SIGNIFICANT CHANGE UP (ref 80–100)
NRBC # FLD: 0 — SIGNIFICANT CHANGE UP
PHOSPHATE SERPL-MCNC: 2.5 MG/DL — SIGNIFICANT CHANGE UP (ref 2.5–4.5)
PLATELET # BLD AUTO: 255 K/UL — SIGNIFICANT CHANGE UP (ref 150–400)
PMV BLD: 10.9 FL — SIGNIFICANT CHANGE UP (ref 7–13)
POTASSIUM SERPL-MCNC: 4 MMOL/L — SIGNIFICANT CHANGE UP (ref 3.5–5.3)
POTASSIUM SERPL-MCNC: 4 MMOL/L — SIGNIFICANT CHANGE UP (ref 3.5–5.3)
POTASSIUM SERPL-SCNC: 4 MMOL/L — SIGNIFICANT CHANGE UP (ref 3.5–5.3)
POTASSIUM SERPL-SCNC: 4 MMOL/L — SIGNIFICANT CHANGE UP (ref 3.5–5.3)
PROT SERPL-MCNC: 6 G/DL — SIGNIFICANT CHANGE UP (ref 6–8.3)
RBC # BLD: 3.33 M/UL — LOW (ref 4.2–5.8)
RBC # FLD: 17.8 % — HIGH (ref 10.3–14.5)
SODIUM SERPL-SCNC: 144 MMOL/L — SIGNIFICANT CHANGE UP (ref 135–145)
SODIUM SERPL-SCNC: 144 MMOL/L — SIGNIFICANT CHANGE UP (ref 135–145)
WBC # BLD: 12.38 K/UL — HIGH (ref 3.8–10.5)
WBC # FLD AUTO: 12.38 K/UL — HIGH (ref 3.8–10.5)

## 2018-01-14 RX ORDER — MINERAL OIL
133 OIL (ML) MISCELLANEOUS ONCE
Qty: 0 | Refills: 0 | Status: COMPLETED | OUTPATIENT
Start: 2018-01-14 | End: 2018-01-14

## 2018-01-14 RX ADMIN — CARBIDOPA AND LEVODOPA 1.5 TABLET(S): 25; 100 TABLET ORAL at 06:42

## 2018-01-14 RX ADMIN — Medication 3 MILLILITER(S): at 10:29

## 2018-01-14 RX ADMIN — CARBIDOPA AND LEVODOPA 1.5 TABLET(S): 25; 100 TABLET ORAL at 21:46

## 2018-01-14 RX ADMIN — Medication 81 MILLIGRAM(S): at 12:02

## 2018-01-14 RX ADMIN — Medication 1 TABLET(S): at 11:59

## 2018-01-14 RX ADMIN — Medication 1 APPLICATION(S): at 12:01

## 2018-01-14 RX ADMIN — Medication 500 MILLIGRAM(S): at 12:00

## 2018-01-14 RX ADMIN — Medication 5 MILLIGRAM(S): at 18:23

## 2018-01-14 RX ADMIN — CARBIDOPA AND LEVODOPA 1.5 TABLET(S): 25; 100 TABLET ORAL at 13:42

## 2018-01-14 RX ADMIN — Medication 200 MILLIGRAM(S): at 18:24

## 2018-01-14 RX ADMIN — PANTOPRAZOLE SODIUM 40 MILLIGRAM(S): 20 TABLET, DELAYED RELEASE ORAL at 06:42

## 2018-01-14 RX ADMIN — Medication 200 MILLIGRAM(S): at 21:46

## 2018-01-14 RX ADMIN — ENOXAPARIN SODIUM 40 MILLIGRAM(S): 100 INJECTION SUBCUTANEOUS at 12:01

## 2018-01-14 RX ADMIN — Medication 1 DROP(S): at 18:24

## 2018-01-14 RX ADMIN — LATANOPROST 1 DROP(S): 0.05 SOLUTION/ DROPS OPHTHALMIC; TOPICAL at 21:46

## 2018-01-14 RX ADMIN — Medication 5 MILLIGRAM(S): at 06:42

## 2018-01-14 RX ADMIN — Medication 1 DROP(S): at 06:43

## 2018-01-14 RX ADMIN — Medication 0.25 MILLIGRAM(S): at 10:29

## 2018-01-14 RX ADMIN — Medication 133 MILLILITER(S): at 12:00

## 2018-01-14 RX ADMIN — FINASTERIDE 5 MILLIGRAM(S): 5 TABLET, FILM COATED ORAL at 12:00

## 2018-01-14 RX ADMIN — Medication 3 MILLILITER(S): at 15:27

## 2018-01-14 RX ADMIN — SENNA PLUS 10 MILLILITER(S): 8.6 TABLET ORAL at 21:46

## 2018-01-14 RX ADMIN — ZINC OXIDE 1 APPLICATION(S): 200 OINTMENT TOPICAL at 12:01

## 2018-01-14 RX ADMIN — CEFTRIAXONE 100 GRAM(S): 500 INJECTION, POWDER, FOR SOLUTION INTRAMUSCULAR; INTRAVENOUS at 18:23

## 2018-01-14 RX ADMIN — DORZOLAMIDE HYDROCHLORIDE TIMOLOL MALEATE 1 DROP(S): 20; 5 SOLUTION/ DROPS OPHTHALMIC at 06:43

## 2018-01-14 RX ADMIN — Medication 1 APPLICATION(S): at 12:02

## 2018-01-14 RX ADMIN — Medication 3 MILLILITER(S): at 04:10

## 2018-01-14 RX ADMIN — Medication 200 MILLIGRAM(S): at 06:42

## 2018-01-14 RX ADMIN — Medication 3 MILLILITER(S): at 21:54

## 2018-01-14 RX ADMIN — Medication 0.25 MILLIGRAM(S): at 21:54

## 2018-01-14 RX ADMIN — DORZOLAMIDE HYDROCHLORIDE TIMOLOL MALEATE 1 DROP(S): 20; 5 SOLUTION/ DROPS OPHTHALMIC at 18:24

## 2018-01-14 RX ADMIN — Medication 1 MILLIGRAM(S): at 21:46

## 2018-01-14 RX ADMIN — ATORVASTATIN CALCIUM 80 MILLIGRAM(S): 80 TABLET, FILM COATED ORAL at 21:46

## 2018-01-14 RX ADMIN — SODIUM CHLORIDE 50 MILLILITER(S): 9 INJECTION, SOLUTION INTRAVENOUS at 18:22

## 2018-01-14 NOTE — PROGRESS NOTE ADULT - PROBLEM SELECTOR PLAN 1
2/2 proteus UTI and bacteremia  Afebrile in past 24 hours, however WBC uptrending. Will continue to trend. Repeat BCx (-).   C/w ceftriaxone 1g qd (day 7/10)  Appreciate ID rec: consider imaging if fever persist.  Monitor vitals 2/2 proteus UTI and bacteremia  Afebrile  New leukocytosis of unspecified etiology, potentially lab error.  Will f/u AM labs and monitor closely.   Repeat BCx (-).   C/w ceftriaxone 1g qd (day 7/10)  If leukocytosis resolves and pt is afebrile, hemodynamically stable, w/o acute events, then will plan for d/c to facility where he can complete abx  Monitor vitals

## 2018-01-14 NOTE — PROGRESS NOTE ADULT - PROBLEM SELECTOR PLAN 4
w/ hyperchloremia. Resolved.   C/w D5W for now  Nephro on board Resolved.   C/w D5W  Nephro on board

## 2018-01-14 NOTE — PROGRESS NOTE ADULT - SUBJECTIVE AND OBJECTIVE BOX
[facility] Progress Note- PGY1.    ===============================================================  CONTACT INFO   Aryan Perez M.D., PGY-1  Pager: NS- [phone], [facility]- [phone]    Mon-Fri: pager covered by day team 7am-7pm;   ***Academic conferences M-F 12pm-1pm- page ONLY if URGENT or if Consultant  Sat/Gaming Cross Coverage 12pm-7pm: NS- page [phone] for Team1-4, [facility]- pager forwarded to covering Resident  For Night coverage 7pm-7am:  [phone](NS)/[phone]([facility]) Team1-3, [phone] (NS)/[id number] ([facility]) Team4 & Care Model,  ===============================================================    Chief Complaint: Patient is a 82y old  Male who presents with a chief complaint of Fever, UTI (09 Jan 2018 10:47)        INTERVAL HPI/OVERNIGHT EVENTS:   No acute overnight event. Patient reports feeling_____. Denied fever, chill, nausea, vomiting, headache, CP, SOB, abdominal pain, diarrhea, or constipation.       MEDICATIONS  (STANDING):  ALBUTerol/ipratropium for Nebulization 3 milliLiter(s) Nebulizer every 6 hours  ascorbic acid 500 milliGRAM(s) Oral daily  aspirin  chewable 81 milliGRAM(s) Oral daily  atorvastatin 80 milliGRAM(s) Oral at bedtime  buDESOnide   0.25 milliGRAM(s) Respule 0.25 milliGRAM(s) Inhalation two times a day  carbidopa/levodopa  25/100 1.5 Tablet(s) Oral three times a day  cefTRIAXone   IVPB 1 Gram(s) IV Intermittent every 24 hours  collagenase Ointment 1 Application(s) Topical daily  dextrose 5%. 1000 milliLiter(s) (50 mL/Hr) IV Continuous <Continuous>  docusate sodium Liquid 200 milliGRAM(s) Oral at bedtime  dorzolamide 2%/timolol 0.5% Ophthalmic Solution 1 Drop(s) Left EYE two times a day  doxazosin 1 milliGRAM(s) Oral at bedtime  enoxaparin Injectable 40 milliGRAM(s) SubCutaneous daily  finasteride 5 milliGRAM(s) Oral daily  guaiFENesin    Syrup 200 milliGRAM(s) Oral two times a day  latanoprost 0.005% Ophthalmic Solution 1 Drop(s) Left EYE at bedtime  metoclopramide   Syrup 5 milliGRAM(s) Oral every 12 hours  multivitamin 1 Tablet(s) Oral daily  pantoprazole   Suspension 40 milliGRAM(s) Oral before breakfast  pilocarpine 4% Solution 1 Drop(s) Left EYE two times a day  senna Syrup 10 milliLiter(s) Oral at bedtime  vitamin A &amp; D Ointment 1 Application(s) Topical daily  zinc oxide 20% Ointment 1 Application(s) Topical daily    MEDICATIONS  (PRN):  acetaminophen    Suspension 650 milliGRAM(s) Oral every 6 hours PRN For Temp greater than 38 C (100.4 F)  acetaminophen    Suspension. 650 milliGRAM(s) Oral every 6 hours PRN Mild to moderate pain      Vital Signs Last 24 Hrs  T(C): 37.1 (14 Jan 2018 07:18), Max: 37.1 (14 Jan 2018 07:18)  T(F): 98.7 (14 Jan 2018 07:18), Max: 98.7 (14 Jan 2018 07:18)  HR: 72 (14 Jan 2018 07:18) (68 - 84)  BP: 123/64 (14 Jan 2018 07:18) (116/80 - 138/65)  BP(mean): --  RR: 18 (14 Jan 2018 07:18) (18 - 18)  SpO2: 99% (14 Jan 2018 07:18) (96% - 100%)  Supplemental O2: [ ] No, on Room Air [ ] Yes,     I&O's Detail    13 Jan 2018 07:01  -  14 Jan 2018 07:00  --------------------------------------------------------  IN:  Total IN: 0 mL    OUT:    Indwelling Catheter - Urethral: 1025 mL  Total OUT: 1025 mL    Total NET: -1025 mL        CAPILLARY BLOOD GLUCOSE          PHYSICAL EXAM:  Daily     Daily    Appearance: NAD, well-developed	  HEENT:   Normal oral mucosa, PERRL, EOMI	  Neck: No JVD, no LAD, supple, trachea in midline  Cardiovascular: Normal S1 S2, No JVD, No murmurs  Respiratory: Lungs clear to auscultation, no wheezing, rhonchi  Gastrointestinal:  Soft, Non-tender, nondistended, + BS  Skin: No rashes, No ecchymoses, No cyanosis	  MSK/Extremities: Normal range of motion, 5/5 Muscle strength bilaterally. No clubbing, cyanosis or edema  Vascular: Peripheral pulses palpable 2+ bilaterally  Neurologic: Non-focal, CN II-XII intact  Psychiatry: A & O x 3, Mood & affect appropriate    LABS:                        9.3    9.31  )-----------( 219      ( 13 Jan 2018 06:00 )             29.3     01-13    148<H>  |  113<H>  |  30<H>  ----------------------------<  131<H>  3.9   |  25  |  0.49<L>    Ca    7.5<L>      13 Jan 2018 06:00  Phos  2.6     01-13  Mg     1.9     01-13    TPro  6.1  /  Alb  2.2<L>  /  TBili  0.3  /  DBili  x   /  AST  36  /  ALT  59<H>  /  AlkPhos  107  01-13    LIVER FUNCTIONS - ( 13 Jan 2018 06:00 )  Alb: 2.2 g/dL / Pro: 6.1 g/dL / ALK PHOS: 107 u/L / ALT: 59 u/L / AST: 36 u/L / GGT: x     / T. Bili 0.3 mg/dL / D. Bili x                     Microbiology:    RADIOLOGY & ADDITIONAL TESTS:    Xray -   CT -  MRI -     Imaging Personally Reviewed:  [ ] YES  [ ] NO  Consultant(s) Notes Reviewed:  [X] YES  [ ] NO  Care Discussed with Consultants/Other Providers [ ] YES  [ ] NO [facility] Progress Note- PGY1.    ===============================================================  CONTACT INFO   Aryan Perez M.D., PGY-1  Pager: NS- [phone], [facility]- [phone]    Mon-Fri: pager covered by day team 7am-7pm;   ***Academic conferences M-F 12pm-1pm- page ONLY if URGENT or if Consultant  Sat/Gaming Cross Coverage 12pm-7pm: NS- page [phone] for Team1-4, [facility]- pager forwarded to covering Resident  For Night coverage 7pm-7am:  [phone](NS)/[phone]([facility]) Team1-3, [phone] (NS)/[id number] ([facility]) Team4 & Care Model,  ===============================================================    Chief Complaint: Patient is a 82y old  Male who presents with a chief complaint of Fever, UTI (09 Jan 2018 10:47)        INTERVAL HPI/OVERNIGHT EVENTS:   No acute overnight event. Patient with dysarthria. Denied fever, chill, CP, SOB, abdominal pain.       MEDICATIONS  (STANDING):  ALBUTerol/ipratropium for Nebulization 3 milliLiter(s) Nebulizer every 6 hours  ascorbic acid 500 milliGRAM(s) Oral daily  aspirin  chewable 81 milliGRAM(s) Oral daily  atorvastatin 80 milliGRAM(s) Oral at bedtime  buDESOnide   0.25 milliGRAM(s) Respule 0.25 milliGRAM(s) Inhalation two times a day  carbidopa/levodopa  25/100 1.5 Tablet(s) Oral three times a day  cefTRIAXone   IVPB 1 Gram(s) IV Intermittent every 24 hours  collagenase Ointment 1 Application(s) Topical daily  dextrose 5%. 1000 milliLiter(s) (50 mL/Hr) IV Continuous <Continuous>  docusate sodium Liquid 200 milliGRAM(s) Oral at bedtime  dorzolamide 2%/timolol 0.5% Ophthalmic Solution 1 Drop(s) Left EYE two times a day  doxazosin 1 milliGRAM(s) Oral at bedtime  enoxaparin Injectable 40 milliGRAM(s) SubCutaneous daily  finasteride 5 milliGRAM(s) Oral daily  guaiFENesin    Syrup 200 milliGRAM(s) Oral two times a day  latanoprost 0.005% Ophthalmic Solution 1 Drop(s) Left EYE at bedtime  metoclopramide   Syrup 5 milliGRAM(s) Oral every 12 hours  multivitamin 1 Tablet(s) Oral daily  pantoprazole   Suspension 40 milliGRAM(s) Oral before breakfast  pilocarpine 4% Solution 1 Drop(s) Left EYE two times a day  senna Syrup 10 milliLiter(s) Oral at bedtime  vitamin A &amp; D Ointment 1 Application(s) Topical daily  zinc oxide 20% Ointment 1 Application(s) Topical daily    MEDICATIONS  (PRN):  acetaminophen    Suspension 650 milliGRAM(s) Oral every 6 hours PRN For Temp greater than 38 C (100.4 F)  acetaminophen    Suspension. 650 milliGRAM(s) Oral every 6 hours PRN Mild to moderate pain      Vital Signs Last 24 Hrs  T(C): 37.1 (14 Jan 2018 07:18), Max: 37.1 (14 Jan 2018 07:18)  T(F): 98.7 (14 Jan 2018 07:18), Max: 98.7 (14 Jan 2018 07:18)  HR: 72 (14 Jan 2018 07:18) (68 - 84)  BP: 123/64 (14 Jan 2018 07:18) (116/80 - 138/65)  BP(mean): --  RR: 18 (14 Jan 2018 07:18) (18 - 18)  SpO2: 99% (14 Jan 2018 07:18) (96% - 100%)  Supplemental O2: [ ] No, on Room Air [ ] Yes,     I&O's Detail    13 Jan 2018 07:01  -  14 Jan 2018 07:00  --------------------------------------------------------  IN:  Total IN: 0 mL    OUT:    Indwelling Catheter - Urethral: 1025 mL  Total OUT: 1025 mL    Total NET: -1025 mL        CAPILLARY BLOOD GLUCOSE          PHYSICAL EXAM:  Appearance: NAD, well-developed.   HEENT:   NC/AT  Neck: supple, bandage covered on previous trach site  Cardiovascular: Normal S1 S2, No JVD, No murmurs  Respiratory: Lungs clear to auscultation, no wheezing, rhonchi, decrease breath sound on left lower lung  Gastrointestinal:  Soft, nondistended, + BS.   : lubin intact. draining yellow urine.   Skin: No rashes, No ecchymoses, No cyanosis	  MSK/Extremities: unable to assess  Vascular: Peripheral pulses palpable 2+ bilaterally  Neurologic: unable to assess  Psychiatry: alert today. answer yes and no to questions.     LABS:                        9.3    9.31  )-----------( 219      ( 13 Jan 2018 06:00 )             29.3     01-13    148<H>  |  113<H>  |  30<H>  ----------------------------<  131<H>  3.9   |  25  |  0.49<L>    Ca    7.5<L>      13 Jan 2018 06:00  Phos  2.6     01-13  Mg     1.9     01-13    TPro  6.1  /  Alb  2.2<L>  /  TBili  0.3  /  DBili  x   /  AST  36  /  ALT  59<H>  /  AlkPhos  107  01-13    LIVER FUNCTIONS - ( 13 Jan 2018 06:00 )  Alb: 2.2 g/dL / Pro: 6.1 g/dL / ALK PHOS: 107 u/L / ALT: 59 u/L / AST: 36 u/L / GGT: x     / T. Bili 0.3 mg/dL / D. Bili x                     Microbiology:    RADIOLOGY & ADDITIONAL TESTS:    Xray -   CT -  MRI -     Imaging Personally Reviewed:  [ ] YES  [ ] NO  Consultant(s) Notes Reviewed:  [X] YES  [ ] NO  Care Discussed with Consultants/Other Providers [ ] YES  [ ] NO [facility] Progress Note- PGY1.    ===============================================================  CONTACT INFO   Aryan Perez M.D., PGY-1  Pager: NS- [phone], [facility]- [phone]    Mon-Fri: pager covered by day team 7am-7pm;   ***Academic conferences M-F 12pm-1pm- page ONLY if URGENT or if Consultant  Sat/Gaming Cross Coverage 12pm-7pm: NS- page [phone] for Team1-4, [facility]- pager forwarded to covering Resident  For Night coverage 7pm-7am:  [phone](NS)/[phone]([facility]) Team1-3, [phone] (NS)/[id number] ([facility]) Team4 & Care Model,  ===============================================================    Chief Complaint: Patient is a 82y old  Male who presents with a chief complaint of Fever, UTI (09 Jan 2018 10:47)        INTERVAL HPI/OVERNIGHT EVENTS:   No acute overnight event. Patient with dysarthria. Denied fever, chill, CP, SOB, abdominal pain.       MEDICATIONS  (STANDING):  ALBUTerol/ipratropium for Nebulization 3 milliLiter(s) Nebulizer every 6 hours  ascorbic acid 500 milliGRAM(s) Oral daily  aspirin  chewable 81 milliGRAM(s) Oral daily  atorvastatin 80 milliGRAM(s) Oral at bedtime  buDESOnide   0.25 milliGRAM(s) Respule 0.25 milliGRAM(s) Inhalation two times a day  carbidopa/levodopa  25/100 1.5 Tablet(s) Oral three times a day  cefTRIAXone   IVPB 1 Gram(s) IV Intermittent every 24 hours  collagenase Ointment 1 Application(s) Topical daily  dextrose 5%. 1000 milliLiter(s) (50 mL/Hr) IV Continuous <Continuous>  docusate sodium Liquid 200 milliGRAM(s) Oral at bedtime  dorzolamide 2%/timolol 0.5% Ophthalmic Solution 1 Drop(s) Left EYE two times a day  doxazosin 1 milliGRAM(s) Oral at bedtime  enoxaparin Injectable 40 milliGRAM(s) SubCutaneous daily  finasteride 5 milliGRAM(s) Oral daily  guaiFENesin    Syrup 200 milliGRAM(s) Oral two times a day  latanoprost 0.005% Ophthalmic Solution 1 Drop(s) Left EYE at bedtime  metoclopramide   Syrup 5 milliGRAM(s) Oral every 12 hours  multivitamin 1 Tablet(s) Oral daily  pantoprazole   Suspension 40 milliGRAM(s) Oral before breakfast  pilocarpine 4% Solution 1 Drop(s) Left EYE two times a day  senna Syrup 10 milliLiter(s) Oral at bedtime  vitamin A &amp; D Ointment 1 Application(s) Topical daily  zinc oxide 20% Ointment 1 Application(s) Topical daily    MEDICATIONS  (PRN):  acetaminophen    Suspension 650 milliGRAM(s) Oral every 6 hours PRN For Temp greater than 38 C (100.4 F)  acetaminophen    Suspension. 650 milliGRAM(s) Oral every 6 hours PRN Mild to moderate pain      Vital Signs Last 24 Hrs  T(C): 37.1 (14 Jan 2018 07:18), Max: 37.1 (14 Jan 2018 07:18)  T(F): 98.7 (14 Jan 2018 07:18), Max: 98.7 (14 Jan 2018 07:18)  HR: 72 (14 Jan 2018 07:18) (68 - 84)  BP: 123/64 (14 Jan 2018 07:18) (116/80 - 138/65)  BP(mean): --  RR: 18 (14 Jan 2018 07:18) (18 - 18)  SpO2: 99% (14 Jan 2018 07:18) (96% - 100%)  Supplemental O2: [ ] No, on Room Air [ ] Yes,     I&O's Detail    13 Jan 2018 07:01  -  14 Jan 2018 07:00  --------------------------------------------------------  IN:  Total IN: 0 mL    OUT:    Indwelling Catheter - Urethral: 1025 mL  Total OUT: 1025 mL    Total NET: -1025 mL        CAPILLARY BLOOD GLUCOSE          PHYSICAL EXAM:  Appearance: NAD, well-developed.   HEENT:   NC/AT  Neck: supple, bandage covered on previous trach site  Cardiovascular: Normal S1 S2, No JVD, No murmurs  Respiratory: Lungs clear to auscultation, no wheezing, rhonchi, decrease breath sound on left lower lung  Gastrointestinal:  Soft, nondistended, + BS.   : lubin intact. draining yellow urine.   Skin: No rashes, No ecchymoses, No cyanosis	  MSK/Extremities: unable to assess  Vascular: Peripheral pulses palpable 2+ bilaterally  Neurologic: unable to assess  Psychiatry: alert today. answer yes and no to questions.     LABS:                        9.3    9.31  )-----------( 219      ( 13 Jan 2018 06:00 )             29.3     01-13    148<H>  |  113<H>  |  30<H>  ----------------------------<  131<H>  3.9   |  25  |  0.49<L>    Ca    7.5<L>      13 Jan 2018 06:00  Phos  2.6     01-13  Mg     1.9     01-13    TPro  6.1  /  Alb  2.2<L>  /  TBili  0.3  /  DBili  x   /  AST  36  /  ALT  59<H>  /  AlkPhos  107  01-13    LIVER FUNCTIONS - ( 13 Jan 2018 06:00 )  Alb: 2.2 g/dL / Pro: 6.1 g/dL / ALK PHOS: 107 u/L / ALT: 59 u/L / AST: 36 u/L / GGT: x     / T. Bili 0.3 mg/dL / D. Bili x           Blood Cx NGTD X4        Imaging Personally Reviewed:  [ ] YES  [ ] NO  Consultant(s) Notes Reviewed:  [X] YES  [ ] NO  Care Discussed with Consultants/Other Providers [ ] YES  [ ] NO IM ATTENDING Chente Jaime ([id number]) and [facility] Progress Note- PGY1.    ===============================================================  CONTACT INFO   Aryan Perez M.D., PGY-1  Pager: NS- [phone], [facility]- [phone]    Mon-Fri: pager covered by day team 7am-7pm;   ***Academic conferences M-F 12pm-1pm- page ONLY if URGENT or if Consultant  Sat/Gaming Cross Coverage 12pm-7pm: NS- page [phone] for Team1-4, [facility]- pager forwarded to covering Resident  For Night coverage 7pm-7am:  [phone](NS)/[phone]([facility]) Team1-3, 1446 (NS)/[id number] ([facility]) Team4 & Care Model,  ===============================================================    Chief Complaint: Patient is a 82y old  Male who presents with a chief complaint of Fever, UTI (09 Jan 2018 10:47)        INTERVAL HPI/OVERNIGHT EVENTS:   No acute overnight event. Patient with dysarthria. Denied fever, chill, CP, SOB, abdominal pain.       MEDICATIONS  (STANDING):  ALBUTerol/ipratropium for Nebulization 3 milliLiter(s) Nebulizer every 6 hours  ascorbic acid 500 milliGRAM(s) Oral daily  aspirin  chewable 81 milliGRAM(s) Oral daily  atorvastatin 80 milliGRAM(s) Oral at bedtime  buDESOnide   0.25 milliGRAM(s) Respule 0.25 milliGRAM(s) Inhalation two times a day  carbidopa/levodopa  25/100 1.5 Tablet(s) Oral three times a day  cefTRIAXone   IVPB 1 Gram(s) IV Intermittent every 24 hours  collagenase Ointment 1 Application(s) Topical daily  dextrose 5%. 1000 milliLiter(s) (50 mL/Hr) IV Continuous <Continuous>  docusate sodium Liquid 200 milliGRAM(s) Oral at bedtime  dorzolamide 2%/timolol 0.5% Ophthalmic Solution 1 Drop(s) Left EYE two times a day  doxazosin 1 milliGRAM(s) Oral at bedtime  enoxaparin Injectable 40 milliGRAM(s) SubCutaneous daily  finasteride 5 milliGRAM(s) Oral daily  guaiFENesin    Syrup 200 milliGRAM(s) Oral two times a day  latanoprost 0.005% Ophthalmic Solution 1 Drop(s) Left EYE at bedtime  metoclopramide   Syrup 5 milliGRAM(s) Oral every 12 hours  multivitamin 1 Tablet(s) Oral daily  pantoprazole   Suspension 40 milliGRAM(s) Oral before breakfast  pilocarpine 4% Solution 1 Drop(s) Left EYE two times a day  senna Syrup 10 milliLiter(s) Oral at bedtime  vitamin A &amp; D Ointment 1 Application(s) Topical daily  zinc oxide 20% Ointment 1 Application(s) Topical daily    MEDICATIONS  (PRN):  acetaminophen    Suspension 650 milliGRAM(s) Oral every 6 hours PRN For Temp greater than 38 C (100.4 F)  acetaminophen    Suspension. 650 milliGRAM(s) Oral every 6 hours PRN Mild to moderate pain      Vital Signs Last 24 Hrs  T(C): 37.1 (14 Jan 2018 07:18), Max: 37.1 (14 Jan 2018 07:18)  T(F): 98.7 (14 Jan 2018 07:18), Max: 98.7 (14 Jan 2018 07:18)  HR: 72 (14 Jan 2018 07:18) (68 - 84)  BP: 123/64 (14 Jan 2018 07:18) (116/80 - 138/65)  BP(mean): --  RR: 18 (14 Jan 2018 07:18) (18 - 18)  SpO2: 99% (14 Jan 2018 07:18) (96% - 100%)  Supplemental O2: [ ] No, on Room Air [ ] Yes,     I&O's Detail    13 Jan 2018 07:01  -  14 Jan 2018 07:00  --------------------------------------------------------  IN:  Total IN: 0 mL    OUT:    Indwelling Catheter - Urethral: 1025 mL  Total OUT: 1025 mL    Total NET: -1025 mL        CAPILLARY BLOOD GLUCOSE          PHYSICAL EXAM:  Appearance: NAD, well-developed.   HEENT:   NC/AT  Neck: supple, bandage covered on previous trach site  Cardiovascular: Normal S1 S2, No JVD, No murmurs  Respiratory: Lungs clear to auscultation, no wheezing, rhonchi, decrease breath sound on left lower lung  Gastrointestinal:  Soft, nondistended, + BS.   : lubin intact. draining yellow urine.   Skin: No rashes, No ecchymoses, No cyanosis	  MSK/Extremities: unable to assess  Vascular: Peripheral pulses palpable 2+ bilaterally  Neurologic: unable to assess  Psychiatry: alert today. answer yes and no to questions.     LABS:             Comprehensive Metabolic, Mg + Phosphorus (01.14.18 @ 06:30)    eGFR if : 124 mL/min    eGFR if Non : 107: The units for eGFR are ml/min/1.73m2 (normalized body  surface area). The eGFR is calculated from a serum  creatinine using the CKD-EPI equation. Other variables  required for calculation are race, age and sex. Among  patients with chronic kidney disease (CKD), the eGFR is  useful in determining the stage of disease according to  KDOQI CKD classification. All eGFR results are reported  numerically with the following interpretation.    GFR  (ml/min/1.73 m2)          W/KIDNEY DAMAGE    W/O KIDNEY DMG  ==========================================================  >= 90.......................Stage 1..............Normal  60-89.......................Stage 2...........Decreased GFR  30-59.......................Stage 3..............Stage 3  15-29.......................Stage 4..............Stage 4  < 15........................Stage 5..............Stage 5    Each stage of CKD assumes that the associated GFR level  has been in effect for at least 3 months. Determination of  stages one and two (with eGFR > 59ml/min/m2) requires  estimation of kidney damage for at least 3 months as  defined by structural or functional abnormalities.    Limitations: All estimates of GFR will be less accurate  for patients at extremes of muscle mass (including but  not limited to frail elderly, critically ill, or cancer  patients), those with unusual diets, and those with  conditions associated with reduced secretion or  extrarenal elimination of creatinine. The eGFR equation  is not recommended for use in patients with unstable  creatinine levels. mL/min    Phosphorus Level, Serum: 2.5 mg/dL    Sodium, Serum: 144 mmol/L    Potassium, Serum: 4.0 mmol/L    Chloride, Serum: 110 mmol/L    Carbon Dioxide, Serum: 25 mmol/L    Blood Urea Nitrogen, Serum: 23 mg/dL    Creatinine, Serum: 0.45 mg/dL    Glucose, Serum: 122 mg/dL    Calcium, Total Serum: 7.6 mg/dL    Protein Total, Serum: 6.0 g/dL    Albumin, Serum: 2.0 g/dL    Bilirubin Total, Serum: 0.3 mg/dL    Alkaline Phosphatase, Serum: 98: Please note new reference ranges are adjusted for age and  gender. u/L    Aspartate Aminotransferase (AST/SGOT): 28 u/L    Alanine Aminotransferase (ALT/SGPT): 51 u/L    Magnesium, Serum: 1.9 mg/dL    Complete Blood Count (01.14.18 @ 06:30)    WBC Count: 12.38 K/uL    RBC Count: 3.33 M/uL    Hemoglobin: 9.2 g/dL    Hematocrit: 29.7 %    Mean Cell Volume: 89.2 fL    Mean Cell Hemoglobin: 27.6 pg    Mean Cell Hemoglobin Conc: 31.0 %    Red Cell Distrib Width: 17.8 %    Platelet Count - Automated: 255 K/uL    MPV: 10.9 fl    Nucleated RBC #: 0        Imaging Personally Reviewed:  [ ] YES  [ ] NO  Consultant(s) Notes Reviewed:  [X] YES  [ ] NO  Care Discussed with Consultants/Other Providers [ ] YES  [ ] NO IM ATTENDING Chente Jaime ([id number]) and [facility] CMB Progress Note- PGY1.    ===============================================================  CONTACT INFO   Aryan Perez M.D., PGY-1  Pager: NS- [phone], [facility]- [phone]    Mon-Fri: pager covered by day team 7am-7pm;   ***Academic conferences M-F 12pm-1pm- page ONLY if URGENT or if Consultant  Sat/Gaming Cross Coverage 12pm-7pm: NS- page [phone] for Team1-4, [facility]- pager forwarded to covering Resident  For Night coverage 7pm-7am:  [phone](NS)/[id number]([facility]) Team1-3, 1446 (NS)/[id number] ([facility]) Team4 & Care Model,  ===============================================================    Chief Complaint: Patient is a 82y old  Male who presents with a chief complaint of Fever, UTI (09 Jan 2018 10:47)        INTERVAL HPI/OVERNIGHT EVENTS:   No acute overnight event. Patient with dysarthria. Denied fever, chill, CP, SOB, abdominal pain.       MEDICATIONS  (STANDING):  ALBUTerol/ipratropium for Nebulization 3 milliLiter(s) Nebulizer every 6 hours  ascorbic acid 500 milliGRAM(s) Oral daily  aspirin  chewable 81 milliGRAM(s) Oral daily  atorvastatin 80 milliGRAM(s) Oral at bedtime  buDESOnide   0.25 milliGRAM(s) Respule 0.25 milliGRAM(s) Inhalation two times a day  carbidopa/levodopa  25/100 1.5 Tablet(s) Oral three times a day  cefTRIAXone   IVPB 1 Gram(s) IV Intermittent every 24 hours  collagenase Ointment 1 Application(s) Topical daily  dextrose 5%. 1000 milliLiter(s) (50 mL/Hr) IV Continuous <Continuous>  docusate sodium Liquid 200 milliGRAM(s) Oral at bedtime  dorzolamide 2%/timolol 0.5% Ophthalmic Solution 1 Drop(s) Left EYE two times a day  doxazosin 1 milliGRAM(s) Oral at bedtime  enoxaparin Injectable 40 milliGRAM(s) SubCutaneous daily  finasteride 5 milliGRAM(s) Oral daily  guaiFENesin    Syrup 200 milliGRAM(s) Oral two times a day  latanoprost 0.005% Ophthalmic Solution 1 Drop(s) Left EYE at bedtime  metoclopramide   Syrup 5 milliGRAM(s) Oral every 12 hours  multivitamin 1 Tablet(s) Oral daily  pantoprazole   Suspension 40 milliGRAM(s) Oral before breakfast  pilocarpine 4% Solution 1 Drop(s) Left EYE two times a day  senna Syrup 10 milliLiter(s) Oral at bedtime  vitamin A &amp; D Ointment 1 Application(s) Topical daily  zinc oxide 20% Ointment 1 Application(s) Topical daily    MEDICATIONS  (PRN):  acetaminophen    Suspension 650 milliGRAM(s) Oral every 6 hours PRN For Temp greater than 38 C (100.4 F)  acetaminophen    Suspension. 650 milliGRAM(s) Oral every 6 hours PRN Mild to moderate pain      Vital Signs Last 24 Hrs  T(C): 37.1 (14 Jan 2018 07:18), Max: 37.1 (14 Jan 2018 07:18)  T(F): 98.7 (14 Jan 2018 07:18), Max: 98.7 (14 Jan 2018 07:18)  HR: 72 (14 Jan 2018 07:18) (68 - 84)  BP: 123/64 (14 Jan 2018 07:18) (116/80 - 138/65)  BP(mean): --  RR: 18 (14 Jan 2018 07:18) (18 - 18)  SpO2: 99% (14 Jan 2018 07:18) (96% - 100%)  Supplemental O2: [ ] No, on Room Air [ ] Yes,     I&O's Detail    13 Jan 2018 07:01  -  14 Jan 2018 07:00  --------------------------------------------------------  IN:  Total IN: 0 mL    OUT:    Indwelling Catheter - Urethral: 1025 mL  Total OUT: 1025 mL    Total NET: -1025 mL        CAPILLARY BLOOD GLUCOSE          PHYSICAL EXAM:  Appearance: NAD, well-developed.   HEENT:   NC/AT, poor dentition  Neck: supple, bandage covered on previous trach site  Cardiovascular: Normal S1 S2, No JVD, No murmurs  Respiratory: decreased breath sounds b/l however lungs clear to auscultation, no wheezing, rhonchi  Gastrointestinal:  Soft, nondistended, + BS.   : lubin intact. yellow urine.   Skin: wwp	  Vascular: radial pulses 2+ bilaterally  Neurologic: speaks few words but does not respond to most questions. Awake, moves eyes spontaneously  Psychiatry: alert and more awake     LABS:             Comprehensive Metabolic, Mg + Phosphorus (01.14.18 @ 06:30)    eGFR if : 124 mL/min    eGFR if Non : 107: The units for eGFR are ml/min/1.73m2 (normalized body  surface area). The eGFR is calculated from a serum  creatinine using the CKD-EPI equation. Other variables  required for calculation are race, age and sex. Among  patients with chronic kidney disease (CKD), the eGFR is  useful in determining the stage of disease according to  KDOQI CKD classification. All eGFR results are reported  numerically with the following interpretation.    GFR  (ml/min/1.73 m2)          W/KIDNEY DAMAGE    W/O KIDNEY DMG  ==========================================================  >= 90.......................Stage 1..............Normal  60-89.......................Stage 2...........Decreased GFR  30-59.......................Stage 3..............Stage 3  15-29.......................Stage 4..............Stage 4  < 15........................Stage 5..............Stage 5    Each stage of CKD assumes that the associated GFR level  has been in effect for at least 3 months. Determination of  stages one and two (with eGFR > 59ml/min/m2) requires  estimation of kidney damage for at least 3 months as  defined by structural or functional abnormalities.    Limitations: All estimates of GFR will be less accurate  for patients at extremes of muscle mass (including but  not limited to frail elderly, critically ill, or cancer  patients), those with unusual diets, and those with  conditions associated with reduced secretion or  extrarenal elimination of creatinine. The eGFR equation  is not recommended for use in patients with unstable  creatinine levels. mL/min    Phosphorus Level, Serum: 2.5 mg/dL    Sodium, Serum: 144 mmol/L    Potassium, Serum: 4.0 mmol/L    Chloride, Serum: 110 mmol/L    Carbon Dioxide, Serum: 25 mmol/L    Blood Urea Nitrogen, Serum: 23 mg/dL    Creatinine, Serum: 0.45 mg/dL    Glucose, Serum: 122 mg/dL    Calcium, Total Serum: 7.6 mg/dL    Protein Total, Serum: 6.0 g/dL    Albumin, Serum: 2.0 g/dL    Bilirubin Total, Serum: 0.3 mg/dL    Alkaline Phosphatase, Serum: 98: Please note new reference ranges are adjusted for age and  gender. u/L    Aspartate Aminotransferase (AST/SGOT): 28 u/L    Alanine Aminotransferase (ALT/SGPT): 51 u/L    Magnesium, Serum: 1.9 mg/dL    Complete Blood Count (01.14.18 @ 06:30)    WBC Count: 12.38 K/uL    RBC Count: 3.33 M/uL    Hemoglobin: 9.2 g/dL    Hematocrit: 29.7 %    Mean Cell Volume: 89.2 fL    Mean Cell Hemoglobin: 27.6 pg    Mean Cell Hemoglobin Conc: 31.0 %    Red Cell Distrib Width: 17.8 %    Platelet Count - Automated: 255 K/uL    MPV: 10.9 fl    Nucleated RBC #: 0        Imaging Personally Reviewed:  [ ] YES  [ ] NO  Consultant(s) Notes Reviewed:  [X] YES  [ ] NO  Care Discussed with Consultants/Other Providers [ ] YES  [ ] NO

## 2018-01-14 NOTE — PROGRESS NOTE ADULT - PROBLEM SELECTOR PLAN 5
Aspiration precautions  - NPO with peg feeding.  - Pt with uptrending WBC. Monitor for aspiration.  - Oral care Aspiration precautions  NPO with peg feeding.  Oral care

## 2018-01-15 DIAGNOSIS — D72.829 ELEVATED WHITE BLOOD CELL COUNT, UNSPECIFIED: ICD-10-CM

## 2018-01-15 DIAGNOSIS — R05 COUGH: ICD-10-CM

## 2018-01-15 DIAGNOSIS — K59.00 CONSTIPATION, UNSPECIFIED: ICD-10-CM

## 2018-01-15 LAB
BACTERIA BLD CULT: SIGNIFICANT CHANGE UP
BUN SERPL-MCNC: 21 MG/DL — SIGNIFICANT CHANGE UP (ref 7–23)
CALCIUM SERPL-MCNC: 7.7 MG/DL — LOW (ref 8.4–10.5)
CHLORIDE SERPL-SCNC: 105 MMOL/L — SIGNIFICANT CHANGE UP (ref 98–107)
CO2 SERPL-SCNC: 28 MMOL/L — SIGNIFICANT CHANGE UP (ref 22–31)
CREAT SERPL-MCNC: 0.42 MG/DL — LOW (ref 0.5–1.3)
GLUCOSE SERPL-MCNC: 118 MG/DL — HIGH (ref 70–99)
HCT VFR BLD CALC: 30.4 % — LOW (ref 39–50)
HGB BLD-MCNC: 9.4 G/DL — LOW (ref 13–17)
MAGNESIUM SERPL-MCNC: 1.9 MG/DL — SIGNIFICANT CHANGE UP (ref 1.6–2.6)
MCHC RBC-ENTMCNC: 27.3 PG — SIGNIFICANT CHANGE UP (ref 27–34)
MCHC RBC-ENTMCNC: 30.9 % — LOW (ref 32–36)
MCV RBC AUTO: 88.4 FL — SIGNIFICANT CHANGE UP (ref 80–100)
NRBC # FLD: 0 — SIGNIFICANT CHANGE UP
PHOSPHATE SERPL-MCNC: 3.2 MG/DL — SIGNIFICANT CHANGE UP (ref 2.5–4.5)
PLATELET # BLD AUTO: 254 K/UL — SIGNIFICANT CHANGE UP (ref 150–400)
PMV BLD: 10.9 FL — SIGNIFICANT CHANGE UP (ref 7–13)
POTASSIUM SERPL-MCNC: 4.3 MMOL/L — SIGNIFICANT CHANGE UP (ref 3.5–5.3)
POTASSIUM SERPL-SCNC: 4.3 MMOL/L — SIGNIFICANT CHANGE UP (ref 3.5–5.3)
RBC # BLD: 3.44 M/UL — LOW (ref 4.2–5.8)
RBC # FLD: 17.5 % — HIGH (ref 10.3–14.5)
SODIUM SERPL-SCNC: 141 MMOL/L — SIGNIFICANT CHANGE UP (ref 135–145)
WBC # BLD: 11.16 K/UL — HIGH (ref 3.8–10.5)
WBC # FLD AUTO: 11.16 K/UL — HIGH (ref 3.8–10.5)

## 2018-01-15 PROCEDURE — 71045 X-RAY EXAM CHEST 1 VIEW: CPT | Mod: 26

## 2018-01-15 RX ORDER — METOCLOPRAMIDE HCL 10 MG
5 TABLET ORAL
Qty: 0 | Refills: 0 | Status: DISCONTINUED | OUTPATIENT
Start: 2018-01-15 | End: 2018-01-15

## 2018-01-15 RX ORDER — POLYETHYLENE GLYCOL 3350 17 G/17G
17 POWDER, FOR SOLUTION ORAL DAILY
Qty: 0 | Refills: 0 | Status: DISCONTINUED | OUTPATIENT
Start: 2018-01-15 | End: 2018-01-15

## 2018-01-15 RX ADMIN — DORZOLAMIDE HYDROCHLORIDE TIMOLOL MALEATE 1 DROP(S): 20; 5 SOLUTION/ DROPS OPHTHALMIC at 06:50

## 2018-01-15 RX ADMIN — DORZOLAMIDE HYDROCHLORIDE TIMOLOL MALEATE 1 DROP(S): 20; 5 SOLUTION/ DROPS OPHTHALMIC at 18:29

## 2018-01-15 RX ADMIN — LATANOPROST 1 DROP(S): 0.05 SOLUTION/ DROPS OPHTHALMIC; TOPICAL at 23:08

## 2018-01-15 RX ADMIN — CARBIDOPA AND LEVODOPA 1.5 TABLET(S): 25; 100 TABLET ORAL at 13:18

## 2018-01-15 RX ADMIN — Medication 1 DROP(S): at 18:27

## 2018-01-15 RX ADMIN — Medication 5 MILLIGRAM(S): at 06:49

## 2018-01-15 RX ADMIN — Medication 500 MILLIGRAM(S): at 13:18

## 2018-01-15 RX ADMIN — Medication 10 MILLIGRAM(S): at 13:21

## 2018-01-15 RX ADMIN — Medication 200 MILLIGRAM(S): at 18:27

## 2018-01-15 RX ADMIN — Medication 3 MILLILITER(S): at 09:51

## 2018-01-15 RX ADMIN — Medication 3 MILLILITER(S): at 20:33

## 2018-01-15 RX ADMIN — ATORVASTATIN CALCIUM 80 MILLIGRAM(S): 80 TABLET, FILM COATED ORAL at 23:07

## 2018-01-15 RX ADMIN — Medication 0.25 MILLIGRAM(S): at 20:32

## 2018-01-15 RX ADMIN — Medication 1 APPLICATION(S): at 13:18

## 2018-01-15 RX ADMIN — SENNA PLUS 10 MILLILITER(S): 8.6 TABLET ORAL at 23:08

## 2018-01-15 RX ADMIN — CARBIDOPA AND LEVODOPA 1.5 TABLET(S): 25; 100 TABLET ORAL at 06:49

## 2018-01-15 RX ADMIN — ENOXAPARIN SODIUM 40 MILLIGRAM(S): 100 INJECTION SUBCUTANEOUS at 13:19

## 2018-01-15 RX ADMIN — Medication 81 MILLIGRAM(S): at 13:17

## 2018-01-15 RX ADMIN — Medication 0.25 MILLIGRAM(S): at 09:51

## 2018-01-15 RX ADMIN — Medication 200 MILLIGRAM(S): at 06:49

## 2018-01-15 RX ADMIN — ZINC OXIDE 1 APPLICATION(S): 200 OINTMENT TOPICAL at 13:18

## 2018-01-15 RX ADMIN — CEFTRIAXONE 100 GRAM(S): 500 INJECTION, POWDER, FOR SOLUTION INTRAMUSCULAR; INTRAVENOUS at 18:30

## 2018-01-15 RX ADMIN — FINASTERIDE 5 MILLIGRAM(S): 5 TABLET, FILM COATED ORAL at 13:17

## 2018-01-15 RX ADMIN — Medication 3 MILLILITER(S): at 04:35

## 2018-01-15 RX ADMIN — Medication 1 APPLICATION(S): at 13:19

## 2018-01-15 RX ADMIN — Medication 1 TABLET(S): at 13:18

## 2018-01-15 RX ADMIN — Medication 200 MILLIGRAM(S): at 23:08

## 2018-01-15 RX ADMIN — Medication 1 DROP(S): at 06:50

## 2018-01-15 RX ADMIN — CARBIDOPA AND LEVODOPA 1.5 TABLET(S): 25; 100 TABLET ORAL at 23:08

## 2018-01-15 RX ADMIN — PANTOPRAZOLE SODIUM 40 MILLIGRAM(S): 20 TABLET, DELAYED RELEASE ORAL at 06:50

## 2018-01-15 RX ADMIN — Medication 1 MILLIGRAM(S): at 23:08

## 2018-01-15 RX ADMIN — Medication 3 MILLILITER(S): at 15:26

## 2018-01-15 NOTE — PROGRESS NOTE ADULT - PROBLEM SELECTOR PLAN 7
C/w ASA and Lipitor   Aspiration precautions Improved  Avoid nephrotoxic agents Resolved  c/w free water via peg

## 2018-01-15 NOTE — PROGRESS NOTE ADULT - PROBLEM SELECTOR PLAN 5
Aspiration precautions  NPO with peg feeding.  Oral care Resolved. No documented BM since admission.  s/p mineral oil enema yesterday w/o BM. Pt impacted.   Will disimpact today  monitor bowel movements

## 2018-01-15 NOTE — PROGRESS NOTE ADULT - PROBLEM SELECTOR PLAN 8
Supplement tube feeds C/w ASA and Lipitor   Aspiration precautions Aspiration precautions  NPO with peg feeding.  Oral care

## 2018-01-15 NOTE — PROGRESS NOTE ADULT - PROBLEM SELECTOR PROBLEM 3
Urinary tract infection associated with indwelling urethral catheter, initial encounter Constipation Sepsis

## 2018-01-15 NOTE — PROGRESS NOTE ADULT - PROBLEM SELECTOR PLAN 4
Resolved.   C/w D5W  Nephro on board C/w ceftriaxone (day 8/10)  Alexander already replaced Improving  C/w Ceftriaxone 1g qd (day 8/10)

## 2018-01-15 NOTE — PROGRESS NOTE ADULT - PROBLEM SELECTOR PROBLEM 6
SHREYAS (acute kidney injury) Aspiration precautions Urinary tract infection associated with indwelling urethral catheter, initial encounter

## 2018-01-15 NOTE — PROGRESS NOTE ADULT - PROBLEM SELECTOR PROBLEM 8
Severe protein-calorie malnutrition Cerebrovascular accident (CVA), unspecified mechanism Aspiration precautions

## 2018-01-15 NOTE — PROGRESS NOTE ADULT - ASSESSMENT
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 Mental status improved. 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   Overnight, pt with cough, new leukocytosis improving yet persists

## 2018-01-15 NOTE — PROGRESS NOTE ADULT - SUBJECTIVE AND OBJECTIVE BOX
[facility] Progress Note- PGY1.    ===============================================================  CONTACT INFO   Aryan Perez M.D., PGY-1  Pager: NS- [phone], [facility]- [phone]    Mon-Fri: pager covered by day team 7am-7pm;   ***Academic conferences M-F 12pm-1pm- page ONLY if URGENT or if Consultant  Sat/Gaming Cross Coverage 12pm-7pm: NS- page [phone] for Team1-4, [facility]- pager forwarded to covering Resident  For Night coverage 7pm-7am:  [phone](NS)/[phone]([facility]) Team1-3, [phone] (NS)/[phone] ([facility]) Team4 & Care Model,  ===============================================================    Chief Complaint: Patient is a 82y old  Male who presents with a chief complaint of Fever, UTI (09 Jan 2018 10:47)        INTERVAL HPI/OVERNIGHT EVENTS:   No acute overnight event. Patient reports feeling_____. Denied fever, chill, nausea, vomiting, headache, CP, SOB, abdominal pain, diarrhea, or constipation.       MEDICATIONS  (STANDING):  ALBUTerol/ipratropium for Nebulization 3 milliLiter(s) Nebulizer every 6 hours  ascorbic acid 500 milliGRAM(s) Oral daily  aspirin  chewable 81 milliGRAM(s) Oral daily  atorvastatin 80 milliGRAM(s) Oral at bedtime  buDESOnide   0.25 milliGRAM(s) Respule 0.25 milliGRAM(s) Inhalation two times a day  carbidopa/levodopa  25/100 1.5 Tablet(s) Oral three times a day  cefTRIAXone   IVPB 1 Gram(s) IV Intermittent every 24 hours  collagenase Ointment 1 Application(s) Topical daily  dextrose 5%. 1000 milliLiter(s) (50 mL/Hr) IV Continuous <Continuous>  docusate sodium Liquid 200 milliGRAM(s) Oral at bedtime  dorzolamide 2%/timolol 0.5% Ophthalmic Solution 1 Drop(s) Left EYE two times a day  doxazosin 1 milliGRAM(s) Oral at bedtime  enoxaparin Injectable 40 milliGRAM(s) SubCutaneous daily  finasteride 5 milliGRAM(s) Oral daily  guaiFENesin    Syrup 200 milliGRAM(s) Oral two times a day  latanoprost 0.005% Ophthalmic Solution 1 Drop(s) Left EYE at bedtime  metoclopramide   Syrup 5 milliGRAM(s) Oral every 12 hours  multivitamin 1 Tablet(s) Oral daily  pantoprazole   Suspension 40 milliGRAM(s) Oral before breakfast  pilocarpine 4% Solution 1 Drop(s) Left EYE two times a day  senna Syrup 10 milliLiter(s) Oral at bedtime  vitamin A &amp; D Ointment 1 Application(s) Topical daily  zinc oxide 20% Ointment 1 Application(s) Topical daily    MEDICATIONS  (PRN):  acetaminophen    Suspension 650 milliGRAM(s) Oral every 6 hours PRN For Temp greater than 38 C (100.4 F)  acetaminophen    Suspension. 650 milliGRAM(s) Oral every 6 hours PRN Mild to moderate pain      Vital Signs Last 24 Hrs  T(C): 37.1 (15 Buddy 2018 06:48), Max: 37.4 (14 Jan 2018 21:42)  T(F): 98.8 (15 Buddy 2018 06:48), Max: 99.3 (14 Jan 2018 21:42)  HR: 75 (15 Buddy 2018 06:48) (70 - 76)  BP: 122/68 (15 Buddy 2018 06:48) (118/63 - 123/64)  BP(mean): --  RR: 18 (15 Buddy 2018 06:48) (18 - 18)  SpO2: 96% (15 Buddy 2018 06:48) (96% - 99%)  Supplemental O2: [ ] No, on Room Air [ ] Yes,     I&O's Detail    14 Jan 2018 07:01  -  15 Buddy 2018 07:00  --------------------------------------------------------  IN:  Total IN: 0 mL    OUT:    Indwelling Catheter - Urethral: 2050 mL  Total OUT: 2050 mL    Total NET: -2050 mL        CAPILLARY BLOOD GLUCOSE          PHYSICAL EXAM:  Daily     Daily    Appearance: NAD, well-developed	  HEENT:   Normal oral mucosa, PERRL, EOMI	  Neck: No JVD, no LAD, supple, trachea in midline  Cardiovascular: Normal S1 S2, No JVD, No murmurs  Respiratory: Lungs clear to auscultation, no wheezing, rhonchi  Gastrointestinal:  Soft, Non-tender, nondistended, + BS  Skin: No rashes, No ecchymoses, No cyanosis	  MSK/Extremities: Normal range of motion, 5/5 Muscle strength bilaterally. No clubbing, cyanosis or edema  Vascular: Peripheral pulses palpable 2+ bilaterally  Neurologic: Non-focal, CN II-XII intact  Psychiatry: A & O x 3, Mood & affect appropriate    LABS:                        9.4    11.16 )-----------( 254      ( 15 Buddy 2018 05:36 )             30.4     01-14    144  |  110<H>  |  23  ----------------------------<  122<H>  4.0   |  25  |  0.45<L>    Ca    7.6<L>      14 Jan 2018 06:30  Phos  2.5     01-14  Mg     1.9     01-14    TPro  6.0  /  Alb  2.0<L>  /  TBili  0.3  /  DBili  x   /  AST  28  /  ALT  51<H>  /  AlkPhos  98  01-14    LIVER FUNCTIONS - ( 14 Jan 2018 06:30 )  Alb: 2.0 g/dL / Pro: 6.0 g/dL / ALK PHOS: 98 u/L / ALT: 51 u/L / AST: 28 u/L / GGT: x     / T. Bili 0.3 mg/dL / D. Bili x                     Microbiology:    RADIOLOGY & ADDITIONAL TESTS:    Xray -   CT -  MRI -     Imaging Personally Reviewed:  [ ] YES  [ ] NO  Consultant(s) Notes Reviewed:  [X] YES  [ ] NO  Care Discussed with Consultants/Other Providers [ ] YES  [ ] NO [facility] Progress Note- PGY1.    ===============================================================  CONTACT INFO   Aryan Perez M.D., PGY-1  Pager: NS- [phone], [facility]- [phone]    Mon-Fri: pager covered by day team 7am-7pm;   ***Academic conferences M-F 12pm-1pm- page ONLY if URGENT or if Consultant  Sat/Gaming Cross Coverage 12pm-7pm: NS- page [phone] for Team1-4, [facility]- pager forwarded to covering Resident  For Night coverage 7pm-7am:  [phone](NS)/[phone]([facility]) Team1-3, [phone] (NS)/[phone] ([facility]) Team4 & Care Model,  ===============================================================    Chief Complaint: Patient is a 82y old  Male who presents with a chief complaint of Fever, UTI (09 Jan 2018 10:47)        INTERVAL HPI/OVERNIGHT EVENTS:   No acute overnight event. Patient resting comfortably on bed. No complaints at this time. Still no BM this AM.       MEDICATIONS  (STANDING):  ALBUTerol/ipratropium for Nebulization 3 milliLiter(s) Nebulizer every 6 hours  ascorbic acid 500 milliGRAM(s) Oral daily  aspirin  chewable 81 milliGRAM(s) Oral daily  atorvastatin 80 milliGRAM(s) Oral at bedtime  buDESOnide   0.25 milliGRAM(s) Respule 0.25 milliGRAM(s) Inhalation two times a day  carbidopa/levodopa  25/100 1.5 Tablet(s) Oral three times a day  cefTRIAXone   IVPB 1 Gram(s) IV Intermittent every 24 hours  collagenase Ointment 1 Application(s) Topical daily  dextrose 5%. 1000 milliLiter(s) (50 mL/Hr) IV Continuous <Continuous>  docusate sodium Liquid 200 milliGRAM(s) Oral at bedtime  dorzolamide 2%/timolol 0.5% Ophthalmic Solution 1 Drop(s) Left EYE two times a day  doxazosin 1 milliGRAM(s) Oral at bedtime  enoxaparin Injectable 40 milliGRAM(s) SubCutaneous daily  finasteride 5 milliGRAM(s) Oral daily  guaiFENesin    Syrup 200 milliGRAM(s) Oral two times a day  latanoprost 0.005% Ophthalmic Solution 1 Drop(s) Left EYE at bedtime  metoclopramide   Syrup 5 milliGRAM(s) Oral every 12 hours  multivitamin 1 Tablet(s) Oral daily  pantoprazole   Suspension 40 milliGRAM(s) Oral before breakfast  pilocarpine 4% Solution 1 Drop(s) Left EYE two times a day  senna Syrup 10 milliLiter(s) Oral at bedtime  vitamin A &amp; D Ointment 1 Application(s) Topical daily  zinc oxide 20% Ointment 1 Application(s) Topical daily    MEDICATIONS  (PRN):  acetaminophen    Suspension 650 milliGRAM(s) Oral every 6 hours PRN For Temp greater than 38 C (100.4 F)  acetaminophen    Suspension. 650 milliGRAM(s) Oral every 6 hours PRN Mild to moderate pain      Vital Signs Last 24 Hrs  T(C): 37.1 (15 Buddy 2018 06:48), Max: 37.4 (14 Jan 2018 21:42)  T(F): 98.8 (15 Buddy 2018 06:48), Max: 99.3 (14 Jan 2018 21:42)  HR: 75 (15 Buddy 2018 06:48) (70 - 76)  BP: 122/68 (15 Buddy 2018 06:48) (118/63 - 123/64)  BP(mean): --  RR: 18 (15 Buddy 2018 06:48) (18 - 18)  SpO2: 96% (15 Buddy 2018 06:48) (96% - 99%)  Supplemental O2: [ ] No, on Room Air [ ] Yes,     I&O's Detail    14 Jan 2018 07:01  -  15 Buddy 2018 07:00  --------------------------------------------------------  IN:  Total IN: 0 mL    OUT:    Indwelling Catheter - Urethral: 2050 mL  Total OUT: 2050 mL    Total NET: -2050 mL      PHYSICAL EXAM:  Appearance: NAD, well-developed.   HEENT:   NC/AT, poor dentition  Neck: supple, bandage covered on previous trach site  Cardiovascular: Normal S1 S2, No JVD, No murmurs  Respiratory: decreased breath sounds b/l however lungs clear to auscultation, no wheezing, rhonchi  Gastrointestinal:  Soft, nondistended, + BS.   : lubin intact. yellow urine.   Skin: wwp	  Vascular: radial pulses 2+ bilaterally  Neurologic: speaks few words but does not respond to most questions. Awake, moves eyes spontaneously  Psychiatry: alert and more awake     LABS:                        9.4    11.16 )-----------( 254      ( 15 Buddy 2018 05:36 )             30.4     01-14    144  |  110<H>  |  23  ----------------------------<  122<H>  4.0   |  25  |  0.45<L>    Ca    7.6<L>      14 Jan 2018 06:30  Phos  2.5     01-14  Mg     1.9     01-14    TPro  6.0  /  Alb  2.0<L>  /  TBili  0.3  /  DBili  x   /  AST  28  /  ALT  51<H>  /  AlkPhos  98  01-14    LIVER FUNCTIONS - ( 14 Jan 2018 06:30 )  Alb: 2.0 g/dL / Pro: 6.0 g/dL / ALK PHOS: 98 u/L / ALT: 51 u/L / AST: 28 u/L / GGT: x     / T. Bili 0.3 mg/dL / D. Bili x             Microbiology:  Blood Cx NGTD    Imaging Personally Reviewed:  [ ] YES  [ ] NO  Consultant(s) Notes Reviewed:  [X] YES  [ ] NO  Care Discussed with Consultants/Other Providers [ ] YES  [ ] NO IM ATTENDING Chente Jaime ([id number])  &  [facility] Progress Note- PGY1.    ===============================================================  CONTACT INFO   Aryan Perez M.D., PGY-1  Pager: NS- [phone], [facility]- [phone]    Mon-Fri: pager covered by day team 7am-7pm;   ***Academic conferences M-F 12pm-1pm- page ONLY if URGENT or if Consultant  Sat/Gaming Cross Coverage 12pm-7pm: NS- page [phone] for Team1-4, [facility]- pager forwarded to covering Resident  For Night coverage 7pm-7am:  [phone](NS)/[phone]([facility]) Team1-3, [phone] (NS)/[phone] ([facility]) Team4 & Care Model,  ===============================================================    Chief Complaint: Patient is a 82y old  Male who presents with a chief complaint of Fever, UTI (09 Jan 2018 10:47)        INTERVAL HPI/OVERNIGHT EVENTS:   Pt with leukocytosis on repeat labs  (+)cough-new   Patient resting comfortably on bed. No complaints at this time. Still no BM this AM.       MEDICATIONS  (STANDING):  ALBUTerol/ipratropium for Nebulization 3 milliLiter(s) Nebulizer every 6 hours  ascorbic acid 500 milliGRAM(s) Oral daily  aspirin  chewable 81 milliGRAM(s) Oral daily  atorvastatin 80 milliGRAM(s) Oral at bedtime  buDESOnide   0.25 milliGRAM(s) Respule 0.25 milliGRAM(s) Inhalation two times a day  carbidopa/levodopa  25/100 1.5 Tablet(s) Oral three times a day  cefTRIAXone   IVPB 1 Gram(s) IV Intermittent every 24 hours  collagenase Ointment 1 Application(s) Topical daily  dextrose 5%. 1000 milliLiter(s) (50 mL/Hr) IV Continuous <Continuous>  docusate sodium Liquid 200 milliGRAM(s) Oral at bedtime  dorzolamide 2%/timolol 0.5% Ophthalmic Solution 1 Drop(s) Left EYE two times a day  doxazosin 1 milliGRAM(s) Oral at bedtime  enoxaparin Injectable 40 milliGRAM(s) SubCutaneous daily  finasteride 5 milliGRAM(s) Oral daily  guaiFENesin    Syrup 200 milliGRAM(s) Oral two times a day  latanoprost 0.005% Ophthalmic Solution 1 Drop(s) Left EYE at bedtime  metoclopramide   Syrup 5 milliGRAM(s) Oral every 12 hours  multivitamin 1 Tablet(s) Oral daily  pantoprazole   Suspension 40 milliGRAM(s) Oral before breakfast  pilocarpine 4% Solution 1 Drop(s) Left EYE two times a day  senna Syrup 10 milliLiter(s) Oral at bedtime  vitamin A &amp; D Ointment 1 Application(s) Topical daily  zinc oxide 20% Ointment 1 Application(s) Topical daily    MEDICATIONS  (PRN):  acetaminophen    Suspension 650 milliGRAM(s) Oral every 6 hours PRN For Temp greater than 38 C (100.4 F)  acetaminophen    Suspension. 650 milliGRAM(s) Oral every 6 hours PRN Mild to moderate pain      Vital Signs Last 24 Hrs  T(C): 37.1 (15 Buddy 2018 06:48), Max: 37.4 (14 Jan 2018 21:42)  T(F): 98.8 (15 Buddy 2018 06:48), Max: 99.3 (14 Jan 2018 21:42)  HR: 75 (15 Buddy 2018 06:48) (70 - 76)  BP: 122/68 (15 Buddy 2018 06:48) (118/63 - 123/64)  BP(mean): --  RR: 18 (15 Buddy 2018 06:48) (18 - 18)  SpO2: 96% (15 Buddy 2018 06:48) (96% - 99%)  Supplemental O2: [ ] No, on Room Air [ ] Yes,     I&O's Detail    14 Jan 2018 07:01  -  15 Buddy 2018 07:00  --------------------------------------------------------  IN:  Total IN: 0 mL    OUT:    Indwelling Catheter - Urethral: 2050 mL  Total OUT: 2050 mL    Total NET: -2050 mL      PHYSICAL EXAM:  Appearance: NAD, well-developed.   HEENT:   NC/AT, poor dentition  Neck: supple, bandage covered on previous trach site  Cardiovascular: Normal S1 S2, No JVD, No murmurs  Respiratory: productive cough at bedside. Decreased breath sounds b/l which makes assessment difficult however no noted wheezing, rales  Gastrointestinal:  peg intact. Soft, nondistended, + BS.   : lubin intact. yellow urine.   Skin: wwp	  Vascular: radial pulses 2+ bilaterally  Neurologic: speaking more words today - few at a time, but does not respond to most questions. Awake, moves eyes spontaneously  Psychiatry: alert and more awake     LABS:                        9.4    11.16 )-----------( 254      ( 15 Buddy 2018 05:36 )             30.4     01-14    144  |  110<H>  |  23  ----------------------------<  122<H>  4.0   |  25  |  0.45<L>    Ca    7.6<L>      14 Jan 2018 06:30  Phos  2.5     01-14  Mg     1.9     01-14    TPro  6.0  /  Alb  2.0<L>  /  TBili  0.3  /  DBili  x   /  AST  28  /  ALT  51<H>  /  AlkPhos  98  01-14    LIVER FUNCTIONS - ( 14 Jan 2018 06:30 )  Alb: 2.0 g/dL / Pro: 6.0 g/dL / ALK PHOS: 98 u/L / ALT: 51 u/L / AST: 28 u/L / GGT: x     / T. Bili 0.3 mg/dL / D. Bili x             Microbiology:  Blood Cx NGTD    Imaging Personally Reviewed:  YES   Consultant(s) Notes Reviewed:  X YES   Care Discussed with Consultants/Other Providers  YES

## 2018-01-15 NOTE — PROGRESS NOTE ADULT - PROBLEM SELECTOR PLAN 1
2/2 proteus UTI and bacteremia  Afebrile  New leukocytosis of unspecified etiology, potentially lab error.  Will f/u AM labs and monitor closely.   Repeat BCx (-).   C/w ceftriaxone 1g qd (day 7/10)  If leukocytosis resolves and pt is afebrile, hemodynamically stable, w/o acute events, then will plan for d/c to facility where he can complete abx  Monitor vitals 2/2 proteus UTI and bacteremia  Afebrile  New leukocytosis of unspecified etiology, downtrending today. Will monitor for vitals and signs of infection closely as pt has high risk for aspiration  Repeat BCx (-).   C/w ceftriaxone 1g qd (day 8/10)  If leukocytosis resolves and pt is afebrile, hemodynamically stable, w/o acute events, then will plan for d/c to facility where he can complete abx New  With new mild leukocytosis  Concern for aspiration pna/hcap vs other etiology  F/u CXR, however, if (-) then obtain CT Chest  bronchodilators, aspiration precautions, oxygen prn

## 2018-01-15 NOTE — PROGRESS NOTE ADULT - PROBLEM SELECTOR PLAN 2
Improving  C/w Ceftriaxone 1g qd Improving  C/w Ceftriaxone 1g qd (day 8/10) Persistent  Mild  With novel cough and poor ability to protect airway, concern for aspiration vs hcap as stated above  F/u labs and imaging  F/u ID recs

## 2018-01-15 NOTE — PROGRESS NOTE ADULT - PROBLEM SELECTOR PLAN 6
Improved  Avoid nephrotoxic agents Aspiration precautions  NPO with peg feeding.  Oral care C/w ceftriaxone (day 8/10)  Alexander already replaced

## 2018-01-15 NOTE — PROGRESS NOTE ADULT - PROBLEM SELECTOR PROBLEM 4
Hypernatremia Urinary tract infection associated with indwelling urethral catheter, initial encounter Bacteremia

## 2018-01-15 NOTE — PROGRESS NOTE ADULT - PROBLEM SELECTOR PLAN 3
C/w ceftriaxone (day 6/10)  Aelxander already replaced NO documented BM since admission.  s/p mineral oil enema yesterday w/o BM. Pt likely impacted. Will disimpact today 2/2 proteus UTI and bacteremia  Afebrile  New leukocytosis of unspecified etiology, downtrending today.   Repeat BCx (-).   C/w ceftriaxone 1g qd (day 8/10)  Awaiting ID f/u recs

## 2018-01-16 DIAGNOSIS — I95.9 HYPOTENSION, UNSPECIFIED: ICD-10-CM

## 2018-01-16 LAB
BACTERIA BLD CULT: SIGNIFICANT CHANGE UP
BUN SERPL-MCNC: 25 MG/DL — HIGH (ref 7–23)
CALCIUM SERPL-MCNC: 7.4 MG/DL — LOW (ref 8.4–10.5)
CHLORIDE SERPL-SCNC: 103 MMOL/L — SIGNIFICANT CHANGE UP (ref 98–107)
CO2 SERPL-SCNC: 23 MMOL/L — SIGNIFICANT CHANGE UP (ref 22–31)
CREAT SERPL-MCNC: 0.46 MG/DL — LOW (ref 0.5–1.3)
GLUCOSE SERPL-MCNC: 104 MG/DL — HIGH (ref 70–99)
HCT VFR BLD CALC: 28.9 % — LOW (ref 39–50)
HGB BLD-MCNC: 9.4 G/DL — LOW (ref 13–17)
MAGNESIUM SERPL-MCNC: 1.8 MG/DL — SIGNIFICANT CHANGE UP (ref 1.6–2.6)
MCHC RBC-ENTMCNC: 28.7 PG — SIGNIFICANT CHANGE UP (ref 27–34)
MCHC RBC-ENTMCNC: 32.5 % — SIGNIFICANT CHANGE UP (ref 32–36)
MCV RBC AUTO: 88.1 FL — SIGNIFICANT CHANGE UP (ref 80–100)
NRBC # FLD: 0 — SIGNIFICANT CHANGE UP
PHOSPHATE SERPL-MCNC: 3.1 MG/DL — SIGNIFICANT CHANGE UP (ref 2.5–4.5)
PLATELET # BLD AUTO: 251 K/UL — SIGNIFICANT CHANGE UP (ref 150–400)
PMV BLD: 10.9 FL — SIGNIFICANT CHANGE UP (ref 7–13)
POTASSIUM SERPL-MCNC: 4.4 MMOL/L — SIGNIFICANT CHANGE UP (ref 3.5–5.3)
POTASSIUM SERPL-SCNC: 4.4 MMOL/L — SIGNIFICANT CHANGE UP (ref 3.5–5.3)
RBC # BLD: 3.28 M/UL — LOW (ref 4.2–5.8)
RBC # FLD: 18 % — HIGH (ref 10.3–14.5)
SODIUM SERPL-SCNC: 138 MMOL/L — SIGNIFICANT CHANGE UP (ref 135–145)
WBC # BLD: 12.93 K/UL — HIGH (ref 3.8–10.5)
WBC # FLD AUTO: 12.93 K/UL — HIGH (ref 3.8–10.5)

## 2018-01-16 PROCEDURE — 99232 SBSQ HOSP IP/OBS MODERATE 35: CPT

## 2018-01-16 RX ORDER — METRONIDAZOLE 500 MG
500 TABLET ORAL ONCE
Qty: 0 | Refills: 0 | Status: COMPLETED | OUTPATIENT
Start: 2018-01-16 | End: 2018-01-16

## 2018-01-16 RX ORDER — METRONIDAZOLE 500 MG
TABLET ORAL
Qty: 0 | Refills: 0 | Status: DISCONTINUED | OUTPATIENT
Start: 2018-01-16 | End: 2018-01-16

## 2018-01-16 RX ORDER — METRONIDAZOLE 500 MG
TABLET ORAL
Qty: 0 | Refills: 0 | Status: DISCONTINUED | OUTPATIENT
Start: 2018-01-16 | End: 2018-01-17

## 2018-01-16 RX ORDER — METRONIDAZOLE 500 MG
500 TABLET ORAL EVERY 8 HOURS
Qty: 0 | Refills: 0 | Status: DISCONTINUED | OUTPATIENT
Start: 2018-01-16 | End: 2018-01-17

## 2018-01-16 RX ORDER — SODIUM CHLORIDE 9 MG/ML
500 INJECTION INTRAMUSCULAR; INTRAVENOUS; SUBCUTANEOUS ONCE
Qty: 0 | Refills: 0 | Status: COMPLETED | OUTPATIENT
Start: 2018-01-16 | End: 2018-01-16

## 2018-01-16 RX ADMIN — Medication 0.25 MILLIGRAM(S): at 10:21

## 2018-01-16 RX ADMIN — SODIUM CHLORIDE 500 MILLILITER(S): 9 INJECTION INTRAMUSCULAR; INTRAVENOUS; SUBCUTANEOUS at 15:57

## 2018-01-16 RX ADMIN — Medication 3 MILLILITER(S): at 10:21

## 2018-01-16 RX ADMIN — Medication 200 MILLIGRAM(S): at 06:09

## 2018-01-16 RX ADMIN — Medication 650 MILLIGRAM(S): at 14:50

## 2018-01-16 RX ADMIN — CARBIDOPA AND LEVODOPA 1.5 TABLET(S): 25; 100 TABLET ORAL at 06:09

## 2018-01-16 RX ADMIN — FINASTERIDE 5 MILLIGRAM(S): 5 TABLET, FILM COATED ORAL at 13:52

## 2018-01-16 RX ADMIN — Medication 3 MILLILITER(S): at 16:32

## 2018-01-16 RX ADMIN — Medication 81 MILLIGRAM(S): at 13:52

## 2018-01-16 RX ADMIN — Medication 650 MILLIGRAM(S): at 14:07

## 2018-01-16 RX ADMIN — Medication 3 MILLILITER(S): at 03:50

## 2018-01-16 RX ADMIN — Medication 1 MILLIGRAM(S): at 22:59

## 2018-01-16 RX ADMIN — Medication 1 APPLICATION(S): at 13:52

## 2018-01-16 RX ADMIN — Medication 100 MILLIGRAM(S): at 23:04

## 2018-01-16 RX ADMIN — SENNA PLUS 10 MILLILITER(S): 8.6 TABLET ORAL at 22:59

## 2018-01-16 RX ADMIN — CARBIDOPA AND LEVODOPA 1.5 TABLET(S): 25; 100 TABLET ORAL at 22:59

## 2018-01-16 RX ADMIN — ATORVASTATIN CALCIUM 80 MILLIGRAM(S): 80 TABLET, FILM COATED ORAL at 22:59

## 2018-01-16 RX ADMIN — Medication 1 DROP(S): at 06:09

## 2018-01-16 RX ADMIN — CARBIDOPA AND LEVODOPA 1.5 TABLET(S): 25; 100 TABLET ORAL at 13:53

## 2018-01-16 RX ADMIN — Medication 3 MILLILITER(S): at 21:29

## 2018-01-16 RX ADMIN — Medication 0.25 MILLIGRAM(S): at 21:29

## 2018-01-16 RX ADMIN — Medication 500 MILLIGRAM(S): at 13:52

## 2018-01-16 RX ADMIN — DORZOLAMIDE HYDROCHLORIDE TIMOLOL MALEATE 1 DROP(S): 20; 5 SOLUTION/ DROPS OPHTHALMIC at 06:09

## 2018-01-16 RX ADMIN — PANTOPRAZOLE SODIUM 40 MILLIGRAM(S): 20 TABLET, DELAYED RELEASE ORAL at 06:09

## 2018-01-16 RX ADMIN — Medication 1 DROP(S): at 18:09

## 2018-01-16 RX ADMIN — ENOXAPARIN SODIUM 40 MILLIGRAM(S): 100 INJECTION SUBCUTANEOUS at 13:52

## 2018-01-16 RX ADMIN — DORZOLAMIDE HYDROCHLORIDE TIMOLOL MALEATE 1 DROP(S): 20; 5 SOLUTION/ DROPS OPHTHALMIC at 18:10

## 2018-01-16 RX ADMIN — Medication 100 MILLIGRAM(S): at 14:07

## 2018-01-16 RX ADMIN — CEFTRIAXONE 100 GRAM(S): 500 INJECTION, POWDER, FOR SOLUTION INTRAMUSCULAR; INTRAVENOUS at 18:08

## 2018-01-16 RX ADMIN — LATANOPROST 1 DROP(S): 0.05 SOLUTION/ DROPS OPHTHALMIC; TOPICAL at 22:59

## 2018-01-16 RX ADMIN — Medication 200 MILLIGRAM(S): at 22:59

## 2018-01-16 RX ADMIN — Medication 1 TABLET(S): at 13:52

## 2018-01-16 RX ADMIN — Medication 200 MILLIGRAM(S): at 18:09

## 2018-01-16 NOTE — PROGRESS NOTE ADULT - NSHPATTENDINGPLANDISCUSS_GEN_ALL_CORE
pt and medical team
intern pgy-1
intern PGY-1
IM Resident
Attending and Medical Resident, verbal conversation between medical resident and ID physician, medical team
Medical Resident

## 2018-01-16 NOTE — PROGRESS NOTE ADULT - PROBLEM SELECTOR PLAN 6
C/w ceftriaxone (day 8/10)  Alexander already replaced C/w ceftriaxone (day 9/10)  Alexander already replaced

## 2018-01-16 NOTE — PROGRESS NOTE ADULT - PROBLEM SELECTOR PLAN 3
2/2 proteus UTI and bacteremia  Afebrile  New leukocytosis of unspecified etiology, downtrending today.   Repeat BCx (-).   C/w ceftriaxone 1g qd (day 8/10)  Awaiting ID f/u recs 2/2 proteus UTI and bacteremia  Afebrile  New leukocytosis of unspecified etiology worsening today.  Repeat BCx (-).   C/w ceftriaxone 1g qd (day 9/10)  Awaiting ID f/u recs 1 recording on vitals  ns ivf bolus   monitor vitals

## 2018-01-16 NOTE — PROGRESS NOTE ADULT - SUBJECTIVE AND OBJECTIVE BOX
ANILA NARVAEZ 82y [id number]    Patient is a 82y old  Male who presents with a chief complaint of Fever, UTI (09 Jan 2018 10:47)      Follow Up/CC:  bacteremia    Interval History/ROS: cough    Allergies    penicillin (Hives)    Intolerances        ANTIMICROBIALS:  cefTRIAXone   IVPB 1 every 24 hours      MEDICATIONS  (STANDING):  ALBUTerol/ipratropium for Nebulization 3 milliLiter(s) Nebulizer every 6 hours  ascorbic acid 500 milliGRAM(s) Oral daily  aspirin  chewable 81 milliGRAM(s) Oral daily  atorvastatin 80 milliGRAM(s) Oral at bedtime  buDESOnide   0.25 milliGRAM(s) Respule 0.25 milliGRAM(s) Inhalation two times a day  carbidopa/levodopa  25/100 1.5 Tablet(s) Oral three times a day  cefTRIAXone   IVPB 1 Gram(s) IV Intermittent every 24 hours  docusate sodium Liquid 200 milliGRAM(s) Oral at bedtime  dorzolamide 2%/timolol 0.5% Ophthalmic Solution 1 Drop(s) Left EYE two times a day  doxazosin 1 milliGRAM(s) Oral at bedtime  enoxaparin Injectable 40 milliGRAM(s) SubCutaneous daily  finasteride 5 milliGRAM(s) Oral daily  guaiFENesin    Syrup 200 milliGRAM(s) Oral two times a day  latanoprost 0.005% Ophthalmic Solution 1 Drop(s) Left EYE at bedtime  multivitamin 1 Tablet(s) Oral daily  pantoprazole   Suspension 40 milliGRAM(s) Oral before breakfast  pilocarpine 4% Solution 1 Drop(s) Left EYE two times a day  senna Syrup 10 milliLiter(s) Oral at bedtime  vitamin A &amp; D Ointment 1 Application(s) Topical daily    MEDICATIONS  (PRN):  acetaminophen    Suspension 650 milliGRAM(s) Oral every 6 hours PRN For Temp greater than 38 C (100.4 F)  acetaminophen    Suspension. 650 milliGRAM(s) Oral every 6 hours PRN Mild to moderate pain  bisacodyl Suppository 10 milliGRAM(s) Rectal daily PRN Constipation        Vital Signs Last 24 Hrs  T(C): 36.7 (16 Jan 2018 06:04), Max: 37.6 (15 Buddy 2018 22:04)  T(F): 98 (16 Jan 2018 06:04), Max: 99.7 (15 Buddy 2018 22:04)  HR: 84 (16 Jan 2018 10:21) (80 - 88)  BP: 102/58 (16 Jan 2018 06:04) (102/58 - 115/66)  BP(mean): --  RR: 16 (16 Jan 2018 06:04) (16 - 18)  SpO2: 98% (16 Jan 2018 06:04) (97% - 99%)    CBC Full  -  ( 16 Jan 2018 05:25 )  WBC Count : 12.93 K/uL  Hemoglobin : 9.4 g/dL  Hematocrit : 28.9 %  Platelet Count - Automated : 251 K/uL  Mean Cell Volume : 88.1 fL  Mean Cell Hemoglobin : 28.7 pg  Mean Cell Hemoglobin Concentration : 32.5 %  Auto Neutrophil # : x  Auto Lymphocyte # : x  Auto Monocyte # : x  Auto Eosinophil # : x  Auto Basophil # : x  Auto Neutrophil % : x  Auto Lymphocyte % : x  Auto Monocyte % : x  Auto Eosinophil % : x  Auto Basophil % : x    01-16    138  |  103  |  25<H>  ----------------------------<  104<H>  4.4   |  23  |  0.46<L>    Ca    7.4<L>      16 Jan 2018 05:25  Phos  3.1     01-16  Mg     1.8     01-16            MICROBIOLOGY:  BLOOD  01-11-18 --  --  --      BLOOD PERIPHERAL  01-11-18 --  --  --      BLOOD PERIPHERAL  01-10-18 --  --  BLOOD CULTURE PCR  Staphylococcus sp.,coag neg      URINE CATHETER  01-08-18 --  --  Proteus mirabilis      BLOOD  01-08-18 --  --  --      BLOOD VENOUS  01-08-18 --  --  BLOOD CULTURE PCR  Proteus mirabilis    RADIOLOGY

## 2018-01-16 NOTE — PROGRESS NOTE ADULT - PROBLEM SELECTOR PLAN 2
Persistent  Mild  With novel cough and poor ability to protect airway, concern for aspiration vs hcap as stated above  F/u labs and imaging  F/u ID recs Persistent  Mild and worsening.  With novel cough and poor ability to protect airway, concern for aspiration vs hcap as stated above  F/u labs and imaging  F/u ID recs Persistent  Mild and worsening.  With novel cough and poor ability to protect airway, concern for aspiration vs hcap as stated above  F/u labs and imaging  F/u ID recs  -C/w ceftriaxone 1g daily (day 9/10), add IV flagyl 500mg BID Persistent  Mild and worsening.  With novel cough and poor ability to protect airway, concern for aspiration vs hcap as stated above  Add flagyl  C/w ceftriaxone 1g daily (day 9/10), add IV flagyl 500mg  F/u ID recs

## 2018-01-16 NOTE — PROGRESS NOTE ADULT - ASSESSMENT
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   Hospital course complicated by cough and  labs (+) new stable leukocytosis despite being on iv abx for uti/bacteremia

## 2018-01-16 NOTE — PROGRESS NOTE ADULT - PROBLEM SELECTOR PLAN 1
New  With new mild leukocytosis  Concern for aspiration pna/hcap vs other etiology  F/u CXR, however, if (-) then obtain CT Chest  bronchodilators, aspiration precautions, oxygen prn New  With new mild worsening leukocytosis. Pt afebrile.   Concern for aspiration pna/hcap vs other etiology  F/u CXR, however, if (-) then obtain CT Chest  bronchodilators, aspiration precautions, oxygen prn New  With new mild worsening leukocytosis. Pt afebrile.   Concern for aspiration pna/hcap vs other etiology  F/u CXR, however, if (-) then obtain CT Chest  bronchodilators, aspiration precautions, oxygen prn  Per discussion with ID, will start IV Flagyl 500mg BID to cover for possible aspiration pneumonia New, w/ stable to mildly worsening leukocytosis.    Afebrile, hemodynamically stable but recent recording of hypotension X 1.   Concern for aspiration pna/hcap vs other etiology  X-Ray (+) left basilar process of uncertain etiology  Verbal ID recs appreciated  Per discussion with ID, will start IV Flagyl IVPB for anaerobic bacteria coverage - aspiration pneumonia  F/u ID recs   C/w bronchodilators, aspiration precautions, oxygen prn  Chest PT  Monitor clinical status closely  Monitor vitals and labs

## 2018-01-16 NOTE — PROGRESS NOTE ADULT - PROBLEM SELECTOR PLAN 5
No documented BM since admission.  s/p mineral oil enema yesterday w/o BM. Pt impacted.   Will disimpact today  monitor bowel movements No documented BM since admission.  s/p manual disimpaction yesterday with 1BM overnight.   monitor bowel movements improved  1 BM overnight.   monitor bowel movements  colace, laxatives prn

## 2018-01-17 VITALS — OXYGEN SATURATION: 97 %

## 2018-01-17 LAB
BASOPHILS # BLD AUTO: 0.04 K/UL — SIGNIFICANT CHANGE UP (ref 0–0.2)
BASOPHILS NFR BLD AUTO: 0.4 % — SIGNIFICANT CHANGE UP (ref 0–2)
BUN SERPL-MCNC: 24 MG/DL — HIGH (ref 7–23)
CALCIUM SERPL-MCNC: 7.6 MG/DL — LOW (ref 8.4–10.5)
CHLORIDE SERPL-SCNC: 103 MMOL/L — SIGNIFICANT CHANGE UP (ref 98–107)
CO2 SERPL-SCNC: 24 MMOL/L — SIGNIFICANT CHANGE UP (ref 22–31)
CREAT SERPL-MCNC: 0.5 MG/DL — SIGNIFICANT CHANGE UP (ref 0.5–1.3)
EOSINOPHIL # BLD AUTO: 0.63 K/UL — HIGH (ref 0–0.5)
EOSINOPHIL NFR BLD AUTO: 6.4 % — HIGH (ref 0–6)
GLUCOSE SERPL-MCNC: 111 MG/DL — HIGH (ref 70–99)
HCT VFR BLD CALC: 28.4 % — LOW (ref 39–50)
HGB BLD-MCNC: 9.3 G/DL — LOW (ref 13–17)
IMM GRANULOCYTES # BLD AUTO: 0.15 # — SIGNIFICANT CHANGE UP
IMM GRANULOCYTES NFR BLD AUTO: 1.5 % — SIGNIFICANT CHANGE UP (ref 0–1.5)
LYMPHOCYTES # BLD AUTO: 0.85 K/UL — LOW (ref 1–3.3)
LYMPHOCYTES # BLD AUTO: 8.6 % — LOW (ref 13–44)
MAGNESIUM SERPL-MCNC: 1.8 MG/DL — SIGNIFICANT CHANGE UP (ref 1.6–2.6)
MCHC RBC-ENTMCNC: 28.8 PG — SIGNIFICANT CHANGE UP (ref 27–34)
MCHC RBC-ENTMCNC: 32.7 % — SIGNIFICANT CHANGE UP (ref 32–36)
MCV RBC AUTO: 87.9 FL — SIGNIFICANT CHANGE UP (ref 80–100)
MONOCYTES # BLD AUTO: 0.64 K/UL — SIGNIFICANT CHANGE UP (ref 0–0.9)
MONOCYTES NFR BLD AUTO: 6.5 % — SIGNIFICANT CHANGE UP (ref 2–14)
NEUTROPHILS # BLD AUTO: 7.53 K/UL — HIGH (ref 1.8–7.4)
NEUTROPHILS NFR BLD AUTO: 76.6 % — SIGNIFICANT CHANGE UP (ref 43–77)
NRBC # FLD: 0 — SIGNIFICANT CHANGE UP
PHOSPHATE SERPL-MCNC: 3 MG/DL — SIGNIFICANT CHANGE UP (ref 2.5–4.5)
PLATELET # BLD AUTO: 237 K/UL — SIGNIFICANT CHANGE UP (ref 150–400)
PMV BLD: 11.1 FL — SIGNIFICANT CHANGE UP (ref 7–13)
POTASSIUM SERPL-MCNC: 4.4 MMOL/L — SIGNIFICANT CHANGE UP (ref 3.5–5.3)
POTASSIUM SERPL-SCNC: 4.4 MMOL/L — SIGNIFICANT CHANGE UP (ref 3.5–5.3)
RBC # BLD: 3.23 M/UL — LOW (ref 4.2–5.8)
RBC # FLD: 18 % — HIGH (ref 10.3–14.5)
SODIUM SERPL-SCNC: 137 MMOL/L — SIGNIFICANT CHANGE UP (ref 135–145)
WBC # BLD: 9.84 K/UL — SIGNIFICANT CHANGE UP (ref 3.8–10.5)
WBC # FLD AUTO: 9.84 K/UL — SIGNIFICANT CHANGE UP (ref 3.8–10.5)

## 2018-01-17 PROCEDURE — 99232 SBSQ HOSP IP/OBS MODERATE 35: CPT

## 2018-01-17 RX ORDER — METRONIDAZOLE 500 MG
1 TABLET ORAL
Qty: 9 | Refills: 0 | OUTPATIENT
Start: 2018-01-17 | End: 2018-01-19

## 2018-01-17 RX ADMIN — CARBIDOPA AND LEVODOPA 1.5 TABLET(S): 25; 100 TABLET ORAL at 05:36

## 2018-01-17 RX ADMIN — PANTOPRAZOLE SODIUM 40 MILLIGRAM(S): 20 TABLET, DELAYED RELEASE ORAL at 05:37

## 2018-01-17 RX ADMIN — CARBIDOPA AND LEVODOPA 1.5 TABLET(S): 25; 100 TABLET ORAL at 13:37

## 2018-01-17 RX ADMIN — DORZOLAMIDE HYDROCHLORIDE TIMOLOL MALEATE 1 DROP(S): 20; 5 SOLUTION/ DROPS OPHTHALMIC at 17:32

## 2018-01-17 RX ADMIN — Medication 3 MILLILITER(S): at 11:37

## 2018-01-17 RX ADMIN — ENOXAPARIN SODIUM 40 MILLIGRAM(S): 100 INJECTION SUBCUTANEOUS at 13:36

## 2018-01-17 RX ADMIN — Medication 200 MILLIGRAM(S): at 17:32

## 2018-01-17 RX ADMIN — Medication 500 MILLIGRAM(S): at 13:37

## 2018-01-17 RX ADMIN — Medication 81 MILLIGRAM(S): at 13:36

## 2018-01-17 RX ADMIN — Medication 3 MILLILITER(S): at 03:05

## 2018-01-17 RX ADMIN — Medication 1 DROP(S): at 17:32

## 2018-01-17 RX ADMIN — Medication 100 MILLIGRAM(S): at 13:40

## 2018-01-17 RX ADMIN — Medication 200 MILLIGRAM(S): at 05:36

## 2018-01-17 RX ADMIN — Medication 3 MILLILITER(S): at 16:14

## 2018-01-17 RX ADMIN — Medication 1 APPLICATION(S): at 13:39

## 2018-01-17 RX ADMIN — Medication 100 MILLIGRAM(S): at 05:35

## 2018-01-17 RX ADMIN — DORZOLAMIDE HYDROCHLORIDE TIMOLOL MALEATE 1 DROP(S): 20; 5 SOLUTION/ DROPS OPHTHALMIC at 05:37

## 2018-01-17 RX ADMIN — Medication 1 DROP(S): at 05:37

## 2018-01-17 RX ADMIN — FINASTERIDE 5 MILLIGRAM(S): 5 TABLET, FILM COATED ORAL at 13:37

## 2018-01-17 RX ADMIN — Medication 1 TABLET(S): at 13:37

## 2018-01-17 NOTE — PROGRESS NOTE ADULT - ASSESSMENT
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   Hospital course complicated by cough and  labs (+) new stable leukocytosis currently resolved.

## 2018-01-17 NOTE — PROGRESS NOTE ADULT - PROBLEM SELECTOR PLAN 4
-aspiration precautions  -flagyl added -short course 5 days -can change to po flagyl 500 mg po bid x 3 more days  -can add bactrim DS 1 tab po BID x 3 days for aspiration also

## 2018-01-17 NOTE — PROGRESS NOTE ADULT - SUBJECTIVE AND OBJECTIVE BOX
ANILA NARVAEZ 82y [id number]    Patient is a 82y old  Male who presents with a chief complaint of Fever, UTI (09 Jan 2018 10:47)      Follow Up/CC:  bacteremia    Interval History/ROS: no acute issues    Allergies    penicillin (Hives)    Intolerances        ANTIMICROBIALS:  cefTRIAXone   IVPB 1 every 24 hours  metroNIDAZOLE  IVPB    metroNIDAZOLE  IVPB 500 every 8 hours      MEDICATIONS  (STANDING):  ALBUTerol/ipratropium for Nebulization 3 milliLiter(s) Nebulizer every 6 hours  ascorbic acid 500 milliGRAM(s) Oral daily  aspirin  chewable 81 milliGRAM(s) Oral daily  atorvastatin 80 milliGRAM(s) Oral at bedtime  buDESOnide   0.25 milliGRAM(s) Respule 0.25 milliGRAM(s) Inhalation two times a day  carbidopa/levodopa  25/100 1.5 Tablet(s) Oral three times a day  cefTRIAXone   IVPB 1 Gram(s) IV Intermittent every 24 hours  docusate sodium Liquid 200 milliGRAM(s) Oral at bedtime  dorzolamide 2%/timolol 0.5% Ophthalmic Solution 1 Drop(s) Left EYE two times a day  doxazosin 1 milliGRAM(s) Oral at bedtime  enoxaparin Injectable 40 milliGRAM(s) SubCutaneous daily  finasteride 5 milliGRAM(s) Oral daily  guaiFENesin    Syrup 200 milliGRAM(s) Oral two times a day  latanoprost 0.005% Ophthalmic Solution 1 Drop(s) Left EYE at bedtime  metroNIDAZOLE  IVPB      metroNIDAZOLE  IVPB 500 milliGRAM(s) IV Intermittent every 8 hours  multivitamin 1 Tablet(s) Oral daily  pantoprazole   Suspension 40 milliGRAM(s) Oral before breakfast  pilocarpine 4% Solution 1 Drop(s) Left EYE two times a day  senna Syrup 10 milliLiter(s) Oral at bedtime  vitamin A &amp; D Ointment 1 Application(s) Topical daily    MEDICATIONS  (PRN):  acetaminophen    Suspension 650 milliGRAM(s) Oral every 6 hours PRN For Temp greater than 38 C (100.4 F)  acetaminophen    Suspension. 650 milliGRAM(s) Oral every 6 hours PRN Mild to moderate pain  bisacodyl Suppository 10 milliGRAM(s) Rectal daily PRN Constipation        Vital Signs Last 24 Hrs  T(C): 36.3 (17 Jan 2018 05:28), Max: 36.9 (16 Jan 2018 14:14)  T(F): 97.4 (17 Jan 2018 05:28), Max: 98.4 (16 Jan 2018 14:14)  HR: 73 (17 Jan 2018 11:39) (73 - 82)  BP: 104/63 (17 Jan 2018 05:28) (93/54 - 111/69)  BP(mean): --  RR: 16 (17 Jan 2018 05:28) (16 - 18)  SpO2: 97% (17 Jan 2018 11:39) (94% - 100%)    CBC Full  -  ( 17 Jan 2018 06:30 )  WBC Count : 9.84 K/uL  Hemoglobin : 9.3 g/dL  Hematocrit : 28.4 %  Platelet Count - Automated : 237 K/uL  Mean Cell Volume : 87.9 fL  Mean Cell Hemoglobin : 28.8 pg  Mean Cell Hemoglobin Concentration : 32.7 %  Auto Neutrophil # : 7.53 K/uL  Auto Lymphocyte # : 0.85 K/uL  Auto Monocyte # : 0.64 K/uL  Auto Eosinophil # : 0.63 K/uL  Auto Basophil # : 0.04 K/uL  Auto Neutrophil % : 76.6 %  Auto Lymphocyte % : 8.6 %  Auto Monocyte % : 6.5 %  Auto Eosinophil % : 6.4 %  Auto Basophil % : 0.4 %    01-17    137  |  103  |  24<H>  ----------------------------<  111<H>  4.4   |  24  |  0.50    Ca    7.6<L>      17 Jan 2018 06:30  Phos  3.0     01-17  Mg     1.8     01-17            MICROBIOLOGY:  BLOOD  01-11-18 --  --  --      BLOOD PERIPHERAL  01-11-18 --  --  --      BLOOD PERIPHERAL  01-10-18 --  --  BLOOD CULTURE PCR  Staphylococcus sp.,coag neg      URINE CATHETER  01-08-18 --  --  Proteus mirabilis      BLOOD  01-08-18 --  --  --      BLOOD VENOUS  01-08-18 --  --  BLOOD CULTURE PCR  Proteus mirabilis              v            RADIOLOGY

## 2018-01-17 NOTE — PROGRESS NOTE ADULT - PROBLEM SELECTOR PLAN 1
Improving  Afebrile, hemodynamically stable, leukocytosis improved  Concern for aspiration pna/hcap vs other etiology  X-Ray (+) left basilar process of uncertain etiology  C/w Ceftriaxone 1g qd (day 10/10) and flagyl (day 2). Consider switch to oral flagyl on d/c  F/u ID recs   C/w bronchodilators, aspiration precautions, oxygen prn  Chest PT  Monitor clinical status closely  Monitor vitals and labs

## 2018-01-17 NOTE — PROGRESS NOTE ADULT - ATTENDING COMMENTS
Mil Farooq  Attending Physician   Division of Infectious Disease  Pager #[phone]  After 5pm/weekend or no response, call #[phone]
C/w Cefepime for Proteus UTI   pt still very lethargic, will open eyes and says "yes"  wife at bedside reports that he has low baseline BPs, unsure how low   c/w IVF hydration   monitor VS q4h
I agree with the above, corrections/adjustments made
Mil Farooq  Attending Physician   Division of Infectious Disease  Pager #[phone]  After 5pm/weekend or no response, call #[phone]    ID coverage available over 3 day holiday weekend if needed. Call #[phone] for questions/concerns.
All adjustments and corrections were made above
I agree with the information.  Appropriate adjustments and corrections have been made above
Mil Farooq  Attending Physician   Division of Infectious Disease  Pager #[phone]  After 5pm/weekend or no response, call #[phone]    Will sign off, recall ID if needed #[phone].
I made appropriate corrections/adjustments as needed above
I agree with the above  necessary adjustments/corrections made accordingly
I agree with the above information.   Corrections/adjustments have been made accordingly above

## 2018-01-17 NOTE — PROGRESS NOTE ADULT - PROBLEM SELECTOR PLAN 2
Improved  With novel cough and poor ability to protect airway, concern for aspiration vs hcap as stated above  C/w ceftriaxone 1g daily (day 10/10), and IV flagyl day 2  F/u ID recs

## 2018-01-17 NOTE — PROGRESS NOTE ADULT - COMMENTS
poor historian but denies complaints

## 2018-01-17 NOTE — CHART NOTE - NSCHARTNOTEFT_GEN_A_CORE
=========>>>> N U T R I T I O N   F O L L O W - U P   N O T E <<<<==========    Source: Patient [ ]    Family [ ]     other [ X ] RN    Patient seen for malnutrition follow-up. Patient tolerating previous recommendations for tube feeding. No GI distress or gastric residuals reported by RN.     CURRENT DIET : Jevity 1.2 @60ml x 24 hours to provide total volume of 1440ml, 1728 kcal, 79.2g protein at this time + 1 packet Prosource (15g protein)       _______WEIGHTS:________  Bedscale obtained today 71.3 kg/156.8#  (1/11) 151.4#  no new significant weight changes; 5.4# weight gain suggested since last assessment   #       EDEMA: none  Skin: Unstageable sacrum, right heel      _______PERTINENT MEDICATIONS:________     ALBUTerol/ipratropium for Nebulization  ascorbic acid  atorvastatin  buDESOnide   0.25 milliGRAM(s) Respule  docusate sodium Liquid  doxazosin  finasteride  guaiFENesin    Syrup  multivitamin  pantoprazole   Suspension  senna Syrup      _______PERTINENT LABS:_________    Sodium, Serum: 137 (01-17 @ 06:30)  Potassium, Serum: 4.4 (01-17 @ 06:30)  Glucose, Serum: 111 <H> (01-17 @ 06:30)  Blood Urea Nitrogen, Serum: 24 <H> (01-17 @ 06:30)  Phosphorus Level, Serum: 3.0 (01-17 @ 06:30)  Magnesium, Serum: 1.8 (01-17 @ 06:30)        Estimated Needs:   [ X ] no change since previous assessment      Previous Nutrition Diagnosis:     [ X ] Swallowing difficulty; Biting/Chewing (masticatory) difficulty  [ X ] Malnutrition   Nutrition Diagnosis is [ X ] ongoing  [ ] resolved [ ] not applicable       New Nutrition Diagnosis: [ X ] not applicable        ========>>>  A D D I T I O N A L  R E C O M M E N D A T I O N S  <<<=======    1) Continue current feeding regimen, tolerated well.   2) If bolus feeding regimen is desired for discharge, may consider boluses of 360ml every 6 hours (4 times/day) for same total volume=1440ml, 1728 kcal, 79.2g protein at this time + 1 packet Prosource (15g protein) .      3) Further adjustments to rate/volume/duration/free water provision of enteral feeds will be determined in accordance with long term patient tolerance, needs and weight trends.            Mercedes Palacio, ADALIN, CDN, CDE (Pager [phone])

## 2018-01-17 NOTE — PROGRESS NOTE ADULT - PROVIDER SPECIALTY LIST ADULT
Infectious Disease
Infectious Disease
Internal Medicine
Infectious Disease

## 2018-01-17 NOTE — PROGRESS NOTE ADULT - SUBJECTIVE AND OBJECTIVE BOX
[facility] Progress Note- PGY1.    ===============================================================  CONTACT INFO   Aryan Perez M.D., PGY-1  Pager: NS- [phone], [facility]- [phone]    Mon-Fri: pager covered by day team 7am-7pm;   ***Academic conferences M-F 12pm-1pm- page ONLY if URGENT or if Consultant  Sat/Gaming Cross Coverage 12pm-7pm: NS- page [phone] for Team1-4, [facility]- pager forwarded to covering Resident  For Night coverage 7pm-7am:  [phone]([phone]([facility]) Team1-3, [phone] (NS)/[id number] ([facility]) Team4 & Care Model,  ===============================================================    Chief Complaint: Patient is a 82y old  Male who presents with a chief complaint of Fever, UTI (09 Jan 2018 10:47)        INTERVAL HPI/OVERNIGHT EVENTS:   No acute overnight event. Patient reports feeling_____. Denied fever, chill, nausea, vomiting, headache, CP, SOB, abdominal pain, diarrhea, or constipation.       MEDICATIONS  (STANDING):  ALBUTerol/ipratropium for Nebulization 3 milliLiter(s) Nebulizer every 6 hours  ascorbic acid 500 milliGRAM(s) Oral daily  aspirin  chewable 81 milliGRAM(s) Oral daily  atorvastatin 80 milliGRAM(s) Oral at bedtime  buDESOnide   0.25 milliGRAM(s) Respule 0.25 milliGRAM(s) Inhalation two times a day  carbidopa/levodopa  25/100 1.5 Tablet(s) Oral three times a day  cefTRIAXone   IVPB 1 Gram(s) IV Intermittent every 24 hours  docusate sodium Liquid 200 milliGRAM(s) Oral at bedtime  dorzolamide 2%/timolol 0.5% Ophthalmic Solution 1 Drop(s) Left EYE two times a day  doxazosin 1 milliGRAM(s) Oral at bedtime  enoxaparin Injectable 40 milliGRAM(s) SubCutaneous daily  finasteride 5 milliGRAM(s) Oral daily  guaiFENesin    Syrup 200 milliGRAM(s) Oral two times a day  latanoprost 0.005% Ophthalmic Solution 1 Drop(s) Left EYE at bedtime  metroNIDAZOLE  IVPB      metroNIDAZOLE  IVPB 500 milliGRAM(s) IV Intermittent every 8 hours  multivitamin 1 Tablet(s) Oral daily  pantoprazole   Suspension 40 milliGRAM(s) Oral before breakfast  pilocarpine 4% Solution 1 Drop(s) Left EYE two times a day  senna Syrup 10 milliLiter(s) Oral at bedtime  vitamin A &amp; D Ointment 1 Application(s) Topical daily    MEDICATIONS  (PRN):  acetaminophen    Suspension 650 milliGRAM(s) Oral every 6 hours PRN For Temp greater than 38 C (100.4 F)  acetaminophen    Suspension. 650 milliGRAM(s) Oral every 6 hours PRN Mild to moderate pain  bisacodyl Suppository 10 milliGRAM(s) Rectal daily PRN Constipation      Vital Signs Last 24 Hrs  T(C): 36.3 (17 Jan 2018 05:28), Max: 36.9 (16 Jan 2018 14:14)  T(F): 97.4 (17 Jan 2018 05:28), Max: 98.4 (16 Jan 2018 14:14)  HR: 76 (17 Jan 2018 05:28) (73 - 84)  BP: 104/63 (17 Jan 2018 05:28) (93/54 - 111/69)  BP(mean): --  RR: 16 (17 Jan 2018 05:28) (16 - 18)  SpO2: 100% (17 Jan 2018 05:28) (94% - 100%)  Supplemental O2: [ ] No, on Room Air [ ] Yes,     I&O's Detail    16 Jan 2018 07:01  -  17 Jan 2018 07:00  --------------------------------------------------------  IN:  Total IN: 0 mL    OUT:    Indwelling Catheter - Urethral: 1750 mL  Total OUT: 1750 mL    Total NET: -1750 mL        CAPILLARY BLOOD GLUCOSE          PHYSICAL EXAM:  Daily     Daily    Appearance: NAD, well-developed	  HEENT:   Normal oral mucosa, PERRL, EOMI	  Neck: No JVD, no LAD, supple, trachea in midline  Cardiovascular: Normal S1 S2, No JVD, No murmurs  Respiratory: Lungs clear to auscultation, no wheezing, rhonchi  Gastrointestinal:  Soft, Non-tender, nondistended, + BS  Skin: No rashes, No ecchymoses, No cyanosis	  MSK/Extremities: Normal range of motion, 5/5 Muscle strength bilaterally. No clubbing, cyanosis or edema  Vascular: Peripheral pulses palpable 2+ bilaterally  Neurologic: Non-focal, CN II-XII intact  Psychiatry: A & O x 3, Mood & affect appropriate    LABS:                        9.4    12.93 )-----------( 251      ( 16 Jan 2018 05:25 )             28.9     01-16    138  |  103  |  25<H>  ----------------------------<  104<H>  4.4   |  23  |  0.46<L>    Ca    7.4<L>      16 Jan 2018 05:25  Phos  3.1     01-16  Mg     1.8     01-16      LIVER FUNCTIONS - ( 14 Jan 2018 06:30 )  Alb: 2.0 g/dL / Pro: 6.0 g/dL / ALK PHOS: 98 u/L / ALT: 51 u/L / AST: 28 u/L / GGT: x     / T. Bili 0.3 mg/dL / D. Bili x                     Microbiology:    RADIOLOGY & ADDITIONAL TESTS:    Xray -   CT -  MRI -     Imaging Personally Reviewed:  [ ] YES  [ ] NO  Consultant(s) Notes Reviewed:  [X] YES  [ ] NO  Care Discussed with Consultants/Other Providers [ ] YES  [ ] NO [facility] Progress Note- PGY1.    ===============================================================  CONTACT INFO   Aryan Perez M.D., PGY-1  Pager: NS- [phone], [facility]- [phone]    Mon-Fri: pager covered by day team 7am-7pm;   ***Academic conferences M-F 12pm-1pm- page ONLY if URGENT or if Consultant  Sat/Gaming Cross Coverage 12pm-7pm: NS- page [phone] for Team1-4, [facility]- pager forwarded to covering Resident  For Night coverage 7pm-7am:  [phone]([phone]([facility]) Team1-3, [phone] (NS)/[phone] ([facility]) Team4 & Care Model,  ===============================================================    Chief Complaint: Patient is a 82y old  Male who presents with a chief complaint of Fever, UTI (09 Jan 2018 10:47)        INTERVAL HPI/OVERNIGHT EVENTS:   No acute overnight event. Patient more alert today. He denied any pain or SOB. Report occasional cough.       MEDICATIONS  (STANDING):  ALBUTerol/ipratropium for Nebulization 3 milliLiter(s) Nebulizer every 6 hours  ascorbic acid 500 milliGRAM(s) Oral daily  aspirin  chewable 81 milliGRAM(s) Oral daily  atorvastatin 80 milliGRAM(s) Oral at bedtime  buDESOnide   0.25 milliGRAM(s) Respule 0.25 milliGRAM(s) Inhalation two times a day  carbidopa/levodopa  25/100 1.5 Tablet(s) Oral three times a day  cefTRIAXone   IVPB 1 Gram(s) IV Intermittent every 24 hours  docusate sodium Liquid 200 milliGRAM(s) Oral at bedtime  dorzolamide 2%/timolol 0.5% Ophthalmic Solution 1 Drop(s) Left EYE two times a day  doxazosin 1 milliGRAM(s) Oral at bedtime  enoxaparin Injectable 40 milliGRAM(s) SubCutaneous daily  finasteride 5 milliGRAM(s) Oral daily  guaiFENesin    Syrup 200 milliGRAM(s) Oral two times a day  latanoprost 0.005% Ophthalmic Solution 1 Drop(s) Left EYE at bedtime  metroNIDAZOLE  IVPB      metroNIDAZOLE  IVPB 500 milliGRAM(s) IV Intermittent every 8 hours  multivitamin 1 Tablet(s) Oral daily  pantoprazole   Suspension 40 milliGRAM(s) Oral before breakfast  pilocarpine 4% Solution 1 Drop(s) Left EYE two times a day  senna Syrup 10 milliLiter(s) Oral at bedtime  vitamin A &amp; D Ointment 1 Application(s) Topical daily    MEDICATIONS  (PRN):  acetaminophen    Suspension 650 milliGRAM(s) Oral every 6 hours PRN For Temp greater than 38 C (100.4 F)  acetaminophen    Suspension. 650 milliGRAM(s) Oral every 6 hours PRN Mild to moderate pain  bisacodyl Suppository 10 milliGRAM(s) Rectal daily PRN Constipation      Vital Signs Last 24 Hrs  T(C): 36.3 (17 Jan 2018 05:28), Max: 36.9 (16 Jan 2018 14:14)  T(F): 97.4 (17 Jan 2018 05:28), Max: 98.4 (16 Jan 2018 14:14)  HR: 76 (17 Jan 2018 05:28) (73 - 84)  BP: 104/63 (17 Jan 2018 05:28) (93/54 - 111/69)  BP(mean): --  RR: 16 (17 Jan 2018 05:28) (16 - 18)  SpO2: 100% (17 Jan 2018 05:28) (94% - 100%)  Supplemental O2: [ ] No, on Room Air [ ] Yes,     I&O's Detail    16 Jan 2018 07:01  -  17 Jan 2018 07:00  --------------------------------------------------------  IN:  Total IN: 0 mL    OUT:    Indwelling Catheter - Urethral: 1750 mL  Total OUT: 1750 mL    Total NET: -1750 mL        CAPILLARY BLOOD GLUCOSE          PHYSICAL EXAM:  Appearance: NAD, well-developed.   HEENT:   NC/AT, poor dentition  Neck: supple, bandage covered on previous trach site  Cardiovascular: Normal S1 S2, No JVD, No murmurs  Respiratory: decreased breath sounds 2/2 poor inspiratory effort, however mild left basilar rales. No wheezing  Gastrointestinal:  peg intact. Soft, nondistended, normoactive bs  : lubin intact. yellow urine.   Skin: wwp	  Vascular: radial pulses 2+ bilaterally  Neurologic: respond to voice, intermittently answers yes or no to questions.   Psychiatry: alert and more awake     LABS:                        9.4    12.93 )-----------( 251      ( 16 Jan 2018 05:25 )             28.9     01-16    138  |  103  |  25<H>  ----------------------------<  104<H>  4.4   |  23  |  0.46<L>    Ca    7.4<L>      16 Jan 2018 05:25  Phos  3.1     01-16  Mg     1.8     01-16      LIVER FUNCTIONS - ( 14 Jan 2018 06:30 )  Alb: 2.0 g/dL / Pro: 6.0 g/dL / ALK PHOS: 98 u/L / ALT: 51 u/L / AST: 28 u/L / GGT: x     / T. Bili 0.3 mg/dL / D. Bili x           RADIOLOGY & ADDITIONAL TESTS:  < from: Xray Chest 1 View AP- PORTABLE-Urgent (01.15.18 @ 17:45) >  FINDINGS:   The patient is rotated. Status post median sternotomy and CABG.  The heart size cannot be accurately assessed on this projection.  Left lower lung opacity, likely a slightly improved left pleural   effusion. The right lung is clear. No pneumothorax.    IMPRESSION:   Left lower lung opacity, likely a slightly improved left pleural   effusion. There is probably associated passive atelectasis. Underlying   atelectasis of other cause and/or pneumonia is not excluded.      < end of copied text >      Imaging Personally Reviewed:  [ ] YES  [ ] NO  Consultant(s) Notes Reviewed:  [X] YES  [ ] NO  Care Discussed with Consultants/Other Providers [ ] YES  [ ] NO

## 2018-01-23 ENCOUNTER — INPATIENT (INPATIENT)
Facility: HOSPITAL | Age: 83
LOS: 6 days | Discharge: NURSING, REHAB OR LONG-TERM CARE | End: 2018-01-30
Attending: INTERNAL MEDICINE | Admitting: INTERNAL MEDICINE
Payer: MEDICARE

## 2018-01-23 VITALS
OXYGEN SATURATION: 97 % | DIASTOLIC BLOOD PRESSURE: 72 MMHG | RESPIRATION RATE: 18 BRPM | HEART RATE: 85 BPM | SYSTOLIC BLOOD PRESSURE: 116 MMHG | TEMPERATURE: 98 F

## 2018-01-23 DIAGNOSIS — R41.82 ALTERED MENTAL STATUS, UNSPECIFIED: ICD-10-CM

## 2018-01-23 DIAGNOSIS — R13.12 DYSPHAGIA, OROPHARYNGEAL PHASE: ICD-10-CM

## 2018-01-23 DIAGNOSIS — Z98.890 OTHER SPECIFIED POSTPROCEDURAL STATES: Chronic | ICD-10-CM

## 2018-01-23 DIAGNOSIS — A41.9 SEPSIS, UNSPECIFIED ORGANISM: ICD-10-CM

## 2018-01-23 DIAGNOSIS — G20 PARKINSON'S DISEASE: ICD-10-CM

## 2018-01-23 DIAGNOSIS — Z29.9 ENCOUNTER FOR PROPHYLACTIC MEASURES, UNSPECIFIED: ICD-10-CM

## 2018-01-23 DIAGNOSIS — I95.9 HYPOTENSION, UNSPECIFIED: ICD-10-CM

## 2018-01-23 DIAGNOSIS — Z93.1 GASTROSTOMY STATUS: Chronic | ICD-10-CM

## 2018-01-23 LAB
ALBUMIN SERPL ELPH-MCNC: 2.4 G/DL — LOW (ref 3.3–5)
ALP SERPL-CCNC: 84 U/L — SIGNIFICANT CHANGE UP (ref 40–120)
ALT FLD-CCNC: 27 U/L — SIGNIFICANT CHANGE UP (ref 4–41)
APPEARANCE UR: CLEAR — SIGNIFICANT CHANGE UP
AST SERPL-CCNC: 28 U/L — SIGNIFICANT CHANGE UP (ref 4–40)
B PERT DNA SPEC QL NAA+PROBE: SIGNIFICANT CHANGE UP
BACTERIA # UR AUTO: SIGNIFICANT CHANGE UP
BASE EXCESS BLDV CALC-SCNC: 2.8 MMOL/L — SIGNIFICANT CHANGE UP
BASOPHILS # BLD AUTO: 0.01 K/UL — SIGNIFICANT CHANGE UP (ref 0–0.2)
BASOPHILS NFR BLD AUTO: 0.2 % — SIGNIFICANT CHANGE UP (ref 0–2)
BILIRUB SERPL-MCNC: 0.5 MG/DL — SIGNIFICANT CHANGE UP (ref 0.2–1.2)
BILIRUB UR-MCNC: NEGATIVE — SIGNIFICANT CHANGE UP
BLOOD GAS VENOUS - CREATININE: 0.45 MG/DL — LOW (ref 0.5–1.3)
BLOOD UR QL VISUAL: HIGH
BUN SERPL-MCNC: 25 MG/DL — HIGH (ref 7–23)
C PNEUM DNA SPEC QL NAA+PROBE: NOT DETECTED — SIGNIFICANT CHANGE UP
CALCIUM SERPL-MCNC: 7.6 MG/DL — LOW (ref 8.4–10.5)
CHLORIDE BLDV-SCNC: 103 MMOL/L — SIGNIFICANT CHANGE UP (ref 96–108)
CHLORIDE SERPL-SCNC: 100 MMOL/L — SIGNIFICANT CHANGE UP (ref 98–107)
CO2 SERPL-SCNC: 26 MMOL/L — SIGNIFICANT CHANGE UP (ref 22–31)
COLOR SPEC: YELLOW — SIGNIFICANT CHANGE UP
CREAT SERPL-MCNC: 0.57 MG/DL — SIGNIFICANT CHANGE UP (ref 0.5–1.3)
EOSINOPHIL # BLD AUTO: 0.01 K/UL — SIGNIFICANT CHANGE UP (ref 0–0.5)
EOSINOPHIL NFR BLD AUTO: 0.2 % — SIGNIFICANT CHANGE UP (ref 0–6)
FLUAV H1 RNA SPEC QL NAA+PROBE: NOT DETECTED — SIGNIFICANT CHANGE UP
FLUAV H3 RNA SPEC QL NAA+PROBE: NOT DETECTED — SIGNIFICANT CHANGE UP
FLUBV RNA SPEC QL NAA+PROBE: NOT DETECTED — SIGNIFICANT CHANGE UP
GAS PNL BLDV: 129 MMOL/L — LOW (ref 136–146)
GLUCOSE BLDV-MCNC: 101 — HIGH (ref 70–99)
GLUCOSE SERPL-MCNC: 99 MG/DL — SIGNIFICANT CHANGE UP (ref 70–99)
GLUCOSE UR-MCNC: NEGATIVE — SIGNIFICANT CHANGE UP
HADV DNA SPEC QL NAA+PROBE: NOT DETECTED — SIGNIFICANT CHANGE UP
HCO3 BLDV-SCNC: 27 MMOL/L — SIGNIFICANT CHANGE UP (ref 20–27)
HCOV 229E RNA SPEC QL NAA+PROBE: NOT DETECTED — SIGNIFICANT CHANGE UP
HCOV HKU1 RNA SPEC QL NAA+PROBE: NOT DETECTED — SIGNIFICANT CHANGE UP
HCOV NL63 RNA SPEC QL NAA+PROBE: NOT DETECTED — SIGNIFICANT CHANGE UP
HCOV OC43 RNA SPEC QL NAA+PROBE: NOT DETECTED — SIGNIFICANT CHANGE UP
HCT VFR BLD CALC: 29.7 % — LOW (ref 39–50)
HCT VFR BLDV CALC: 30.2 % — LOW (ref 39–51)
HGB BLD-MCNC: 9.5 G/DL — LOW (ref 13–17)
HGB BLDV-MCNC: 9.8 G/DL — LOW (ref 13–17)
HMPV RNA SPEC QL NAA+PROBE: NOT DETECTED — SIGNIFICANT CHANGE UP
HPIV1 RNA SPEC QL NAA+PROBE: NOT DETECTED — SIGNIFICANT CHANGE UP
HPIV2 RNA SPEC QL NAA+PROBE: NOT DETECTED — SIGNIFICANT CHANGE UP
HPIV3 RNA SPEC QL NAA+PROBE: NOT DETECTED — SIGNIFICANT CHANGE UP
HPIV4 RNA SPEC QL NAA+PROBE: NOT DETECTED — SIGNIFICANT CHANGE UP
IMM GRANULOCYTES # BLD AUTO: 0.04 # — SIGNIFICANT CHANGE UP
IMM GRANULOCYTES NFR BLD AUTO: 0.6 % — SIGNIFICANT CHANGE UP (ref 0–1.5)
KETONES UR-MCNC: NEGATIVE — SIGNIFICANT CHANGE UP
LACTATE BLDV-MCNC: 1 MMOL/L — SIGNIFICANT CHANGE UP (ref 0.5–2)
LEUKOCYTE ESTERASE UR-ACNC: SIGNIFICANT CHANGE UP
LYMPHOCYTES # BLD AUTO: 0.39 K/UL — LOW (ref 1–3.3)
LYMPHOCYTES # BLD AUTO: 6.3 % — LOW (ref 13–44)
M PNEUMO DNA SPEC QL NAA+PROBE: NOT DETECTED — SIGNIFICANT CHANGE UP
MCHC RBC-ENTMCNC: 27.8 PG — SIGNIFICANT CHANGE UP (ref 27–34)
MCHC RBC-ENTMCNC: 32 % — SIGNIFICANT CHANGE UP (ref 32–36)
MCV RBC AUTO: 86.8 FL — SIGNIFICANT CHANGE UP (ref 80–100)
MONOCYTES # BLD AUTO: 0.54 K/UL — SIGNIFICANT CHANGE UP (ref 0–0.9)
MONOCYTES NFR BLD AUTO: 8.8 % — SIGNIFICANT CHANGE UP (ref 2–14)
MUCOUS THREADS # UR AUTO: SIGNIFICANT CHANGE UP
NEUTROPHILS # BLD AUTO: 5.17 K/UL — SIGNIFICANT CHANGE UP (ref 1.8–7.4)
NEUTROPHILS NFR BLD AUTO: 83.9 % — HIGH (ref 43–77)
NITRITE UR-MCNC: NEGATIVE — SIGNIFICANT CHANGE UP
NON-SQ EPI CELLS # UR AUTO: <1 — SIGNIFICANT CHANGE UP
NRBC # FLD: 0 — SIGNIFICANT CHANGE UP
PCO2 BLDV: 36 MMHG — LOW (ref 41–51)
PH BLDV: 7.48 PH — HIGH (ref 7.32–7.43)
PH UR: 6.5 — SIGNIFICANT CHANGE UP (ref 4.6–8)
PLATELET # BLD AUTO: 192 K/UL — SIGNIFICANT CHANGE UP (ref 150–400)
PMV BLD: 10.7 FL — SIGNIFICANT CHANGE UP (ref 7–13)
PO2 BLDV: 40 MMHG — SIGNIFICANT CHANGE UP (ref 35–40)
POTASSIUM BLDV-SCNC: 3.8 MMOL/L — SIGNIFICANT CHANGE UP (ref 3.4–4.5)
POTASSIUM SERPL-MCNC: 4.3 MMOL/L — SIGNIFICANT CHANGE UP (ref 3.5–5.3)
POTASSIUM SERPL-SCNC: 4.3 MMOL/L — SIGNIFICANT CHANGE UP (ref 3.5–5.3)
PROT SERPL-MCNC: 6.9 G/DL — SIGNIFICANT CHANGE UP (ref 6–8.3)
PROT UR-MCNC: 30 MG/DL — HIGH
RBC # BLD: 3.42 M/UL — LOW (ref 4.2–5.8)
RBC # FLD: 18 % — HIGH (ref 10.3–14.5)
RBC CASTS # UR COMP ASSIST: SIGNIFICANT CHANGE UP (ref 0–?)
RSV RNA SPEC QL NAA+PROBE: NOT DETECTED — SIGNIFICANT CHANGE UP
RV+EV RNA SPEC QL NAA+PROBE: NOT DETECTED — SIGNIFICANT CHANGE UP
SAO2 % BLDV: 74.1 % — SIGNIFICANT CHANGE UP (ref 60–85)
SODIUM SERPL-SCNC: 135 MMOL/L — SIGNIFICANT CHANGE UP (ref 135–145)
SP GR SPEC: > 1.04 — HIGH (ref 1–1.04)
UROBILINOGEN FLD QL: 1 MG/DL — SIGNIFICANT CHANGE UP
WBC # BLD: 6.16 K/UL — SIGNIFICANT CHANGE UP (ref 3.8–10.5)
WBC # FLD AUTO: 6.16 K/UL — SIGNIFICANT CHANGE UP (ref 3.8–10.5)
WBC UR QL: SIGNIFICANT CHANGE UP (ref 0–?)

## 2018-01-23 PROCEDURE — 70450 CT HEAD/BRAIN W/O DYE: CPT | Mod: 26

## 2018-01-23 PROCEDURE — 74177 CT ABD & PELVIS W/CONTRAST: CPT | Mod: 26

## 2018-01-23 PROCEDURE — 71045 X-RAY EXAM CHEST 1 VIEW: CPT | Mod: 26

## 2018-01-23 RX ORDER — DORZOLAMIDE HYDROCHLORIDE TIMOLOL MALEATE 20; 5 MG/ML; MG/ML
1 SOLUTION/ DROPS OPHTHALMIC
Qty: 0 | Refills: 0 | COMMUNITY

## 2018-01-23 RX ORDER — IPRATROPIUM/ALBUTEROL SULFATE 18-103MCG
3 AEROSOL WITH ADAPTER (GRAM) INHALATION EVERY 6 HOURS
Qty: 0 | Refills: 0 | Status: DISCONTINUED | OUTPATIENT
Start: 2018-01-23 | End: 2018-01-30

## 2018-01-23 RX ORDER — ACETAMINOPHEN 500 MG
1000 TABLET ORAL ONCE
Qty: 0 | Refills: 0 | Status: COMPLETED | OUTPATIENT
Start: 2018-01-23 | End: 2018-01-23

## 2018-01-23 RX ORDER — TIMOLOL 0.5 %
1 DROPS OPHTHALMIC (EYE)
Qty: 0 | Refills: 0 | Status: DISCONTINUED | OUTPATIENT
Start: 2018-01-23 | End: 2018-01-24

## 2018-01-23 RX ORDER — MIDODRINE HYDROCHLORIDE 2.5 MG/1
10 TABLET ORAL THREE TIMES A DAY
Qty: 0 | Refills: 0 | Status: DISCONTINUED | OUTPATIENT
Start: 2018-01-23 | End: 2018-01-27

## 2018-01-23 RX ORDER — COLLAGENASE CLOSTRIDIUM HIST. 250 UNIT/G
1 OINTMENT (GRAM) TOPICAL
Qty: 0 | Refills: 0 | COMMUNITY

## 2018-01-23 RX ORDER — ACETAMINOPHEN 500 MG
1000 TABLET ORAL ONCE
Qty: 0 | Refills: 0 | Status: DISCONTINUED | OUTPATIENT
Start: 2018-01-23 | End: 2018-01-24

## 2018-01-23 RX ORDER — CARBIDOPA AND LEVODOPA 25; 100 MG/1; MG/1
1.5 TABLET ORAL THREE TIMES A DAY
Qty: 0 | Refills: 0 | Status: DISCONTINUED | OUTPATIENT
Start: 2018-01-23 | End: 2018-01-30

## 2018-01-23 RX ORDER — SODIUM CHLORIDE 9 MG/ML
1000 INJECTION INTRAMUSCULAR; INTRAVENOUS; SUBCUTANEOUS ONCE
Qty: 0 | Refills: 0 | Status: COMPLETED | OUTPATIENT
Start: 2018-01-23 | End: 2018-01-23

## 2018-01-23 RX ORDER — METOCLOPRAMIDE HCL 10 MG
5 TABLET ORAL
Qty: 0 | Refills: 0 | Status: DISCONTINUED | OUTPATIENT
Start: 2018-01-23 | End: 2018-01-30

## 2018-01-23 RX ORDER — PANTOPRAZOLE SODIUM 20 MG/1
40 TABLET, DELAYED RELEASE ORAL
Qty: 0 | Refills: 0 | Status: DISCONTINUED | OUTPATIENT
Start: 2018-01-23 | End: 2018-01-23

## 2018-01-23 RX ORDER — MIDODRINE HYDROCHLORIDE 2.5 MG/1
20 TABLET ORAL ONCE
Qty: 0 | Refills: 0 | Status: COMPLETED | OUTPATIENT
Start: 2018-01-23 | End: 2018-01-23

## 2018-01-23 RX ORDER — ACETAMINOPHEN 500 MG
650 TABLET ORAL EVERY 6 HOURS
Qty: 0 | Refills: 0 | Status: DISCONTINUED | OUTPATIENT
Start: 2018-01-23 | End: 2018-01-24

## 2018-01-23 RX ORDER — PILOCARPINE HCL 4 %
1 DROPS OPHTHALMIC (EYE)
Qty: 0 | Refills: 0 | Status: DISCONTINUED | OUTPATIENT
Start: 2018-01-23 | End: 2018-01-30

## 2018-01-23 RX ORDER — HEPARIN SODIUM 5000 [USP'U]/ML
5000 INJECTION INTRAVENOUS; SUBCUTANEOUS EVERY 8 HOURS
Qty: 0 | Refills: 0 | Status: DISCONTINUED | OUTPATIENT
Start: 2018-01-23 | End: 2018-01-30

## 2018-01-23 RX ORDER — SALICYLIC ACID 0.5 %
1 CLEANSER (GRAM) TOPICAL
Qty: 0 | Refills: 0 | Status: DISCONTINUED | OUTPATIENT
Start: 2018-01-23 | End: 2018-01-30

## 2018-01-23 RX ORDER — ZINC OXIDE 200 MG/G
0 OINTMENT TOPICAL
Qty: 0 | Refills: 0 | COMMUNITY

## 2018-01-23 RX ORDER — LATANOPROST 0.05 MG/ML
1 SOLUTION/ DROPS OPHTHALMIC; TOPICAL AT BEDTIME
Qty: 0 | Refills: 0 | Status: DISCONTINUED | OUTPATIENT
Start: 2018-01-23 | End: 2018-01-30

## 2018-01-23 RX ORDER — VANCOMYCIN HCL 1 G
1000 VIAL (EA) INTRAVENOUS ONCE
Qty: 0 | Refills: 0 | Status: COMPLETED | OUTPATIENT
Start: 2018-01-23 | End: 2018-01-23

## 2018-01-23 RX ORDER — ASPIRIN/CALCIUM CARB/MAGNESIUM 324 MG
81 TABLET ORAL DAILY
Qty: 0 | Refills: 0 | Status: DISCONTINUED | OUTPATIENT
Start: 2018-01-23 | End: 2018-01-30

## 2018-01-23 RX ORDER — FINASTERIDE 5 MG/1
5 TABLET, FILM COATED ORAL DAILY
Qty: 0 | Refills: 0 | Status: DISCONTINUED | OUTPATIENT
Start: 2018-01-23 | End: 2018-01-30

## 2018-01-23 RX ORDER — DOCUSATE SODIUM 100 MG
200 CAPSULE ORAL
Qty: 0 | Refills: 0 | Status: DISCONTINUED | OUTPATIENT
Start: 2018-01-23 | End: 2018-01-30

## 2018-01-23 RX ORDER — ATORVASTATIN CALCIUM 80 MG/1
80 TABLET, FILM COATED ORAL AT BEDTIME
Qty: 0 | Refills: 0 | Status: DISCONTINUED | OUTPATIENT
Start: 2018-01-23 | End: 2018-01-30

## 2018-01-23 RX ORDER — SODIUM CHLORIDE 9 MG/ML
1000 INJECTION INTRAMUSCULAR; INTRAVENOUS; SUBCUTANEOUS
Qty: 0 | Refills: 0 | Status: DISCONTINUED | OUTPATIENT
Start: 2018-01-23 | End: 2018-01-23

## 2018-01-23 RX ORDER — DORZOLAMIDE HYDROCHLORIDE 20 MG/ML
1 SOLUTION/ DROPS OPHTHALMIC
Qty: 0 | Refills: 0 | Status: DISCONTINUED | OUTPATIENT
Start: 2018-01-23 | End: 2018-01-24

## 2018-01-23 RX ORDER — SENNA PLUS 8.6 MG/1
10 TABLET ORAL AT BEDTIME
Qty: 0 | Refills: 0 | Status: DISCONTINUED | OUTPATIENT
Start: 2018-01-23 | End: 2018-01-30

## 2018-01-23 RX ORDER — CEFEPIME 1 G/1
2000 INJECTION, POWDER, FOR SOLUTION INTRAMUSCULAR; INTRAVENOUS ONCE
Qty: 0 | Refills: 0 | Status: COMPLETED | OUTPATIENT
Start: 2018-01-23 | End: 2018-01-23

## 2018-01-23 RX ORDER — BUDESONIDE, MICRONIZED 100 %
0.25 POWDER (GRAM) MISCELLANEOUS
Qty: 0 | Refills: 0 | Status: DISCONTINUED | OUTPATIENT
Start: 2018-01-23 | End: 2018-01-30

## 2018-01-23 RX ORDER — SODIUM CHLORIDE 9 MG/ML
1000 INJECTION INTRAMUSCULAR; INTRAVENOUS; SUBCUTANEOUS
Qty: 0 | Refills: 0 | Status: DISCONTINUED | OUTPATIENT
Start: 2018-01-23 | End: 2018-01-24

## 2018-01-23 RX ORDER — PANTOPRAZOLE SODIUM 20 MG/1
40 TABLET, DELAYED RELEASE ORAL DAILY
Qty: 0 | Refills: 0 | Status: DISCONTINUED | OUTPATIENT
Start: 2018-01-23 | End: 2018-01-30

## 2018-01-23 RX ADMIN — MIDODRINE HYDROCHLORIDE 10 MILLIGRAM(S): 2.5 TABLET ORAL at 22:39

## 2018-01-23 RX ADMIN — Medication 250 MILLIGRAM(S): at 09:51

## 2018-01-23 RX ADMIN — ATORVASTATIN CALCIUM 80 MILLIGRAM(S): 80 TABLET, FILM COATED ORAL at 22:39

## 2018-01-23 RX ADMIN — SENNA PLUS 10 MILLILITER(S): 8.6 TABLET ORAL at 22:39

## 2018-01-23 RX ADMIN — Medication 400 MILLIGRAM(S): at 08:11

## 2018-01-23 RX ADMIN — SODIUM CHLORIDE 1000 MILLILITER(S): 9 INJECTION INTRAMUSCULAR; INTRAVENOUS; SUBCUTANEOUS at 09:51

## 2018-01-23 RX ADMIN — SODIUM CHLORIDE 70 MILLILITER(S): 9 INJECTION INTRAMUSCULAR; INTRAVENOUS; SUBCUTANEOUS at 15:57

## 2018-01-23 RX ADMIN — CEFEPIME 100 MILLIGRAM(S): 1 INJECTION, POWDER, FOR SOLUTION INTRAMUSCULAR; INTRAVENOUS at 11:12

## 2018-01-23 RX ADMIN — SODIUM CHLORIDE 1000 MILLILITER(S): 9 INJECTION INTRAMUSCULAR; INTRAVENOUS; SUBCUTANEOUS at 08:11

## 2018-01-23 RX ADMIN — HEPARIN SODIUM 5000 UNIT(S): 5000 INJECTION INTRAVENOUS; SUBCUTANEOUS at 22:39

## 2018-01-23 RX ADMIN — Medication 3 MILLILITER(S): at 22:40

## 2018-01-23 RX ADMIN — Medication 1 DROP(S): at 19:30

## 2018-01-23 RX ADMIN — Medication 200 MILLIGRAM(S): at 19:31

## 2018-01-23 RX ADMIN — Medication 1 APPLICATION(S): at 19:29

## 2018-01-23 RX ADMIN — Medication 75 MILLIGRAM(S): at 10:18

## 2018-01-23 RX ADMIN — Medication 0.25 MILLIGRAM(S): at 22:40

## 2018-01-23 RX ADMIN — LATANOPROST 1 DROP(S): 0.05 SOLUTION/ DROPS OPHTHALMIC; TOPICAL at 22:39

## 2018-01-23 RX ADMIN — Medication 650 MILLIGRAM(S): at 18:04

## 2018-01-23 RX ADMIN — Medication 75 MILLIGRAM(S): at 19:40

## 2018-01-23 RX ADMIN — MIDODRINE HYDROCHLORIDE 20 MILLIGRAM(S): 2.5 TABLET ORAL at 11:47

## 2018-01-23 RX ADMIN — CARBIDOPA AND LEVODOPA 1.5 TABLET(S): 25; 100 TABLET ORAL at 22:39

## 2018-01-23 NOTE — ED PROVIDER NOTE - PROGRESS NOTE DETAILS
Persisitent hypotension despite 2L, MICU consulted. Will give midodrine through peg tube and reassess.

## 2018-01-23 NOTE — H&P ADULT - PROBLEM SELECTOR PLAN 4
PEG site showing erythema. Attending to call GI. PEG site showing erythema, with CT abd showing displacement. Attending to call GI. PEG site showing erythema, with CT abd showing displacement. Attending to call GI for possible peg replacement.

## 2018-01-23 NOTE — ED PROCEDURE NOTE - ATTENDING CONTRIBUTION TO CARE
DR. PAREKH, ATTENDING MD-  I was in the exam room and observed and supervised the resident when they were completing this procedure.

## 2018-01-23 NOTE — H&P ADULT - NSHPLABSRESULTS_GEN_ALL_CORE
9.5    6.16  )-----------( 192      ( 23 Jan 2018 08:14 )             29.7       01-23    135  |  100  |  25<H>  ----------------------------<  99  4.3   |  26  |  0.57    Ca    7.6<L>      23 Jan 2018 08:14    TPro  6.9  /  Alb  2.4<L>  /  TBili  0.5  /  DBili  x   /  AST  28  /  ALT  27  /  AlkPhos  84  01-23      < from: CT Abdomen and Pelvis w/ IV Cont (01.23.18 @ 09:19) >    IMPRESSION: Malpositioned gastrostomy tube with balloon inflated in the   left rectus muscle. The distal portion of the tip appears to be tethering   the stomach to the abdominal wall. Tiny amount of fluid and droplet of   air in the left rectus muscle surrounds the gastrostomy tube.    Dr. Agrawal discussed the findings with Dr. Vega on January 23, 2018 at 9:43   AM.  Readback was obtained.        < end of copied text >    < from: CT Head No Cont (01.23.18 @ 08:38) >        IMPRESSION:  Severe severity microvascular ischemic change.  No acute intracranial hemorrhage.    < end of copied text >    Urinalysis (01.23.18 @ 10:34)    Color: YELLOW    Urine Appearance: CLEAR    Glucose: NEGATIVE: Glucose is reported in mg/dL.  Negative is defined as < 0.03 mg/dL.    Bilirubin: NEGATIVE    Ketone - Urine: NEGATIVE    Specific Gravity: > 1.040    Blood: MODERATE    pH - Urine: 6.5    Protein, Urine: 30 mg/dL    Urobilinogen: 1 mg/dL    Nitrite: NEGATIVE    Leukocyte Esterase Concentration: TRACE    Red Blood Cell - Urine: 25-50    White Blood Cell - Urine: 10-25    Mucus: FEW    Bacteria: FEW    Non-Squamous Epithelial: <1      Rapid Respiratory Viral Panel (01.23.18 @ 06:29)    Influenza AH1 2009 (RapRVP): NOT DETECTED    Influenza AH1 (RapRVP): NOT DETECTED    Influenza AH3 (RapRVP): POSITIVE: 01/23/18 0935:  INFLUENZA AH3 previously reported as: NOT DETECTED      Influenza B (RapRVP): NOT DETECTED    Parainfluenza 1 (RapRVP): NOT DETECTED    Parainfluenza 2 (RapRVP): NOT DETECTED    Parainfluenza 3 (RapRVP): NOT DETECTED    Parainfluenza 4 (RapRVP): NOT DETECTED

## 2018-01-23 NOTE — H&P ADULT - PROBLEM SELECTOR PLAN 3
Likely infectious encephalopathy. Will continue to monitor with treatment of Influenza A Likely infectious encephalopathy complicated by dehydration. Will continue to monitor with treatment of Influenza A and ivf

## 2018-01-23 NOTE — ED PROCEDURE NOTE - CPROC ED INFUS LINE DETAIL1
The location was identified, and the area was draped and prepped./The guidewire was recovered./Ultrasound guidance was used during placement./The catheter was placed using sterile technique./All lumen(s) aspirated and flushed without difficulty.

## 2018-01-23 NOTE — H&P ADULT - ASSESSMENT
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

## 2018-01-23 NOTE — H&P ADULT - PROBLEM SELECTOR PLAN 1
Influenza A positive  Will continue with Tamiflu 75 for a 5 day course.  Hold off on further antibiotics as patient's CXR  more consistent with pleural effusion. Attending to call ID.  Linwood QUEEN. Influenza A positive  Will continue with Tamiflu 75 for a 5 day course.  Hold off on further antibiotics as patient's CXR  more consistent with pleural effusion.   Cw IVF.  monitor vitals closely

## 2018-01-23 NOTE — H&P ADULT - HISTORY OF PRESENT ILLNESS
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 The patient was recently discharged from [facility] for UTI. Per family, patient was speaking yesterday evening at approximately 7pm and is typically AAox4, though he is dependent on all ADLs and lives in Nursing Home. His wife received a call at 3am that the patient was not doing well and would be sent to the ED.     Vital Signs Last 24 Hrs  T(C): 39.3 (23 Jan 2018 06:28), Max: 39.3 (23 Jan 2018 06:28)  T(F): 102.7 (23 Jan 2018 06:28), Max: 102.7 (23 Jan 2018 06:28)  HR: 77 (23 Jan 2018 15:24) (74 - 88)  BP: 120/54 (23 Jan 2018 15:24) (89/32 - 126/61)  BP(mean): --  RR: 20 (23 Jan 2018 15:24) (18 - 25)  SpO2: 97% (23 Jan 2018 15:24) (96% - 98%)    Once here, the patient received Cefepime 2g IV x 1, Tamiflu 75mg oral via PEG x1, and Vancomycin 1g IV x1. He also received NS 1L x2 and Ofirmev 1g x1.

## 2018-01-23 NOTE — H&P ADULT - ATTENDING COMMENTS
I agree with the above information  pt seen and evaluated at bedside  case discussed at bedside with patients wife and daughter, as well as medical resident and ED physician  necessary changes were made above as needed

## 2018-01-23 NOTE — ED PROVIDER NOTE - OBJECTIVE STATEMENT
82yoM hx of parkinsons, CVA, CAD, bed bound A&Ox3 at baseline pw fever and confusion since last night. fever to 102 at the nursing home. last normal yesterday per family. patient also with some redness around peg tube site which is new. history obtained from family and report from nursing home.

## 2018-01-23 NOTE — H&P ADULT - NSHPPHYSICALEXAM_GEN_ALL_CORE
Vital Signs Last 24 Hrs  T(C): 39.3 (23 Jan 2018 06:28), Max: 39.3 (23 Jan 2018 06:28)  T(F): 102.7 (23 Jan 2018 06:28), Max: 102.7 (23 Jan 2018 06:28)  HR: 77 (23 Jan 2018 15:24) (74 - 88)  BP: 120/54 (23 Jan 2018 15:24) (89/32 - 126/61)  BP(mean): --  RR: 20 (23 Jan 2018 15:24) (18 - 25)  SpO2: 97% (23 Jan 2018 15:24) (96% - 98%)    PHYSICAL EXAM:    GENERAL: Altered, somnolent  HEAD:  Normocephalic, atraumatic  EYES: EOMI, PERRLA  HEENT: Moist mucous membranes  NECK: Supple, No JVD  NERVOUS SYSTEM:  Altered. Unable to follow commands.  CHEST/LUNG: dec BS LLL  HEART: Regular rate and rhythm, no murmur   ABDOMEN: erythema noted at site of PEG  EXTREMITIES:   No clubbing, cyanosis, or edema  MUSCULOSKELETAL: No muscle tenderness, no joint tenderness  SKIN: warm and dry, no rash

## 2018-01-23 NOTE — ED PROVIDER NOTE - CRITICAL CARE PROVIDED
documentation/direct patient care (not related to procedure)/additional history taking/interpretation of diagnostic studies/consult w/ pt's family directly relating to pts condition/consultation with other physicians/conducted a detailed discussion of DNR status

## 2018-01-23 NOTE — ED ADULT NURSE NOTE - PMH
CAD (coronary artery disease)    CVA (cerebral vascular accident)    Alexander catheter in place    Glaucoma    Hypertension    Oropharyngeal dysphagia    Parkinson disease    Tracheostomy in place  removed 12/2017

## 2018-01-23 NOTE — ED PROCEDURE NOTE - CPROC ED POST PROC CARE GUIDE1
Keep the cast/splint/dressing clean and dry./Instructed patient/caregiver regarding signs and symptoms of infection./Verbal/written post procedure instructions were given to patient/caregiver.

## 2018-01-23 NOTE — CONSULT NOTE ADULT - ASSESSMENT
82 year-old M with PMH of Parkinson's, CVA s/p PEG and trach (trach removed), and CAD who presented to the hospital with altered mental status, fevers, and erythema at his PEG site. MICU consult called for hypotension.    Hypotension  -Likely in the setting of sepsis  -Administer Midodrine and re-assess blood pressure  -MICU will re-assess patient after this medication has been given and will make determination at that time    Case discussed with Dr. Abner Tucker, PGY-2 82 year-old M with PMH of Parkinson's, CVA s/p PEG and trach (trach removed), and CAD who presented to the hospital with altered mental status, fevers, and erythema at his PEG site. MICU consult called for hypotension.    Hypotension  -Likely in the setting of sepsis  -Administer Midodrine and re-assess blood pressure  -MICU will re-assess patient after this medication has been given and will make determination at that time    Case discussed with Dr. Abner Tucker, PGY-2    Addendum 1/23/18 1:51PM  Discussed with Dr. Ang who re-evaluated patient. Patient's systolic blood pressure remained above 120 on two separate BP measurements spaced 5 minutes apart. Patient is not a candidate for MICU admission at this time. Please re-consult with any change in clinical condition.    Jules Tucker, PGY-2

## 2018-01-23 NOTE — ED PROVIDER NOTE - ATTENDING CONTRIBUTION TO CARE
DR. PAREKH, ATTENDING MD-  I performed a face to face bedside interview with patient regarding history of present illness, review of symptoms and past medical history. I completed an independent physical exam.  I have discussed patient's plan of care with the resident.   Documentation as above in the note.   HPI: 82yoM hx of parkinsons, CVA, CAD, bed bound A&Ox3 at baseline pw fever and confusion since last night 9 hours prior to arrival. fever to 102 at the nursing home. last normal yesterday per family, baseline pt AOx3, but not ambulatory. patient also with some redness around peg tube site which is new. history obtained from family and report from nursing home. Pt not verbal at this time.   EXAM: Pt awake but not able to respond to verbal commands, making incoherent sounds, eyes pupils PERRL, no blown pupil, heart RRR, no M/R/G, lungs ctab, abd with peg tube site with induration, tenderness to palpation around PEG site and firm, no fluctuance, pt BUE contracted at elbows. BLE with heel ulcers.   MDM: Concern is for Sepsis source possible UTI vs PNA vs PEG/abd infection/abscess. Also consider stroke as previous CVA and at baseline AOx3, now with contractions in BUE and speech abnormal with AMS. Will need to do extensive workup. Pt was recently discharged from here 5 days ago. Will need readmission. Will obtain CT head for stroke, CT abd for abscess/PEG tube infection r/o, Pt already has lubin but not draining, will replace, will need blood for labs, cultures. Rectal temp 102F. Will provide IVAbx, obtain Lactic Acid and provide tylenol IV for fever. Monitor closely and reassess. Family at bedside. DR. PAREKH, ATTENDING MD-  I performed a face to face bedside interview with patient regarding history of present illness, review of symptoms and past medical history. I completed an independent physical exam.  I have discussed patient's plan of care with the resident.   Documentation as above in the note.   HPI: 82yoM hx of parkinsons, CVA, CAD, bed bound A&Ox3 at baseline pw fever and confusion since last night 9 hours prior to arrival. fever to 102 at the nursing home. last normal yesterday per family, baseline pt AOx3, but not ambulatory. patient also with some redness around peg tube site which is new. history obtained from family and report from nursing home. Pt not verbal at this time.   EXAM: Pt awake but not able to respond to verbal commands, making incoherent sounds, eyes pupils PERRL, no blown pupil, heart RRR, no M/R/G, lungs ctab, abd with peg tube site with induration, tenderness to palpation around PEG site and firm, no fluctuance, pt BUE contracted at elbows. BLE with heel ulcers.   MDM: Concern is for Sepsis source possible UTI vs PNA vs PEG/abd infection/abscess. Also consider stroke as previous CVA and at baseline AOx3, now with contractions in BUE and speech abnormal with AMS. Will need to do extensive workup. Pt was recently discharged from here 5 days ago. Will need readmission. Will obtain CT head for stroke but pt out of TPA window as symptoms started 9 hours prior to arrival, CT abd for abscess/PEG tube infection r/o, Pt already has lubin but not draining, will replace, will need blood for labs, cultures. Rectal temp 102F. Will provide IVAbx, obtain Lactic Acid and provide tylenol IV for fever. Monitor closely and reassess. Family at bedside.

## 2018-01-23 NOTE — ED ADULT NURSE NOTE - OBJECTIVE STATEMENT
Pt received to room20 A&Ox0 from [facility] for abdominal distention, redness around PEG tube site and no residual when checked this AM. Per nursing home paperwork, pt was febrile around midnight last night and was given 325mg Tylenol. Pt febrile rectally on arrival to ED- per family, pt was A&Ox3 yesterday, was able to hold a conversation and make his needs known. Pt was discharged from [facility] last week, was admitted for nine days for a UTI. Resides at nursing home for CVA last year with "R sided residual weakness" per family- as stated in paperwork, pt is hemiplegic and hemiparesis since the CVA. Pt arrives with Alexander catheter in place- old Alexander d/c per MD order and replaced in ED with 14Fr. Peg tube site appears red, abdomen firm/distended around PEG tube insertion site, warm to touch. 3.5 X 2 stage 3 wound on sacrum and unstageable ulcer on R heel. Pt is NPO and on 2L NC chronically per nursing home paperwork. Pt is unable to answer SI/HI/safety questions 2/2 altered mental status. Family present, MD at bedside placing IV. Safety and comfort maintained.

## 2018-01-23 NOTE — ED PROVIDER NOTE - MEDICAL DECISION MAKING DETAILS
82yoM hx of parkinsons pw altered mental status, fever, and erythema around peg tube. patient septic. will send labs, cultures, urine, cxr, ct head and abd pelvis. fluid, tyelnol, antibiotics, lactate and admit.

## 2018-01-23 NOTE — ED ADULT TRIAGE NOTE - CHIEF COMPLAINT QUOTE
Brought to ED by EMS from [facility].  Appears comfortable and in no apparent distress.  Per EMS, staff called [phone] because they noticed redness and edema around patient peg tube.  Redness, edema and firmness noted around peg tube.  History of CVA, HTN and Parkinson's.

## 2018-01-23 NOTE — CONSULT NOTE ADULT - SUBJECTIVE AND OBJECTIVE BOX
CHIEF COMPLAINT: Hypotension    HPI: 83 yo Male with history of CVA with R-sided residual deficits s/p trach and PEG (s/p trach reversal 2 weeks PTA), Parkinson's disease, and CAD s/p CABG who presented to the ED with altered mental status, fever, and erythema at the PEG site. Per family, patient was speaking yesterday evening at approximately 7pm. His wife received a call at 3am that the patient was not doing well and would be sent to the ED. Once here, the patient received Cefepime 2g IV x 1, Tamiflu 75mg oral via PEG x1, and Vancomycin 1g IV x1. He also received NS 1L x2 and Ofirmev 1g x1.    PAST MEDICAL & SURGICAL HISTORY:  Alexander catheter in place  Oropharyngeal dysphagia  Glaucoma  CAD (coronary artery disease)  Parkinson disease  Tracheostomy in place: removed 12/2017  Hypertension  CVA (cerebral vascular accident)  H/O tracheostomy  S/P percutaneous endoscopic gastrostomy (PEG) tube placement      FAMILY HISTORY:  No pertinent family history in first degree relatives      SOCIAL HISTORY:  Smoking: [ ] Never Smoked [ ] Former Smoker (__ packs x ___ years) [ ] Current Smoker  (__ packs x ___ years)  Substance Use: [ ] Never Used [ ] Used ____  EtOH Use:  Marital Status: [ ] Single [x ]  [ ]  [ ]   Sexual History:   Occupation:  Recent Travel:  Country of Birth:  Advance Directives: Full Code    Allergies    penicillin (Hives)    Intolerances        HOME MEDICATIONS:    REVIEW OF SYSTEMS:  Constitutional: [ ] negative [ ] fevers [ ] chills [ ] weight loss [ ] weight gain  HEENT: [ ] negative [ ] dry eyes [ ] eye irritation [ ] postnasal drip [ ] nasal congestion  CV: [ ] negative  [ ] chest pain [ ] orthopnea [ ] palpitations [ ] murmur  Resp: [ ] negative [ ] cough [ ] shortness of breath [ ] dyspnea [ ] wheezing [ ] sputum [ ] hemoptysis  GI: [ ] negative [ ] nausea [ ] vomiting [ ] diarrhea [ ] constipation [ ] abd pain [ ] dysphagia   : [ ] negative [ ] dysuria [ ] nocturia [ ] hematuria [ ] increased urinary frequency  Musculoskeletal: [ ] negative [ ] back pain [ ] myalgias [ ] arthralgias [ ] fracture  Skin: [ ] negative [ ] rash [ ] itch  Neurological: [ ] negative [ ] headache [ ] dizziness [ ] syncope [ ] weakness [ ] numbness  Psychiatric: [ ] negative [ ] anxiety [ ] depression  Endocrine: [ ] negative [ ] diabetes [ ] thyroid problem  Hematologic/Lymphatic: [ ] negative [ ] anemia [ ] bleeding problem  Allergic/Immunologic: [ ] negative [ ] itchy eyes [ ] nasal discharge [ ] hives [ ] angioedema  [ ] All other systems negative  [x ] Unable to assess ROS because patient is nonverbal currently    OBJECTIVE:  ICU Vital Signs Last 24 Hrs  T(C): 39.3 (23 Jan 2018 06:28), Max: 39.3 (23 Jan 2018 06:28)  T(F): 102.7 (23 Jan 2018 06:28), Max: 102.7 (23 Jan 2018 06:28)  HR: 79 (23 Jan 2018 10:19) (79 - 88)  BP: 91/46 (23 Jan 2018 10:19) (91/46 - 122/47)  BP(mean): --  ABP: --  ABP(mean): --  RR: 25 (23 Jan 2018 10:19) (18 - 25)  SpO2: 96% (23 Jan 2018 10:19) (96% - 98%)        CAPILLARY BLOOD GLUCOSE          PHYSICAL EXAM:  General: Laying in bed  HEENT: PERRL  Lymph Nodes: No LAD  Neck: Tracheostomy stoma  Respiratory: Diminished, coarse breath sounds bilaterally  Cardiovascular: S1S2  Abdomen: PEG tube in place, some erythema present, palpable mass  Extremities:   Skin: Erythema at PEG site  Neurological: Does not respond to commands  Psychiatry: Unable to assess    LINES: +[facility]    HOSPITAL MEDICATIONS:                              LABS:                        9.5    6.16  )-----------( 192      ( 23 Jan 2018 08:14 )             29.7     Hgb Trend: 9.5<--, 9.3<--  01-23    135  |  100  |  25<H>  ----------------------------<  99  4.3   |  26  |  0.57    Ca    7.6<L>      23 Jan 2018 08:14    TPro  6.9  /  Alb  2.4<L>  /  TBili  0.5  /  DBili  x   /  AST  28  /  ALT  27  /  AlkPhos  84  01-23    Creatinine Trend: 0.57<--, 0.50<--, 0.46<--, 0.42<--, 0.45<--, 0.49<--    Urinalysis Basic - ( 23 Jan 2018 10:34 )    Color: YELLOW / Appearance: CLEAR / SG: > 1.040 / pH: 6.5  Gluc: NEGATIVE / Ketone: NEGATIVE  / Bili: NEGATIVE / Urobili: 1 mg/dL   Blood: MODERATE / Protein: 30 mg/dL / Nitrite: NEGATIVE   Leuk Esterase: TRACE / RBC: 25-50 / WBC 10-25   Sq Epi: x / Non Sq Epi: x / Bacteria: FEW        Venous Blood Gas:  01-23 @ 08:14  7.48/36/40/27/74.1  VBG Lactate: 1.0      MICROBIOLOGY:     RADIOLOGY:  [x ] Reviewed and interpreted by me, CXR left pleural effusion    EKG:

## 2018-01-23 NOTE — CHART NOTE - NSCHARTNOTEFT_GEN_A_CORE
Notified by RN that pt EKG showing afib. EKG with atrial fibrillation with HR 89. D/w attending Dr. Mark, who will call cardiologist Dr. Ta for consult. Pt asymptomatic. Normotensive. Continue IVF, monitor rate and BP.     Bk PONCE Notified by RN that pt EKG showing afib. EKG with atrial fibrillation with HR 89. D/w attending Dr. Mark, who will call cardiologist Dr. Ta for consult. Pt asymptomatic. Normotensive. Continue IVF. Monitor status, rate and BP. Will place pt on tele monitor.     Bk PONCE Notified by RN that pt EKG showing acute changes. EKG with atrial fibrillation with HR 89. D/w attending Dr. Mark, who will call cardiologist Dr. Ta for consult. Pt asymptomatic. Normotensive. Continue IVF. Monitor status, rate and BP. Will place pt on tele monitor.     Bk PONCE

## 2018-01-24 DIAGNOSIS — A41.9 SEPSIS, UNSPECIFIED ORGANISM: ICD-10-CM

## 2018-01-24 DIAGNOSIS — E46 UNSPECIFIED PROTEIN-CALORIE MALNUTRITION: ICD-10-CM

## 2018-01-24 DIAGNOSIS — I48.91 UNSPECIFIED ATRIAL FIBRILLATION: ICD-10-CM

## 2018-01-24 DIAGNOSIS — E86.0 DEHYDRATION: ICD-10-CM

## 2018-01-24 DIAGNOSIS — I95.9 HYPOTENSION, UNSPECIFIED: ICD-10-CM

## 2018-01-24 DIAGNOSIS — K94.23 GASTROSTOMY MALFUNCTION: ICD-10-CM

## 2018-01-24 LAB
BACTERIA UR CULT: SIGNIFICANT CHANGE UP
BUN SERPL-MCNC: 23 MG/DL — SIGNIFICANT CHANGE UP (ref 7–23)
CALCIUM SERPL-MCNC: 7.4 MG/DL — LOW (ref 8.4–10.5)
CHLORIDE SERPL-SCNC: 108 MMOL/L — HIGH (ref 98–107)
CO2 SERPL-SCNC: 25 MMOL/L — SIGNIFICANT CHANGE UP (ref 22–31)
CREAT SERPL-MCNC: 0.55 MG/DL — SIGNIFICANT CHANGE UP (ref 0.5–1.3)
GLUCOSE SERPL-MCNC: 119 MG/DL — HIGH (ref 70–99)
HCT VFR BLD CALC: 29.9 % — LOW (ref 39–50)
HGB BLD-MCNC: 9.3 G/DL — LOW (ref 13–17)
MCHC RBC-ENTMCNC: 27.6 PG — SIGNIFICANT CHANGE UP (ref 27–34)
MCHC RBC-ENTMCNC: 31.1 % — LOW (ref 32–36)
MCV RBC AUTO: 88.7 FL — SIGNIFICANT CHANGE UP (ref 80–100)
NRBC # FLD: 0 — SIGNIFICANT CHANGE UP
PLATELET # BLD AUTO: 183 K/UL — SIGNIFICANT CHANGE UP (ref 150–400)
PMV BLD: 10.9 FL — SIGNIFICANT CHANGE UP (ref 7–13)
POTASSIUM SERPL-MCNC: 4.1 MMOL/L — SIGNIFICANT CHANGE UP (ref 3.5–5.3)
POTASSIUM SERPL-SCNC: 4.1 MMOL/L — SIGNIFICANT CHANGE UP (ref 3.5–5.3)
RBC # BLD: 3.37 M/UL — LOW (ref 4.2–5.8)
RBC # FLD: 18.2 % — HIGH (ref 10.3–14.5)
SODIUM SERPL-SCNC: 141 MMOL/L — SIGNIFICANT CHANGE UP (ref 135–145)
SPECIMEN SOURCE: SIGNIFICANT CHANGE UP
WBC # BLD: 5.01 K/UL — SIGNIFICANT CHANGE UP (ref 3.8–10.5)
WBC # FLD AUTO: 5.01 K/UL — SIGNIFICANT CHANGE UP (ref 3.8–10.5)

## 2018-01-24 RX ORDER — ACETAMINOPHEN 500 MG
650 TABLET ORAL EVERY 6 HOURS
Qty: 0 | Refills: 0 | Status: DISCONTINUED | OUTPATIENT
Start: 2018-01-24 | End: 2018-01-30

## 2018-01-24 RX ORDER — SODIUM CHLORIDE 9 MG/ML
1000 INJECTION INTRAMUSCULAR; INTRAVENOUS; SUBCUTANEOUS ONCE
Qty: 0 | Refills: 0 | Status: DISCONTINUED | OUTPATIENT
Start: 2018-01-24 | End: 2018-01-24

## 2018-01-24 RX ORDER — SODIUM CHLORIDE 9 MG/ML
1000 INJECTION INTRAMUSCULAR; INTRAVENOUS; SUBCUTANEOUS ONCE
Qty: 0 | Refills: 0 | Status: COMPLETED | OUTPATIENT
Start: 2018-01-24 | End: 2018-01-24

## 2018-01-24 RX ORDER — DORZOLAMIDE HYDROCHLORIDE TIMOLOL MALEATE 20; 5 MG/ML; MG/ML
1 SOLUTION/ DROPS OPHTHALMIC
Qty: 0 | Refills: 0 | Status: DISCONTINUED | OUTPATIENT
Start: 2018-01-24 | End: 2018-01-30

## 2018-01-24 RX ORDER — SODIUM CHLORIDE 9 MG/ML
1000 INJECTION INTRAMUSCULAR; INTRAVENOUS; SUBCUTANEOUS
Qty: 0 | Refills: 0 | Status: DISCONTINUED | OUTPATIENT
Start: 2018-01-24 | End: 2018-01-25

## 2018-01-24 RX ADMIN — MIDODRINE HYDROCHLORIDE 10 MILLIGRAM(S): 2.5 TABLET ORAL at 13:22

## 2018-01-24 RX ADMIN — Medication 3 MILLILITER(S): at 20:35

## 2018-01-24 RX ADMIN — Medication 75 MILLIGRAM(S): at 23:15

## 2018-01-24 RX ADMIN — Medication 1 APPLICATION(S): at 23:18

## 2018-01-24 RX ADMIN — Medication 200 MILLIGRAM(S): at 06:58

## 2018-01-24 RX ADMIN — Medication 1 APPLICATION(S): at 19:08

## 2018-01-24 RX ADMIN — Medication 3 MILLILITER(S): at 04:43

## 2018-01-24 RX ADMIN — HEPARIN SODIUM 5000 UNIT(S): 5000 INJECTION INTRAVENOUS; SUBCUTANEOUS at 23:18

## 2018-01-24 RX ADMIN — Medication 3 MILLILITER(S): at 11:33

## 2018-01-24 RX ADMIN — Medication 1 APPLICATION(S): at 11:34

## 2018-01-24 RX ADMIN — SODIUM CHLORIDE 100 MILLILITER(S): 9 INJECTION INTRAMUSCULAR; INTRAVENOUS; SUBCUTANEOUS at 06:58

## 2018-01-24 RX ADMIN — CARBIDOPA AND LEVODOPA 1.5 TABLET(S): 25; 100 TABLET ORAL at 13:22

## 2018-01-24 RX ADMIN — DORZOLAMIDE HYDROCHLORIDE TIMOLOL MALEATE 1 DROP(S): 20; 5 SOLUTION/ DROPS OPHTHALMIC at 19:08

## 2018-01-24 RX ADMIN — Medication 75 MILLIGRAM(S): at 06:58

## 2018-01-24 RX ADMIN — Medication 200 MILLIGRAM(S): at 19:02

## 2018-01-24 RX ADMIN — Medication 1 DROP(S): at 19:08

## 2018-01-24 RX ADMIN — CARBIDOPA AND LEVODOPA 1.5 TABLET(S): 25; 100 TABLET ORAL at 23:16

## 2018-01-24 RX ADMIN — DORZOLAMIDE HYDROCHLORIDE TIMOLOL MALEATE 1 DROP(S): 20; 5 SOLUTION/ DROPS OPHTHALMIC at 06:49

## 2018-01-24 RX ADMIN — Medication 1 APPLICATION(S): at 06:49

## 2018-01-24 RX ADMIN — MIDODRINE HYDROCHLORIDE 10 MILLIGRAM(S): 2.5 TABLET ORAL at 23:18

## 2018-01-24 RX ADMIN — SODIUM CHLORIDE 1000 MILLILITER(S): 9 INJECTION INTRAMUSCULAR; INTRAVENOUS; SUBCUTANEOUS at 13:42

## 2018-01-24 RX ADMIN — ATORVASTATIN CALCIUM 80 MILLIGRAM(S): 80 TABLET, FILM COATED ORAL at 23:18

## 2018-01-24 RX ADMIN — Medication 0.25 MILLIGRAM(S): at 11:33

## 2018-01-24 RX ADMIN — MIDODRINE HYDROCHLORIDE 10 MILLIGRAM(S): 2.5 TABLET ORAL at 06:58

## 2018-01-24 RX ADMIN — HEPARIN SODIUM 5000 UNIT(S): 5000 INJECTION INTRAVENOUS; SUBCUTANEOUS at 13:22

## 2018-01-24 RX ADMIN — SODIUM CHLORIDE 60 MILLILITER(S): 9 INJECTION INTRAMUSCULAR; INTRAVENOUS; SUBCUTANEOUS at 23:19

## 2018-01-24 RX ADMIN — Medication 1 DROP(S): at 06:48

## 2018-01-24 RX ADMIN — HEPARIN SODIUM 5000 UNIT(S): 5000 INJECTION INTRAVENOUS; SUBCUTANEOUS at 06:54

## 2018-01-24 RX ADMIN — CARBIDOPA AND LEVODOPA 1.5 TABLET(S): 25; 100 TABLET ORAL at 06:58

## 2018-01-24 RX ADMIN — PANTOPRAZOLE SODIUM 40 MILLIGRAM(S): 20 TABLET, DELAYED RELEASE ORAL at 13:22

## 2018-01-24 RX ADMIN — Medication 0.25 MILLIGRAM(S): at 20:51

## 2018-01-24 RX ADMIN — SODIUM CHLORIDE 60 MILLILITER(S): 9 INJECTION INTRAMUSCULAR; INTRAVENOUS; SUBCUTANEOUS at 15:52

## 2018-01-24 RX ADMIN — FINASTERIDE 5 MILLIGRAM(S): 5 TABLET, FILM COATED ORAL at 11:33

## 2018-01-24 RX ADMIN — Medication 1 APPLICATION(S): at 00:29

## 2018-01-24 RX ADMIN — Medication 81 MILLIGRAM(S): at 11:33

## 2018-01-24 NOTE — PATIENT PROFILE ADULT. - FUNCTIONAL LEVEL PRIOR: COMMUNICATION
AMS/(2) difficulty understanding and speaking (not related to language barrier) (2) difficulty understanding (not related to language barrier)/AMS

## 2018-01-24 NOTE — CONSULT NOTE ADULT - SUBJECTIVE AND OBJECTIVE BOX
A 81 yo Male with history of CVA in 2017 with R-sided residual deficits s/p trach and PEG (s/p trach reversal 2 weeks PTA), Parkinson's disease, and CAD s/p CABG who presented to the ED with altered mental status, fever, and erythema at the PEG site. The patient was recently discharged from [facility] for UTI. Per family, patient was speaking yesterday evening at approximately 7pm and is typically AAox4, though he is dependent on all ADLs and lives in Nursing Home. His wife received a call at 3am that the patient was not doing well and would be sent to the ED.             REVIEW OF SYSTEMS: Unable to obtain due to mental status              INTERVAL HPI/OVERNIGHT EVENTS:  T(C): 37.2 (01-24-18 @ 17:39), Max: 37.9 (01-24-18 @ 01:00)  HR: 65 (01-24-18 @ 20:35) (65 - 105)  BP: 92/50 (01-24-18 @ 17:39) (92/44 - 99/52)  RR: 18 (01-24-18 @ 17:39) (18 - 20)  SpO2: 99% (01-24-18 @ 17:39) (99% - 100%)  Wt(kg): --  I&O's Summary          PAST MEDICAL & SURGICAL HISTORY:  Alexander catheter in place  Oropharyngeal dysphagia  Glaucoma  CAD (coronary artery disease)  Parkinson disease  Tracheostomy in place: removed 12/2017  Hypertension  CVA (cerebral vascular accident)  H/O tracheostomy  S/P percutaneous endoscopic gastrostomy (PEG) tube placement      SOCIAL HISTORY  Alcohol:  Tobacco:  Illicit substance use:      FAMILY HISTORY:          ALLERGIES:          PHYSICAL EXAM:  GENERAL:       LABS:                        9.3    5.01  )-----------( 183      ( 24 Jan 2018 05:48 )             29.9     01-24    141  |  108<H>  |  23  ----------------------------<  119<H>  4.1   |  25  |  0.55    Ca    7.4<L>      24 Jan 2018 05:48    TPro  6.9  /  Alb  2.4<L>  /  TBili  0.5  /  DBili  x   /  AST  28  /  ALT  27  /  AlkPhos  84  01-23      Urinalysis Basic - ( 23 Jan 2018 10:34 )    Color: YELLOW / Appearance: CLEAR / SG: > 1.040 / pH: 6.5  Gluc: NEGATIVE / Ketone: NEGATIVE  / Bili: NEGATIVE / Urobili: 1 mg/dL   Blood: MODERATE / Protein: 30 mg/dL / Nitrite: NEGATIVE   Leuk Esterase: TRACE / RBC: 25-50 / WBC 10-25   Sq Epi: x / Non Sq Epi: x / Bacteria: FEW            MEDICATIONS  (STANDING):  ALBUTerol/ipratropium for Nebulization 3 milliLiter(s) Nebulizer every 6 hours  aspirin  chewable 81 milliGRAM(s) Oral daily  atorvastatin 80 milliGRAM(s) Oral at bedtime  buDESOnide   0.25 milliGRAM(s) Respule 0.25 milliGRAM(s) Inhalation two times a day  carbidopa/levodopa  25/100 1.5 Tablet(s) Oral three times a day  docusate sodium Liquid 200 milliGRAM(s) Oral two times a day  dorzolamide 2%/timolol 0.5% Ophthalmic Solution 1 Drop(s) Both EYES two times a day  finasteride 5 milliGRAM(s) Oral daily  heparin  Injectable 5000 Unit(s) SubCutaneous every 8 hours  latanoprost 0.005% Ophthalmic Solution 1 Drop(s) Left EYE at bedtime  midodrine 10 milliGRAM(s) Oral three times a day  oseltamivir Suspension 75 milliGRAM(s) Oral two times a day  pantoprazole   Suspension 40 milliGRAM(s) Oral daily  pilocarpine 4% Solution 1 Drop(s) Both EYES two times a day  senna Syrup 10 milliLiter(s) Oral at bedtime  sodium chloride 0.9%. 1000 milliLiter(s) (60 mL/Hr) IV Continuous <Continuous>  vitamin A &amp; D Ointment 1 Application(s) Topical four times a day    MEDICATIONS  (PRN):  acetaminophen    Suspension 650 milliGRAM(s) Oral every 6 hours PRN For Temp greater than 38 C (100.4 F)  metoclopramide 5 milliGRAM(s) Oral Before meals and at bedtime PRN nausea          RADIOLOGY & ADDITIONAL TESTS:    Imaging Personally Reviewed:  [ ] YES  [ ] NO    Consultant(s) Notes Reviewed:  [ ] YES  [ ] NO A 83 yo Male with history of CVA in 2017 with R-sided residual deficits s/p trach and PEG (s/p trach reversal 2 weeks PTA), Parkinson's disease, sent in to the ER for evaluation of altered mental status, fever, and erythema at the PEG site. The patient was recently discharged from [facility] after treated for  UTI. In the ER, he found to be febrile, T102.7, hypotensive, BP of 89/32, mildly positive urine analysis, Left pleural  effusion and CT abd/pelvis shows  Malpositioned gastrostomy tube with balloon inflated in the left rectus muscle. The distal portion of the tip appears to be tethering the stomach to the abdominal wall. Tiny amount of fluid and droplet of air in the left rectus muscle surrounds the gastrostomy tube. He also found to have positive Influenza AH3. He has started on Tamiflu, cultures pending and The ID consult requested to assist with further evaluation and antibiotic management.           REVIEW OF SYSTEMS: Unable to obtain due to mental status          T(C): 37.2 (01-24-18 @ 17:39), Max: 37.9 (01-24-18 @ 01:00)  HR: 65 (01-24-18 @ 20:35) (65 - 105)  BP: 92/50 (01-24-18 @ 17:39) (92/44 - 99/52)  RR: 18 (01-24-18 @ 17:39) (18 - 20)  SpO2: 99% (01-24-18 @ 17:39) (99% - 100%)  Wt(kg): --  I&O's Summary          PAST MEDICAL & SURGICAL HISTORY:  Hypertension, CVA (cerebral vascular accident)  dysphagia  Glaucoma  CAD (coronary artery disease)  Parkinson disease  H/O tracheostomy, Tracheostomy in place: removed 12/2017  S/P  PEG tube placement          SOCIAL HISTORY  Alcohol: Does not drink  Tobacco: Does not smoke  Illicit substance use: None            FAMILY HISTORY: Non contributory to the present illness          ALLERGIES: PCN  (Type of reaction ??)          PHYSICAL EXAM:  GENERAL: Obtunded, on VM  HEENT: previous trach site ostomy is closed  CVS: s1 and s2 present  RESP: Air entry B/L  GI: Abdomen nondistended but PEG site has some bleeding   EXT: No pedal edema  CNS: Obtunded          LABS:                        9.3    5.01  )-----------( 183      ( 24 Jan 2018 05:48 )             29.9         01-24    141  |  108<H>  |  23  ----------------------------<  119<H>  4.1   |  25  |  0.55    Ca    7.4<L>      24 Jan 2018 05:48    TPro  6.9  /  Alb  2.4<L>  /  TBili  0.5  /  DBili  x   /  AST  28  /  ALT  27  /  AlkPhos  84  01-23      Urinalysis Basic - ( 23 Jan 2018 10:34 )    Color: YELLOW / Appearance: CLEAR / SG: > 1.040 / pH: 6.5  Gluc: NEGATIVE / Ketone: NEGATIVE  / Bili: NEGATIVE / Urobili: 1 mg/dL   Blood: MODERATE / Protein: 30 mg/dL / Nitrite: NEGATIVE   Leuk Esterase: TRACE / RBC: 25-50 / WBC 10-25   Sq Epi: x / Non Sq Epi: x / Bacteria: FEW            MEDICATIONS  (STANDING):  ALBUTerol/ipratropium for Nebulization 3 milliLiter(s) Nebulizer every 6 hours  aspirin  chewable 81 milliGRAM(s) Oral daily  atorvastatin 80 milliGRAM(s) Oral at bedtime  buDESOnide   0.25 milliGRAM(s) Respule 0.25 milliGRAM(s) Inhalation two times a day  carbidopa/levodopa  25/100 1.5 Tablet(s) Oral three times a day  docusate sodium Liquid 200 milliGRAM(s) Oral two times a day  dorzolamide 2%/timolol 0.5% Ophthalmic Solution 1 Drop(s) Both EYES two times a day  finasteride 5 milliGRAM(s) Oral daily  heparin  Injectable 5000 Unit(s) SubCutaneous every 8 hours  latanoprost 0.005% Ophthalmic Solution 1 Drop(s) Left EYE at bedtime  midodrine 10 milliGRAM(s) Oral three times a day  oseltamivir Suspension 75 milliGRAM(s) Oral two times a day  pantoprazole   Suspension 40 milliGRAM(s) Oral daily  pilocarpine 4% Solution 1 Drop(s) Both EYES two times a day  senna Syrup 10 milliLiter(s) Oral at bedtime  sodium chloride 0.9%. 1000 milliLiter(s) (60 mL/Hr) IV Continuous <Continuous>  vitamin A &amp; D Ointment 1 Application(s) Topical four times a day    MEDICATIONS  (PRN):  acetaminophen    Suspension 650 milliGRAM(s) Oral every 6 hours PRN For Temp greater than 38 C (100.4 F)  metoclopramide 5 milliGRAM(s) Oral Before meals and at bedtime PRN nausea            RADIOLOGY & ADDITIONAL TESTS:    1/23/18 : CT Abdomen and Pelvis w/ IV Cont (01.23.18 @ 09:19) : Malpositioned gastrostomy tube with balloon inflated in the left rectus muscle. The distal portion of the tip appears to be tethering the stomach to the abdominal wall. Tiny amount of fluid and droplet of air in the left rectus muscle surrounds the gastrostomy tube.    1/23/18 Xray Chest 1 View AP -PORTABLE-Routine (01.23.18 @ 09:33) : Left pleural effusion.        MICROBIOLOGY DATA:    Culture - Urine (01.23.18 @ 10:43)    Culture - Urine:   NO GROWTH AT 24 HOURS    Specimen Source: URINE CATHETER      Culture - Blood (01.23.18 @ 09:19)    Culture - Blood:   NO ORGANISMS ISOLATED  NO ORGANISMS ISOLATED AT 24 HOURS    Specimen Source: BLOOD VENOUS      Culture - Blood (01.23.18 @ 09:19)    Culture - Blood:   NO ORGANISMS ISOLATED  NO ORGANISMS ISOLATED AT 24 HOURS    Specimen Source: BLOOD PERIPHERAL

## 2018-01-24 NOTE — CONSULT NOTE ADULT - SUBJECTIVE AND OBJECTIVE BOX
Patient is a 82y old  Male who presents with a chief complaint of Hypotension, sepsis, Influenza (23 Jan 2018 16:02)      HPI:  The patient is an 83 yo Male with history of CVA in 2017 with R-sided residual deficits s/p trach and PEG (s/p trach reversal 2 weeks PTA), Parkinson's disease, and CAD s/p CABG who presented to the ED with altered mental status, fever, and erythema at the PEG site. The patient was recently discharged from [facility] for UTI. Per family, patient was speaking yesterday evening at approximately 7pm and is typically AAox4, though he is dependent on all ADLs and lives in Nursing Home. His wife received a call at 3am that the patient was not doing well and would be sent to the ED.     Vital Signs Last 24 Hrs  T(C): 39.3 (23 Jan 2018 06:28), Max: 39.3 (23 Jan 2018 06:28)  T(F): 102.7 (23 Jan 2018 06:28), Max: 102.7 (23 Jan 2018 06:28)  HR: 77 (23 Jan 2018 15:24) (74 - 88)  BP: 120/54 (23 Jan 2018 15:24) (89/32 - 126/61)  BP(mean): --  RR: 20 (23 Jan 2018 15:24) (18 - 25)  SpO2: 97% (23 Jan 2018 15:24) (96% - 98%)    Once here, the patient received Cefepime 2g IV x 1, Tamiflu 75mg oral via PEG x1, and Vancomycin 1g IV x1. He also received NS 1L x2 and Ofirmev 1g x1. (23 Jan 2018 16:02)      PAST MEDICAL & SURGICAL HISTORY:  Alexander catheter in place  Oropharyngeal dysphagia  Glaucoma  CAD (coronary artery disease)  Parkinson disease  Tracheostomy in place: removed 12/2017  Hypertension  CVA (cerebral vascular accident)  H/O tracheostomy  S/P percutaneous endoscopic gastrostomy (PEG) tube placement      MEDICATIONS  (STANDING):  ALBUTerol/ipratropium for Nebulization 3 milliLiter(s) Nebulizer every 6 hours  aspirin  chewable 81 milliGRAM(s) Oral daily  atorvastatin 80 milliGRAM(s) Oral at bedtime  buDESOnide   0.25 milliGRAM(s) Respule 0.25 milliGRAM(s) Inhalation two times a day  carbidopa/levodopa  25/100 1.5 Tablet(s) Oral three times a day  docusate sodium Liquid 200 milliGRAM(s) Oral two times a day  dorzolamide 2%/timolol 0.5% Ophthalmic Solution 1 Drop(s) Both EYES two times a day  finasteride 5 milliGRAM(s) Oral daily  heparin  Injectable 5000 Unit(s) SubCutaneous every 8 hours  latanoprost 0.005% Ophthalmic Solution 1 Drop(s) Left EYE at bedtime  midodrine 10 milliGRAM(s) Oral three times a day  oseltamivir Suspension 75 milliGRAM(s) Oral two times a day  pantoprazole   Suspension 40 milliGRAM(s) Oral daily  pilocarpine 4% Solution 1 Drop(s) Both EYES two times a day  senna Syrup 10 milliLiter(s) Oral at bedtime  sodium chloride 0.9%. 1000 milliLiter(s) (60 mL/Hr) IV Continuous <Continuous>  vitamin A &amp; D Ointment 1 Application(s) Topical four times a day      Allergies    penicillin (Hives)    Intolerances        SOCIAL HISTORY:  Denies ETOh,Smoking,     FAMILY HISTORY:  No pertinent family history in first degree relatives      REVIEW OF SYSTEMS:    CONSTITUTIONAL: No weakness, fevers or chills  EYES/ENT: No visual changes;  No vertigo or throat pain   NECK: No pain or stiffness  RESPIRATORY: No cough, wheezing, hemoptysis; No shortness of breath  CARDIOVASCULAR: No chest pain or palpitations  GASTROINTESTINAL: No abdominal or epigastric pain. No nausea, vomiting, or hematemesis; No diarrhea or constipation. No melena or hematochezia.  GENITOURINARY: No dysuria, frequency or hematuria  NEUROLOGICAL: No numbness or weakness  SKIN: No itching, burning, rashes, or lesions   All other review of systems is negative unless indicated above.    VITAL:  T(C): , Max: 37.9 (01-24-18 @ 01:00)  T(F): , Max: 100.2 (01-24-18 @ 01:00)  HR: 70 (01-24-18 @ 17:39)  BP: 92/50 (01-24-18 @ 17:39)  BP(mean): --  RR: 18 (01-24-18 @ 17:39)  SpO2: 99% (01-24-18 @ 17:39)  Wt(kg): --    I and O's:    01-23 @ 07:01 - 01-24 @ 07:00  --------------------------------------------------------  IN: 0 mL / OUT: 500 mL / NET: -500 mL    01-24 @ 07:01 - 01-24 @ 18:55  --------------------------------------------------------  IN: 0 mL / OUT: 300 mL / NET: -300 mL          PHYSICAL EXAM:    Constitutional: NAD  HEENT: PERRLA,   Neck: No JVD  Respiratory: CTA B/L  Cardiovascular: S1 and S2  Gastrointestinal: BS+, soft, NT/ND  Extremities: No peripheral edema  Neurological: A/O x 3, no focal deficits  Psychiatric: Normal mood, normal affect  : No Alexander  Skin: No rashes  Access: Not applicable  Back: No CVA tenderness    LABS:                        9.3    5.01  )-----------( 183      ( 24 Jan 2018 05:48 )             29.9     01-24    141  |  108<H>  |  23  ----------------------------<  119<H>  4.1   |  25  |  0.55    Ca    7.4<L>      24 Jan 2018 05:48    TPro  6.9  /  Alb  2.4<L>  /  TBili  0.5  /  DBili  x   /  AST  28  /  ALT  27  /  AlkPhos  84  01-23          RADIOLOGY & ADDITIONAL STUDIES:
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
## 2018-01-24 NOTE — CONSULT NOTE ADULT - ASSESSMENT
Full consult to follow A 81 yo Male with history of CVA in 2017 with R-sided residual deficits s/p trach and PEG (s/p trach reversal 2 weeks PTA), Parkinson's disease, sent in to the ER for evaluation of altered mental status, fever, and erythema at the PEG site. The patient was recently discharged from [facility] after treated for  UTI. In the ER, he found to be febrile, T102.7, hypotensive, BP of 89/32, mildly positive urine analysis, Left pleural  effusion and CT abd/pelvis shows  Malpositioned gastrostomy tube with balloon inflated in the left rectus muscle. The distal portion of the tip appears to be tethering the stomach to the abdominal wall. Tiny amount of fluid and droplet of air in the left rectus muscle surrounds the gastrostomy tube. He also found to have positive Influenza AH3. He has started on Tamiflu, cultures pending and The ID consult requested to assist with further evaluation and antibiotic management.     # S/p Septic shock  # Influenza AH3  # Intra-abdominal process- most likely due to PEG malpositioned in left rectus muscle  # Aspiration pneumonia- in the setting of Thick tube feeding color bronchial secretions      Would recommend:    1. Change of PEG tube   2. Hold the PEG feeding to prevent further Aspiration and continue bronchial suctioning  3. Start on Levaquin and flagyl since allergic to PCN,  to cover intraabdominal pathogen  4. Follow up final cultures  5. Continue Tamiflu X 5 days ( 10 doses)  6. Droplet precaution  7. Continue supplemental oxygenation and bronchodilator    d/w Nursing staff    will follow the patient with you and make further recommendation based on the clinical course and Lab results  Thank you for the opportunity to participate in Mr. Vazquez's care

## 2018-01-24 NOTE — CONSULT NOTE ADULT - ASSESSMENT
case discussed with family at bedside and nurse, peg now functioning. looks ok at skin.  ? issue with balloon,  will have ir do peg check and change tomorrow.  ok with using for meds at this point.  discussed with staff.

## 2018-01-24 NOTE — PATIENT PROFILE ADULT. - NUTRITION PROFILE
no indicators present Failure to Thrive/no indicators present/BMI less than 18/pressure ulcer Stage 2 or greater

## 2018-01-25 LAB
BUN SERPL-MCNC: 17 MG/DL — SIGNIFICANT CHANGE UP (ref 7–23)
CALCIUM SERPL-MCNC: 7.1 MG/DL — LOW (ref 8.4–10.5)
CHLORIDE SERPL-SCNC: 106 MMOL/L — SIGNIFICANT CHANGE UP (ref 98–107)
CO2 SERPL-SCNC: 25 MMOL/L — SIGNIFICANT CHANGE UP (ref 22–31)
CREAT SERPL-MCNC: 0.38 MG/DL — LOW (ref 0.5–1.3)
GLUCOSE SERPL-MCNC: 88 MG/DL — SIGNIFICANT CHANGE UP (ref 70–99)
HCT VFR BLD CALC: 26.6 % — LOW (ref 39–50)
HGB BLD-MCNC: 8.2 G/DL — LOW (ref 13–17)
MCHC RBC-ENTMCNC: 27 PG — SIGNIFICANT CHANGE UP (ref 27–34)
MCHC RBC-ENTMCNC: 30.8 % — LOW (ref 32–36)
MCV RBC AUTO: 87.5 FL — SIGNIFICANT CHANGE UP (ref 80–100)
NRBC # FLD: 0 — SIGNIFICANT CHANGE UP
PLATELET # BLD AUTO: 157 K/UL — SIGNIFICANT CHANGE UP (ref 150–400)
PMV BLD: 10.4 FL — SIGNIFICANT CHANGE UP (ref 7–13)
POTASSIUM SERPL-MCNC: 3.5 MMOL/L — SIGNIFICANT CHANGE UP (ref 3.5–5.3)
POTASSIUM SERPL-SCNC: 3.5 MMOL/L — SIGNIFICANT CHANGE UP (ref 3.5–5.3)
RBC # BLD: 3.04 M/UL — LOW (ref 4.2–5.8)
RBC # FLD: 18.2 % — HIGH (ref 10.3–14.5)
SODIUM SERPL-SCNC: 139 MMOL/L — SIGNIFICANT CHANGE UP (ref 135–145)
WBC # BLD: 4.97 K/UL — SIGNIFICANT CHANGE UP (ref 3.8–10.5)
WBC # FLD AUTO: 4.97 K/UL — SIGNIFICANT CHANGE UP (ref 3.8–10.5)

## 2018-01-25 RX ORDER — METRONIDAZOLE 500 MG
TABLET ORAL
Qty: 0 | Refills: 0 | Status: DISCONTINUED | OUTPATIENT
Start: 2018-01-25 | End: 2018-01-30

## 2018-01-25 RX ORDER — METRONIDAZOLE 500 MG
500 TABLET ORAL ONCE
Qty: 0 | Refills: 0 | Status: COMPLETED | OUTPATIENT
Start: 2018-01-25 | End: 2018-01-25

## 2018-01-25 RX ORDER — SODIUM CHLORIDE 9 MG/ML
1000 INJECTION INTRAMUSCULAR; INTRAVENOUS; SUBCUTANEOUS
Qty: 0 | Refills: 0 | Status: DISCONTINUED | OUTPATIENT
Start: 2018-01-26 | End: 2018-01-26

## 2018-01-25 RX ORDER — METRONIDAZOLE 500 MG
500 TABLET ORAL EVERY 8 HOURS
Qty: 0 | Refills: 0 | Status: DISCONTINUED | OUTPATIENT
Start: 2018-01-26 | End: 2018-01-30

## 2018-01-25 RX ADMIN — MIDODRINE HYDROCHLORIDE 10 MILLIGRAM(S): 2.5 TABLET ORAL at 12:36

## 2018-01-25 RX ADMIN — Medication 200 MILLIGRAM(S): at 18:20

## 2018-01-25 RX ADMIN — Medication 1 DROP(S): at 18:19

## 2018-01-25 RX ADMIN — SODIUM CHLORIDE 60 MILLILITER(S): 9 INJECTION INTRAMUSCULAR; INTRAVENOUS; SUBCUTANEOUS at 12:37

## 2018-01-25 RX ADMIN — CARBIDOPA AND LEVODOPA 1.5 TABLET(S): 25; 100 TABLET ORAL at 05:48

## 2018-01-25 RX ADMIN — HEPARIN SODIUM 5000 UNIT(S): 5000 INJECTION INTRAVENOUS; SUBCUTANEOUS at 12:38

## 2018-01-25 RX ADMIN — Medication 0.25 MILLIGRAM(S): at 09:05

## 2018-01-25 RX ADMIN — Medication 1 APPLICATION(S): at 18:20

## 2018-01-25 RX ADMIN — Medication 200 MILLIGRAM(S): at 05:47

## 2018-01-25 RX ADMIN — MIDODRINE HYDROCHLORIDE 10 MILLIGRAM(S): 2.5 TABLET ORAL at 05:47

## 2018-01-25 RX ADMIN — Medication 3 MILLILITER(S): at 16:01

## 2018-01-25 RX ADMIN — HEPARIN SODIUM 5000 UNIT(S): 5000 INJECTION INTRAVENOUS; SUBCUTANEOUS at 05:47

## 2018-01-25 RX ADMIN — Medication 1 APPLICATION(S): at 23:20

## 2018-01-25 RX ADMIN — Medication 1 APPLICATION(S): at 05:47

## 2018-01-25 RX ADMIN — Medication 75 MILLIGRAM(S): at 18:20

## 2018-01-25 RX ADMIN — Medication 0.25 MILLIGRAM(S): at 21:50

## 2018-01-25 RX ADMIN — Medication 100 MILLIGRAM(S): at 23:20

## 2018-01-25 RX ADMIN — Medication 3 MILLILITER(S): at 09:05

## 2018-01-25 RX ADMIN — Medication 1 APPLICATION(S): at 12:36

## 2018-01-25 RX ADMIN — ATORVASTATIN CALCIUM 80 MILLIGRAM(S): 80 TABLET, FILM COATED ORAL at 23:21

## 2018-01-25 RX ADMIN — Medication 3 MILLILITER(S): at 21:53

## 2018-01-25 RX ADMIN — Medication 81 MILLIGRAM(S): at 12:36

## 2018-01-25 RX ADMIN — DORZOLAMIDE HYDROCHLORIDE TIMOLOL MALEATE 1 DROP(S): 20; 5 SOLUTION/ DROPS OPHTHALMIC at 18:20

## 2018-01-25 RX ADMIN — LATANOPROST 1 DROP(S): 0.05 SOLUTION/ DROPS OPHTHALMIC; TOPICAL at 23:19

## 2018-01-25 RX ADMIN — HEPARIN SODIUM 5000 UNIT(S): 5000 INJECTION INTRAVENOUS; SUBCUTANEOUS at 23:20

## 2018-01-25 RX ADMIN — CARBIDOPA AND LEVODOPA 1.5 TABLET(S): 25; 100 TABLET ORAL at 23:21

## 2018-01-25 RX ADMIN — CARBIDOPA AND LEVODOPA 1.5 TABLET(S): 25; 100 TABLET ORAL at 12:37

## 2018-01-25 RX ADMIN — SENNA PLUS 10 MILLILITER(S): 8.6 TABLET ORAL at 23:19

## 2018-01-25 RX ADMIN — MIDODRINE HYDROCHLORIDE 10 MILLIGRAM(S): 2.5 TABLET ORAL at 23:19

## 2018-01-25 RX ADMIN — DORZOLAMIDE HYDROCHLORIDE TIMOLOL MALEATE 1 DROP(S): 20; 5 SOLUTION/ DROPS OPHTHALMIC at 05:46

## 2018-01-25 RX ADMIN — Medication 75 MILLIGRAM(S): at 05:48

## 2018-01-25 RX ADMIN — LATANOPROST 1 DROP(S): 0.05 SOLUTION/ DROPS OPHTHALMIC; TOPICAL at 02:40

## 2018-01-25 RX ADMIN — Medication 1 DROP(S): at 05:46

## 2018-01-25 RX ADMIN — PANTOPRAZOLE SODIUM 40 MILLIGRAM(S): 20 TABLET, DELAYED RELEASE ORAL at 12:36

## 2018-01-25 RX ADMIN — FINASTERIDE 5 MILLIGRAM(S): 5 TABLET, FILM COATED ORAL at 12:36

## 2018-01-25 RX ADMIN — Medication 3 MILLILITER(S): at 03:22

## 2018-01-25 NOTE — DIETITIAN INITIAL EVALUATION ADULT. - PROBLEM SELECTOR PLAN 3
Likely infectious encephalopathy complicated by dehydration. Will continue to monitor with treatment of Influenza A and ivf

## 2018-01-25 NOTE — DIETITIAN INITIAL EVALUATION ADULT. - PROBLEM SELECTOR PLAN 4
PEG site showing erythema, with CT abd showing displacement. Attending to call GI for possible peg replacement.

## 2018-01-25 NOTE — DIETITIAN INITIAL EVALUATION ADULT. - OTHER INFO
Pt was admitted with Dx Sepsis. Received nutrition consult for BMI < 18 kg/m^2, FTT and Pressure Ulcer > Stage II. Pt's Tube Feeds were on hold due to issues with placement. Spoke with Registered Nurse and PA---ok to start feeding again. No weight or height was noted on this admission. Pt was seen by Nutrition services on previous admit. Per Registered Dietitian note at the time, with weight loss and noted admit weight 151 pounds and Ht 67 inches. Pt also remains multiple pressure ulcers. Pt was admitted with Dx Sepsis. Received nutrition consult for BMI <18 kg/m^2, FTT and Pressure Ulcer > Stage II. Pt's Tube Feeds were on hold due to issues with placement. Spoke with Registered Nurse and PA---ok to start feeding again. No weight or height was noted on this admission. Pt was seen by Nutrition services on previous admit. Per Registered Dietitian note at the time, with weight loss and noted admit weight 151 pounds and Ht 67 inches. Pt also remains multiple pressure ulcers.

## 2018-01-25 NOTE — DIETITIAN INITIAL EVALUATION ADULT. - PROBLEM SELECTOR PLAN 1
Influenza A positive  Will continue with Tamiflu 75 for a 5 day course.  Hold off on further antibiotics as patient's CXR  more consistent with pleural effusion.   Cw IVF.  monitor vitals closely

## 2018-01-25 NOTE — DIETITIAN INITIAL EVALUATION ADULT. - MD RECOMMEND
1. Recommend to continue with current TF order. Same is providing 1728 kcals/79 gm pro and an additional 15 gm pro from [employer] 1. Recommend to continue with current TF order as it is sufficient to meet estimated nutrient needs. Same is providing 1728 kcals/79 gm pro and an additional 15 gm pro from [employer] 1 pack 2x daily (total pro. 94 gm).  2. Monitor TF tolerance, weight, labs 1. Recommend to continue with current TF order as it is sufficient to meet estimated nutrient needs. Same is providing 1728 kcals/79 gm pro and an additional 15 gm pro from [employer] 1 pack daily (total pro. 94 gm).  2. Monitor TF tolerance, weight, labs

## 2018-01-25 NOTE — DIETITIAN INITIAL EVALUATION ADULT. - PHYSICAL APPEARANCE
Nutrition focused physical exam conducted - found signs of malnutrition : [X ]Present   Subcutaneous fat loss: [SEVERE] Orbital fat pads region, [SEVERE]Buccal fat region, [SEVERE]Triceps region,  [ ]Ribs region  Muscle wasting: [SEVERE]Temples region, [SEVERE]Clavicle region, [SEVERE]Shoulder region, [ ]Scapula region, [SEVERE] Interosseous region,  [ ]thigh region, [SEVERE]Calf region./debilitated/underweight/emaciated

## 2018-01-25 NOTE — CHART NOTE - NSCHARTNOTEFT_GEN_A_CORE
NUTRITION SERVICES     Upon Nutritional Assessment by the Registered Dietitian your patient was determined to meet criteria/ has evidence of the following diagnosis/diagnoses:  [ ] Mild Protein Calorie Malnutrition   [ ] Moderate Protein Calorie Malnutrition   [X] Severe Protein Calorie Malnutrition   [ ] Unspecified Protein Calorie Malnutrition   [ ] Underweight / BMI <19  [ ] Morbid Obesity / BMI >40    Findings as based on:  •  Comprehensive nutritional assessment and consultation    Please refer to Initial Dietitian Evaluation via documents section of [facility] for further recommendations.    Gaston Ashraf RD,CD/N,CDE

## 2018-01-25 NOTE — DIETITIAN INITIAL EVALUATION ADULT. - SOURCE
other (specify)/EMR Reviewed, Spoke with Registered Nurse, PA, Reviewed previous admit Registered Dietitian note 1/11/18.  Transfer records from [facility]

## 2018-01-25 NOTE — DIETITIAN INITIAL EVALUATION ADULT. - ADHERENCE
Pt was on PEG feeds-------> Jevity 1.2 1400 ml @ 65 ml/hr.  Protein Supplement LPS 30 ML twice daily

## 2018-01-26 DIAGNOSIS — R53.81 OTHER MALAISE: ICD-10-CM

## 2018-01-26 LAB
APPEARANCE UR: SIGNIFICANT CHANGE UP
BILIRUB UR-MCNC: NEGATIVE — SIGNIFICANT CHANGE UP
BLOOD UR QL VISUAL: HIGH
BUN SERPL-MCNC: 18 MG/DL — SIGNIFICANT CHANGE UP (ref 7–23)
CALCIUM SERPL-MCNC: 7.3 MG/DL — LOW (ref 8.4–10.5)
CHLORIDE SERPL-SCNC: 104 MMOL/L — SIGNIFICANT CHANGE UP (ref 98–107)
CO2 SERPL-SCNC: 24 MMOL/L — SIGNIFICANT CHANGE UP (ref 22–31)
COD CRY URNS QL: SIGNIFICANT CHANGE UP (ref 0–0)
COLOR SPEC: YELLOW — SIGNIFICANT CHANGE UP
CREAT SERPL-MCNC: 0.44 MG/DL — LOW (ref 0.5–1.3)
GLUCOSE SERPL-MCNC: 96 MG/DL — SIGNIFICANT CHANGE UP (ref 70–99)
GLUCOSE UR-MCNC: NEGATIVE — SIGNIFICANT CHANGE UP
HCT VFR BLD CALC: 29.1 % — LOW (ref 39–50)
HGB BLD-MCNC: 9 G/DL — LOW (ref 13–17)
KETONES UR-MCNC: NEGATIVE — SIGNIFICANT CHANGE UP
LEUKOCYTE ESTERASE UR-ACNC: HIGH
MAGNESIUM SERPL-MCNC: 1.7 MG/DL — SIGNIFICANT CHANGE UP (ref 1.6–2.6)
MCHC RBC-ENTMCNC: 27.4 PG — SIGNIFICANT CHANGE UP (ref 27–34)
MCHC RBC-ENTMCNC: 30.9 % — LOW (ref 32–36)
MCV RBC AUTO: 88.7 FL — SIGNIFICANT CHANGE UP (ref 80–100)
MUCOUS THREADS # UR AUTO: SIGNIFICANT CHANGE UP
NITRITE UR-MCNC: NEGATIVE — SIGNIFICANT CHANGE UP
NON-SQ EPI CELLS # UR AUTO: 1 — SIGNIFICANT CHANGE UP
NRBC # FLD: 0 — SIGNIFICANT CHANGE UP
PH UR: 6 — SIGNIFICANT CHANGE UP (ref 4.6–8)
PLATELET # BLD AUTO: 179 K/UL — SIGNIFICANT CHANGE UP (ref 150–400)
PMV BLD: 10.5 FL — SIGNIFICANT CHANGE UP (ref 7–13)
POTASSIUM SERPL-MCNC: 3.7 MMOL/L — SIGNIFICANT CHANGE UP (ref 3.5–5.3)
POTASSIUM SERPL-SCNC: 3.7 MMOL/L — SIGNIFICANT CHANGE UP (ref 3.5–5.3)
PROT UR-MCNC: 20 MG/DL — SIGNIFICANT CHANGE UP
RBC # BLD: 3.28 M/UL — LOW (ref 4.2–5.8)
RBC # FLD: 18 % — HIGH (ref 10.3–14.5)
RBC CASTS # UR COMP ASSIST: SIGNIFICANT CHANGE UP (ref 0–?)
SODIUM SERPL-SCNC: 138 MMOL/L — SIGNIFICANT CHANGE UP (ref 135–145)
SP GR SPEC: 1.02 — SIGNIFICANT CHANGE UP (ref 1–1.04)
UROBILINOGEN FLD QL: 4 MG/DL — HIGH
WBC # BLD: 5.19 K/UL — SIGNIFICANT CHANGE UP (ref 3.8–10.5)
WBC # FLD AUTO: 5.19 K/UL — SIGNIFICANT CHANGE UP (ref 3.8–10.5)
WBC UR QL: >50 — HIGH (ref 0–?)

## 2018-01-26 PROCEDURE — 71045 X-RAY EXAM CHEST 1 VIEW: CPT | Mod: 26

## 2018-01-26 PROCEDURE — 49450 REPLACE G/C TUBE PERC: CPT

## 2018-01-26 RX ORDER — NYSTATIN CREAM 100000 [USP'U]/G
1 CREAM TOPICAL THREE TIMES A DAY
Qty: 0 | Refills: 0 | Status: DISCONTINUED | OUTPATIENT
Start: 2018-01-26 | End: 2018-01-30

## 2018-01-26 RX ORDER — CEFEPIME 1 G/1
INJECTION, POWDER, FOR SOLUTION INTRAMUSCULAR; INTRAVENOUS
Qty: 0 | Refills: 0 | Status: DISCONTINUED | OUTPATIENT
Start: 2018-01-26 | End: 2018-01-30

## 2018-01-26 RX ORDER — COLLAGENASE CLOSTRIDIUM HIST. 250 UNIT/G
1 OINTMENT (GRAM) TOPICAL DAILY
Qty: 0 | Refills: 0 | Status: DISCONTINUED | OUTPATIENT
Start: 2018-01-26 | End: 2018-01-30

## 2018-01-26 RX ORDER — SODIUM CHLORIDE 9 MG/ML
1000 INJECTION INTRAMUSCULAR; INTRAVENOUS; SUBCUTANEOUS
Qty: 0 | Refills: 0 | Status: DISCONTINUED | OUTPATIENT
Start: 2018-01-26 | End: 2018-01-30

## 2018-01-26 RX ORDER — CEFEPIME 1 G/1
1000 INJECTION, POWDER, FOR SOLUTION INTRAMUSCULAR; INTRAVENOUS ONCE
Qty: 0 | Refills: 0 | Status: COMPLETED | OUTPATIENT
Start: 2018-01-26 | End: 2018-01-26

## 2018-01-26 RX ORDER — DIPHENHYDRAMINE HCL 50 MG
25 CAPSULE ORAL ONCE
Qty: 0 | Refills: 0 | Status: DISCONTINUED | OUTPATIENT
Start: 2018-01-26 | End: 2018-01-30

## 2018-01-26 RX ORDER — CEFEPIME 1 G/1
1000 INJECTION, POWDER, FOR SOLUTION INTRAMUSCULAR; INTRAVENOUS EVERY 8 HOURS
Qty: 0 | Refills: 0 | Status: DISCONTINUED | OUTPATIENT
Start: 2018-01-27 | End: 2018-01-30

## 2018-01-26 RX ADMIN — Medication 1 APPLICATION(S): at 13:55

## 2018-01-26 RX ADMIN — MIDODRINE HYDROCHLORIDE 10 MILLIGRAM(S): 2.5 TABLET ORAL at 23:23

## 2018-01-26 RX ADMIN — DORZOLAMIDE HYDROCHLORIDE TIMOLOL MALEATE 1 DROP(S): 20; 5 SOLUTION/ DROPS OPHTHALMIC at 17:29

## 2018-01-26 RX ADMIN — Medication 100 MILLIGRAM(S): at 23:23

## 2018-01-26 RX ADMIN — CEFEPIME 100 MILLIGRAM(S): 1 INJECTION, POWDER, FOR SOLUTION INTRAMUSCULAR; INTRAVENOUS at 18:48

## 2018-01-26 RX ADMIN — CARBIDOPA AND LEVODOPA 1.5 TABLET(S): 25; 100 TABLET ORAL at 06:22

## 2018-01-26 RX ADMIN — HEPARIN SODIUM 5000 UNIT(S): 5000 INJECTION INTRAVENOUS; SUBCUTANEOUS at 06:21

## 2018-01-26 RX ADMIN — CARBIDOPA AND LEVODOPA 1.5 TABLET(S): 25; 100 TABLET ORAL at 23:23

## 2018-01-26 RX ADMIN — Medication 1 DROP(S): at 06:24

## 2018-01-26 RX ADMIN — DORZOLAMIDE HYDROCHLORIDE TIMOLOL MALEATE 1 DROP(S): 20; 5 SOLUTION/ DROPS OPHTHALMIC at 06:23

## 2018-01-26 RX ADMIN — Medication 75 MILLIGRAM(S): at 17:30

## 2018-01-26 RX ADMIN — Medication 75 MILLIGRAM(S): at 06:23

## 2018-01-26 RX ADMIN — MIDODRINE HYDROCHLORIDE 10 MILLIGRAM(S): 2.5 TABLET ORAL at 13:55

## 2018-01-26 RX ADMIN — ATORVASTATIN CALCIUM 80 MILLIGRAM(S): 80 TABLET, FILM COATED ORAL at 23:23

## 2018-01-26 RX ADMIN — HEPARIN SODIUM 5000 UNIT(S): 5000 INJECTION INTRAVENOUS; SUBCUTANEOUS at 13:55

## 2018-01-26 RX ADMIN — Medication 1 APPLICATION(S): at 23:23

## 2018-01-26 RX ADMIN — Medication 3 MILLILITER(S): at 08:57

## 2018-01-26 RX ADMIN — Medication 1 APPLICATION(S): at 17:30

## 2018-01-26 RX ADMIN — HEPARIN SODIUM 5000 UNIT(S): 5000 INJECTION INTRAVENOUS; SUBCUTANEOUS at 23:22

## 2018-01-26 RX ADMIN — MIDODRINE HYDROCHLORIDE 10 MILLIGRAM(S): 2.5 TABLET ORAL at 06:22

## 2018-01-26 RX ADMIN — CARBIDOPA AND LEVODOPA 1.5 TABLET(S): 25; 100 TABLET ORAL at 13:54

## 2018-01-26 RX ADMIN — NYSTATIN CREAM 1 APPLICATION(S): 100000 CREAM TOPICAL at 23:23

## 2018-01-26 RX ADMIN — Medication 200 MILLIGRAM(S): at 17:29

## 2018-01-26 RX ADMIN — Medication 100 MILLIGRAM(S): at 06:23

## 2018-01-26 RX ADMIN — Medication 1 DROP(S): at 17:30

## 2018-01-26 RX ADMIN — SENNA PLUS 10 MILLILITER(S): 8.6 TABLET ORAL at 23:24

## 2018-01-26 RX ADMIN — Medication 0.25 MILLIGRAM(S): at 08:58

## 2018-01-26 RX ADMIN — Medication 650 MILLIGRAM(S): at 04:30

## 2018-01-26 RX ADMIN — Medication 200 MILLIGRAM(S): at 06:22

## 2018-01-26 RX ADMIN — Medication 100 MILLIGRAM(S): at 13:55

## 2018-01-26 RX ADMIN — SODIUM CHLORIDE 60 MILLILITER(S): 9 INJECTION INTRAMUSCULAR; INTRAVENOUS; SUBCUTANEOUS at 03:00

## 2018-01-26 RX ADMIN — Medication 3 MILLILITER(S): at 16:00

## 2018-01-26 RX ADMIN — Medication 0.25 MILLIGRAM(S): at 22:03

## 2018-01-26 RX ADMIN — Medication 3 MILLILITER(S): at 21:51

## 2018-01-26 RX ADMIN — Medication 3 MILLILITER(S): at 03:56

## 2018-01-26 RX ADMIN — Medication 1 APPLICATION(S): at 06:23

## 2018-01-26 RX ADMIN — PANTOPRAZOLE SODIUM 40 MILLIGRAM(S): 20 TABLET, DELAYED RELEASE ORAL at 13:55

## 2018-01-26 RX ADMIN — Medication 81 MILLIGRAM(S): at 13:54

## 2018-01-26 RX ADMIN — LATANOPROST 1 DROP(S): 0.05 SOLUTION/ DROPS OPHTHALMIC; TOPICAL at 23:23

## 2018-01-26 RX ADMIN — FINASTERIDE 5 MILLIGRAM(S): 5 TABLET, FILM COATED ORAL at 13:54

## 2018-01-26 NOTE — CHART NOTE - NSCHARTNOTEFT_GEN_A_CORE
Per RN pt's rectal temp was 100.4   Patient was tested + flu on Tamiflu this admission   Fever - BCx x 2 sets, UA, UCx, CXR ordered  Tylenol as ordered for fever    VITAL SIGNS:  Vital Signs Last 24 Hrs  T(C): 38 (2018 02:30), Max: 38 (2018 02:30)  T(F): 100.4 (2018 02:30), Max: 100.4 (2018 02:30)  HR: 77 (2018 03:56) (58 - 86)  BP: 103/60 (2018 02:30) (99/61 - 106/66)  BP(mean): --  RR: 17 (2018 02:30) (16 - 18)  SpO2: 95% (2018 02:30) (95% - 100%)      Allergies    penicillin (Hives)    MEDICATIONS  (STANDING):  ALBUTerol/ipratropium for Nebulization 3 milliLiter(s) Nebulizer every 6 hours  aspirin  chewable 81 milliGRAM(s) Oral daily  atorvastatin 80 milliGRAM(s) Oral at bedtime  buDESOnide   0.25 milliGRAM(s) Respule 0.25 milliGRAM(s) Inhalation two times a day  carbidopa/levodopa  25/100 1.5 Tablet(s) Oral three times a day  docusate sodium Liquid 200 milliGRAM(s) Oral two times a day  dorzolamide 2%/timolol 0.5% Ophthalmic Solution 1 Drop(s) Both EYES two times a day  finasteride 5 milliGRAM(s) Oral daily  heparin  Injectable 5000 Unit(s) SubCutaneous every 8 hours  latanoprost 0.005% Ophthalmic Solution 1 Drop(s) Left EYE at bedtime  levoFLOXacin IVPB 500 milliGRAM(s) IV Intermittent every 24 hours  levoFLOXacin IVPB      metroNIDAZOLE  IVPB      metroNIDAZOLE  IVPB 500 milliGRAM(s) IV Intermittent every 8 hours  midodrine 10 milliGRAM(s) Oral three times a day  oseltamivir Suspension 75 milliGRAM(s) Oral two times a day  pantoprazole   Suspension 40 milliGRAM(s) Oral daily  pilocarpine 4% Solution 1 Drop(s) Both EYES two times a day  senna Syrup 10 milliLiter(s) Oral at bedtime  sodium chloride 0.9%. 1000 milliLiter(s) (60 mL/Hr) IV Continuous <Continuous>  vitamin A &amp; D Ointment 1 Application(s) Topical four times a day    MEDICATIONS  (PRN):  acetaminophen    Suspension 650 milliGRAM(s) Oral every 6 hours PRN For Temp greater than 38 C (100.4 F)  metoclopramide 5 milliGRAM(s) Oral Before meals and at bedtime PRN nausea                            8.2    4.97  )-----------( 157      ( 2018 05:50 )             26.6     -    139  |  106  |  17  ----------------------------<  88  3.5   |  25  |  0.38<L>    Ca    7.1<L>      2018 05:50    Urinalysis Basic - ( 2018 03:11 )    Color: YELLOW / Appearance: HAZY / S.021 / pH: 6.0  Gluc: NEGATIVE / Ketone: NEGATIVE  / Bili: NEGATIVE / Urobili: 4 mg/dL   Blood: TRACE / Protein: 20 mg/dL / Nitrite: NEGATIVE   Leuk Esterase: LARGE / RBC: 10-25 / WBC >50   Sq Epi: x / Non Sq Epi: x / Bacteria: x

## 2018-01-26 NOTE — ADVANCED PRACTICE NURSE CONSULT - REASON FOR CONSULT
Patient seen on skin care rounds after wound care referral received for assessment of skin impairment and recommendations of topical management. Patient known to [facility] last seeing on 1/11/2018 for left heel unsteageable and sacrococcygeal SDTI extending to bilateral buttocks, chart reviewed: WBC 5.19k/uL, Serum albumin 2.4g/dL(slight improvement from last visit), Benjamin 12-14, BMI 24.3kg/m2,BC negative, HH 9/29.1, UA positive, Influenza B RapRVP positive, CT of the ABD and Pelvis with IV contrast reports malpositioned gastrostomy, CT of the head reports severe severity microvascular ischemis changes. . Patient interviewed. 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 Patient is being seeing by ID for septic shock, gastroenterology for gastrostomy tube malposition, cardiology for new onset Afib, and Internal Medicine. Patient receiving IV antibiotics

## 2018-01-26 NOTE — ADVANCED PRACTICE NURSE CONSULT - RECOMMEDATIONS
Will recommend Podiatry consult for eschar of the right heel     Pending replacement of PEG tube    Topical Recommendations    Place small Z fluidized pillow to position head    Sween 24 to bilateral upper and lower extremities daily.    Peritubular skin of PEG- Cleanse q shift with NS, apply liquid barrier film beneath luba disc. Apply thin foam dressing without border (Mepilex Lite)- cut to mid dressing with a 'Y' shape. Change every shift. Secondary securement to abdomen taping in 'H' fashion with Steri-strips. Change every 5 days.    Bilateral groin, Sacrococcygeal extending to bilateral buttocks- Gently cleanse with NS. Pat dry. Sprinkle NYSTATIN powder to entire area, Apply TRIAD moisture barrier paste every shift and prn with episodes of incontinence. Note: while cleansing after episodes of incontinence gently remove soiled layer of TRIAD (do not aggressively remove remainder of paste) reapply secondary layer. Monitor for tissue type changes.     Right heel- Cleanse wound and periwound skin with NS. Pat dry. Apply Collagenase nickel thick to entire area, fill in depth with Aquacel, Cover with ABD Pad, secure with Kerlix. Change daily.     Continue low air loss bed therapy, continue heel elevation with Z-flex fluidized positioning boots, continue to turn & reposition q2h with Z-max fluidized positioning device, soft pillow between bony prominences, continue moisture management with barrier creams & single breathable pad, continue measures to decrease friction/shear/pressure. Continue with nutritional support as per dietary/orders.    Finding and plan discussed with patient's spouse at bedside, and son present at bedside at completion of assessment, RN, and primary team. All questions and concerns addressed.     Please contact Wound Care Service Line if we can be of further assistance (ext [phone]). Will recommend Podiatry consult for eschar of the right heel     Pending replacement of PEG tube    Topical Recommendations    Place small Z fluidized pillow to position head    Apply Liquid barrier film to bilateral ears and left heel, apply twice a day.     Sween 24 to bilateral upper and lower extremities daily.    Peritubular skin of PEG- Cleanse q shift with NS, apply liquid barrier film beneath luab disc. Apply thin foam dressing without border (Mepilex Lite)- cut to mid dressing with a 'Y' shape. Change every shift. Secondary securement to abdomen taping in 'H' fashion with Steri-strips. Change every 5 days.    Bilateral groin, Sacrococcygeal and bilateral buttocks- Gently cleanse with NS. Pat dry. Sprinkle NYSTATIN powder to entire area, Apply TRIAD moisture barrier paste three times a day and prn with episodes of incontinence. Note: while cleansing after episodes of incontinence gently remove soiled layer of TRIAD (do not aggressively remove remainder of paste) reapply secondary layer. Monitor for tissue type changes.     Right heel- Cleanse wound and periwound skin with NS. Pat dry. Apply Collagenase (nickel thick) to entire area, fill in depth with Aquacel, Cover with ABD Pad, secure with Kerlix. Change daily.     Continue low air loss bed therapy, continue heel elevation with Z-flex fluidized positioning boots, continue to turn & reposition q2h with Z-max fluidized positioning device, soft pillow between bony prominences, continue moisture management with barrier creams & single breathable pad, continue measures to decrease friction/shear/pressure. Continue with nutritional support as per dietary/orders.    Finding and plan discussed with patient's spouse at bedside, and son present at bedside at completion of assessment, RN, and primary team. All questions and concerns addressed.     Please contact Wound Care Service Line if we can be of further assistance (ext [phone]).

## 2018-01-26 NOTE — ADVANCED PRACTICE NURSE CONSULT - ASSESSMENT
General: Patient lethargic, non-verbal during assessment, shakes head no when asked if he is in pain, unable to confirm name or birthday. Neck hyperextention noted. Patient bedbound, patient received with Air trap device in place. indwelling lubin catheter in place, right neck central line, incontinent of urine. Previous tracheostomy site closed with 2 black sutures at 12 and 6 o'clock, madi tracheotomy skin with blanchable erythema. Patient received wearing oxygen via nasal cannula (Offload device place in bilateral ears). Skin warm, dry with increased moisture in intertriginous folds, poor skin turgor. Abdominal surgical scar. PEG in place. Bilateral upper and lower extremities with rigidity and contraction of right leg. Bilateral ears and Left heel blanchable erythema. Bilateral lower extremities with dry, flaky skin.     Vascular:  Areas of hyperpigmentation around bilateral malleoli. Dry, flaky skin. Thickened-yellow toenails. No temperature changes, dependent edema in left dorsal foot(patient received turned into left side). Capillary refill >3 seconds in dorsal pads. +2 DP/PT pulses, with  biphasic irregular doppler sounds.     Peritubular skin of PEG- with dry serous exudate, able to remove while cleansing. Hypergranulation tissue and dark maroon discoloration noted from 7-11o'clock. Scant serous drainage, no odor. Induration circumferentially extends 1cm to 4cm with 4cm from 7-10 o'clock.     Moisture/Incontinence Associated dermatitis in bilateral groin as evident by erosion (erosion noted in left groin) and moist. No suspected candidiasis    Sacrococcygeal extending to bilateral buttocks- mixed etiology evolving deep tissue pressure injury and incontinence associated dermatitis- patient lying on left side during assessment. Partial obstruction of pressure injury when patient left in natural anatomical position. In natural anatomical position injury measures [id number] (Dimensions decrease since last assessment Irregular borders. 50% purple maroon discoloration of exposed dermis, unable to visualize when left in natural anatomical position), 35% scattered areas of translucent, yellow, fibrinous film. 15% denuded epidermis. (+) Induration beneath areas of purple-maroon discoloration. Scant serous drainage, no odor. Periwound skin with macerated wound edges, hypopigmentation surrounded by hyperpigmentation circumferentially. 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 Goals of care: continue to monitor changes in tissue type, maintain moist environment to promote wound healing while protecting from fecal incontinence, protect periwound skin, hydrophilic debridement, treat candidiasis.     Right heel- Unsteagable pressure injury complicated by arterial insufficiency- 1.5cmx1.5cmx0.9cm(since last assessment measurements have increase), unable to determine true anatomical depth due to necrotic tissue. 85% softening black eschar, 10% granulation tissue, 10% dark maroon discoloration from 12-6 o'clock at wound edges. Small serosanguinous drainage. No odor. Additionally periwound skin with dry, flaky skin and blanchable erythema, maceration noted from 9-11 o'clock. No increased warmth, no edema noted. Goals of care: manage exudate, enzymatic debridement, fill in depth. protect periwound skin. General: Patient lethargic, non-verbal during assessment, shakes head no when asked if he is in pain, unable to confirm name or birthday. Neck hyperextention noted. Patient bedbound, patient received with Air trap device in place. indwelling lubin catheter in place, right neck central line, incontinent of urine. Previous tracheostomy site closed with 2 black sutures at 12 and 6 o'clock, madi tracheotomy skin with blanchable erythema. Patient received wearing oxygen via nasal cannula (Offload device place in bilateral ears). Skin warm, dry with increased moisture in intertriginous folds, poor skin turgor. Abdominal surgical scar. PEG in place. Bilateral upper and lower extremities with rigidity and contraction of right leg. Bilateral ears and Left heel blanchable erythema. Bilateral lower extremities with dry, flaky skin.     Vascular:  Areas of hyperpigmentation around bilateral malleoli. Dry, flaky skin. Thickened-yellow toenails. No temperature changes, dependent edema in left dorsal foot(patient received turned into left side). Capillary refill >3 seconds in dorsal pads. +2 DP/PT pulses, with  biphasic irregular doppler sounds.     Peritubular skin of PEG- with dry serous exudate, able to remove while cleansing. Hypergranulation tissue and dark maroon discoloration noted from 7-11o'clock. Scant serous drainage, no odor. Induration circumferentially extends 1cm to 4cm with 4cm from 7-10 o'clock.     Moisture/Incontinence Associated dermatitis in bilateral groin as evident by erosion (erosion noted in left groin) and moist. No suspected candidiasis    Sacrococcygeal extending to bilateral buttocks- mixed etiology evolving deep tissue pressure injury and incontinence associated dermatitis- patient lying on left side during assessment. Partial obstruction of pressure injury when patient left in natural anatomical position. In natural anatomical position injury measures [id number] (Dimensions decrease since last assessment Irregular borders. 50% purple maroon discoloration of exposed dermis, unable to visualize when left in natural anatomical position), 35% scattered areas of translucent, yellow, fibrinous film. 15% denuded epidermis. (+) Induration beneath areas of purple-maroon discoloration. Scant serous drainage, no odor. Periwound skin with macerated wound edges, hypopigmentation surrounded by hyperpigmentation circumferentially. 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 Goals of care: continue to monitor changes in tissue type, maintain moist environment to promote wound healing while protecting from fecal incontinence, protect periwound skin, hydrophilic debridement, treat candidiasis.     Right heel- Unsteagable pressure injury complicated by arterial insufficiency- 1.5cmx1.5cmx0.9cm(since last assessment measurements have increase), unable to determine true anatomical depth due to necrotic tissue. 85% softening black eschar, 10% granulation tissue, 10% dark maroon discoloration from 12-6 o'clock at wound edges. Small serosanguinous drainage. No odor. Additionally periwound skin with dry, flaky skin and blanchable erythema, maceration noted from 9-11 o'clock. No increased warmth, no edema noted. Goals of care: manage exudate, enzymatic debridement, fill in depth. protect periwound skin.    Findings discussed with Steve shaw

## 2018-01-27 DIAGNOSIS — J11.1 INFLUENZA DUE TO UNIDENTIFIED INFLUENZA VIRUS WITH OTHER RESPIRATORY MANIFESTATIONS: ICD-10-CM

## 2018-01-27 DIAGNOSIS — A41.9 SEPSIS, UNSPECIFIED ORGANISM: ICD-10-CM

## 2018-01-27 DIAGNOSIS — J69.0 PNEUMONITIS DUE TO INHALATION OF FOOD AND VOMIT: ICD-10-CM

## 2018-01-27 LAB
BACTERIA UR CULT: SIGNIFICANT CHANGE UP
BASOPHILS # BLD AUTO: 0.01 K/UL — SIGNIFICANT CHANGE UP (ref 0–0.2)
BASOPHILS NFR BLD AUTO: 0.2 % — SIGNIFICANT CHANGE UP (ref 0–2)
BUN SERPL-MCNC: 18 MG/DL — SIGNIFICANT CHANGE UP (ref 7–23)
CALCIUM SERPL-MCNC: 7 MG/DL — LOW (ref 8.4–10.5)
CHLORIDE SERPL-SCNC: 106 MMOL/L — SIGNIFICANT CHANGE UP (ref 98–107)
CO2 SERPL-SCNC: 22 MMOL/L — SIGNIFICANT CHANGE UP (ref 22–31)
CREAT SERPL-MCNC: 0.42 MG/DL — LOW (ref 0.5–1.3)
EOSINOPHIL # BLD AUTO: 0.25 K/UL — SIGNIFICANT CHANGE UP (ref 0–0.5)
EOSINOPHIL NFR BLD AUTO: 5.9 % — SIGNIFICANT CHANGE UP (ref 0–6)
GLUCOSE SERPL-MCNC: 130 MG/DL — HIGH (ref 70–99)
HCT VFR BLD CALC: 28.1 % — LOW (ref 39–50)
HGB BLD-MCNC: 9.1 G/DL — LOW (ref 13–17)
IMM GRANULOCYTES # BLD AUTO: 0.02 # — SIGNIFICANT CHANGE UP
IMM GRANULOCYTES NFR BLD AUTO: 0.5 % — SIGNIFICANT CHANGE UP (ref 0–1.5)
LYMPHOCYTES # BLD AUTO: 0.84 K/UL — LOW (ref 1–3.3)
LYMPHOCYTES # BLD AUTO: 19.7 % — SIGNIFICANT CHANGE UP (ref 13–44)
MAGNESIUM SERPL-MCNC: 1.7 MG/DL — SIGNIFICANT CHANGE UP (ref 1.6–2.6)
MCHC RBC-ENTMCNC: 28.6 PG — SIGNIFICANT CHANGE UP (ref 27–34)
MCHC RBC-ENTMCNC: 32.4 % — SIGNIFICANT CHANGE UP (ref 32–36)
MCV RBC AUTO: 88.4 FL — SIGNIFICANT CHANGE UP (ref 80–100)
MONOCYTES # BLD AUTO: 0.37 K/UL — SIGNIFICANT CHANGE UP (ref 0–0.9)
MONOCYTES NFR BLD AUTO: 8.7 % — SIGNIFICANT CHANGE UP (ref 2–14)
NEUTROPHILS # BLD AUTO: 2.77 K/UL — SIGNIFICANT CHANGE UP (ref 1.8–7.4)
NEUTROPHILS NFR BLD AUTO: 65 % — SIGNIFICANT CHANGE UP (ref 43–77)
NRBC # FLD: 0 — SIGNIFICANT CHANGE UP
PLATELET # BLD AUTO: 174 K/UL — SIGNIFICANT CHANGE UP (ref 150–400)
PMV BLD: 10.3 FL — SIGNIFICANT CHANGE UP (ref 7–13)
POTASSIUM SERPL-MCNC: 3.8 MMOL/L — SIGNIFICANT CHANGE UP (ref 3.5–5.3)
POTASSIUM SERPL-SCNC: 3.8 MMOL/L — SIGNIFICANT CHANGE UP (ref 3.5–5.3)
RBC # BLD: 3.18 M/UL — LOW (ref 4.2–5.8)
RBC # FLD: 17.7 % — HIGH (ref 10.3–14.5)
SODIUM SERPL-SCNC: 138 MMOL/L — SIGNIFICANT CHANGE UP (ref 135–145)
SPECIMEN SOURCE: SIGNIFICANT CHANGE UP
WBC # BLD: 4.26 K/UL — SIGNIFICANT CHANGE UP (ref 3.8–10.5)
WBC # FLD AUTO: 4.26 K/UL — SIGNIFICANT CHANGE UP (ref 3.8–10.5)

## 2018-01-27 RX ORDER — MIDODRINE HYDROCHLORIDE 2.5 MG/1
10 TABLET ORAL THREE TIMES A DAY
Qty: 0 | Refills: 0 | Status: DISCONTINUED | OUTPATIENT
Start: 2018-01-27 | End: 2018-01-30

## 2018-01-27 RX ADMIN — SODIUM CHLORIDE 60 MILLILITER(S): 9 INJECTION INTRAMUSCULAR; INTRAVENOUS; SUBCUTANEOUS at 14:11

## 2018-01-27 RX ADMIN — CARBIDOPA AND LEVODOPA 1.5 TABLET(S): 25; 100 TABLET ORAL at 05:31

## 2018-01-27 RX ADMIN — CEFEPIME 100 MILLIGRAM(S): 1 INJECTION, POWDER, FOR SOLUTION INTRAMUSCULAR; INTRAVENOUS at 23:35

## 2018-01-27 RX ADMIN — MIDODRINE HYDROCHLORIDE 10 MILLIGRAM(S): 2.5 TABLET ORAL at 13:46

## 2018-01-27 RX ADMIN — HEPARIN SODIUM 5000 UNIT(S): 5000 INJECTION INTRAVENOUS; SUBCUTANEOUS at 22:07

## 2018-01-27 RX ADMIN — Medication 0.25 MILLIGRAM(S): at 22:42

## 2018-01-27 RX ADMIN — CARBIDOPA AND LEVODOPA 1.5 TABLET(S): 25; 100 TABLET ORAL at 22:07

## 2018-01-27 RX ADMIN — Medication 75 MILLIGRAM(S): at 17:28

## 2018-01-27 RX ADMIN — PANTOPRAZOLE SODIUM 40 MILLIGRAM(S): 20 TABLET, DELAYED RELEASE ORAL at 13:48

## 2018-01-27 RX ADMIN — HEPARIN SODIUM 5000 UNIT(S): 5000 INJECTION INTRAVENOUS; SUBCUTANEOUS at 05:31

## 2018-01-27 RX ADMIN — NYSTATIN CREAM 1 APPLICATION(S): 100000 CREAM TOPICAL at 05:32

## 2018-01-27 RX ADMIN — Medication 1 DROP(S): at 17:28

## 2018-01-27 RX ADMIN — CEFEPIME 100 MILLIGRAM(S): 1 INJECTION, POWDER, FOR SOLUTION INTRAMUSCULAR; INTRAVENOUS at 06:40

## 2018-01-27 RX ADMIN — Medication 0.25 MILLIGRAM(S): at 09:43

## 2018-01-27 RX ADMIN — NYSTATIN CREAM 1 APPLICATION(S): 100000 CREAM TOPICAL at 13:46

## 2018-01-27 RX ADMIN — Medication 81 MILLIGRAM(S): at 13:46

## 2018-01-27 RX ADMIN — Medication 1 APPLICATION(S): at 21:17

## 2018-01-27 RX ADMIN — NYSTATIN CREAM 1 APPLICATION(S): 100000 CREAM TOPICAL at 21:17

## 2018-01-27 RX ADMIN — Medication 3 MILLILITER(S): at 22:36

## 2018-01-27 RX ADMIN — Medication 100 MILLIGRAM(S): at 05:32

## 2018-01-27 RX ADMIN — FINASTERIDE 5 MILLIGRAM(S): 5 TABLET, FILM COATED ORAL at 13:47

## 2018-01-27 RX ADMIN — Medication 3 MILLILITER(S): at 09:43

## 2018-01-27 RX ADMIN — Medication 200 MILLIGRAM(S): at 05:31

## 2018-01-27 RX ADMIN — Medication 1 APPLICATION(S): at 13:46

## 2018-01-27 RX ADMIN — Medication 200 MILLIGRAM(S): at 17:28

## 2018-01-27 RX ADMIN — MIDODRINE HYDROCHLORIDE 10 MILLIGRAM(S): 2.5 TABLET ORAL at 05:31

## 2018-01-27 RX ADMIN — MIDODRINE HYDROCHLORIDE 10 MILLIGRAM(S): 2.5 TABLET ORAL at 21:15

## 2018-01-27 RX ADMIN — CEFEPIME 100 MILLIGRAM(S): 1 INJECTION, POWDER, FOR SOLUTION INTRAMUSCULAR; INTRAVENOUS at 13:47

## 2018-01-27 RX ADMIN — LATANOPROST 1 DROP(S): 0.05 SOLUTION/ DROPS OPHTHALMIC; TOPICAL at 21:16

## 2018-01-27 RX ADMIN — DORZOLAMIDE HYDROCHLORIDE TIMOLOL MALEATE 1 DROP(S): 20; 5 SOLUTION/ DROPS OPHTHALMIC at 05:32

## 2018-01-27 RX ADMIN — HEPARIN SODIUM 5000 UNIT(S): 5000 INJECTION INTRAVENOUS; SUBCUTANEOUS at 13:46

## 2018-01-27 RX ADMIN — Medication 75 MILLIGRAM(S): at 05:32

## 2018-01-27 RX ADMIN — Medication 1 DROP(S): at 05:32

## 2018-01-27 RX ADMIN — SODIUM CHLORIDE 60 MILLILITER(S): 9 INJECTION INTRAMUSCULAR; INTRAVENOUS; SUBCUTANEOUS at 22:07

## 2018-01-27 RX ADMIN — ATORVASTATIN CALCIUM 80 MILLIGRAM(S): 80 TABLET, FILM COATED ORAL at 21:17

## 2018-01-27 RX ADMIN — Medication 100 MILLIGRAM(S): at 22:07

## 2018-01-27 RX ADMIN — DORZOLAMIDE HYDROCHLORIDE TIMOLOL MALEATE 1 DROP(S): 20; 5 SOLUTION/ DROPS OPHTHALMIC at 17:28

## 2018-01-27 RX ADMIN — Medication 1 APPLICATION(S): at 17:28

## 2018-01-27 RX ADMIN — SENNA PLUS 10 MILLILITER(S): 8.6 TABLET ORAL at 21:14

## 2018-01-27 RX ADMIN — SODIUM CHLORIDE 60 MILLILITER(S): 9 INJECTION INTRAMUSCULAR; INTRAVENOUS; SUBCUTANEOUS at 05:32

## 2018-01-27 RX ADMIN — Medication 1 APPLICATION(S): at 05:32

## 2018-01-27 RX ADMIN — Medication 100 MILLIGRAM(S): at 14:37

## 2018-01-27 RX ADMIN — Medication 3 MILLILITER(S): at 04:17

## 2018-01-27 RX ADMIN — Medication 3 MILLILITER(S): at 16:26

## 2018-01-27 RX ADMIN — CARBIDOPA AND LEVODOPA 1.5 TABLET(S): 25; 100 TABLET ORAL at 13:48

## 2018-01-28 LAB
BACTERIA BLD CULT: SIGNIFICANT CHANGE UP
BACTERIA BLD CULT: SIGNIFICANT CHANGE UP
BASOPHILS # BLD AUTO: 0.01 K/UL — SIGNIFICANT CHANGE UP (ref 0–0.2)
BASOPHILS NFR BLD AUTO: 0.3 % — SIGNIFICANT CHANGE UP (ref 0–2)
BUN SERPL-MCNC: 15 MG/DL — SIGNIFICANT CHANGE UP (ref 7–23)
CALCIUM SERPL-MCNC: 7.1 MG/DL — LOW (ref 8.4–10.5)
CHLORIDE SERPL-SCNC: 105 MMOL/L — SIGNIFICANT CHANGE UP (ref 98–107)
CO2 SERPL-SCNC: 26 MMOL/L — SIGNIFICANT CHANGE UP (ref 22–31)
CREAT SERPL-MCNC: 0.38 MG/DL — LOW (ref 0.5–1.3)
EOSINOPHIL # BLD AUTO: 0.28 K/UL — SIGNIFICANT CHANGE UP (ref 0–0.5)
EOSINOPHIL NFR BLD AUTO: 8.5 % — HIGH (ref 0–6)
GLUCOSE SERPL-MCNC: 126 MG/DL — HIGH (ref 70–99)
HCT VFR BLD CALC: 27.7 % — LOW (ref 39–50)
HGB BLD-MCNC: 8.5 G/DL — LOW (ref 13–17)
IMM GRANULOCYTES # BLD AUTO: 0.02 # — SIGNIFICANT CHANGE UP
IMM GRANULOCYTES NFR BLD AUTO: 0.6 % — SIGNIFICANT CHANGE UP (ref 0–1.5)
LYMPHOCYTES # BLD AUTO: 0.74 K/UL — LOW (ref 1–3.3)
LYMPHOCYTES # BLD AUTO: 22.4 % — SIGNIFICANT CHANGE UP (ref 13–44)
MAGNESIUM SERPL-MCNC: 1.6 MG/DL — SIGNIFICANT CHANGE UP (ref 1.6–2.6)
MCHC RBC-ENTMCNC: 27.2 PG — SIGNIFICANT CHANGE UP (ref 27–34)
MCHC RBC-ENTMCNC: 30.7 % — LOW (ref 32–36)
MCV RBC AUTO: 88.8 FL — SIGNIFICANT CHANGE UP (ref 80–100)
MONOCYTES # BLD AUTO: 0.31 K/UL — SIGNIFICANT CHANGE UP (ref 0–0.9)
MONOCYTES NFR BLD AUTO: 9.4 % — SIGNIFICANT CHANGE UP (ref 2–14)
NEUTROPHILS # BLD AUTO: 1.94 K/UL — SIGNIFICANT CHANGE UP (ref 1.8–7.4)
NEUTROPHILS NFR BLD AUTO: 58.8 % — SIGNIFICANT CHANGE UP (ref 43–77)
NRBC # FLD: 0 — SIGNIFICANT CHANGE UP
PLATELET # BLD AUTO: 178 K/UL — SIGNIFICANT CHANGE UP (ref 150–400)
PMV BLD: 11 FL — SIGNIFICANT CHANGE UP (ref 7–13)
POTASSIUM SERPL-MCNC: 3.6 MMOL/L — SIGNIFICANT CHANGE UP (ref 3.5–5.3)
POTASSIUM SERPL-SCNC: 3.6 MMOL/L — SIGNIFICANT CHANGE UP (ref 3.5–5.3)
RBC # BLD: 3.12 M/UL — LOW (ref 4.2–5.8)
RBC # FLD: 18 % — HIGH (ref 10.3–14.5)
SODIUM SERPL-SCNC: 139 MMOL/L — SIGNIFICANT CHANGE UP (ref 135–145)
WBC # BLD: 3.3 K/UL — LOW (ref 3.8–10.5)
WBC # FLD AUTO: 3.3 K/UL — LOW (ref 3.8–10.5)

## 2018-01-28 RX ORDER — FLUCONAZOLE 150 MG/1
TABLET ORAL
Qty: 0 | Refills: 0 | Status: DISCONTINUED | OUTPATIENT
Start: 2018-01-28 | End: 2018-01-30

## 2018-01-28 RX ORDER — NYSTATIN 500MM UNIT
500000 POWDER (EA) MISCELLANEOUS THREE TIMES A DAY
Qty: 0 | Refills: 0 | Status: DISCONTINUED | OUTPATIENT
Start: 2018-01-28 | End: 2018-01-30

## 2018-01-28 RX ORDER — FLUCONAZOLE 150 MG/1
200 TABLET ORAL EVERY 24 HOURS
Qty: 0 | Refills: 0 | Status: DISCONTINUED | OUTPATIENT
Start: 2018-01-29 | End: 2018-01-30

## 2018-01-28 RX ORDER — FLUCONAZOLE 150 MG/1
200 TABLET ORAL ONCE
Qty: 0 | Refills: 0 | Status: COMPLETED | OUTPATIENT
Start: 2018-01-28 | End: 2018-01-28

## 2018-01-28 RX ADMIN — MIDODRINE HYDROCHLORIDE 10 MILLIGRAM(S): 2.5 TABLET ORAL at 06:25

## 2018-01-28 RX ADMIN — Medication 3 MILLILITER(S): at 15:02

## 2018-01-28 RX ADMIN — MIDODRINE HYDROCHLORIDE 10 MILLIGRAM(S): 2.5 TABLET ORAL at 22:16

## 2018-01-28 RX ADMIN — FINASTERIDE 5 MILLIGRAM(S): 5 TABLET, FILM COATED ORAL at 13:17

## 2018-01-28 RX ADMIN — DORZOLAMIDE HYDROCHLORIDE TIMOLOL MALEATE 1 DROP(S): 20; 5 SOLUTION/ DROPS OPHTHALMIC at 06:26

## 2018-01-28 RX ADMIN — LATANOPROST 1 DROP(S): 0.05 SOLUTION/ DROPS OPHTHALMIC; TOPICAL at 22:16

## 2018-01-28 RX ADMIN — Medication 3 MILLILITER(S): at 22:40

## 2018-01-28 RX ADMIN — Medication 1 APPLICATION(S): at 22:17

## 2018-01-28 RX ADMIN — Medication 1 APPLICATION(S): at 13:17

## 2018-01-28 RX ADMIN — NYSTATIN CREAM 1 APPLICATION(S): 100000 CREAM TOPICAL at 13:15

## 2018-01-28 RX ADMIN — Medication 1 APPLICATION(S): at 06:25

## 2018-01-28 RX ADMIN — CEFEPIME 100 MILLIGRAM(S): 1 INJECTION, POWDER, FOR SOLUTION INTRAMUSCULAR; INTRAVENOUS at 22:15

## 2018-01-28 RX ADMIN — Medication 1 APPLICATION(S): at 16:47

## 2018-01-28 RX ADMIN — CEFEPIME 100 MILLIGRAM(S): 1 INJECTION, POWDER, FOR SOLUTION INTRAMUSCULAR; INTRAVENOUS at 13:18

## 2018-01-28 RX ADMIN — Medication 1 DROP(S): at 06:26

## 2018-01-28 RX ADMIN — SODIUM CHLORIDE 60 MILLILITER(S): 9 INJECTION INTRAMUSCULAR; INTRAVENOUS; SUBCUTANEOUS at 02:00

## 2018-01-28 RX ADMIN — Medication 500000 UNIT(S): at 23:14

## 2018-01-28 RX ADMIN — Medication 200 MILLIGRAM(S): at 06:25

## 2018-01-28 RX ADMIN — Medication 1 DROP(S): at 16:47

## 2018-01-28 RX ADMIN — Medication 200 MILLIGRAM(S): at 16:47

## 2018-01-28 RX ADMIN — Medication 1 APPLICATION(S): at 13:16

## 2018-01-28 RX ADMIN — CARBIDOPA AND LEVODOPA 1.5 TABLET(S): 25; 100 TABLET ORAL at 22:16

## 2018-01-28 RX ADMIN — Medication 3 MILLILITER(S): at 10:00

## 2018-01-28 RX ADMIN — CARBIDOPA AND LEVODOPA 1.5 TABLET(S): 25; 100 TABLET ORAL at 13:19

## 2018-01-28 RX ADMIN — CARBIDOPA AND LEVODOPA 1.5 TABLET(S): 25; 100 TABLET ORAL at 06:23

## 2018-01-28 RX ADMIN — CEFEPIME 100 MILLIGRAM(S): 1 INJECTION, POWDER, FOR SOLUTION INTRAMUSCULAR; INTRAVENOUS at 07:12

## 2018-01-28 RX ADMIN — ATORVASTATIN CALCIUM 80 MILLIGRAM(S): 80 TABLET, FILM COATED ORAL at 22:18

## 2018-01-28 RX ADMIN — Medication 100 MILLIGRAM(S): at 22:15

## 2018-01-28 RX ADMIN — FLUCONAZOLE 100 MILLIGRAM(S): 150 TABLET ORAL at 22:35

## 2018-01-28 RX ADMIN — Medication 0.25 MILLIGRAM(S): at 22:43

## 2018-01-28 RX ADMIN — HEPARIN SODIUM 5000 UNIT(S): 5000 INJECTION INTRAVENOUS; SUBCUTANEOUS at 06:24

## 2018-01-28 RX ADMIN — Medication 81 MILLIGRAM(S): at 13:17

## 2018-01-28 RX ADMIN — Medication 3 MILLILITER(S): at 04:09

## 2018-01-28 RX ADMIN — PANTOPRAZOLE SODIUM 40 MILLIGRAM(S): 20 TABLET, DELAYED RELEASE ORAL at 13:17

## 2018-01-28 RX ADMIN — HEPARIN SODIUM 5000 UNIT(S): 5000 INJECTION INTRAVENOUS; SUBCUTANEOUS at 22:18

## 2018-01-28 RX ADMIN — DORZOLAMIDE HYDROCHLORIDE TIMOLOL MALEATE 1 DROP(S): 20; 5 SOLUTION/ DROPS OPHTHALMIC at 16:47

## 2018-01-28 RX ADMIN — Medication 100 MILLIGRAM(S): at 06:48

## 2018-01-28 RX ADMIN — NYSTATIN CREAM 1 APPLICATION(S): 100000 CREAM TOPICAL at 06:24

## 2018-01-28 RX ADMIN — Medication 100 MILLIGRAM(S): at 13:18

## 2018-01-28 RX ADMIN — Medication 0.25 MILLIGRAM(S): at 10:00

## 2018-01-28 RX ADMIN — SENNA PLUS 10 MILLILITER(S): 8.6 TABLET ORAL at 22:17

## 2018-01-28 RX ADMIN — HEPARIN SODIUM 5000 UNIT(S): 5000 INJECTION INTRAVENOUS; SUBCUTANEOUS at 13:18

## 2018-01-28 RX ADMIN — NYSTATIN CREAM 1 APPLICATION(S): 100000 CREAM TOPICAL at 22:19

## 2018-01-28 NOTE — PROVIDER CONTACT NOTE (OTHER) - BACKGROUND
patient is 83yo  male who comes from [facility].  wife is requesting to speak with  regarding home care for herself.
Hypotensive during admission, IV fluids infusing
Pt on IV Abx/fungal regiment

## 2018-01-29 DIAGNOSIS — R53.83 OTHER FATIGUE: ICD-10-CM

## 2018-01-29 DIAGNOSIS — B37.0 CANDIDAL STOMATITIS: ICD-10-CM

## 2018-01-29 LAB
BASOPHILS # BLD AUTO: 0.01 K/UL — SIGNIFICANT CHANGE UP (ref 0–0.2)
BASOPHILS NFR BLD AUTO: 0.2 % — SIGNIFICANT CHANGE UP (ref 0–2)
BUN SERPL-MCNC: 15 MG/DL — SIGNIFICANT CHANGE UP (ref 7–23)
CALCIUM SERPL-MCNC: 7.2 MG/DL — LOW (ref 8.4–10.5)
CHLORIDE SERPL-SCNC: 105 MMOL/L — SIGNIFICANT CHANGE UP (ref 98–107)
CO2 SERPL-SCNC: 28 MMOL/L — SIGNIFICANT CHANGE UP (ref 22–31)
CREAT SERPL-MCNC: 0.36 MG/DL — LOW (ref 0.5–1.3)
EOSINOPHIL # BLD AUTO: 0.52 K/UL — HIGH (ref 0–0.5)
EOSINOPHIL NFR BLD AUTO: 9.7 % — HIGH (ref 0–6)
GLUCOSE SERPL-MCNC: 121 MG/DL — HIGH (ref 70–99)
HCT VFR BLD CALC: 27 % — LOW (ref 39–50)
HGB BLD-MCNC: 8.7 G/DL — LOW (ref 13–17)
IMM GRANULOCYTES # BLD AUTO: 0.03 # — SIGNIFICANT CHANGE UP
IMM GRANULOCYTES NFR BLD AUTO: 0.6 % — SIGNIFICANT CHANGE UP (ref 0–1.5)
LYMPHOCYTES # BLD AUTO: 0.84 K/UL — LOW (ref 1–3.3)
LYMPHOCYTES # BLD AUTO: 15.6 % — SIGNIFICANT CHANGE UP (ref 13–44)
MAGNESIUM SERPL-MCNC: 1.6 MG/DL — SIGNIFICANT CHANGE UP (ref 1.6–2.6)
MCHC RBC-ENTMCNC: 28.6 PG — SIGNIFICANT CHANGE UP (ref 27–34)
MCHC RBC-ENTMCNC: 32.2 % — SIGNIFICANT CHANGE UP (ref 32–36)
MCV RBC AUTO: 88.8 FL — SIGNIFICANT CHANGE UP (ref 80–100)
MONOCYTES # BLD AUTO: 0.51 K/UL — SIGNIFICANT CHANGE UP (ref 0–0.9)
MONOCYTES NFR BLD AUTO: 9.5 % — SIGNIFICANT CHANGE UP (ref 2–14)
NEUTROPHILS # BLD AUTO: 3.46 K/UL — SIGNIFICANT CHANGE UP (ref 1.8–7.4)
NEUTROPHILS NFR BLD AUTO: 64.4 % — SIGNIFICANT CHANGE UP (ref 43–77)
NRBC # FLD: 0 — SIGNIFICANT CHANGE UP
PLATELET # BLD AUTO: 178 K/UL — SIGNIFICANT CHANGE UP (ref 150–400)
PMV BLD: 10.6 FL — SIGNIFICANT CHANGE UP (ref 7–13)
POTASSIUM SERPL-MCNC: 3.9 MMOL/L — SIGNIFICANT CHANGE UP (ref 3.5–5.3)
POTASSIUM SERPL-SCNC: 3.9 MMOL/L — SIGNIFICANT CHANGE UP (ref 3.5–5.3)
RBC # BLD: 3.04 M/UL — LOW (ref 4.2–5.8)
RBC # FLD: 17.9 % — HIGH (ref 10.3–14.5)
SODIUM SERPL-SCNC: 140 MMOL/L — SIGNIFICANT CHANGE UP (ref 135–145)
WBC # BLD: 5.37 K/UL — SIGNIFICANT CHANGE UP (ref 3.8–10.5)
WBC # FLD AUTO: 5.37 K/UL — SIGNIFICANT CHANGE UP (ref 3.8–10.5)

## 2018-01-29 PROCEDURE — 93306 TTE W/DOPPLER COMPLETE: CPT | Mod: 26

## 2018-01-29 RX ADMIN — LATANOPROST 1 DROP(S): 0.05 SOLUTION/ DROPS OPHTHALMIC; TOPICAL at 23:33

## 2018-01-29 RX ADMIN — Medication 0.25 MILLIGRAM(S): at 11:06

## 2018-01-29 RX ADMIN — Medication 1 APPLICATION(S): at 11:25

## 2018-01-29 RX ADMIN — NYSTATIN CREAM 1 APPLICATION(S): 100000 CREAM TOPICAL at 11:24

## 2018-01-29 RX ADMIN — Medication 100 MILLIGRAM(S): at 14:40

## 2018-01-29 RX ADMIN — MIDODRINE HYDROCHLORIDE 10 MILLIGRAM(S): 2.5 TABLET ORAL at 23:33

## 2018-01-29 RX ADMIN — SENNA PLUS 10 MILLILITER(S): 8.6 TABLET ORAL at 23:33

## 2018-01-29 RX ADMIN — Medication 100 MILLIGRAM(S): at 05:16

## 2018-01-29 RX ADMIN — MIDODRINE HYDROCHLORIDE 10 MILLIGRAM(S): 2.5 TABLET ORAL at 05:16

## 2018-01-29 RX ADMIN — CEFEPIME 100 MILLIGRAM(S): 1 INJECTION, POWDER, FOR SOLUTION INTRAMUSCULAR; INTRAVENOUS at 05:16

## 2018-01-29 RX ADMIN — Medication 500000 UNIT(S): at 23:56

## 2018-01-29 RX ADMIN — Medication 1 APPLICATION(S): at 11:24

## 2018-01-29 RX ADMIN — CARBIDOPA AND LEVODOPA 1.5 TABLET(S): 25; 100 TABLET ORAL at 14:43

## 2018-01-29 RX ADMIN — HEPARIN SODIUM 5000 UNIT(S): 5000 INJECTION INTRAVENOUS; SUBCUTANEOUS at 15:01

## 2018-01-29 RX ADMIN — Medication 500000 UNIT(S): at 05:20

## 2018-01-29 RX ADMIN — CARBIDOPA AND LEVODOPA 1.5 TABLET(S): 25; 100 TABLET ORAL at 05:17

## 2018-01-29 RX ADMIN — CEFEPIME 100 MILLIGRAM(S): 1 INJECTION, POWDER, FOR SOLUTION INTRAMUSCULAR; INTRAVENOUS at 23:33

## 2018-01-29 RX ADMIN — FLUCONAZOLE 100 MILLIGRAM(S): 150 TABLET ORAL at 23:34

## 2018-01-29 RX ADMIN — PANTOPRAZOLE SODIUM 40 MILLIGRAM(S): 20 TABLET, DELAYED RELEASE ORAL at 14:45

## 2018-01-29 RX ADMIN — Medication 3 MILLILITER(S): at 21:23

## 2018-01-29 RX ADMIN — NYSTATIN CREAM 1 APPLICATION(S): 100000 CREAM TOPICAL at 23:33

## 2018-01-29 RX ADMIN — MIDODRINE HYDROCHLORIDE 10 MILLIGRAM(S): 2.5 TABLET ORAL at 14:44

## 2018-01-29 RX ADMIN — Medication 1 DROP(S): at 05:19

## 2018-01-29 RX ADMIN — Medication 1 APPLICATION(S): at 23:33

## 2018-01-29 RX ADMIN — DORZOLAMIDE HYDROCHLORIDE TIMOLOL MALEATE 1 DROP(S): 20; 5 SOLUTION/ DROPS OPHTHALMIC at 05:19

## 2018-01-29 RX ADMIN — HEPARIN SODIUM 5000 UNIT(S): 5000 INJECTION INTRAVENOUS; SUBCUTANEOUS at 05:19

## 2018-01-29 RX ADMIN — Medication 81 MILLIGRAM(S): at 14:43

## 2018-01-29 RX ADMIN — Medication 3 MILLILITER(S): at 11:06

## 2018-01-29 RX ADMIN — CEFEPIME 100 MILLIGRAM(S): 1 INJECTION, POWDER, FOR SOLUTION INTRAMUSCULAR; INTRAVENOUS at 14:38

## 2018-01-29 RX ADMIN — Medication 200 MILLIGRAM(S): at 05:16

## 2018-01-29 RX ADMIN — Medication 1 APPLICATION(S): at 05:20

## 2018-01-29 RX ADMIN — CARBIDOPA AND LEVODOPA 1.5 TABLET(S): 25; 100 TABLET ORAL at 23:33

## 2018-01-29 RX ADMIN — Medication 100 MILLIGRAM(S): at 23:43

## 2018-01-29 RX ADMIN — Medication 0.25 MILLIGRAM(S): at 21:42

## 2018-01-29 RX ADMIN — Medication 3 MILLILITER(S): at 16:47

## 2018-01-29 RX ADMIN — Medication 200 MILLIGRAM(S): at 18:09

## 2018-01-29 RX ADMIN — Medication 1 APPLICATION(S): at 18:09

## 2018-01-29 RX ADMIN — NYSTATIN CREAM 1 APPLICATION(S): 100000 CREAM TOPICAL at 05:18

## 2018-01-29 RX ADMIN — DORZOLAMIDE HYDROCHLORIDE TIMOLOL MALEATE 1 DROP(S): 20; 5 SOLUTION/ DROPS OPHTHALMIC at 18:09

## 2018-01-29 RX ADMIN — HEPARIN SODIUM 5000 UNIT(S): 5000 INJECTION INTRAVENOUS; SUBCUTANEOUS at 23:44

## 2018-01-29 RX ADMIN — FINASTERIDE 5 MILLIGRAM(S): 5 TABLET, FILM COATED ORAL at 14:44

## 2018-01-29 RX ADMIN — ATORVASTATIN CALCIUM 80 MILLIGRAM(S): 80 TABLET, FILM COATED ORAL at 23:33

## 2018-01-29 RX ADMIN — Medication 1 DROP(S): at 18:09

## 2018-01-29 RX ADMIN — Medication 3 MILLILITER(S): at 03:30

## 2018-01-30 ENCOUNTER — TRANSCRIPTION ENCOUNTER (OUTPATIENT)
Age: 83
End: 2018-01-30

## 2018-01-30 VITALS
OXYGEN SATURATION: 98 % | HEART RATE: 74 BPM | TEMPERATURE: 98 F | DIASTOLIC BLOOD PRESSURE: 60 MMHG | SYSTOLIC BLOOD PRESSURE: 111 MMHG | RESPIRATION RATE: 18 BRPM

## 2018-01-30 LAB
BASOPHILS # BLD AUTO: 0.01 K/UL — SIGNIFICANT CHANGE UP (ref 0–0.2)
BASOPHILS NFR BLD AUTO: 0.2 % — SIGNIFICANT CHANGE UP (ref 0–2)
BUN SERPL-MCNC: 16 MG/DL — SIGNIFICANT CHANGE UP (ref 7–23)
CALCIUM SERPL-MCNC: 7.4 MG/DL — LOW (ref 8.4–10.5)
CHLORIDE SERPL-SCNC: 100 MMOL/L — SIGNIFICANT CHANGE UP (ref 98–107)
CO2 SERPL-SCNC: 28 MMOL/L — SIGNIFICANT CHANGE UP (ref 22–31)
CREAT SERPL-MCNC: 0.37 MG/DL — LOW (ref 0.5–1.3)
EOSINOPHIL # BLD AUTO: 0.73 K/UL — HIGH (ref 0–0.5)
EOSINOPHIL NFR BLD AUTO: 15 % — HIGH (ref 0–6)
GLUCOSE SERPL-MCNC: 97 MG/DL — SIGNIFICANT CHANGE UP (ref 70–99)
HCT VFR BLD CALC: 27.4 % — LOW (ref 39–50)
HGB BLD-MCNC: 8.9 G/DL — LOW (ref 13–17)
IMM GRANULOCYTES # BLD AUTO: 0.05 # — SIGNIFICANT CHANGE UP
IMM GRANULOCYTES NFR BLD AUTO: 1 % — SIGNIFICANT CHANGE UP (ref 0–1.5)
LYMPHOCYTES # BLD AUTO: 1 K/UL — SIGNIFICANT CHANGE UP (ref 1–3.3)
LYMPHOCYTES # BLD AUTO: 20.6 % — SIGNIFICANT CHANGE UP (ref 13–44)
MAGNESIUM SERPL-MCNC: 1.8 MG/DL — SIGNIFICANT CHANGE UP (ref 1.6–2.6)
MCHC RBC-ENTMCNC: 28.7 PG — SIGNIFICANT CHANGE UP (ref 27–34)
MCHC RBC-ENTMCNC: 32.5 % — SIGNIFICANT CHANGE UP (ref 32–36)
MCV RBC AUTO: 88.4 FL — SIGNIFICANT CHANGE UP (ref 80–100)
MONOCYTES # BLD AUTO: 0.59 K/UL — SIGNIFICANT CHANGE UP (ref 0–0.9)
MONOCYTES NFR BLD AUTO: 12.1 % — SIGNIFICANT CHANGE UP (ref 2–14)
NEUTROPHILS # BLD AUTO: 2.48 K/UL — SIGNIFICANT CHANGE UP (ref 1.8–7.4)
NEUTROPHILS NFR BLD AUTO: 51.1 % — SIGNIFICANT CHANGE UP (ref 43–77)
NRBC # FLD: 0 — SIGNIFICANT CHANGE UP
PHOSPHATE SERPL-MCNC: 2.8 MG/DL — SIGNIFICANT CHANGE UP (ref 2.5–4.5)
PLATELET # BLD AUTO: 189 K/UL — SIGNIFICANT CHANGE UP (ref 150–400)
PMV BLD: 10.8 FL — SIGNIFICANT CHANGE UP (ref 7–13)
POTASSIUM SERPL-MCNC: 4.1 MMOL/L — SIGNIFICANT CHANGE UP (ref 3.5–5.3)
POTASSIUM SERPL-SCNC: 4.1 MMOL/L — SIGNIFICANT CHANGE UP (ref 3.5–5.3)
RBC # BLD: 3.1 M/UL — LOW (ref 4.2–5.8)
RBC # FLD: 18.4 % — HIGH (ref 10.3–14.5)
SODIUM SERPL-SCNC: 137 MMOL/L — SIGNIFICANT CHANGE UP (ref 135–145)
WBC # BLD: 4.86 K/UL — SIGNIFICANT CHANGE UP (ref 3.8–10.5)
WBC # FLD AUTO: 4.86 K/UL — SIGNIFICANT CHANGE UP (ref 3.8–10.5)

## 2018-01-30 RX ORDER — CIPROFLOXACIN LACTATE 400MG/40ML
1 VIAL (ML) INTRAVENOUS
Qty: 7 | Refills: 0 | OUTPATIENT
Start: 2018-01-30 | End: 2018-02-05

## 2018-01-30 RX ORDER — NYSTATIN 500MM UNIT
5 POWDER (EA) MISCELLANEOUS
Qty: 0 | Refills: 0 | COMMUNITY
Start: 2018-01-30

## 2018-01-30 RX ORDER — MIDODRINE HYDROCHLORIDE 2.5 MG/1
1 TABLET ORAL
Qty: 0 | Refills: 0 | COMMUNITY
Start: 2018-01-30

## 2018-01-30 RX ORDER — CIPROFLOXACIN LACTATE 400MG/40ML
1 VIAL (ML) INTRAVENOUS
Qty: 0 | Refills: 0 | COMMUNITY
Start: 2018-01-30 | End: 2018-02-05

## 2018-01-30 RX ORDER — METRONIDAZOLE 500 MG
1 TABLET ORAL
Qty: 21 | Refills: 0 | OUTPATIENT
Start: 2018-01-30 | End: 2018-02-05

## 2018-01-30 RX ORDER — NYSTATIN CREAM 100000 [USP'U]/G
1 CREAM TOPICAL
Qty: 0 | Refills: 0 | COMMUNITY
Start: 2018-01-30

## 2018-01-30 RX ORDER — ACETAMINOPHEN 500 MG
2 TABLET ORAL
Qty: 0 | Refills: 0 | COMMUNITY

## 2018-01-30 RX ORDER — FLUCONAZOLE 150 MG/1
1 TABLET ORAL
Qty: 12 | Refills: 0 | OUTPATIENT
Start: 2018-01-30 | End: 2018-02-10

## 2018-01-30 RX ADMIN — Medication 81 MILLIGRAM(S): at 11:09

## 2018-01-30 RX ADMIN — NYSTATIN CREAM 1 APPLICATION(S): 100000 CREAM TOPICAL at 06:27

## 2018-01-30 RX ADMIN — Medication 500000 UNIT(S): at 06:27

## 2018-01-30 RX ADMIN — Medication 1 APPLICATION(S): at 11:10

## 2018-01-30 RX ADMIN — Medication 1 APPLICATION(S): at 06:28

## 2018-01-30 RX ADMIN — CARBIDOPA AND LEVODOPA 1.5 TABLET(S): 25; 100 TABLET ORAL at 06:27

## 2018-01-30 RX ADMIN — MIDODRINE HYDROCHLORIDE 10 MILLIGRAM(S): 2.5 TABLET ORAL at 06:27

## 2018-01-30 RX ADMIN — CARBIDOPA AND LEVODOPA 1.5 TABLET(S): 25; 100 TABLET ORAL at 13:28

## 2018-01-30 RX ADMIN — MIDODRINE HYDROCHLORIDE 10 MILLIGRAM(S): 2.5 TABLET ORAL at 13:28

## 2018-01-30 RX ADMIN — Medication 3 MILLILITER(S): at 16:30

## 2018-01-30 RX ADMIN — HEPARIN SODIUM 5000 UNIT(S): 5000 INJECTION INTRAVENOUS; SUBCUTANEOUS at 13:28

## 2018-01-30 RX ADMIN — Medication 0.25 MILLIGRAM(S): at 08:48

## 2018-01-30 RX ADMIN — Medication 200 MILLIGRAM(S): at 06:27

## 2018-01-30 RX ADMIN — FINASTERIDE 5 MILLIGRAM(S): 5 TABLET, FILM COATED ORAL at 11:10

## 2018-01-30 RX ADMIN — Medication 100 MILLIGRAM(S): at 06:26

## 2018-01-30 RX ADMIN — DORZOLAMIDE HYDROCHLORIDE TIMOLOL MALEATE 1 DROP(S): 20; 5 SOLUTION/ DROPS OPHTHALMIC at 06:27

## 2018-01-30 RX ADMIN — PANTOPRAZOLE SODIUM 40 MILLIGRAM(S): 20 TABLET, DELAYED RELEASE ORAL at 11:10

## 2018-01-30 RX ADMIN — Medication 3 MILLILITER(S): at 08:48

## 2018-01-30 RX ADMIN — Medication 500000 UNIT(S): at 13:28

## 2018-01-30 RX ADMIN — NYSTATIN CREAM 1 APPLICATION(S): 100000 CREAM TOPICAL at 13:28

## 2018-01-30 RX ADMIN — Medication 100 MILLIGRAM(S): at 13:28

## 2018-01-30 RX ADMIN — CEFEPIME 100 MILLIGRAM(S): 1 INJECTION, POWDER, FOR SOLUTION INTRAMUSCULAR; INTRAVENOUS at 06:26

## 2018-01-30 RX ADMIN — Medication 3 MILLILITER(S): at 04:00

## 2018-01-30 RX ADMIN — CEFEPIME 100 MILLIGRAM(S): 1 INJECTION, POWDER, FOR SOLUTION INTRAMUSCULAR; INTRAVENOUS at 13:28

## 2018-01-30 RX ADMIN — Medication 1 DROP(S): at 06:27

## 2018-01-30 RX ADMIN — HEPARIN SODIUM 5000 UNIT(S): 5000 INJECTION INTRAVENOUS; SUBCUTANEOUS at 06:27

## 2018-01-30 NOTE — PROGRESS NOTE ADULT - PROBLEM SELECTOR PROBLEM 6
Protein calorie malnutrition
Debility
Debility
PEG tube malfunction
PEG tube malfunction
Protein calorie malnutrition
Protein calorie malnutrition

## 2018-01-30 NOTE — DISCHARGE NOTE ADULT - CARE PLAN
Principal Discharge DX:	Sepsis  Goal:	cont Flagyl and Levaquin as persribed  Assessment and plan of treatment:	Fu with PCP  Secondary Diagnosis:	Oral thrush  Assessment and plan of treatment:	cont medication  Secondary Diagnosis:	Hypotension  Assessment and plan of treatment:	cont medication  Secondary Diagnosis:	Glaucoma  Assessment and plan of treatment:	cont medication  Secondary Diagnosis:	CVA (cerebral vascular accident)  Assessment and plan of treatment:	cont medication

## 2018-01-30 NOTE — PROGRESS NOTE ADULT - PROBLEM SELECTOR PLAN 6
peg replaced by IR  supplement diet  c/w tube feeding  gi recs appreciated
IR peg check  f/u gi recs
c/w tube feeding
f/u gi recs  may require peg replacement
fall precautions  aspiration precautions  frequent repositioning in bet
fall precautions  aspiration precautions  frequent repositioning in bet
s/p peg replacement by IR  supplement diet

## 2018-01-30 NOTE — PROGRESS NOTE ADULT - PROBLEM SELECTOR PROBLEM 1
Aspiration pneumonia
Septic shock
Severe sepsis

## 2018-01-30 NOTE — PROGRESS NOTE ADULT - PROBLEM SELECTOR PLAN 3
mild  c/w close monitoring of mental status
heart rate well controlled  no beta blocker 2/2 hypotension  no anticoagulation 2/2 risks > benefits  c/w asa 81
improved
new onset  heart rate well controlled  optimize pulmonary status and infectious state  optimize electrolyte and vitals  no beta blocker 2/2 hypotension  c/w asa 81  f/u cardio recs
new onset  heart rate well controlled  optimize pulmonary status and infectious state  optimize electrolyte and vitals  no beta blocker 2/2 hypotension  no anticoagulation 2/2 risks > benefits  c/w asa 81  f/u cardio recs
new onset  heart rate well controlled  optimize pulmonary status and infectious state  optimize electrolyte and vitals  pt is bedbound with hx of cva.  conversation with pt's wife at bedside regarding risks and benefits of anticoagulation, and we're in agreement, that the risks of anticoagulation outweight the benefits at this time, thus no a.c.  no beta blocker 2/2 hypotension  f/u cardio recs
tamiflu

## 2018-01-30 NOTE — PROGRESS NOTE ADULT - PROBLEM SELECTOR PROBLEM 5
Afib
Afib
Dehydration
Protein calorie malnutrition

## 2018-01-30 NOTE — PROGRESS NOTE ADULT - PROBLEM SELECTOR PLAN 2
c/w diflucan  id recs appreciated
administer ivf bolus  maintenance ivf  midodrine  monitor vitals closely  if hypotension persists then will reconsult micu
c/w cefepime and flagyl  aspiration precautions  bronchodilators  oxygen supplementation
c/w diflucan and bethanie and spit as outpt  id recs appreciated
completed tamiflu  no more need for isolation precautions
improved  c/w maintenance ivf  midodrine  monitor vitals closely
improving, however, borderline low normal  c/w maintenance ivf  midodrine  monitor vitals closely

## 2018-01-30 NOTE — PROGRESS NOTE ADULT - PROBLEM SELECTOR PLAN 4
completed tamiflu  no more need for isolation precautions
c/w ivf
completed tamiflu  no more need for isolation precautions
heart rate well controlled  c/w optimization of pulmonary status and infectious state  optimize electrolyte and vitals  no beta blocker 2/2 hypotension  no anticoagulation 2/2 risks > benefits  c/w asa 81
improving

## 2018-01-30 NOTE — DISCHARGE NOTE ADULT - CARE PROVIDER_API CALL
Mello Yepez (ANTHONY), Medicine  [address] [place] [address]  Phone: [phone]  Fax: [phone]    Chente Mark), Medicine  [address] [place] [address]  Phone: [phone]  Fax: [phone]

## 2018-01-30 NOTE — PROGRESS NOTE ADULT - PROVIDER SPECIALTY LIST ADULT
Cardiology
Gastroenterology
Infectious Disease
Internal Medicine
Infectious Disease
Internal Medicine

## 2018-01-30 NOTE — DISCHARGE NOTE ADULT - PATIENT PORTAL LINK FT
“You can access the [facility] Patient Portal, offered by [facility], by registering with the following website: [email or web address]”

## 2018-01-30 NOTE — DISCHARGE NOTE ADULT - HOSPITAL COURSE
81 yo Male with history of CVA with R-sided residual deficits s/p trach and PEG (s/p trach reversal 2 weeks PTA), Parkinson's disease, and CAD s/p CABG who presented to the ED with altered mental status, fever, and erythema at the PEG site.    +Sepsis/aspiration PNA --> MICU reject, on cefepime/flagyl  +Influenza --> Tamiflu last dose 1/27  +AMS  +New onset Afib - Dr. Ta following rec ASA 81 mg    + mild AR, Mild global left ventricular systolic dysfunction on echo    TRANSFERRED TO TELE FROM [facility] FOR NEW AFIB         1/26 T=100.4 rectal - UA +, UCx - Neg @ 24 hrs & BCx x 2 sets - Neg @ 72 hrs  1/26 s/p PEG replacement by IR, PEG working well  altered mental status / Sepsis  - fevers, hypotensive  - midodrine started  -micu consult: IVF started  - IVF abx given -   cultures sent - f/u blood cx ----- f/u urine cx------  - BP improved    RVP + influenza  - IVF -   - tamiflu started    PEG inflammed - GI consulted   CT a/p: Malpositioned gastrostomy tube with balloon inflated in the   left rectus muscle. The distal portion of the tip appears to be tethering   the stomach to the abdominal wall.  - ok to use as per pt medical attending    Nutrition - PEG feeds ordered    Chronic lubin - changed in ED  Pressure Ulcers  - Sacrum and heel - f/u wound care consult recs ----0-      1/26 CXR: Stable small left pleural effusion    UA +, UCx - Neg @ 24 hrs & BCx x 2 sets - Neg @ 72 hrs    1/29 ECHO: EF- 54%, 1. Mitral annular calcification, otherwise normal mitral  valve. Mild-moderate mitral regurgitation. 2. Calcified trileaflet aortic valve with normal opening. Mild aortic regurgitation. 3. Severely dilated left atrium.  LA volume index = 49 cc/m2. 4. Normal left ventricular internal dimensions and wall thicknesses. 5. Mild global left ventricular systolic dysfunction. 6. Normal right ventricular size and function.  7. Estimated right ventricular systolic pressure equals 38 mm Hg, assuming right atrial pressure   1/30 pt stable for discharge back to nursing facility

## 2018-01-30 NOTE — PROGRESS NOTE ADULT - ASSESSMENT
81 yo M from NH admitted for severe sepsis 2/2 influenza
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 The patient was recently discharged from [facility] after treated for  UTI. In the ER, he found to be febrile, T102.7, hypotensive, BP of 89/32, mildly positive urine analysis, Left pleural  effusion and CT abd/pelvis shows  Malpositioned gastrostomy tube with balloon inflated in the left rectus muscle. The distal portion of the tip appears to be tethering the stomach to the abdominal wall. Tiny amount of fluid and droplet of air in the left rectus muscle surrounds the gastrostomy tube. He also found to have positive Influenza AH3. He has started on Tamiflu, cultures pending and The ID consult requested to assist with further evaluation and antibiotic management.     # S/p Septic shock  # Influenza AH3 - s/p tamiflu  # Intra-abdominal process- most likely due to PEG malpositioned in left rectus muscle- s/p IR guided PEG changed 1/26/18  # Aspiration pneumonia- in the setting of Thick tube feeding color bronchial secretions  # UTI - culture has no growth        Would recommend:    1. Please discontinue Central Line since has peripheral access  2. Contineu Fluconazole and Nystatin swish and spit since unable to swallow  3. Continue Cefepime and Flagyl inpatient, may change to oral Levaquin and flagyl on discharge to continue until 2/6/18  4.Continue bronchial suctioning and Aspiration precaution  5. Continue supplemental oxygenation and bronchodilator as needed      d/w Nursing staff    will follow the patient with you
81 yo M from NH admitted for severe sepsis 2/2 influenza
83 yo M from NH admitted for severe sepsis 2/2 influenza
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 The patient was recently discharged from [facility] after treated for  UTI. In the ER, he found to be febrile, T102.7, hypotensive, BP of 89/32, mildly positive urine analysis, Left pleural  effusion and CT abd/pelvis shows  Malpositioned gastrostomy tube with balloon inflated in the left rectus muscle. The distal portion of the tip appears to be tethering the stomach to the abdominal wall. Tiny amount of fluid and droplet of air in the left rectus muscle surrounds the gastrostomy tube. He also found to have positive Influenza AH3. He has started on Tamiflu, cultures pending and The ID consult requested to assist with further evaluation and antibiotic management.     # S/p Septic shock  # Influenza AH3  # Intra-abdominal process- most likely due to PEG malpositioned in left rectus muscle  # Aspiration pneumonia- in the setting of Thick tube feeding color bronchial secretions      Would recommend:    1. Change of PEG tube by IR  2. Continue bronchial suctioning and Aspiration precaution  3. Continue Tamiflu X 5 days ( 10 doses)  4. Droplet precaution  5. Start on Levaquin and Flagyl to cover intra-abdominal pathogens, until removal of PEG  6. Continue supplemental oxygenation and bronchodilator    d/w Nursing staff    will follow the patient with you
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 The patient was recently discharged from [facility] after treated for  UTI. In the ER, he found to be febrile, T102.7, hypotensive, BP of 89/32, mildly positive urine analysis, Left pleural  effusion and CT abd/pelvis shows  Malpositioned gastrostomy tube with balloon inflated in the left rectus muscle. The distal portion of the tip appears to be tethering the stomach to the abdominal wall. Tiny amount of fluid and droplet of air in the left rectus muscle surrounds the gastrostomy tube. He also found to have positive Influenza AH3. He has started on Tamiflu, cultures pending and The ID consult requested to assist with further evaluation and antibiotic management.     # S/p Septic shock  # Influenza AH3  # Intra-abdominal process- most likely due to PEG malpositioned in left rectus muscle- s/p IR guided PEG changed 1/26/18  # Aspiration pneumonia- in the setting of Thick tube feeding color bronchial secretions  # UTI - culture has no growth        Would recommend:    1. Continue Cefepime and Flagyl for now  2. Continue bronchial suctioning and Aspiration precaution  3. Continue Tamiflu X 5 days ( 10 doses)  4. Droplet precaution  5. Continue supplemental oxygenation and bronchodilator as needed  6. Monitor Temp. and c/w supportive care    d/w Nursing staff    will follow the patient with you
A 83 yo Male with history of CVA in 2017 with R-sided residual deficits s/p trach and PEG (s/p trach reversal 2 weeks PTA), Parkinson's disease, sent in to the ER for evaluation of altered mental status, fever, and erythema at the PEG site. The patient was recently discharged from [facility] after treated for  UTI. In the ER, he found to be febrile, T102.7, hypotensive, BP of 89/32, mildly positive urine analysis, Left pleural  effusion and CT abd/pelvis shows  Malpositioned gastrostomy tube with balloon inflated in the left rectus muscle. The distal portion of the tip appears to be tethering the stomach to the abdominal wall. Tiny amount of fluid and droplet of air in the left rectus muscle surrounds the gastrostomy tube. He also found to have positive Influenza AH3. He has started on Tamiflu, cultures pending and The ID consult requested to assist with further evaluation and antibiotic management.     # S/p Septic shock  # Influenza AH3  # Intra-abdominal process- most likely due to PEG malpositioned in left rectus muscle- s/p IR guided PEG changed 1/26/18  # Aspiration pneumonia- in the setting of Thick tube feeding color bronchial secretions  # UTI        Would recommend:    1. Follow up Urine and repeat Blood  cultures since spiked fever  2. Change Levaquin to Cefepime since tolerated in the ER and c/w Flagyl  3. Continue bronchial suctioning and Aspiration precaution  4. Continue Tamiflu X 5 days ( 10 doses)  5. Droplet precaution  6. Continue supplemental oxygenation and bronchodilator as needed  7. Monitor Temp. and c/w supportive care    d/w Nursing staff    will follow the patient with you
A 83 yo Male with history of CVA in 2017 with R-sided residual deficits s/p trach and PEG (s/p trach reversal 2 weeks PTA), Parkinson's disease, sent in to the ER for evaluation of altered mental status, fever, and erythema at the PEG site. The patient was recently discharged from [facility] after treated for  UTI. In the ER, he found to be febrile, T102.7, hypotensive, BP of 89/32, mildly positive urine analysis, Left pleural  effusion and CT abd/pelvis shows  Malpositioned gastrostomy tube with balloon inflated in the left rectus muscle. The distal portion of the tip appears to be tethering the stomach to the abdominal wall. Tiny amount of fluid and droplet of air in the left rectus muscle surrounds the gastrostomy tube. He also found to have positive Influenza AH3. He has started on Tamiflu, cultures pending and The ID consult requested to assist with further evaluation and antibiotic management.     # S/p Septic shock  # Influenza AH3 - s/p tamiflu  # Intra-abdominal process- most likely due to PEG malpositioned in left rectus muscle- s/p IR guided PEG changed 1/26/18  # Aspiration pneumonia- in the setting of Thick tube feeding color bronchial secretions  # UTI - culture has no growth        Would recommend:    1. Add Fluconazole and Nystatin swish and spit since unable to swallow  2. Continue Cefepime and Flagyl inpatient, may change to oral Levaquin and flagyl on discharge   3. Continue bronchial suctioning and Aspiration precaution  4. Continue supplemental oxygenation and bronchodilator as needed  5. Monitor Temp. and c/w supportive care    d/w Nursing staff    will follow the patient with you
apreciate ir.  succesful tube check and change.  will follow to make sure appropriate funciton
asia tf at goal
malpositioned peg likely pulled from stomach.  repositioned by taylor reaves.
s/p tube change. tolerating feeds.
treatment respirotaro issue, will order tube check and change in ir.

## 2018-01-30 NOTE — PROGRESS NOTE ADULT - PROBLEM SELECTOR PLAN 1
c/w cefepime and flagyl, will transition to oral abx upon hospital discharge  aspiration precautions  bronchodilators  oxygen supplementation  case management for d/c planning
2/2 acute influenza  C/w tamiflu, ivf  bronchodilators, oxygen supplementation prn   isolatin precautions  holding additional abx 2/2 no other infection identified  aspiration and fall precautions  monitor vitals closely  f/u id recs  f/u nephro recs
C/w tamiflu, ivf  broaden abx - levaquin and flagyl   bronchodilators, oxygen supplementation prn   isolation precautions  holding additional abx 2/2 no other infection identified  aspiration and fall precautions  monitor vitals closely  f/u id recs  f/u nephro recs
c/w cefepime and flagyl  aspiration precautions  bronchodilators  oxygen supplementation
improved  hemodynamically stable  c/w abx, ivf  f/u cultures to final date
improving   C/w tamiflu, ivf  bronchodilators, oxygen supplementation prn   isolatin precautions  holding additional abx 2/2 no other infection identified  aspiration and fall precautions  monitor vitals closely  f/u id recs  f/u nephro recs
pt will complete po abx via peg at [facility]  pt is medically optimized and stable for safe discharge

## 2018-01-30 NOTE — DISCHARGE NOTE ADULT - MEDICATION SUMMARY - MEDICATIONS TO TAKE
I will START or STAY ON the medications listed below when I get home from the hospital:    finasteride 5 mg oral tablet  -- 1 tab(s) by gastrostomy tube once a day  -- Indication: For urinary rentention    budesonide 0.25 mg/2 mL inhalation suspension  -- 2 milliliter(s) inhaled 2 times a day  -- Indication: For Asthma    Flagyl 500 mg oral tablet  -- 1 tab(s) by gastrostomy tube 3 times a day stop on 2/6/18  -- Indication: For Sepsis    aspirin 81 mg oral tablet, chewable  -- 1 tab(s) by gastrostomy tube once a day  -- Indication: For H/o CVA     tamsulosin 0.4 mg oral capsule  -- 1 cap(s) by gastrostomy tube once a day  -- Indication: For urinary retention    Reglan 5 mg oral tablet  -- 1 tab(s) by gastrostomy tube 4 times a day (before meals and at bedtime), As Needed  -- Indication: For Nausea    fluconazole 200 mg oral tablet  -- 1 tab(s) by mouth once a day  -- Indication: For thrush     nystatin 100,000 units/mL oral suspension  -- 5 milliliter(s) by mouth 3 times a day x 12 days   -- Indication: For thrush    atorvastatin 80 mg oral tablet  -- 1 tab(s) by gastrostomy tube once a day (at bedtime)  -- Indication: For Hyperlipedemia    carbidopa-levodopa 25 mg-100 mg oral tablet  -- 1.5 tab(s) by gastrostomy tube 3 times a day  -- Indication: For Parkinson disease    DuoNeb 0.5 mg-2.5 mg/3 mL inhalation solution  -- 3 milliliter(s) inhaled 4 times a day, As Needed  -- Indication: For Asthma    vitamin A & D topical ointment  -- Apply on skin to affected area   -- Indication: For rash    nystatin 100,000 units/g topical powder  -- 1 application on skin 3 times a day  -- Indication: For rash    Colace 10 mg/mL oral liquid  -- 20 milliliter(s) by gastrostomy tube once a day (at bedtime)  -- Indication: For constipation    Senna 8.8 mg/5 mL oral syrup  -- 10 milliliter(s) by gastrostomy tube once a day (at bedtime)  -- Indication: For constipation    midodrine 10 mg oral tablet  -- 1 tab(s) by mouth 3 times a day  -- Indication: For Hypotension    Lotemax 0.5% ophthalmic suspension  -- 1 drop(s) in the right eye every 48 hours  -- Indication: For glaucoma    pilocarpine 4% ophthalmic solution  -- 1 drop(s) in the left eye 2 times a day  -- Indication: For glaucoma    Travatan Z 0.004% ophthalmic solution  -- 1 drop(s) in the left eye once a day (in the evening)  -- Indication: For glaucoma    dorzolamide 2% ophthalmic solution  -- 1 drop(s) to each affected eye 2 times a day  -- Indication: For glaucoma    timolol maleate 0.5% ophthalmic solution  -- 1 drop(s) to each affected eye 2 times a day  -- Indication: For glaucoma    omeprazole 20 mg oral delayed release capsule  -- 1 cap(s) by gastrostomy tube once a day  -- Indication: For GERD    Levaquin 500 mg oral tablet  -- 1 tab(s) by mouth every 24 hours stop on 2/6/18  -- Avoid prolonged or excessive exposure to direct and/or artificial sunlight while taking this medication.  Do not take dairy products, antacids, or iron preparations within one hour of this medication.  Finish all this medication unless otherwise directed by prescriber.  May cause drowsiness or dizziness.  Medication should be taken with plenty of water.    -- Indication: For Sepsis    Multiple Vitamins oral tablet  -- 1 tab(s) by gastrostomy tube once a day  -- Indication: For Nutrtion    Vitamin C 500 mg oral tablet  -- 1 tab(s) by gastrostomy tube once a day  -- Indication: For Nutrtion

## 2018-01-30 NOTE — PROGRESS NOTE ADULT - SUBJECTIVE AND OBJECTIVE BOX
Patient is seen and examined at the bed side, is afebrile.  He seems mildly Lethargic now.          REVIEW OF SYSTEMS: All other review systems are negative          Vital Signs Last 24 Hrs  T(C): 36.6 (29 Jan 2018 17:52), Max: 36.9 (29 Jan 2018 14:35)  T(F): 97.9 (29 Jan 2018 17:52), Max: 98.5 (29 Jan 2018 14:35)  HR: 72 (29 Jan 2018 21:23) (71 - 84)  BP: 106/61 (29 Jan 2018 17:52) (106/61 - 119/70)  BP(mean): --  RR: 18 (29 Jan 2018 17:52) (16 - 18)  SpO2: 99% (29 Jan 2018 21:23) (96% - 100%)          PHYSICAL EXAM:  GENERAL: Not in distress  HEENT: previous trach site ostomy is closed  CVS: s1 and s2 present  RESP: Air entry B/L with bronchial sounds  GI: Abdomen nondistended but PEG site red, tender bulging out and firm to palpate  EXT: No pedal edema  CNS: Mild Lethargic        ALLERGIES: PCN  (Rash)            LABS:                        8.7    5.37  )-----------( 178      ( 29 Jan 2018 05:30 )             27.0                           8.5    3.30  )-----------( 178      ( 28 Jan 2018 06:30 )             27.7                               01-29    140  |  105  |  15  ----------------------------<  121<H>  3.9   |  28  |  0.36<L>    Ca    7.2<L>      29 Jan 2018 05:30  Mg     1.6     01-29 01-28    139  |  105  |  15  ----------------------------<  126<H>  3.6   |  26  |  0.38<L>    Ca    7.1<L>      28 Jan 2018 06:30  Mg     1.6     01-28      TPro  6.9  /  Alb  2.4<L>  /  TBili  0.5  /  DBili  x   /  AST  28  /  ALT  27  /  AlkPhos  84  01-23      Urinalysis Basic - ( 23 Jan 2018 10:34 )    Color: YELLOW / Appearance: CLEAR / SG: > 1.040 / pH: 6.5  Gluc: NEGATIVE / Ketone: NEGATIVE  / Bili: NEGATIVE / Urobili: 1 mg/dL   Blood: MODERATE / Protein: 30 mg/dL / Nitrite: NEGATIVE   Leuk Esterase: TRACE / RBC: 25-50 / WBC 10-25   Sq Epi: x / Non Sq Epi: x / Bacteria: FEW        MEDICATIONS  (STANDING):  ALBUTerol/ipratropium for Nebulization 3 milliLiter(s) Nebulizer every 6 hours  aspirin  chewable 81 milliGRAM(s) Oral daily  atorvastatin 80 milliGRAM(s) Oral at bedtime  buDESOnide   0.25 milliGRAM(s) Respule 0.25 milliGRAM(s) Inhalation two times a day  carbidopa/levodopa  25/100 1.5 Tablet(s) Oral three times a day  cefepime  IVPB      cefepime  IVPB 1000 milliGRAM(s) IV Intermittent every 8 hours  collagenase Ointment 1 Application(s) Topical daily  docusate sodium Liquid 200 milliGRAM(s) Oral two times a day  dorzolamide 2%/timolol 0.5% Ophthalmic Solution 1 Drop(s) Both EYES two times a day  finasteride 5 milliGRAM(s) Oral daily  fluconAZOLE IVPB 200 milliGRAM(s) IV Intermittent every 24 hours  fluconAZOLE IVPB      heparin  Injectable 5000 Unit(s) SubCutaneous every 8 hours  latanoprost 0.005% Ophthalmic Solution 1 Drop(s) Left EYE at bedtime  metroNIDAZOLE  IVPB 500 milliGRAM(s) IV Intermittent every 8 hours  metroNIDAZOLE  IVPB      midodrine 10 milliGRAM(s) Oral three times a day  nystatin    Suspension 636383 Unit(s) Oral three times a day  nystatin Powder 1 Application(s) Topical three times a day  pantoprazole   Suspension 40 milliGRAM(s) Oral daily  pilocarpine 4% Solution 1 Drop(s) Both EYES two times a day  senna Syrup 10 milliLiter(s) Oral at bedtime  sodium chloride 0.9%. 1000 milliLiter(s) (60 mL/Hr) IV Continuous <Continuous>  vitamin A &amp; D Ointment 1 Application(s) Topical four times a day    MEDICATIONS  (PRN):  acetaminophen    Suspension 650 milliGRAM(s) Oral every 6 hours PRN For Temp greater than 38 C (100.4 F)  diphenhydrAMINE   Injectable 25 milliGRAM(s) IV Push once PRN Allergy symptoms  metoclopramide 5 milliGRAM(s) Oral Before meals and at bedtime PRN nausea                    RADIOLOGY & ADDITIONAL TESTS:    1/26/18 : Xray Chest 1 View AP -PORTABLE-Routine (01.26.18 @ 05:08) : Portable semiupright view of the chest dated 1/26/2018 is compared to 1/23/2018. Small left pleural effusion is stable. There is no pneumothorax. Right IJ line remains in good position.        1/23/18 : CT Abdomen and Pelvis w/ IV Cont (01.23.18 @ 09:19) : Malpositioned gastrostomy tube with balloon inflated in the left rectus muscle. The distal portion of the tip appears to be tethering the stomach to the abdominal wall. Tiny amount of fluid and droplet of air in the left rectus muscle surrounds the gastrostomy tube.    1/23/18 Xray Chest 1 View AP -PORTABLE-Routine (01.23.18 @ 09:33) : Left pleural effusion.        MICROBIOLOGY DATA:      Culture - Blood (01.26.18 @ 07:21)    Culture - Blood:   NO ORGANISMS ISOLATED  NO ORGANISMS ISOLATED AT 24 HOURS    Specimen Source: BLOOD VENOUS    Culture - Blood (01.26.18 @ 07:09)    Culture - Blood:   NO ORGANISMS ISOLATED  NO ORGANISMS ISOLATED AT 24 HOURS    Specimen Source: BLOOD PERIPHERAL    Specimen Source: BLOOD PERIPHERAL (01.26.18 @ 07:09)        Culture - Urine (01.23.18 @ 10:43)    Culture - Urine:   NO GROWTH AT 24 HOURS    Specimen Source: URINE CATHETER      Culture - Blood (01.23.18 @ 09:19)    Culture - Blood:   NO ORGANISMS ISOLATED  NO ORGANISMS ISOLATED AT 24 HOURS    Specimen Source: BLOOD VENOUS      Culture - Blood (01.23.18 @ 09:19)    Culture - Blood:   NO ORGANISMS ISOLATED  NO ORGANISMS ISOLATED AT 24 HOURS    Specimen Source: BLOOD PERIPHERAL
Patient seen and examined at bedside  Case discussed with pt's wife at bedside, and medical team    Pt with increasing lethargy overnight  Diflucan started for oral thrush       PAST MEDICAL & SURGICAL HISTORY:  Alexander catheter in place  Oropharyngeal dysphagia  Glaucoma  CAD (coronary artery disease)  Parkinson disease  Tracheostomy in place: removed 12/2017  Hypertension  CVA (cerebral vascular accident)  H/O tracheostomy  S/P percutaneous endoscopic gastrostomy (PEG) tube placement    allergies:  penicillin (Hives)       MEDICATIONS  (STANDING):  ALBUTerol/ipratropium for Nebulization 3 milliLiter(s) Nebulizer every 6 hours  aspirin  chewable 81 milliGRAM(s) Oral daily  atorvastatin 80 milliGRAM(s) Oral at bedtime  buDESOnide   0.25 milliGRAM(s) Respule 0.25 milliGRAM(s) Inhalation two times a day  carbidopa/levodopa  25/100 1.5 Tablet(s) Oral three times a day  cefepime  IVPB      cefepime  IVPB 1000 milliGRAM(s) IV Intermittent every 8 hours  collagenase Ointment 1 Application(s) Topical daily  docusate sodium Liquid 200 milliGRAM(s) Oral two times a day  dorzolamide 2%/timolol 0.5% Ophthalmic Solution 1 Drop(s) Both EYES two times a day  finasteride 5 milliGRAM(s) Oral daily  fluconAZOLE IVPB 200 milliGRAM(s) IV Intermittent every 24 hours  fluconAZOLE IVPB      heparin  Injectable 5000 Unit(s) SubCutaneous every 8 hours  latanoprost 0.005% Ophthalmic Solution 1 Drop(s) Left EYE at bedtime  metroNIDAZOLE  IVPB 500 milliGRAM(s) IV Intermittent every 8 hours  metroNIDAZOLE  IVPB      midodrine 10 milliGRAM(s) Oral three times a day  nystatin    Suspension 664836 Unit(s) Oral three times a day  nystatin Powder 1 Application(s) Topical three times a day  pantoprazole   Suspension 40 milliGRAM(s) Oral daily  pilocarpine 4% Solution 1 Drop(s) Both EYES two times a day  senna Syrup 10 milliLiter(s) Oral at bedtime  sodium chloride 0.9%. 1000 milliLiter(s) (60 mL/Hr) IV Continuous <Continuous>  vitamin A &amp; D Ointment 1 Application(s) Topical four times a day    MEDICATIONS  (PRN):  acetaminophen    Suspension 650 milliGRAM(s) Oral every 6 hours PRN For Temp greater than 38 C (100.4 F)  diphenhydrAMINE   Injectable 25 milliGRAM(s) IV Push once PRN Allergy symptoms  metoclopramide 5 milliGRAM(s) Oral Before meals and at bedtime PRN nausea      REVIEW OF SYSTEMS:  CONSTITUTIONAL: (+) lethargy and malaise   EYES: No acute change in vision or eye pain  NECK: No pain   RESPIRATORY: No cough, wheezing, hemoptysis  CARDIOVASCULAR: No chest pain, palpitations,  GASTROINTESTINAL: No abdominal pain. No vomiting, hematemesis, diarrhea,   GENITOURINARY: No dysuria, hematuria  NEUROLOGICAL: No headaches,  LYMPH Nodes: No enlarged glands  ENDOCRINE: No heat or cold intolerance; No hair loss    T(C): 36.7 (01-29-18 @ 05:03), Max: 36.9 (01-28-18 @ 21:41)  HR: 77 (01-29-18 @ 11:07) (70 - 79)  BP: 119/70 (01-29-18 @ 05:03) (108/69 - 119/70)  RR: 16 (01-29-18 @ 05:03) (16 - 16)  SpO2: 96% (01-29-18 @ 11:07) (96% - 100%)    PHYSICAL EXAMINATION:   Constitutional: NAD  HEENT: NC, AT  Neck:  Supple  Respiratory:  decreased left basilar breath sounds, otherwise adequate airflow b/l and CTA b/l Not using accessory muscles of respiration.  Cardiovascular:  s1 & s2 intact. no R/G, 2+ pulses b/l  Gastrointestinal: peg intact, Soft, NT, normoactive b.s., no organomegaly/RT/rigidity  Extremities: WWP.    Neurological:  mild lethargy and less responsiveness to questions today, awake.     Labs and imaging reviewed    LABS:                        8.7    5.37  )-----------( 178      ( 29 Jan 2018 05:30 )             27.0     01-29    140  |  105  |  15  ----------------------------<  121<H>  3.9   |  28  |  0.36<L>    Ca    7.2<L>      29 Jan 2018 05:30  Mg     1.6     01-29        RADIOLOGY & ADDITIONAL STUDIES: reviewed
ANILA NARVAEZ:[id number],   82yMale followed for:  penicillin (Hives)    PAST MEDICAL & SURGICAL HISTORY:  Alexander catheter in place  Oropharyngeal dysphagia  Glaucoma  CAD (coronary artery disease)  Parkinson disease  Tracheostomy in place: removed 12/2017  Hypertension  CVA (cerebral vascular accident)  H/O tracheostomy  S/P percutaneous endoscopic gastrostomy (PEG) tube placement    FAMILY HISTORY:  No pertinent family history in first degree relatives    MEDICATIONS  (STANDING):  ALBUTerol/ipratropium for Nebulization 3 milliLiter(s) Nebulizer every 6 hours  aspirin  chewable 81 milliGRAM(s) Oral daily  atorvastatin 80 milliGRAM(s) Oral at bedtime  buDESOnide   0.25 milliGRAM(s) Respule 0.25 milliGRAM(s) Inhalation two times a day  carbidopa/levodopa  25/100 1.5 Tablet(s) Oral three times a day  cefepime  IVPB      cefepime  IVPB 1000 milliGRAM(s) IV Intermittent every 8 hours  collagenase Ointment 1 Application(s) Topical daily  docusate sodium Liquid 200 milliGRAM(s) Oral two times a day  dorzolamide 2%/timolol 0.5% Ophthalmic Solution 1 Drop(s) Both EYES two times a day  finasteride 5 milliGRAM(s) Oral daily  fluconAZOLE IVPB 200 milliGRAM(s) IV Intermittent every 24 hours  fluconAZOLE IVPB      heparin  Injectable 5000 Unit(s) SubCutaneous every 8 hours  latanoprost 0.005% Ophthalmic Solution 1 Drop(s) Left EYE at bedtime  metroNIDAZOLE  IVPB 500 milliGRAM(s) IV Intermittent every 8 hours  metroNIDAZOLE  IVPB      midodrine 10 milliGRAM(s) Oral three times a day  nystatin    Suspension [id number] Unit(s) Oral three times a day  nystatin Powder 1 Application(s) Topical three times a day  pantoprazole   Suspension 40 milliGRAM(s) Oral daily  pilocarpine 4% Solution 1 Drop(s) Both EYES two times a day  senna Syrup 10 milliLiter(s) Oral at bedtime  sodium chloride 0.9%. 1000 milliLiter(s) (60 mL/Hr) IV Continuous <Continuous>  vitamin A &amp; D Ointment 1 Application(s) Topical four times a day    MEDICATIONS  (PRN):  acetaminophen    Suspension 650 milliGRAM(s) Oral every 6 hours PRN For Temp greater than 38 C (100.4 F)  diphenhydrAMINE   Injectable 25 milliGRAM(s) IV Push once PRN Allergy symptoms  metoclopramide 5 milliGRAM(s) Oral Before meals and at bedtime PRN nausea      Vital Signs Last 24 Hrs  T(C): 37.2 (30 Jan 2018 06:25), Max: 37.2 (30 Jan 2018 06:25)  T(F): 98.9 (30 Jan 2018 06:25), Max: 98.9 (30 Jan 2018 06:25)  HR: 67 (30 Jan 2018 06:25) (67 - 85)  BP: 127/81 (30 Jan 2018 06:25) (106/61 - 127/81)  BP(mean): --  RR: 18 (30 Jan 2018 06:25) (18 - 18)  SpO2: 100% (30 Jan 2018 06:25) (96% - 100%)  nc/at  s1s2  cta  soft, nt, nd no guarding or rebound  no c/c/e    CBC Full  -  ( 29 Jan 2018 05:30 )  WBC Count : 5.37 K/uL  Hemoglobin : 8.7 g/dL  Hematocrit : 27.0 %  Platelet Count - Automated : 178 K/uL  Mean Cell Volume : 88.8 fL  Mean Cell Hemoglobin : 28.6 pg  Mean Cell Hemoglobin Concentration : 32.2 %  Auto Neutrophil # : 3.46 K/uL  Auto Lymphocyte # : 0.84 K/uL  Auto Monocyte # : 0.51 K/uL  Auto Eosinophil # : 0.52 K/uL  Auto Basophil # : 0.01 K/uL  Auto Neutrophil % : 64.4 %  Auto Lymphocyte % : 15.6 %  Auto Monocyte % : 9.5 %  Auto Eosinophil % : 9.7 %  Auto Basophil % : 0.2 %    01-29    140  |  105  |  15  ----------------------------<  121<H>  3.9   |  28  |  0.36<L>    Ca    7.2<L>      29 Jan 2018 05:30  Mg     1.6     01-29
ANILA NARVAEZ:[id number],   82yMale followed for:  penicillin (Hives)    PAST MEDICAL & SURGICAL HISTORY:  Alexander catheter in place  Oropharyngeal dysphagia  Glaucoma  CAD (coronary artery disease)  Parkinson disease  Tracheostomy in place: removed 12/2017  Hypertension  CVA (cerebral vascular accident)  H/O tracheostomy  S/P percutaneous endoscopic gastrostomy (PEG) tube placement    FAMILY HISTORY:  No pertinent family history in first degree relatives    MEDICATIONS  (STANDING):  ALBUTerol/ipratropium for Nebulization 3 milliLiter(s) Nebulizer every 6 hours  aspirin  chewable 81 milliGRAM(s) Oral daily  atorvastatin 80 milliGRAM(s) Oral at bedtime  buDESOnide   0.25 milliGRAM(s) Respule 0.25 milliGRAM(s) Inhalation two times a day  carbidopa/levodopa  25/100 1.5 Tablet(s) Oral three times a day  cefepime  IVPB      cefepime  IVPB 1000 milliGRAM(s) IV Intermittent every 8 hours  collagenase Ointment 1 Application(s) Topical daily  docusate sodium Liquid 200 milliGRAM(s) Oral two times a day  dorzolamide 2%/timolol 0.5% Ophthalmic Solution 1 Drop(s) Both EYES two times a day  finasteride 5 milliGRAM(s) Oral daily  fluconAZOLE IVPB 200 milliGRAM(s) IV Intermittent every 24 hours  fluconAZOLE IVPB      heparin  Injectable 5000 Unit(s) SubCutaneous every 8 hours  latanoprost 0.005% Ophthalmic Solution 1 Drop(s) Left EYE at bedtime  metroNIDAZOLE  IVPB 500 milliGRAM(s) IV Intermittent every 8 hours  metroNIDAZOLE  IVPB      midodrine 10 milliGRAM(s) Oral three times a day  nystatin    Suspension [id number] Unit(s) Oral three times a day  nystatin Powder 1 Application(s) Topical three times a day  pantoprazole   Suspension 40 milliGRAM(s) Oral daily  pilocarpine 4% Solution 1 Drop(s) Both EYES two times a day  senna Syrup 10 milliLiter(s) Oral at bedtime  sodium chloride 0.9%. 1000 milliLiter(s) (60 mL/Hr) IV Continuous <Continuous>  vitamin A &amp; D Ointment 1 Application(s) Topical four times a day    MEDICATIONS  (PRN):  acetaminophen    Suspension 650 milliGRAM(s) Oral every 6 hours PRN For Temp greater than 38 C (100.4 F)  diphenhydrAMINE   Injectable 25 milliGRAM(s) IV Push once PRN Allergy symptoms  metoclopramide 5 milliGRAM(s) Oral Before meals and at bedtime PRN nausea      Vital Signs Last 24 Hrs  T(C): 36.7 (29 Jan 2018 05:03), Max: 37.2 (28 Jan 2018 13:12)  T(F): 98.1 (29 Jan 2018 05:03), Max: 99 (28 Jan 2018 13:12)  HR: 77 (29 Jan 2018 11:07) (70 - 79)  BP: 119/70 (29 Jan 2018 05:03) (108/69 - 135/80)  BP(mean): --  RR: 16 (29 Jan 2018 05:03) (16 - 16)  SpO2: 96% (29 Jan 2018 11:07) (96% - 100%)  nc/at  s1s2  cta  soft, nt, nd no guarding or rebound  no c/c/e    CBC Full  -  ( 29 Jan 2018 05:30 )  WBC Count : 5.37 K/uL  Hemoglobin : 8.7 g/dL  Hematocrit : 27.0 %  Platelet Count - Automated : 178 K/uL  Mean Cell Volume : 88.8 fL  Mean Cell Hemoglobin : 28.6 pg  Mean Cell Hemoglobin Concentration : 32.2 %  Auto Neutrophil # : 3.46 K/uL  Auto Lymphocyte # : 0.84 K/uL  Auto Monocyte # : 0.51 K/uL  Auto Eosinophil # : 0.52 K/uL  Auto Basophil # : 0.01 K/uL  Auto Neutrophil % : 64.4 %  Auto Lymphocyte % : 15.6 %  Auto Monocyte % : 9.5 %  Auto Eosinophil % : 9.7 %  Auto Basophil % : 0.2 %    01-29    140  |  105  |  15  ----------------------------<  121<H>  3.9   |  28  |  0.36<L>    Ca    7.2<L>      29 Jan 2018 05:30  Mg     1.6     01-29
ANILA NARVAEZ:[id number],   82yMale followed for:  penicillin (Hives)    PAST MEDICAL & SURGICAL HISTORY:  [place] catheter in place  Oropharyngeal dysphagia  Glaucoma  CAD (coronary artery disease)  Parkinson disease  Tracheostomy in place: removed 12/2017  Hypertension  CVA (cerebral vascular accident)  H/O tracheostomy  S/P percutaneous endoscopic gastrostomy (PEG) tube placement    FAMILY HISTORY:  No pertinent family history in first degree relatives    MEDICATIONS  (STANDING):  ALBUTerol/ipratropium for Nebulization 3 milliLiter(s) Nebulizer every 6 hours  aspirin  chewable 81 milliGRAM(s) Oral daily  atorvastatin 80 milliGRAM(s) Oral at bedtime  buDESOnide   0.25 milliGRAM(s) Respule 0.25 milliGRAM(s) Inhalation two times a day  carbidopa/levodopa  25/100 1.5 Tablet(s) Oral three times a day  docusate sodium Liquid 200 milliGRAM(s) Oral two times a day  dorzolamide 2%/timolol 0.5% Ophthalmic Solution 1 Drop(s) Both EYES two times a day  finasteride 5 milliGRAM(s) Oral daily  heparin  Injectable 5000 Unit(s) SubCutaneous every 8 hours  latanoprost 0.005% Ophthalmic Solution 1 Drop(s) Left EYE at bedtime  midodrine 10 milliGRAM(s) Oral three times a day  oseltamivir Suspension 75 milliGRAM(s) Oral two times a day  pantoprazole   Suspension 40 milliGRAM(s) Oral daily  pilocarpine 4% Solution 1 Drop(s) Both EYES two times a day  senna Syrup 10 milliLiter(s) Oral at bedtime  vitamin A &amp; D Ointment 1 Application(s) Topical four times a day    MEDICATIONS  (PRN):  acetaminophen    Suspension 650 milliGRAM(s) Oral every 6 hours PRN For Temp greater than 38 C (100.4 F)  metoclopramide 5 milliGRAM(s) Oral Before meals and at bedtime PRN nausea      Vital Signs Last 24 Hrs  T(C): 36.9 (25 Jan 2018 14:00), Max: 37.2 (24 Jan 2018 17:39)  T(F): 98.5 (25 Jan 2018 14:00), Max: 99 (24 Jan 2018 17:39)  HR: 68 (25 Jan 2018 16:01) (58 - 86)  BP: 104/63 (25 Jan 2018 14:00) (92/50 - 105/69)  BP(mean): --  RR: 17 (25 Jan 2018 14:00) (16 - 18)  SpO2: 99% (25 Jan 2018 16:01) (96% - 99%)  nc/at  s1s2  cta  soft, nt, nd no guarding or rebound  no c/c/e    CBC Full  -  ( 25 Jan 2018 05:50 )  WBC Count : 4.97 K/uL  Hemoglobin : 8.2 g/dL  Hematocrit : 26.6 %  Platelet Count - Automated : 157 K/uL  Mean Cell Volume : 87.5 fL  Mean Cell Hemoglobin : 27.0 pg  Mean Cell Hemoglobin Concentration : 30.8 %  Auto Neutrophil # : x  Auto Lymphocyte # : x  Auto Monocyte # : x  Auto Eosinophil # : x  Auto Basophil # : x  Auto Neutrophil % : x  Auto Lymphocyte % : x  Auto Monocyte % : x  Auto Eosinophil % : x  Auto Basophil % : x    01-25    139  |  106  |  17  ----------------------------<  88  3.5   |  25  |  0.38<L>    Ca    7.1<L>      25 Jan 2018 05:50
ANILA NARVAEZ:[id number],   82yMale followed for:  penicillin (Hives)    PAST MEDICAL & SURGICAL HISTORY:  [place] catheter in place  Oropharyngeal dysphagia  Glaucoma  CAD (coronary artery disease)  Parkinson disease  Tracheostomy in place: removed 12/2017  Hypertension  CVA (cerebral vascular accident)  H/O tracheostomy  S/P percutaneous endoscopic gastrostomy (PEG) tube placement    FAMILY HISTORY:  No pertinent family history in first degree relatives    MEDICATIONS  (STANDING):  ALBUTerol/ipratropium for Nebulization 3 milliLiter(s) Nebulizer every 6 hours  aspirin  chewable 81 milliGRAM(s) Oral daily  atorvastatin 80 milliGRAM(s) Oral at bedtime  buDESOnide   0.25 milliGRAM(s) Respule 0.25 milliGRAM(s) Inhalation two times a day  carbidopa/levodopa  25/100 1.5 Tablet(s) Oral three times a day  cefepime  IVPB      cefepime  IVPB 1000 milliGRAM(s) IV Intermittent every 8 hours  collagenase Ointment 1 Application(s) Topical daily  docusate sodium Liquid 200 milliGRAM(s) Oral two times a day  dorzolamide 2%/timolol 0.5% Ophthalmic Solution 1 Drop(s) Both EYES two times a day  finasteride 5 milliGRAM(s) Oral daily  heparin  Injectable 5000 Unit(s) SubCutaneous every 8 hours  latanoprost 0.005% Ophthalmic Solution 1 Drop(s) Left EYE at bedtime  metroNIDAZOLE  IVPB      metroNIDAZOLE  IVPB 500 milliGRAM(s) IV Intermittent every 8 hours  midodrine 10 milliGRAM(s) Oral three times a day  nystatin Powder 1 Application(s) Topical three times a day  oseltamivir Suspension 75 milliGRAM(s) Oral two times a day  pantoprazole   Suspension 40 milliGRAM(s) Oral daily  pilocarpine 4% Solution 1 Drop(s) Both EYES two times a day  senna Syrup 10 milliLiter(s) Oral at bedtime  sodium chloride 0.9%. 1000 milliLiter(s) (60 mL/Hr) IV Continuous <Continuous>  vitamin A &amp; D Ointment 1 Application(s) Topical four times a day    MEDICATIONS  (PRN):  acetaminophen    Suspension 650 milliGRAM(s) Oral every 6 hours PRN For Temp greater than 38 C (100.4 F)  diphenhydrAMINE   Injectable 25 milliGRAM(s) IV Push once PRN Allergy symptoms  metoclopramide 5 milliGRAM(s) Oral Before meals and at bedtime PRN nausea      Vital Signs Last 24 Hrs  T(C): 36.6 (27 Jan 2018 04:00), Max: 37.1 (26 Jan 2018 20:00)  T(F): 97.9 (27 Jan 2018 04:00), Max: 98.7 (26 Jan 2018 20:00)  HR: 72 (27 Jan 2018 04:17) (72 - 82)  BP: 127/80 (27 Jan 2018 04:00) (100/60 - 127/80)  BP(mean): --  RR: 17 (27 Jan 2018 04:00) (15 - 17)  SpO2: 97% (27 Jan 2018 04:17) (97% - 100%)  nc/at  s1s2  cta  soft, nt, nd no guarding or rebound  no c/c/e    CBC Full  -  ( 27 Jan 2018 06:30 )  WBC Count : 4.26 K/uL  Hemoglobin : 9.1 g/dL  Hematocrit : 28.1 %  Platelet Count - Automated : 174 K/uL  Mean Cell Volume : 88.4 fL  Mean Cell Hemoglobin : 28.6 pg  Mean Cell Hemoglobin Concentration : 32.4 %  Auto Neutrophil # : 2.77 K/uL  Auto Lymphocyte # : 0.84 K/uL  Auto Monocyte # : 0.37 K/uL  Auto Eosinophil # : 0.25 K/uL  Auto Basophil # : 0.01 K/uL  Auto Neutrophil % : 65.0 %  Auto Lymphocyte % : 19.7 %  Auto Monocyte % : 8.7 %  Auto Eosinophil % : 5.9 %  Auto Basophil % : 0.2 %    01-27    138  |  106  |  18  ----------------------------<  130<H>  3.8   |  22  |  0.42<L>    Ca    7.0<L>      27 Jan 2018 06:30  Mg     1.7     01-27
ANILA NARVAEZ:[id number],   82yMale followed for:  penicillin (Hives)    PAST MEDICAL & SURGICAL HISTORY:  [place] catheter in place  Oropharyngeal dysphagia  Glaucoma  CAD (coronary artery disease)  Parkinson disease  Tracheostomy in place: removed 12/2017  Hypertension  CVA (cerebral vascular accident)  H/O tracheostomy  S/P percutaneous endoscopic gastrostomy (PEG) tube placement    FAMILY HISTORY:  No pertinent family history in first degree relatives    MEDICATIONS  (STANDING):  ALBUTerol/ipratropium for Nebulization 3 milliLiter(s) Nebulizer every 6 hours  aspirin  chewable 81 milliGRAM(s) Oral daily  atorvastatin 80 milliGRAM(s) Oral at bedtime  buDESOnide   0.25 milliGRAM(s) Respule 0.25 milliGRAM(s) Inhalation two times a day  carbidopa/levodopa  25/100 1.5 Tablet(s) Oral three times a day  cefepime  IVPB      cefepime  IVPB 1000 milliGRAM(s) IV Intermittent every 8 hours  collagenase Ointment 1 Application(s) Topical daily  docusate sodium Liquid 200 milliGRAM(s) Oral two times a day  dorzolamide 2%/timolol 0.5% Ophthalmic Solution 1 Drop(s) Both EYES two times a day  finasteride 5 milliGRAM(s) Oral daily  heparin  Injectable 5000 Unit(s) SubCutaneous every 8 hours  latanoprost 0.005% Ophthalmic Solution 1 Drop(s) Left EYE at bedtime  metroNIDAZOLE  IVPB      metroNIDAZOLE  IVPB 500 milliGRAM(s) IV Intermittent every 8 hours  midodrine 10 milliGRAM(s) Oral three times a day  nystatin Powder 1 Application(s) Topical three times a day  pantoprazole   Suspension 40 milliGRAM(s) Oral daily  pilocarpine 4% Solution 1 Drop(s) Both EYES two times a day  senna Syrup 10 milliLiter(s) Oral at bedtime  sodium chloride 0.9%. 1000 milliLiter(s) (60 mL/Hr) IV Continuous <Continuous>  vitamin A &amp; D Ointment 1 Application(s) Topical four times a day    MEDICATIONS  (PRN):  acetaminophen    Suspension 650 milliGRAM(s) Oral every 6 hours PRN For Temp greater than 38 C (100.4 F)  diphenhydrAMINE   Injectable 25 milliGRAM(s) IV Push once PRN Allergy symptoms  metoclopramide 5 milliGRAM(s) Oral Before meals and at bedtime PRN nausea      Vital Signs Last 24 Hrs  T(C): 36.9 (28 Jan 2018 06:22), Max: 37.3 (27 Jan 2018 14:34)  T(F): 98.4 (28 Jan 2018 06:22), Max: 99.2 (27 Jan 2018 14:34)  HR: 72 (28 Jan 2018 06:22) (68 - 78)  BP: 123/80 (28 Jan 2018 06:22) (111/63 - 129/76)  BP(mean): --  RR: 18 (28 Jan 2018 06:22) (18 - 18)  SpO2: 99% (28 Jan 2018 06:22) (96% - 99%)  nc/at  s1s2  cta  soft, nt, nd no guarding or rebound  no c/c/e    CBC Full  -  ( 28 Jan 2018 06:30 )  WBC Count : 3.30 K/uL  Hemoglobin : 8.5 g/dL  Hematocrit : 27.7 %  Platelet Count - Automated : 178 K/uL  Mean Cell Volume : 88.8 fL  Mean Cell Hemoglobin : 27.2 pg  Mean Cell Hemoglobin Concentration : 30.7 %  Auto Neutrophil # : 1.94 K/uL  Auto Lymphocyte # : 0.74 K/uL  Auto Monocyte # : 0.31 K/uL  Auto Eosinophil # : 0.28 K/uL  Auto Basophil # : 0.01 K/uL  Auto Neutrophil % : 58.8 %  Auto Lymphocyte % : 22.4 %  Auto Monocyte % : 9.4 %  Auto Eosinophil % : 8.5 %  Auto Basophil % : 0.3 %    01-28    139  |  105  |  15  ----------------------------<  126<H>  3.6   |  26  |  0.38<L>    Ca    7.1<L>      28 Jan 2018 06:30  Mg     1.6     01-28
Patient is seen and examined at the bed side, is afebrile today.  The urine culture has no growth to date. He has tolerated Cefepime well, in the setting of PCN allergy.          REVIEW OF SYSTEMS: Unable to obtain due to mental status        Vital Signs Last 24 Hrs  T(C): 37.3 (27 Jan 2018 14:34), Max: 37.3 (27 Jan 2018 14:34)  T(F): 99.2 (27 Jan 2018 14:34), Max: 99.2 (27 Jan 2018 14:34)  HR: 71 (27 Jan 2018 16:26) (66 - 79)  BP: 129/76 (27 Jan 2018 14:34) (106/65 - 129/76)  BP(mean): --  RR: 18 (27 Jan 2018 14:34) (17 - 18)  SpO2: 96% (27 Jan 2018 16:26) (96% - 99%)          PHYSICAL EXAM:  GENERAL: Not in distress  HEENT: previous trach site ostomy is closed  CVS: s1 and s2 present  RESP: Air entry B/L with bronchial sounds  GI: Abdomen nondistended but PEG site red, tender bulging out and firm to palpate  EXT: No pedal edema  CNS: more awake           ALLERGIES: PCN  (Rash)            LABS:                        9.1    4.26  )-----------( 174      ( 27 Jan 2018 06:30 )             28.1                           9.0    5.19  )-----------( 179      ( 26 Jan 2018 06:35 )             29.1                           01-27    138  |  106  |  18  ----------------------------<  130<H>  3.8   |  22  |  0.42<L>    Ca    7.0<L>      27 Jan 2018 06:30  Mg     1.7     01-27        TPro  6.9  /  Alb  2.4<L>  /  TBili  0.5  /  DBili  x   /  AST  28  /  ALT  27  /  AlkPhos  84  01-23      Urinalysis Basic - ( 23 Jan 2018 10:34 )    Color: YELLOW / Appearance: CLEAR / SG: > 1.040 / pH: 6.5  Gluc: NEGATIVE / Ketone: NEGATIVE  / Bili: NEGATIVE / Urobili: 1 mg/dL   Blood: MODERATE / Protein: 30 mg/dL / Nitrite: NEGATIVE   Leuk Esterase: TRACE / RBC: 25-50 / WBC 10-25   Sq Epi: x / Non Sq Epi: x / Bacteria: FEW            MEDICATIONS  (STANDING):  ALBUTerol/ipratropium for Nebulization 3 milliLiter(s) Nebulizer every 6 hours  aspirin  chewable 81 milliGRAM(s) Oral daily  atorvastatin 80 milliGRAM(s) Oral at bedtime  buDESOnide   0.25 milliGRAM(s) Respule 0.25 milliGRAM(s) Inhalation two times a day  carbidopa/levodopa  25/100 1.5 Tablet(s) Oral three times a day  cefepime  IVPB      cefepime  IVPB 1000 milliGRAM(s) IV Intermittent every 8 hours  collagenase Ointment 1 Application(s) Topical daily  docusate sodium Liquid 200 milliGRAM(s) Oral two times a day  dorzolamide 2%/timolol 0.5% Ophthalmic Solution 1 Drop(s) Both EYES two times a day  finasteride 5 milliGRAM(s) Oral daily  heparin  Injectable 5000 Unit(s) SubCutaneous every 8 hours  latanoprost 0.005% Ophthalmic Solution 1 Drop(s) Left EYE at bedtime  metroNIDAZOLE  IVPB      metroNIDAZOLE  IVPB 500 milliGRAM(s) IV Intermittent every 8 hours  midodrine 10 milliGRAM(s) Oral three times a day  nystatin Powder 1 Application(s) Topical three times a day  pantoprazole   Suspension 40 milliGRAM(s) Oral daily  pilocarpine 4% Solution 1 Drop(s) Both EYES two times a day  senna Syrup 10 milliLiter(s) Oral at bedtime  sodium chloride 0.9%. 1000 milliLiter(s) (60 mL/Hr) IV Continuous <Continuous>  vitamin A &amp; D Ointment 1 Application(s) Topical four times a day    MEDICATIONS  (PRN):  acetaminophen    Suspension 650 milliGRAM(s) Oral every 6 hours PRN For Temp greater than 38 C (100.4 F)  diphenhydrAMINE   Injectable 25 milliGRAM(s) IV Push once PRN Allergy symptoms  metoclopramide 5 milliGRAM(s) Oral Before meals and at bedtime PRN nausea                RADIOLOGY & ADDITIONAL TESTS:    1/26/18 : Xray Chest 1 View AP -PORTABLE-Routine (01.26.18 @ 05:08) : Portable semiupright view of the chest dated 1/26/2018 is compared to 1/23/2018. Small left pleural effusion is stable. There is no pneumothorax. Right IJ line remains in good position.        1/23/18 : CT Abdomen and Pelvis w/ IV Cont (01.23.18 @ 09:19) : Malpositioned gastrostomy tube with balloon inflated in the left rectus muscle. The distal portion of the tip appears to be tethering the stomach to the abdominal wall. Tiny amount of fluid and droplet of air in the left rectus muscle surrounds the gastrostomy tube.    1/23/18 Xray Chest 1 View AP -PORTABLE-Routine (01.23.18 @ 09:33) : Left pleural effusion.        MICROBIOLOGY DATA:      Culture - Blood (01.26.18 @ 07:21)    Culture - Blood:   NO ORGANISMS ISOLATED  NO ORGANISMS ISOLATED AT 24 HOURS    Specimen Source: BLOOD VENOUS    Culture - Blood (01.26.18 @ 07:09)    Culture - Blood:   NO ORGANISMS ISOLATED  NO ORGANISMS ISOLATED AT 24 HOURS    Specimen Source: BLOOD PERIPHERAL    Specimen Source: BLOOD PERIPHERAL (01.26.18 @ 07:09)        Culture - Urine (01.23.18 @ 10:43)    Culture - Urine:   NO GROWTH AT 24 HOURS    Specimen Source: URINE CATHETER      Culture - Blood (01.23.18 @ 09:19)    Culture - Blood:   NO ORGANISMS ISOLATED  NO ORGANISMS ISOLATED AT 24 HOURS    Specimen Source: BLOOD VENOUS      Culture - Blood (01.23.18 @ 09:19)    Culture - Blood:   NO ORGANISMS ISOLATED  NO ORGANISMS ISOLATED AT 24 HOURS    Specimen Source: BLOOD PERIPHERAL
Patient is seen and examined at the bed side, is afebrile.  He mentioned not having any pain, and found to have oral thrush.          REVIEW OF SYSTEMS: All other review systems are negative          Vital Signs Last 24 Hrs  T(C): 36.9 (28 Jan 2018 21:41), Max: 37.2 (27 Jan 2018 22:14)  T(F): 98.4 (28 Jan 2018 21:41), Max: 99 (27 Jan 2018 22:14)  HR: 72 (28 Jan 2018 21:41) (66 - 79)  BP: 108/69 (28 Jan 2018 21:41) (108/69 - 135/80)  BP(mean): --  RR: 16 (28 Jan 2018 21:41) (16 - 18)  SpO2: 100% (28 Jan 2018 21:41) (97% - 100%)          PHYSICAL EXAM:  GENERAL: Not in distress  HEENT: previous trach site ostomy is closed  CVS: s1 and s2 present  RESP: Air entry B/L with bronchial sounds  GI: Abdomen nondistended but PEG site red, tender bulging out and firm to palpate  EXT: No pedal edema  CNS: more awake           ALLERGIES: PCN  (Rash)            LABS:                        8.5    3.30  )-----------( 178      ( 28 Jan 2018 06:30 )             27.7                           9.1    4.26  )-----------( 174      ( 27 Jan 2018 06:30 )             28.1                              01-28    139  |  105  |  15  ----------------------------<  126<H>  3.6   |  26  |  0.38<L>    Ca    7.1<L>      28 Jan 2018 06:30  Mg     1.6     01-28 01-27    138  |  106  |  18  ----------------------------<  130<H>  3.8   |  22  |  0.42<L>    Ca    7.0<L>      27 Jan 2018 06:30  Mg     1.7     01-27        TPro  6.9  /  Alb  2.4<L>  /  TBili  0.5  /  DBili  x   /  AST  28  /  ALT  27  /  AlkPhos  84  01-23      Urinalysis Basic - ( 23 Jan 2018 10:34 )    Color: YELLOW / Appearance: CLEAR / SG: > 1.040 / pH: 6.5  Gluc: NEGATIVE / Ketone: NEGATIVE  / Bili: NEGATIVE / Urobili: 1 mg/dL   Blood: MODERATE / Protein: 30 mg/dL / Nitrite: NEGATIVE   Leuk Esterase: TRACE / RBC: 25-50 / WBC 10-25   Sq Epi: x / Non Sq Epi: x / Bacteria: FEW          MEDICATIONS  (STANDING):  ALBUTerol/ipratropium for Nebulization 3 milliLiter(s) Nebulizer every 6 hours  aspirin  chewable 81 milliGRAM(s) Oral daily  atorvastatin 80 milliGRAM(s) Oral at bedtime  buDESOnide   0.25 milliGRAM(s) Respule 0.25 milliGRAM(s) Inhalation two times a day  carbidopa/levodopa  25/100 1.5 Tablet(s) Oral three times a day  cefepime  IVPB      cefepime  IVPB 1000 milliGRAM(s) IV Intermittent every 8 hours  collagenase Ointment 1 Application(s) Topical daily  docusate sodium Liquid 200 milliGRAM(s) Oral two times a day  dorzolamide 2%/timolol 0.5% Ophthalmic Solution 1 Drop(s) Both EYES two times a day  finasteride 5 milliGRAM(s) Oral daily  heparin  Injectable 5000 Unit(s) SubCutaneous every 8 hours  latanoprost 0.005% Ophthalmic Solution 1 Drop(s) Left EYE at bedtime  metroNIDAZOLE  IVPB      metroNIDAZOLE  IVPB 500 milliGRAM(s) IV Intermittent every 8 hours  midodrine 10 milliGRAM(s) Oral three times a day  nystatin Powder 1 Application(s) Topical three times a day  pantoprazole   Suspension 40 milliGRAM(s) Oral daily  pilocarpine 4% Solution 1 Drop(s) Both EYES two times a day  senna Syrup 10 milliLiter(s) Oral at bedtime  sodium chloride 0.9%. 1000 milliLiter(s) (60 mL/Hr) IV Continuous <Continuous>  vitamin A &amp; D Ointment 1 Application(s) Topical four times a day    MEDICATIONS  (PRN):  acetaminophen    Suspension 650 milliGRAM(s) Oral every 6 hours PRN For Temp greater than 38 C (100.4 F)  diphenhydrAMINE   Injectable 25 milliGRAM(s) IV Push once PRN Allergy symptoms  metoclopramide 5 milliGRAM(s) Oral Before meals and at bedtime PRN nausea                  RADIOLOGY & ADDITIONAL TESTS:    1/26/18 : Xray Chest 1 View AP -PORTABLE-Routine (01.26.18 @ 05:08) : Portable semiupright view of the chest dated 1/26/2018 is compared to 1/23/2018. Small left pleural effusion is stable. There is no pneumothorax. Right IJ line remains in good position.        1/23/18 : CT Abdomen and Pelvis w/ IV Cont (01.23.18 @ 09:19) : Malpositioned gastrostomy tube with balloon inflated in the left rectus muscle. The distal portion of the tip appears to be tethering the stomach to the abdominal wall. Tiny amount of fluid and droplet of air in the left rectus muscle surrounds the gastrostomy tube.    1/23/18 Xray Chest 1 View AP -PORTABLE-Routine (01.23.18 @ 09:33) : Left pleural effusion.        MICROBIOLOGY DATA:      Culture - Blood (01.26.18 @ 07:21)    Culture - Blood:   NO ORGANISMS ISOLATED  NO ORGANISMS ISOLATED AT 24 HOURS    Specimen Source: BLOOD VENOUS    Culture - Blood (01.26.18 @ 07:09)    Culture - Blood:   NO ORGANISMS ISOLATED  NO ORGANISMS ISOLATED AT 24 HOURS    Specimen Source: BLOOD PERIPHERAL    Specimen Source: BLOOD PERIPHERAL (01.26.18 @ 07:09)        Culture - Urine (01.23.18 @ 10:43)    Culture - Urine:   NO GROWTH AT 24 HOURS    Specimen Source: URINE CATHETER      Culture - Blood (01.23.18 @ 09:19)    Culture - Blood:   NO ORGANISMS ISOLATED  NO ORGANISMS ISOLATED AT 24 HOURS    Specimen Source: BLOOD VENOUS      Culture - Blood (01.23.18 @ 09:19)    Culture - Blood:   NO ORGANISMS ISOLATED  NO ORGANISMS ISOLATED AT 24 HOURS    Specimen Source: BLOOD PERIPHERAL
Patient is seen and examined at the bed side, is afebrile. The blood pressure is lower range normal. He is awake and alert today. The PEG site red and bulging out and firm to palpate. The cultures are negative.          REVIEW OF SYSTEMS: Unable to obtain due to mental status          Vital Signs Last 24 Hrs  T(C): 36.4 (25 Jan 2018 18:16), Max: 37 (25 Jan 2018 02:00)  T(F): 97.5 (25 Jan 2018 18:16), Max: 98.6 (25 Jan 2018 02:00)  HR: 76 (25 Jan 2018 18:16) (58 - 86)  BP: 99/61 (25 Jan 2018 18:16) (92/52 - 105/69)  BP(mean): --  RR: 17 (25 Jan 2018 18:16) (16 - 17)  SpO2: 99% (25 Jan 2018 18:16) (96% - 99%)              PHYSICAL EXAM:  GENERAL: Not in distress  HEENT: previous trach site ostomy is closed  CVS: s1 and s2 present  RESP: Air entry B/L with bronchial sounds  GI: Abdomen nondistended but PEG site red, bulging out and firm to palpate  EXT: No pedal edema  CNS: more awake         ALLERGIES: PCN  (Type of reaction ??)            LABS:                        8.2    4.97  )-----------( 157      ( 25 Jan 2018 05:50 )             26.6                           9.3    5.01  )-----------( 183      ( 24 Jan 2018 05:48 )             29.9             01-25    139  |  106  |  17  ----------------------------<  88  3.5   |  25  |  0.38<L>    Ca    7.1<L>      25 Jan 2018 05:50      TPro  6.9  /  Alb  2.4<L>  /  TBili  0.5  /  DBili  x   /  AST  28  /  ALT  27  /  AlkPhos  84  01-23      Urinalysis Basic - ( 23 Jan 2018 10:34 )    Color: YELLOW / Appearance: CLEAR / SG: > 1.040 / pH: 6.5  Gluc: NEGATIVE / Ketone: NEGATIVE  / Bili: NEGATIVE / Urobili: 1 mg/dL   Blood: MODERATE / Protein: 30 mg/dL / Nitrite: NEGATIVE   Leuk Esterase: TRACE / RBC: 25-50 / WBC 10-25   Sq Epi: x / Non Sq Epi: x / Bacteria: FEW            MEDICATIONS  (STANDING):  ALBUTerol/ipratropium for Nebulization 3 milliLiter(s) Nebulizer every 6 hours  aspirin  chewable 81 milliGRAM(s) Oral daily  atorvastatin 80 milliGRAM(s) Oral at bedtime  buDESOnide   0.25 milliGRAM(s) Respule 0.25 milliGRAM(s) Inhalation two times a day  carbidopa/levodopa  25/100 1.5 Tablet(s) Oral three times a day  docusate sodium Liquid 200 milliGRAM(s) Oral two times a day  dorzolamide 2%/timolol 0.5% Ophthalmic Solution 1 Drop(s) Both EYES two times a day  finasteride 5 milliGRAM(s) Oral daily  heparin  Injectable 5000 Unit(s) SubCutaneous every 8 hours  latanoprost 0.005% Ophthalmic Solution 1 Drop(s) Left EYE at bedtime  midodrine 10 milliGRAM(s) Oral three times a day  oseltamivir Suspension 75 milliGRAM(s) Oral two times a day  pantoprazole   Suspension 40 milliGRAM(s) Oral daily  pilocarpine 4% Solution 1 Drop(s) Both EYES two times a day  senna Syrup 10 milliLiter(s) Oral at bedtime  vitamin A &amp; D Ointment 1 Application(s) Topical four times a day    MEDICATIONS  (PRN):  acetaminophen    Suspension 650 milliGRAM(s) Oral every 6 hours PRN For Temp greater than 38 C (100.4 F)  metoclopramide 5 milliGRAM(s) Oral Before meals and at bedtime PRN nausea              RADIOLOGY & ADDITIONAL TESTS:      1/23/18 : CT Abdomen and Pelvis w/ IV Cont (01.23.18 @ 09:19) : Malpositioned gastrostomy tube with balloon inflated in the left rectus muscle. The distal portion of the tip appears to be tethering the stomach to the abdominal wall. Tiny amount of fluid and droplet of air in the left rectus muscle surrounds the gastrostomy tube.    1/23/18 Xray Chest 1 View AP -PORTABLE-Routine (01.23.18 @ 09:33) : Left pleural effusion.        MICROBIOLOGY DATA:        Culture - Urine (01.23.18 @ 10:43)    Culture - Urine:   NO GROWTH AT 24 HOURS    Specimen Source: URINE CATHETER      Culture - Blood (01.23.18 @ 09:19)    Culture - Blood:   NO ORGANISMS ISOLATED  NO ORGANISMS ISOLATED AT 24 HOURS    Specimen Source: BLOOD VENOUS      Culture - Blood (01.23.18 @ 09:19)    Culture - Blood:   NO ORGANISMS ISOLATED  NO ORGANISMS ISOLATED AT 24 HOURS    Specimen Source: BLOOD PERIPHERAL
Patient is seen and examined at the bed side, spiked fever today.  The PEG  tube has been changed but the site is still red, tender firm. The urine analysis  is positive, cultures pending.          REVIEW OF SYSTEMS: Unable to obtain due to mental status          Vital Signs Last 24 Hrs  T(C): 36.7 (26 Jan 2018 16:00), Max: 38 (26 Jan 2018 02:30)  T(F): 98.1 (26 Jan 2018 16:00), Max: 100.4 (26 Jan 2018 02:30)  HR: 75 (26 Jan 2018 16:00) (69 - 82)  BP: 102/65 (26 Jan 2018 16:00) (100/60 - 106/66)  BP(mean): --  RR: 16 (26 Jan 2018 16:00) (15 - 18)  SpO2: 100% (26 Jan 2018 16:00) (95% - 100%)            PHYSICAL EXAM:  GENERAL: Not in distress  HEENT: previous trach site ostomy is closed  CVS: s1 and s2 present  RESP: Air entry B/L with bronchial sounds  GI: Abdomen nondistended but PEG site red, tender bulging out and firm to palpate  EXT: No pedal edema  CNS: more awake           ALLERGIES: PCN  (Rash)            LABS:                        9.0    5.19  )-----------( 179      ( 26 Jan 2018 06:35 )             29.1                           8.2    4.97  )-----------( 157      ( 25 Jan 2018 05:50 )             26.6                        01-26    138  |  104  |  18  ----------------------------<  96  3.7   |  24  |  0.44<L>    Ca    7.3<L>      26 Jan 2018 06:35  Mg     1.7     01-26      TPro  6.9  /  Alb  2.4<L>  /  TBili  0.5  /  DBili  x   /  AST  28  /  ALT  27  /  AlkPhos  84  01-23      Urinalysis Basic - ( 23 Jan 2018 10:34 )    Color: YELLOW / Appearance: CLEAR / SG: > 1.040 / pH: 6.5  Gluc: NEGATIVE / Ketone: NEGATIVE  / Bili: NEGATIVE / Urobili: 1 mg/dL   Blood: MODERATE / Protein: 30 mg/dL / Nitrite: NEGATIVE   Leuk Esterase: TRACE / RBC: 25-50 / WBC 10-25   Sq Epi: x / Non Sq Epi: x / Bacteria: FEW            MEDICATIONS  (STANDING):  ALBUTerol/ipratropium for Nebulization 3 milliLiter(s) Nebulizer every 6 hours  aspirin  chewable 81 milliGRAM(s) Oral daily  atorvastatin 80 milliGRAM(s) Oral at bedtime  buDESOnide   0.25 milliGRAM(s) Respule 0.25 milliGRAM(s) Inhalation two times a day  carbidopa/levodopa  25/100 1.5 Tablet(s) Oral three times a day  cefepime  IVPB      cefepime  IVPB 1000 milliGRAM(s) IV Intermittent once  collagenase Ointment 1 Application(s) Topical daily  docusate sodium Liquid 200 milliGRAM(s) Oral two times a day  dorzolamide 2%/timolol 0.5% Ophthalmic Solution 1 Drop(s) Both EYES two times a day  finasteride 5 milliGRAM(s) Oral daily  heparin  Injectable 5000 Unit(s) SubCutaneous every 8 hours  latanoprost 0.005% Ophthalmic Solution 1 Drop(s) Left EYE at bedtime  metroNIDAZOLE  IVPB      metroNIDAZOLE  IVPB 500 milliGRAM(s) IV Intermittent every 8 hours  midodrine 10 milliGRAM(s) Oral three times a day  nystatin Powder 1 Application(s) Topical three times a day  oseltamivir Suspension 75 milliGRAM(s) Oral two times a day  pantoprazole   Suspension 40 milliGRAM(s) Oral daily  pilocarpine 4% Solution 1 Drop(s) Both EYES two times a day  senna Syrup 10 milliLiter(s) Oral at bedtime  sodium chloride 0.9%. 1000 milliLiter(s) (60 mL/Hr) IV Continuous <Continuous>  vitamin A &amp; D Ointment 1 Application(s) Topical four times a day    MEDICATIONS  (PRN):  acetaminophen    Suspension 650 milliGRAM(s) Oral every 6 hours PRN For Temp greater than 38 C (100.4 F)  metoclopramide 5 milliGRAM(s) Oral Before meals and at bedtime PRN nausea              RADIOLOGY & ADDITIONAL TESTS:    1/26/18 : Xray Chest 1 View AP -PORTABLE-Routine (01.26.18 @ 05:08) : Portable semiupright view of the chest dated 1/26/2018 is compared to 1/23/2018. Small left pleural effusion is stable. There is no pneumothorax. Right IJ line remains in good position.        1/23/18 : CT Abdomen and Pelvis w/ IV Cont (01.23.18 @ 09:19) : Malpositioned gastrostomy tube with balloon inflated in the left rectus muscle. The distal portion of the tip appears to be tethering the stomach to the abdominal wall. Tiny amount of fluid and droplet of air in the left rectus muscle surrounds the gastrostomy tube.    1/23/18 Xray Chest 1 View AP -PORTABLE-Routine (01.23.18 @ 09:33) : Left pleural effusion.        MICROBIOLOGY DATA:      Culture - Urine (01.23.18 @ 10:43)    Culture - Urine:   NO GROWTH AT 24 HOURS    Specimen Source: URINE CATHETER      Culture - Blood (01.23.18 @ 09:19)    Culture - Blood:   NO ORGANISMS ISOLATED  NO ORGANISMS ISOLATED AT 24 HOURS    Specimen Source: BLOOD VENOUS      Culture - Blood (01.23.18 @ 09:19)    Culture - Blood:   NO ORGANISMS ISOLATED  NO ORGANISMS ISOLATED AT 24 HOURS    Specimen Source: BLOOD PERIPHERAL
Patient seen and evaluated earlier this morning.  Full consult dictated    Assessment  1. New onset atrial fib  2. Influenza  3. CAD  4. H/o CVA    Plan  1. Heart rate is controlled. No indication for rhythm control.  2. Risks of anticoagulation outweighs benefits. Recommend aspirin 81 mg instead.  3. Fluid resuscitation as needed.  4. Further management as per the primary team.      Thank you for the courtesy of this referral.
Patient seen and examined at bedside    elevated temperature overnight, cultures obtained, abx adjusted    Vital Signs Last 24 Hrs  T(C): 37.3 (26 Jan 2018 06:20), Max: 38 (26 Jan 2018 02:30)  T(F): 99.1 (26 Jan 2018 06:20), Max: 100.4 (26 Jan 2018 02:30)  HR: 78 (26 Jan 2018 08:58) (68 - 81)  BP: 104/62 (26 Jan 2018 06:20) (99/61 - 106/66)  BP(mean): --  RR: 17 (26 Jan 2018 06:20) (17 - 18)  SpO2: 99% (26 Jan 2018 08:58) (95% - 100%)      PAST MEDICAL & SURGICAL HISTORY:  Alexander catheter in place  Oropharyngeal dysphagia  Glaucoma  CAD (coronary artery disease)  Parkinson disease  Tracheostomy in place: removed 12/2017  Hypertension  CVA (cerebral vascular accident)  H/O tracheostomy  S/P percutaneous endoscopic gastrostomy (PEG) tube placement      penicillin (Hives)       MEDICATIONS  (STANDING):  ALBUTerol/ipratropium for Nebulization 3 milliLiter(s) Nebulizer every 6 hours  aspirin  chewable 81 milliGRAM(s) Oral daily  atorvastatin 80 milliGRAM(s) Oral at bedtime  buDESOnide   0.25 milliGRAM(s) Respule 0.25 milliGRAM(s) Inhalation two times a day  carbidopa/levodopa  25/100 1.5 Tablet(s) Oral three times a day  docusate sodium Liquid 200 milliGRAM(s) Oral two times a day  dorzolamide 2%/timolol 0.5% Ophthalmic Solution 1 Drop(s) Both EYES two times a day  finasteride 5 milliGRAM(s) Oral daily  heparin  Injectable 5000 Unit(s) SubCutaneous every 8 hours  latanoprost 0.005% Ophthalmic Solution 1 Drop(s) Left EYE at bedtime  levoFLOXacin IVPB 500 milliGRAM(s) IV Intermittent every 24 hours  levoFLOXacin IVPB      metroNIDAZOLE  IVPB      metroNIDAZOLE  IVPB 500 milliGRAM(s) IV Intermittent every 8 hours  midodrine 10 milliGRAM(s) Oral three times a day  oseltamivir Suspension 75 milliGRAM(s) Oral two times a day  pantoprazole   Suspension 40 milliGRAM(s) Oral daily  pilocarpine 4% Solution 1 Drop(s) Both EYES two times a day  senna Syrup 10 milliLiter(s) Oral at bedtime  sodium chloride 0.9%. 1000 milliLiter(s) (60 mL/Hr) IV Continuous <Continuous>  vitamin A &amp; D Ointment 1 Application(s) Topical four times a day    MEDICATIONS  (PRN):  acetaminophen    Suspension 650 milliGRAM(s) Oral every 6 hours PRN For Temp greater than 38 C (100.4 F)  metoclopramide 5 milliGRAM(s) Oral Before meals and at bedtime PRN nausea      REVIEW OF SYSTEMS: limited 2/2 medical condition and mental status  CONSTITUTIONAL: low grade temperature overnight  NECK: No pain   RESPIRATORY: No wheezing, hemoptysis  CARDIOVASCULAR: No chest pain, palpitations  GASTROINTESTINAL: No abdominal pain. No vomiting  GENITOURINARY: No dysuria, hematuria  NEUROLOGICAL: No headaches  LYMPH Nodes: No enlarged glands  ENDOCRINE: No heat or cold intolerance; No hair loss  HEME/LYMPH: No easy bruising or bleeding  ALLERGY AND IMMUNOLOGIC: No hives or eczema	    T(C): 36.6 (01-25-18 @ 10:00), Max: 37.3 (01-24-18 @ 15:44)  HR: 70 (01-25-18 @ 10:00) (63 - 86)  BP: 105/69 (01-25-18 @ 10:00) (92/44 - 105/69)  RR: 16 (01-25-18 @ 10:00) (16 - 18)  SpO2: 98% (01-25-18 @ 10:00) (97% - 99%)    PHYSICAL EXAMINATION:   Constitutional: improving ill appearance, NAD  HEENT: NC, AT  Neck:  Supple  Respiratory:  CTA b/l.  Adequate airflow b/l. Not using accessory muscles of respiration.  Cardiovascular:  s1 and s2 intact, no R/G, 2+ pulses b/l  Gastrointestinal: Soft, NT, ND, normoactive b.s., no organomegaly/RT/rigidity  Extremities: WWP  Neurological:  stable. awake.      Labs and imaging reviewed    LABS:                 Complete Blood Count (01.26.18 @ 06:35)    WBC Count: 5.19 K/uL    RBC Count: 3.28 M/uL    Hemoglobin: 9.0 g/dL    Hematocrit: 29.1 %    Mean Cell Volume: 88.7 fL    Mean Cell Hemoglobin: 27.4 pg    Mean Cell Hemoglobin Conc: 30.9 %    Red Cell Distrib Width: 18.0 %    Platelet Count - Automated: 179 K/uL    MPV: 10.5 fl    Nucleated RBC #: 0      Basic Metabolic Panel (01.26.18 @ 06:35)    Sodium, Serum: 138 mmol/L    Potassium, Serum: 3.7 mmol/L    Chloride, Serum: 104 mmol/L    Carbon Dioxide, Serum: 24 mmol/L    Blood Urea Nitrogen, Serum: 18 mg/dL    Creatinine, Serum: 0.44 mg/dL    Glucose, Serum: 96 mg/dL    Calcium, Total Serum: 7.3 mg/dL    eGFR if Non : 108: The units for eGFR are ml/min/1.73m2 (normalized body  surface area). The eGFR is calculated from a serum  creatinine using the CKD-EPI equation. Other variables  required for calculation are race, age and sex. Among  patients with chronic kidney disease (CKD), the eGFR is  useful in determining the stage of disease according to  KDOQI CKD classification. All eGFR results are reported  numerically with the following interpretation.    GFR  (ml/min/1.73 m2)          W/KIDNEY DAMAGE    W/O KIDNEY DMG  ==========================================================  >= 90.......................Stage 1..............Normal  60-89.......................Stage 2...........Decreased GFR  30-59.......................Stage 3..............Stage 3  15-29.......................Stage 4..............Stage 4  < 15........................Stage 5..............Stage 5    Each stage of CKD assumes that the associated GFR level  has been in effect for at least 3 months. Determination of  stages one and two (with eGFR > 59ml/min/m2) requires  estimation of kidney damage for at least 3 months as  defined by structural or functional abnormalities.    Limitations: All estimates of GFR will be less accurate  for patients at extremes of muscle mass (including but  not limited to frail elderly, critically ill, or cancer  patients), those with unusual diets, and those with  conditions associated with reduced secretion or  extrarenal elimination of creatinine. The eGFR equation  is not recommended for use in patients with unstable  creatinine levels. mL/min    eGFR if : 125 mL/min                  CAPILLARY BLOOD GLUCOSE                  RADIOLOGY & ADDITIONAL STUDIES:
Patient seen and examined at bedside    s/p successful peg exchange by IR    Vital Signs Last 24 Hrs  T(C): 36.6 (27 Jan 2018 04:00), Max: 37.1 (26 Jan 2018 20:00)  T(F): 97.9 (27 Jan 2018 04:00), Max: 98.7 (26 Jan 2018 20:00)  HR: 66 (27 Jan 2018 09:44) (66 - 82)  BP: 127/80 (27 Jan 2018 04:00) (102/65 - 127/80)  BP(mean): --  RR: 17 (27 Jan 2018 04:00) (16 - 17)  SpO2: 96% (27 Jan 2018 09:44) (96% - 100%)      PAST MEDICAL & SURGICAL HISTORY:  Alexander catheter in place  Oropharyngeal dysphagia  Glaucoma  CAD (coronary artery disease)  Parkinson disease  Tracheostomy in place: removed 12/2017  Hypertension  CVA (cerebral vascular accident)  H/O tracheostomy  S/P percutaneous endoscopic gastrostomy (PEG) tube placement      penicillin (Hives)       MEDICATIONS  (STANDING):  ALBUTerol/ipratropium for Nebulization 3 milliLiter(s) Nebulizer every 6 hours  aspirin  chewable 81 milliGRAM(s) Oral daily  atorvastatin 80 milliGRAM(s) Oral at bedtime  buDESOnide   0.25 milliGRAM(s) Respule 0.25 milliGRAM(s) Inhalation two times a day  carbidopa/levodopa  25/100 1.5 Tablet(s) Oral three times a day  cefepime  IVPB      cefepime  IVPB 1000 milliGRAM(s) IV Intermittent every 8 hours  collagenase Ointment 1 Application(s) Topical daily  docusate sodium Liquid 200 milliGRAM(s) Oral two times a day  dorzolamide 2%/timolol 0.5% Ophthalmic Solution 1 Drop(s) Both EYES two times a day  finasteride 5 milliGRAM(s) Oral daily  heparin  Injectable 5000 Unit(s) SubCutaneous every 8 hours  latanoprost 0.005% Ophthalmic Solution 1 Drop(s) Left EYE at bedtime  metroNIDAZOLE  IVPB      metroNIDAZOLE  IVPB 500 milliGRAM(s) IV Intermittent every 8 hours  midodrine 10 milliGRAM(s) Oral three times a day  nystatin Powder 1 Application(s) Topical three times a day  oseltamivir Suspension 75 milliGRAM(s) Oral two times a day  pantoprazole   Suspension 40 milliGRAM(s) Oral daily  pilocarpine 4% Solution 1 Drop(s) Both EYES two times a day  senna Syrup 10 milliLiter(s) Oral at bedtime  sodium chloride 0.9%. 1000 milliLiter(s) (60 mL/Hr) IV Continuous <Continuous>  vitamin A &amp; D Ointment 1 Application(s) Topical four times a day    MEDICATIONS  (PRN):  acetaminophen    Suspension 650 milliGRAM(s) Oral every 6 hours PRN For Temp greater than 38 C (100.4 F)  diphenhydrAMINE   Injectable 25 milliGRAM(s) IV Push once PRN Allergy symptoms  metoclopramide 5 milliGRAM(s) Oral Before meals and at bedtime PRN nausea      REVIEW OF SYSTEMS: limited 2/2 medical condition and mental status  CONSTITUTIONAL: no fevers   NECK: No pain   RESPIRATORY: No wheezing, hemoptysis  CARDIOVASCULAR: No chest pain, palpitations  GASTROINTESTINAL: No abdominal pain. No vomiting  GENITOURINARY: No dysuria, hematuria  NEUROLOGICAL: No headaches  LYMPH Nodes: No enlarged glands  ENDOCRINE: No heat or cold intolerance; No hair loss  HEME/LYMPH: No easy bruising or bleeding  ALLERGY AND IMMUNOLOGIC: No hives or eczema	      PHYSICAL EXAMINATION:   Constitutional: improving ill appearance, NAD  HEENT: NC, AT  Neck:  Supple  Respiratory: decreased left basilar breath sounds otherwise CTA b/l.  Adequate airflow b/l. Not using accessory muscles of respiration.  Cardiovascular:  s1 and s2 intact, no R/G, 2+ pulses b/l  Gastrointestinal: peg intact, soft, NT, ND, normoactive b.s., no organomegaly/RT/rigidity  Extremities: WWP  Neurological:  stable. awake.  speaks few words    Labs and imaging reviewed    LABS:                 Basic Metabolic Panel w/Magnesium (01.27.18 @ 06:30)    eGFR if : 128 mL/min    eGFR if Non : 110: The units for eGFR are ml/min/1.73m2 (normalized body  surface area). The eGFR is calculated from a serum  creatinine using the CKD-EPI equation. Other variables  required for calculation are race, age and sex. Among  patients with chronic kidney disease (CKD), the eGFR is  useful in determining the stage of disease according to  KDOQI CKD classification. All eGFR results are reported  numerically with the following interpretation.    GFR  (ml/min/1.73 m2)          W/KIDNEY DAMAGE    W/O KIDNEY DMG  ==========================================================  >= 90.......................Stage 1..............Normal  60-89.......................Stage 2...........Decreased GFR  30-59.......................Stage 3..............Stage 3  15-29.......................Stage 4..............Stage 4  < 15........................Stage 5..............Stage 5    Each stage of CKD assumes that the associated GFR level  has been in effect for at least 3 months. Determination of  stages one and two (with eGFR > 59ml/min/m2) requires  estimation of kidney damage for at least 3 months as  defined by structural or functional abnormalities.    Limitations: All estimates of GFR will be less accurate  for patients at extremes of muscle mass (including but  not limited to frail elderly, critically ill, or cancer  patients), those with unusual diets, and those with  conditions associated with reduced secretion or  extrarenal elimination of creatinine. The eGFR equation  is not recommended for use in patients with unstable  creatinine levels. mL/min    Calcium, Total Serum: 7.0 mg/dL    Sodium, Serum: 138 mmol/L    Potassium, Serum: 3.8 mmol/L    Chloride, Serum: 106 mmol/L    Carbon Dioxide, Serum: 22 mmol/L    Blood Urea Nitrogen, Serum: 18 mg/dL    Creatinine, Serum: 0.42 mg/dL    Glucose, Serum: 130 mg/dL    Magnesium, Serum: 1.7 mg/dL                        Complete Blood Count + Automated Diff in AM (01.27.18 @ 06:30)    Nucleated RBC #: 0    WBC Count: 4.26 K/uL    RBC Count: 3.18 M/uL    Hemoglobin: 9.1 g/dL    Hematocrit: 28.1 %    Mean Cell Volume: 88.4 fL    Mean Cell Hemoglobin: 28.6 pg    Mean Cell Hemoglobin Conc: 32.4 %    Red Cell Distrib Width: 17.7 %    Platelet Count - Automated: 174 K/uL    MPV: 10.3 fl    Auto Neutrophil #: 2.77 K/uL    Auto Lymphocyte #: 0.84 K/uL    Auto Monocyte #: 0.37 K/uL    Auto Eosinophil #: 0.25 K/uL    Auto Basophil #: 0.01 K/uL    Auto Immature Granulocyte #: 0.02: (Includes meta, myelo and promyelocytes) #    Auto Neutrophil %: 65.0 %    Auto Lymphocyte %: 19.7 %    Auto Monocyte %: 8.7 %    Auto Eosinophil %: 5.9 %    Auto Basophil %: 0.2 %    Auto Immature Granulocyte %: 0.5: (Includes meta, myelo and promyelocytes) %            RADIOLOGY & ADDITIONAL STUDIES:
Patient seen and examined at bedside  No acute events noted overnight    Vital Signs Last 24 Hrs  T(C): 39.3 (23 Jan 2018 06:28), Max: 39.3 (23 Jan 2018 06:28)  T(F): 102.7 (23 Jan 2018 06:28), Max: 102.7 (23 Jan 2018 06:28)  HR: 77 (23 Jan 2018 15:24) (74 - 88)  BP: 120/54 (23 Jan 2018 15:24) (89/32 - 126/61)  BP(mean): --  RR: 20 (23 Jan 2018 15:24) (18 - 25)  SpO2: 97% (23 Jan 2018 15:24) (96% - 98%)      PAST MEDICAL & SURGICAL HISTORY:  Alexander catheter in place  Oropharyngeal dysphagia  Glaucoma  CAD (coronary artery disease)  Parkinson disease  Tracheostomy in place: removed 12/2017  Hypertension  CVA (cerebral vascular accident)  H/O tracheostomy  S/P percutaneous endoscopic gastrostomy (PEG) tube placement      penicillin (Hives)       MEDICATIONS  (STANDING):  ALBUTerol/ipratropium for Nebulization 3 milliLiter(s) Nebulizer every 6 hours  aspirin  chewable 81 milliGRAM(s) Oral daily  atorvastatin 80 milliGRAM(s) Oral at bedtime  buDESOnide   0.25 milliGRAM(s) Respule 0.25 milliGRAM(s) Inhalation two times a day  carbidopa/levodopa  25/100 1.5 Tablet(s) Oral three times a day  docusate sodium Liquid 200 milliGRAM(s) Oral two times a day  dorzolamide 2%/timolol 0.5% Ophthalmic Solution 1 Drop(s) Both EYES two times a day  finasteride 5 milliGRAM(s) Oral daily  heparin  Injectable 5000 Unit(s) SubCutaneous every 8 hours  latanoprost 0.005% Ophthalmic Solution 1 Drop(s) Left EYE at bedtime  midodrine 10 milliGRAM(s) Oral three times a day  oseltamivir Suspension 75 milliGRAM(s) Oral two times a day  pantoprazole   Suspension 40 milliGRAM(s) Oral daily  pilocarpine 4% Solution 1 Drop(s) Both EYES two times a day  senna Syrup 10 milliLiter(s) Oral at bedtime  sodium chloride 0.9%. 1000 milliLiter(s) (60 mL/Hr) IV Continuous <Continuous>  vitamin A &amp; D Ointment 1 Application(s) Topical four times a day    MEDICATIONS  (PRN):  acetaminophen    Suspension 650 milliGRAM(s) Oral every 6 hours PRN For Temp greater than 38 C (100.4 F)  metoclopramide 5 milliGRAM(s) Oral Before meals and at bedtime PRN nausea      REVIEW OF SYSTEMS: limited 2/2 medical condition and mental status  CONSTITUTIONAL: no noted fevers overnight   NECK: No pain   RESPIRATORY: No wheezing, hemoptysis  CARDIOVASCULAR: No chest pain, palpitations  GASTROINTESTINAL: No abdominal pain. No vomiting  GENITOURINARY: No dysuria, hematuria  NEUROLOGICAL: No headaches  LYMPH Nodes: No enlarged glands  ENDOCRINE: No heat or cold intolerance; No hair loss  HEME/LYMPH: No easy bruising or bleeding  ALLERGY AND IMMUNOLOGIC: No hives or eczema	    T(C): 36.6 (01-25-18 @ 10:00), Max: 37.3 (01-24-18 @ 15:44)  HR: 70 (01-25-18 @ 10:00) (63 - 86)  BP: 105/69 (01-25-18 @ 10:00) (92/44 - 105/69)  RR: 16 (01-25-18 @ 10:00) (16 - 18)  SpO2: 98% (01-25-18 @ 10:00) (97% - 99%)    PHYSICAL EXAMINATION:   Constitutional: ill appearing, NAD  HEENT: NC, AT  Neck:  Supple  Respiratory:  CTA b/l.  Adequate airflow b/l. Not using accessory muscles of respiration.  Cardiovascular:  s1 and s2 intact, no R/G, 2+ pulses b/l  Gastrointestinal: Soft, NT, ND, normoactive b.s., no organomegaly/RT/rigidity  Extremities: WWP  Neurological:  stable. awake.      Labs and imaging reviewed    LABS:                        8.2    4.97  )-----------( 157      ( 25 Jan 2018 05:50 )             26.6     01-25    139  |  106  |  17  ----------------------------<  88  3.5   |  25  |  0.38<L>    Ca    7.1<L>      25 Jan 2018 05:50              CAPILLARY BLOOD GLUCOSE                  RADIOLOGY & ADDITIONAL STUDIES:
Patient seen and examined at bedside  with pts wife at bedside    no acute events noted overnight     Vital Signs Last 24 Hrs  T(C): 37.2 (28 Jan 2018 13:12), Max: 37.2 (27 Jan 2018 22:14)  T(F): 99 (28 Jan 2018 13:12), Max: 99 (27 Jan 2018 22:14)  HR: 79 (28 Jan 2018 13:12) (66 - 79)  BP: 135/80 (28 Jan 2018 13:12) (111/63 - 135/80)  BP(mean): --  RR: 16 (28 Jan 2018 13:12) (16 - 18)  SpO2: 100% (28 Jan 2018 13:12) (96% - 100%)      PAST MEDICAL & SURGICAL HISTORY:  Alexander catheter in place  Oropharyngeal dysphagia  Glaucoma  CAD (coronary artery disease)  Parkinson disease  Tracheostomy in place: removed 12/2017  Hypertension  CVA (cerebral vascular accident)  H/O tracheostomy  S/P percutaneous endoscopic gastrostomy (PEG) tube placement      penicillin (Hives)       MEDICATIONS  (STANDING):  ALBUTerol/ipratropium for Nebulization 3 milliLiter(s) Nebulizer every 6 hours  aspirin  chewable 81 milliGRAM(s) Oral daily  atorvastatin 80 milliGRAM(s) Oral at bedtime  buDESOnide   0.25 milliGRAM(s) Respule 0.25 milliGRAM(s) Inhalation two times a day  carbidopa/levodopa  25/100 1.5 Tablet(s) Oral three times a day  cefepime  IVPB      cefepime  IVPB 1000 milliGRAM(s) IV Intermittent every 8 hours  collagenase Ointment 1 Application(s) Topical daily  docusate sodium Liquid 200 milliGRAM(s) Oral two times a day  dorzolamide 2%/timolol 0.5% Ophthalmic Solution 1 Drop(s) Both EYES two times a day  finasteride 5 milliGRAM(s) Oral daily  heparin  Injectable 5000 Unit(s) SubCutaneous every 8 hours  latanoprost 0.005% Ophthalmic Solution 1 Drop(s) Left EYE at bedtime  metroNIDAZOLE  IVPB      metroNIDAZOLE  IVPB 500 milliGRAM(s) IV Intermittent every 8 hours  midodrine 10 milliGRAM(s) Oral three times a day  nystatin Powder 1 Application(s) Topical three times a day  pantoprazole   Suspension 40 milliGRAM(s) Oral daily  pilocarpine 4% Solution 1 Drop(s) Both EYES two times a day  senna Syrup 10 milliLiter(s) Oral at bedtime  sodium chloride 0.9%. 1000 milliLiter(s) (60 mL/Hr) IV Continuous <Continuous>  vitamin A &amp; D Ointment 1 Application(s) Topical four times a day    MEDICATIONS  (PRN):  acetaminophen    Suspension 650 milliGRAM(s) Oral every 6 hours PRN For Temp greater than 38 C (100.4 F)  diphenhydrAMINE   Injectable 25 milliGRAM(s) IV Push once PRN Allergy symptoms  metoclopramide 5 milliGRAM(s) Oral Before meals and at bedtime PRN nausea    REVIEW OF SYSTEMS: limited 2/2 medical condition and mental status  CONSTITUTIONAL: no fevers   NECK: No pain   RESPIRATORY: No wheezing, hemoptysis  CARDIOVASCULAR: No chest pain, palpitations  GASTROINTESTINAL: No abdominal pain. No vomiting  GENITOURINARY: No dysuria, hematuria  NEUROLOGICAL: No headaches  LYMPH Nodes: No enlarged glands  ENDOCRINE: No heat or cold intolerance; No hair loss  HEME/LYMPH: No easy bruising or bleeding  ALLERGY AND IMMUNOLOGIC: No hives or eczema	      PHYSICAL EXAMINATION:   Constitutional: improving ill appearance, NAD  HEENT: NC, AT  Neck:  Supple  Respiratory: decreased left basilar breath sounds otherwise CTA b/l.  Adequate airflow b/l. Not using accessory muscles of respiration.  Cardiovascular:  s1 and s2 intact, no R/G, 2+ pulses b/l  Gastrointestinal: peg intact, soft, NT, ND, normoactive b.s., no organomegaly/RT/rigidity  Extremities: WWP  Neurological:  stable. awake. speaks few words    Labs and imaging reviewed    LABS:                    Basic Metabolic Panel w/Magnesium (01.28.18 @ 06:30)    eGFR if : 133 mL/min    eGFR if Non : 115: The units for eGFR are ml/min/1.73m2 (normalized body  surface area). The eGFR is calculated from a serum  creatinine using the CKD-EPI equation. Other variables  required for calculation are race, age and sex. Among  patients with chronic kidney disease (CKD), the eGFR is  useful in determining the stage of disease according to  KDOQI CKD classification. All eGFR results are reported  numerically with the following interpretation.    GFR  (ml/min/1.73 m2)          W/KIDNEY DAMAGE    W/O KIDNEY DMG  ==========================================================  >= 90.......................Stage 1..............Normal  60-89.......................Stage 2...........Decreased GFR  30-59.......................Stage 3..............Stage 3  15-29.......................Stage 4..............Stage 4  < 15........................Stage 5..............Stage 5    Each stage of CKD assumes that the associated GFR level  has been in effect for at least 3 months. Determination of  stages one and two (with eGFR > 59ml/min/m2) requires  estimation of kidney damage for at least 3 months as  defined by structural or functional abnormalities.    Limitations: All estimates of GFR will be less accurate  for patients at extremes of muscle mass (including but  not limited to frail elderly, critically ill, or cancer  patients), those with unusual diets, and those with  conditions associated with reduced secretion or  extrarenal elimination of creatinine. The eGFR equation  is not recommended for use in patients with unstable  creatinine levels. mL/min    Calcium, Total Serum: 7.1 mg/dL    Sodium, Serum: 139 mmol/L    Potassium, Serum: 3.6 mmol/L    Chloride, Serum: 105 mmol/L    Carbon Dioxide, Serum: 26 mmol/L    Blood Urea Nitrogen, Serum: 15 mg/dL    Creatinine, Serum: 0.38 mg/dL    Glucose, Serum: 126 mg/dL    Magnesium, Serum: 1.6 mg/dL      Complete Blood Count + Automated Diff in AM (01.28.18 @ 06:30)    Nucleated RBC #: 0    WBC Count: 3.30 K/uL    RBC Count: 3.12 M/uL    Hemoglobin: 8.5 g/dL    Hematocrit: 27.7 %    Mean Cell Volume: 88.8 fL    Mean Cell Hemoglobin: 27.2 pg    Mean Cell Hemoglobin Conc: 30.7 %    Red Cell Distrib Width: 18.0 %    Platelet Count - Automated: 178 K/uL    MPV: 11.0 fl    Auto Neutrophil #: 1.94 K/uL    Auto Lymphocyte #: 0.74 K/uL    Auto Monocyte #: 0.31 K/uL    Auto Eosinophil #: 0.28 K/uL    Auto Basophil #: 0.01 K/uL    Auto Immature Granulocyte #: 0.02: (Includes meta, myelo and promyelocytes) #    Auto Neutrophil %: 58.8 %    Auto Lymphocyte %: 22.4 %    Auto Monocyte %: 9.4 %    Auto Eosinophil %: 8.5 %    Auto Basophil %: 0.3 %    Auto Immature Granulocyte %: 0.6: (Includes meta, myelo and promyelocytes) %      RADIOLOGY & ADDITIONAL STUDIES:
Patient seen and examined at bedside, with patients wife at bedside  Case discussed with medical team    overnight, pt with afib (well controlled heart rate), and patient placed on telemonitor  no additional acute events noted      Vital Signs Last 24 Hrs  T(C): 39.3 (23 Jan 2018 06:28), Max: 39.3 (23 Jan 2018 06:28)  T(F): 102.7 (23 Jan 2018 06:28), Max: 102.7 (23 Jan 2018 06:28)  HR: 77 (23 Jan 2018 15:24) (74 - 88)  BP: 120/54 (23 Jan 2018 15:24) (89/32 - 126/61)  BP(mean): --  RR: 20 (23 Jan 2018 15:24) (18 - 25)  SpO2: 97% (23 Jan 2018 15:24) (96% - 98%)      PAST MEDICAL & SURGICAL HISTORY:  Alexander catheter in place  Oropharyngeal dysphagia  Glaucoma  CAD (coronary artery disease)  Parkinson disease  Tracheostomy in place: removed 12/2017  Hypertension  CVA (cerebral vascular accident)  H/O tracheostomy  S/P percutaneous endoscopic gastrostomy (PEG) tube placement    allergies:  penicillin (Hives)       MEDICATIONS  (STANDING):  ALBUTerol/ipratropium for Nebulization 3 milliLiter(s) Nebulizer every 6 hours  aspirin  chewable 81 milliGRAM(s) Oral daily  atorvastatin 80 milliGRAM(s) Oral at bedtime  buDESOnide   0.25 milliGRAM(s) Respule 0.25 milliGRAM(s) Inhalation two times a day  carbidopa/levodopa  25/100 1.5 Tablet(s) Oral three times a day  docusate sodium Liquid 200 milliGRAM(s) Oral two times a day  dorzolamide 2%/timolol 0.5% Ophthalmic Solution 1 Drop(s) Both EYES two times a day  finasteride 5 milliGRAM(s) Oral daily  heparin  Injectable 5000 Unit(s) SubCutaneous every 8 hours  latanoprost 0.005% Ophthalmic Solution 1 Drop(s) Left EYE at bedtime  midodrine 10 milliGRAM(s) Oral three times a day  oseltamivir Suspension 75 milliGRAM(s) Oral two times a day  pantoprazole   Suspension 40 milliGRAM(s) Oral daily  pilocarpine 4% Solution 1 Drop(s) Both EYES two times a day  senna Syrup 10 milliLiter(s) Oral at bedtime  sodium chloride 0.9%. 1000 milliLiter(s) (60 mL/Hr) IV Continuous <Continuous>  vitamin A &amp; D Ointment 1 Application(s) Topical four times a day    MEDICATIONS  (PRN):  acetaminophen    Suspension 650 milliGRAM(s) Oral every 6 hours PRN For Temp greater than 38 C (100.4 F)  metoclopramide 5 milliGRAM(s) Oral Before meals and at bedtime PRN nausea      REVIEW OF SYSTEMS: limited 2/2 acute medical condition and mental status  CONSTITUTIONAL: no fevers  NECK: No pain   RESPIRATORY: No cough, wheezing, hemoptysis  CARDIOVASCULAR: No chest pain, palpitations, syncope,  GASTROINTESTINAL: No abdominal pain. No vomiting, hematemesis, diarrhea, melena, or hematochezia.  GENITOURINARY: No hematuria  LYMPH Nodes: No enlarged glands  ENDOCRINE: No heat or cold intolerance; No hair loss  HEME/LYMPH: No easy bruising or bleeding    T(C): 36.8 (01-24-18 @ 11:13), Max: 39.6 (01-23-18 @ 18:15)  HR: 75 (01-24-18 @ 11:13) (75 - 105)  BP: 92/49 (01-24-18 @ 11:13) (92/49 - 126/61)  RR: 20 (01-24-18 @ 11:13) (18 - 20)  SpO2: 99% (01-24-18 @ 11:13) (96% - 100%)    PHYSICAL EXAMINATION:   Constitutional: improving ill appearance  HEENT: sunken eyes, poor dentition, dry mouth, AT  Neck:  Supple  Respiratory:  decreased left sided breath sounds, othersise adequate breath sounds and CTA b/l.  Not using accessory muscles of respiration.  Cardiovascular:  irregularly irregular rhythm. s1 and s2 intact, no R/G, 2+ pulses b/l  Gastrointestinal: peg intact, Soft, NT, normoactive b.s., no organomegaly/RT/rigidity  Extremities: WWP  Neurological:  Alert and awake.     Labs and imaging reviewed    LABS:                        9.3    5.01  )-----------( 183      ( 24 Jan 2018 05:48 )             29.9     01-24    141  |  108<H>  |  23  ----------------------------<  119<H>  4.1   |  25  |  0.55    Ca    7.4<L>      24 Jan 2018 05:48    TPro  6.9  /  Alb  2.4<L>  /  TBili  0.5  /  DBili  x   /  AST  28  /  ALT  27  /  AlkPhos  84  01-23          Urinalysis Basic - ( 23 Jan 2018 10:34 )    Color: YELLOW / Appearance: CLEAR / SG: > 1.040 / pH: 6.5  Gluc: NEGATIVE / Ketone: NEGATIVE  / Bili: NEGATIVE / Urobili: 1 mg/dL   Blood: MODERATE / Protein: 30 mg/dL / Nitrite: NEGATIVE   Leuk Esterase: TRACE / RBC: 25-50 / WBC 10-25   Sq Epi: x / Non Sq Epi: x / Bacteria: FEW      CAPILLARY BLOOD GLUCOSE      LIVER FUNCTIONS - ( 23 Jan 2018 08:14 )  Alb: 2.4 g/dL / Pro: 6.9 g/dL / ALK PHOS: 84 u/L / ALT: 27 u/L / AST: 28 u/L / GGT: x               RADIOLOGY & ADDITIONAL STUDIES:
no acute events noted overnight    PAST MEDICAL & SURGICAL HISTORY:  Alexander catheter in place  Oropharyngeal dysphagia  Glaucoma  CAD (coronary artery disease)  Parkinson disease  Tracheostomy in place: removed 12/2017  Hypertension  CVA (cerebral vascular accident)  H/O tracheostomy  S/P percutaneous endoscopic gastrostomy (PEG) tube placement    allergies:  penicillin (Hives)       MEDICATIONS  (STANDING):  ALBUTerol/ipratropium for Nebulization 3 milliLiter(s) Nebulizer every 6 hours  aspirin  chewable 81 milliGRAM(s) Oral daily  atorvastatin 80 milliGRAM(s) Oral at bedtime  buDESOnide   0.25 milliGRAM(s) Respule 0.25 milliGRAM(s) Inhalation two times a day  carbidopa/levodopa  25/100 1.5 Tablet(s) Oral three times a day  cefepime  IVPB      cefepime  IVPB 1000 milliGRAM(s) IV Intermittent every 8 hours  collagenase Ointment 1 Application(s) Topical daily  docusate sodium Liquid 200 milliGRAM(s) Oral two times a day  dorzolamide 2%/timolol 0.5% Ophthalmic Solution 1 Drop(s) Both EYES two times a day  finasteride 5 milliGRAM(s) Oral daily  fluconAZOLE IVPB 200 milliGRAM(s) IV Intermittent every 24 hours  fluconAZOLE IVPB      heparin  Injectable 5000 Unit(s) SubCutaneous every 8 hours  latanoprost 0.005% Ophthalmic Solution 1 Drop(s) Left EYE at bedtime  metroNIDAZOLE  IVPB 500 milliGRAM(s) IV Intermittent every 8 hours  metroNIDAZOLE  IVPB      midodrine 10 milliGRAM(s) Oral three times a day  nystatin    Suspension 364608 Unit(s) Oral three times a day  nystatin Powder 1 Application(s) Topical three times a day  pantoprazole   Suspension 40 milliGRAM(s) Oral daily  pilocarpine 4% Solution 1 Drop(s) Both EYES two times a day  senna Syrup 10 milliLiter(s) Oral at bedtime  sodium chloride 0.9%. 1000 milliLiter(s) (60 mL/Hr) IV Continuous <Continuous>  vitamin A &amp; D Ointment 1 Application(s) Topical four times a day    MEDICATIONS  (PRN):  acetaminophen    Suspension 650 milliGRAM(s) Oral every 6 hours PRN For Temp greater than 38 C (100.4 F)  diphenhydrAMINE   Injectable 25 milliGRAM(s) IV Push once PRN Allergy symptoms  metoclopramide 5 milliGRAM(s) Oral Before meals and at bedtime PRN nausea      REVIEW OF SYSTEMS:  CONSTITUTIONAL: no fevers  EYES: No acute change in vision or eye pain  NECK: No pain   RESPIRATORY: No cough, wheezing, hemoptysis  CARDIOVASCULAR: No chest pain, palpitations,  GASTROINTESTINAL: No abdominal pain. No vomiting, hematemesis, diarrhea,   GENITOURINARY: No dysuria, hematuria  NEUROLOGICAL: No headaches,  LYMPH Nodes: No enlarged glands  ENDOCRINE: No heat or cold intolerance; No hair loss    Vital Signs Last 24 Hrs  T(C): 36.8 (30 Jan 2018 13:27), Max: 37.2 (30 Jan 2018 06:25)  T(F): 98.3 (30 Jan 2018 13:27), Max: 98.9 (30 Jan 2018 06:25)  HR: 74 (30 Jan 2018 13:27) (67 - 85)  BP: 111/60 (30 Jan 2018 13:27) (106/61 - 127/81)  BP(mean): --  RR: 18 (30 Jan 2018 13:27) (18 - 18)  SpO2: 98% (30 Jan 2018 13:27) (95% - 100%)    PHYSICAL EXAMINATION:   Constitutional: NAD  HEENT: NC, AT  Neck:  Supple  Respiratory:  CTA b/l Not using accessory muscles of respiration.  Cardiovascular:  s1 & s2 intact. no R/G, 2+ pulses b/l  Gastrointestinal: peg intact, Soft, NT, normoactive b.s., no organomegaly/RT/rigidity  Extremities: WWP.    Neurological:  awake and alert    Labs and imaging reviewed    LABS:                Basic Metabolic Panel w/Magnesium (01.30.18 @ 06:00)    Calcium, Total Serum: 7.4 mg/dL    eGFR if : 134 mL/min    eGFR if Non : 116: The units for eGFR are ml/min/1.73m2 (normalized body  surface area). The eGFR is calculated from a serum  creatinine using the CKD-EPI equation. Other variables  required for calculation are race, age and sex. Among  patients with chronic kidney disease (CKD), the eGFR is  useful in determining the stage of disease according to  KDOQI CKD classification. All eGFR results are reported  numerically with the following interpretation.    GFR  (ml/min/1.73 m2)          W/KIDNEY DAMAGE    W/O KIDNEY DMG  ==========================================================  >= 90.......................Stage 1..............Normal  60-89.......................Stage 2...........Decreased GFR  30-59.......................Stage 3..............Stage 3  15-29.......................Stage 4..............Stage 4  < 15........................Stage 5..............Stage 5    Each stage of CKD assumes that the associated GFR level  has been in effect for at least 3 months. Determination of  stages one and two (with eGFR > 59ml/min/m2) requires  estimation of kidney damage for at least 3 months as  defined by structural or functional abnormalities.    Limitations: All estimates of GFR will be less accurate  for patients at extremes of muscle mass (including but  not limited to frail elderly, critically ill, or cancer  patients), those with unusual diets, and those with  conditions associated with reduced secretion or  extrarenal elimination of creatinine. The eGFR equation  is not recommended for use in patients with unstable  creatinine levels. mL/min    Sodium, Serum: 137 mmol/L    Potassium, Serum: 4.1 mmol/L    Chloride, Serum: 100 mmol/L    Carbon Dioxide, Serum: 28 mmol/L    Blood Urea Nitrogen, Serum: 16 mg/dL    Creatinine, Serum: 0.37 mg/dL    Glucose, Serum: 97 mg/dL    Magnesium, Serum: 1.8 mg/dL

## 2018-01-30 NOTE — PROGRESS NOTE ADULT - PROBLEM SELECTOR PLAN 7
fall precautions  aspiration precautions  frequent repositioning in bed
fall precautions  aspiration precautions  frequent repositioning in bed
fall precautions  aspiration precautions  frequent repositioning in bet

## 2018-01-30 NOTE — PROGRESS NOTE ADULT - PROBLEM SELECTOR PLAN 5
heart rate well controlled  no beta blocker 2/2 hypotension  no anticoagulation 2/2 risks > benefits  c/w asa 81  cardiology recs appreciated
heart rate well controlled
improving with ivf
s/p peg replacement by IR  supplement diet
supplement diet

## 2018-01-31 ENCOUNTER — APPOINTMENT (OUTPATIENT)
Dept: UROLOGY | Facility: CLINIC | Age: 83
End: 2018-01-31

## 2018-01-31 LAB
BACTERIA BLD CULT: SIGNIFICANT CHANGE UP
BACTERIA BLD CULT: SIGNIFICANT CHANGE UP

## 2018-02-01 ENCOUNTER — EMERGENCY (EMERGENCY)
Facility: HOSPITAL | Age: 83
LOS: 1 days | Discharge: HOME | End: 2018-02-01
Attending: EMERGENCY MEDICINE | Admitting: EMERGENCY MEDICINE
Payer: MEDICARE

## 2018-02-01 VITALS
TEMPERATURE: 98 F | SYSTOLIC BLOOD PRESSURE: 130 MMHG | DIASTOLIC BLOOD PRESSURE: 80 MMHG | OXYGEN SATURATION: 93 % | HEART RATE: 83 BPM | RESPIRATION RATE: 20 BRPM

## 2018-02-01 DIAGNOSIS — Z98.890 OTHER SPECIFIED POSTPROCEDURAL STATES: Chronic | ICD-10-CM

## 2018-02-01 DIAGNOSIS — Z93.1 GASTROSTOMY STATUS: Chronic | ICD-10-CM

## 2018-02-01 PROCEDURE — 74019 RADEX ABDOMEN 2 VIEWS: CPT | Mod: 26

## 2018-02-01 PROCEDURE — 99283 EMERGENCY DEPT VISIT LOW MDM: CPT

## 2018-02-01 NOTE — PROVIDER CONTACT NOTE (OTHER) - ASSESSMENT
Pt stable to return to [facility].  [facility] called for ambulance transport back to SNF.  [facility] to transport at 1:30pm and traip# [id number].

## 2018-02-01 NOTE — ED PROVIDER NOTE - PHYSICAL EXAMINATION
Attending/Tristan: NAD, contracted, PERRL/EOMI, supple, RRR, CTAB; Abd-soft, NT/ND, PEG site-C/D/I;   Sacrum-+Stage II decub, no d/c  Rectal Temp 99.4

## 2018-02-01 NOTE — ED PROVIDER NOTE - OBJECTIVE STATEMENT
Attending/Tristan: 81 yo M with [facility] [place] problems including CVA, bed-bound p/w from [facility] p/w clogged PEG tube since last night. Pt with no acute complaints. His wife is at bedside able to [provide collateral information.

## 2018-02-01 NOTE — ED PROVIDER NOTE - PRINCIPAL DIAGNOSIS
Report received from Yajaira West RN.     Renata Thonrton RN  03/11/17 1038     PEG (percutaneous endoscopic gastrostomy) adjustment/replacement/removal

## 2018-02-01 NOTE — ED ADULT NURSE NOTE - OBJECTIVE STATEMENT
Pt. A&Ox3, presented to ER via EMS for clogged peg tube. Pt's wife at bedside states pt. was d/c'd on Tuesday after having peg tube replaced for being clogged again. Denies any pain or other discomforts. Stage II on sacrum. Contracted arms and legs. pmhx CVA in july 2017. KERI. MD at bedside. Will continue to monitor.

## 2018-02-14 ENCOUNTER — APPOINTMENT (OUTPATIENT)
Dept: CT IMAGING | Facility: IMAGING CENTER | Age: 83
End: 2018-02-14

## 2018-02-19 ENCOUNTER — INPATIENT (INPATIENT)
Facility: HOSPITAL | Age: 83
LOS: 0 days | Discharge: OTHER INSTITUTION | End: 2018-02-20
Attending: INTERNAL MEDICINE | Admitting: INTERNAL MEDICINE
Payer: MEDICARE

## 2018-02-19 VITALS
RESPIRATION RATE: 22 BRPM | HEART RATE: 125 BPM | OXYGEN SATURATION: 100 % | SYSTOLIC BLOOD PRESSURE: 84 MMHG | DIASTOLIC BLOOD PRESSURE: 60 MMHG | TEMPERATURE: 98 F

## 2018-02-19 DIAGNOSIS — Z93.1 GASTROSTOMY STATUS: Chronic | ICD-10-CM

## 2018-02-19 DIAGNOSIS — Z98.890 OTHER SPECIFIED POSTPROCEDURAL STATES: Chronic | ICD-10-CM

## 2018-02-19 LAB
ALBUMIN SERPL ELPH-MCNC: 2.1 G/DL — LOW (ref 3.3–5)
ALP SERPL-CCNC: 76 U/L — SIGNIFICANT CHANGE UP (ref 40–120)
ALT FLD-CCNC: 9 U/L — SIGNIFICANT CHANGE UP (ref 4–41)
APPEARANCE UR: SIGNIFICANT CHANGE UP
AST SERPL-CCNC: 28 U/L — SIGNIFICANT CHANGE UP (ref 4–40)
BASE EXCESS BLDV CALC-SCNC: -1.7 MMOL/L — SIGNIFICANT CHANGE UP
BASOPHILS # BLD AUTO: 0.02 K/UL — SIGNIFICANT CHANGE UP (ref 0–0.2)
BASOPHILS NFR BLD AUTO: 0.1 % — SIGNIFICANT CHANGE UP (ref 0–2)
BILIRUB SERPL-MCNC: 0.4 MG/DL — SIGNIFICANT CHANGE UP (ref 0.2–1.2)
BILIRUB UR-MCNC: NEGATIVE — SIGNIFICANT CHANGE UP
BLOOD GAS VENOUS - CREATININE: 4.1 MG/DL — HIGH (ref 0.5–1.3)
BLOOD UR QL VISUAL: HIGH
BUN SERPL-MCNC: 127 MG/DL — HIGH (ref 7–23)
CALCIUM SERPL-MCNC: 7.8 MG/DL — LOW (ref 8.4–10.5)
CHLORIDE BLDV-SCNC: 98 MMOL/L — SIGNIFICANT CHANGE UP (ref 96–108)
CHLORIDE SERPL-SCNC: 91 MMOL/L — LOW (ref 98–107)
CO2 SERPL-SCNC: 19 MMOL/L — LOW (ref 22–31)
COLOR SPEC: HIGH
CREAT SERPL-MCNC: 4.27 MG/DL — HIGH (ref 0.5–1.3)
EOSINOPHIL # BLD AUTO: 0 K/UL — SIGNIFICANT CHANGE UP (ref 0–0.5)
EOSINOPHIL NFR BLD AUTO: 0 % — SIGNIFICANT CHANGE UP (ref 0–6)
GAS PNL BLDV: 125 MMOL/L — LOW (ref 136–146)
GLUCOSE BLDV-MCNC: 141 — HIGH (ref 70–99)
GLUCOSE SERPL-MCNC: 135 MG/DL — HIGH (ref 70–99)
GLUCOSE UR-MCNC: NEGATIVE — SIGNIFICANT CHANGE UP
HCO3 BLDV-SCNC: 23 MMOL/L — SIGNIFICANT CHANGE UP (ref 20–27)
HCT VFR BLD CALC: 30.6 % — LOW (ref 39–50)
HCT VFR BLDV CALC: 31.2 % — LOW (ref 39–51)
HGB BLD-MCNC: 10 G/DL — LOW (ref 13–17)
HGB BLDV-MCNC: 10.1 G/DL — LOW (ref 13–17)
IMM GRANULOCYTES # BLD AUTO: 0.12 # — SIGNIFICANT CHANGE UP
IMM GRANULOCYTES NFR BLD AUTO: 0.7 % — SIGNIFICANT CHANGE UP (ref 0–1.5)
KETONES UR-MCNC: NEGATIVE — SIGNIFICANT CHANGE UP
LACTATE BLDV-MCNC: 4.2 MMOL/L — CRITICAL HIGH (ref 0.5–2)
LEUKOCYTE ESTERASE UR-ACNC: HIGH
LYMPHOCYTES # BLD AUTO: 0.66 K/UL — LOW (ref 1–3.3)
LYMPHOCYTES # BLD AUTO: 3.9 % — LOW (ref 13–44)
MCHC RBC-ENTMCNC: 28.2 PG — SIGNIFICANT CHANGE UP (ref 27–34)
MCHC RBC-ENTMCNC: 32.7 % — SIGNIFICANT CHANGE UP (ref 32–36)
MCV RBC AUTO: 86.4 FL — SIGNIFICANT CHANGE UP (ref 80–100)
MONOCYTES # BLD AUTO: 0.66 K/UL — SIGNIFICANT CHANGE UP (ref 0–0.9)
MONOCYTES NFR BLD AUTO: 3.9 % — SIGNIFICANT CHANGE UP (ref 2–14)
NEUTROPHILS # BLD AUTO: 15.66 K/UL — HIGH (ref 1.8–7.4)
NEUTROPHILS NFR BLD AUTO: 91.4 % — HIGH (ref 43–77)
NITRITE UR-MCNC: NEGATIVE — SIGNIFICANT CHANGE UP
NRBC # FLD: 0 — SIGNIFICANT CHANGE UP
PCO2 BLDV: 33 MMHG — LOW (ref 41–51)
PH BLDV: 7.44 PH — HIGH (ref 7.32–7.43)
PH UR: 6 — SIGNIFICANT CHANGE UP (ref 4.6–8)
PLATELET # BLD AUTO: 194 K/UL — SIGNIFICANT CHANGE UP (ref 150–400)
PMV BLD: 10.9 FL — SIGNIFICANT CHANGE UP (ref 7–13)
PO2 BLDV: 36 MMHG — SIGNIFICANT CHANGE UP (ref 35–40)
POTASSIUM BLDV-SCNC: 4.5 MMOL/L — SIGNIFICANT CHANGE UP (ref 3.4–4.5)
POTASSIUM SERPL-MCNC: 5.4 MMOL/L — HIGH (ref 3.5–5.3)
POTASSIUM SERPL-SCNC: 5.4 MMOL/L — HIGH (ref 3.5–5.3)
PROT SERPL-MCNC: 6.6 G/DL — SIGNIFICANT CHANGE UP (ref 6–8.3)
PROT UR-MCNC: 30 MG/DL — HIGH
RBC # BLD: 3.54 M/UL — LOW (ref 4.2–5.8)
RBC # FLD: 18.1 % — HIGH (ref 10.3–14.5)
RBC CASTS # UR COMP ASSIST: >50 — HIGH (ref 0–?)
SAO2 % BLDV: 59.4 % — LOW (ref 60–85)
SODIUM SERPL-SCNC: 132 MMOL/L — LOW (ref 135–145)
SP GR SPEC: 1.02 — SIGNIFICANT CHANGE UP (ref 1–1.04)
UROBILINOGEN FLD QL: NORMAL MG/DL — SIGNIFICANT CHANGE UP
WBC # BLD: 17.12 K/UL — HIGH (ref 3.8–10.5)
WBC # FLD AUTO: 17.12 K/UL — HIGH (ref 3.8–10.5)
WBC UR QL: >50 — HIGH (ref 0–?)

## 2018-02-19 PROCEDURE — 71045 X-RAY EXAM CHEST 1 VIEW: CPT | Mod: 26

## 2018-02-19 RX ORDER — AZTREONAM 2 G
1000 VIAL (EA) INJECTION ONCE
Qty: 0 | Refills: 0 | Status: COMPLETED | OUTPATIENT
Start: 2018-02-19 | End: 2018-02-19

## 2018-02-19 RX ORDER — SODIUM CHLORIDE 9 MG/ML
2000 INJECTION INTRAMUSCULAR; INTRAVENOUS; SUBCUTANEOUS ONCE
Qty: 0 | Refills: 0 | Status: COMPLETED | OUTPATIENT
Start: 2018-02-19 | End: 2018-02-19

## 2018-02-19 RX ORDER — VANCOMYCIN HCL 1 G
1000 VIAL (EA) INTRAVENOUS ONCE
Qty: 0 | Refills: 0 | Status: COMPLETED | OUTPATIENT
Start: 2018-02-19 | End: 2018-02-20

## 2018-02-19 RX ADMIN — SODIUM CHLORIDE 2000 MILLILITER(S): 9 INJECTION INTRAMUSCULAR; INTRAVENOUS; SUBCUTANEOUS at 22:47

## 2018-02-19 RX ADMIN — Medication 50 MILLIGRAM(S): at 23:23

## 2018-02-19 NOTE — ED PROVIDER NOTE - ATTENDING CONTRIBUTION TO CARE
Roy: 82 yom with CVA with right hemiparesis noted today to be tachycardic and tachypneic and more lethargic as per family over the last 1 week.  Today pt not speaking as he usually does.  On exam pt is afebrile, tachy 120s, BP 75/50, baseline 90s, rr 36, o2 sat normal, pt thin pale, cachetic, clear lungs anteriorly, tachy, abd soft, left side stiff arm right arm edema, no calf tn.  EKg with tachy, no peaked t waves.  Labs show ARF and rest of labs pending.  IV fluids, reassess.

## 2018-02-19 NOTE — ED PROVIDER NOTE - MEDICAL DECISION MAKING DETAILS
83 y/o male with h/o CVA, BPH with chronic lubin p/w AMS and tachycardia; concern for infectious etiology. Will give IVF, check labs and reassess.

## 2018-02-19 NOTE — ED ADULT NURSE NOTE - OBJECTIVE STATEMENT
pt received to room 16 pt is non verbal at this time. pt is A&0x0. pt comes to ED from NH for elevated HR. pt was found to have a low BP and elevated HR. pt has a peg tube in place and a 16 F curved Alexander cath placed by NH RN. pt has hx of stroke. pt has a stage 3 pressure ulcer on sacrum and a un stageable on R heel. pt has a 20 g placed in R AC and 20 g in L hand septic labs were drawn and sent. As per family pt is full code. EKG and chest x ray done awaiting further orders Will continue to monitor the pt

## 2018-02-19 NOTE — ED ADULT TRIAGE NOTE - CHIEF COMPLAINT QUOTE
Patient from [facility] c/o tachycardia. As per EMS, -135. Patient family reports lubin changed today. As per EMS, patient is verbal at baseline. Hx. CVA, BPH. Patient from [facility] c/o tachycardia. As per EMS, -135. Patient family reports lubin changed today. As per EMS, patient is verbal at baseline. Hx. CVA, BPH.    addendum: Abnormal EKG, Charge RN notified.

## 2018-02-19 NOTE — ED PROVIDER NOTE - OBJECTIVE STATEMENT
83 y/o male with h/o CVA with R hemiparesis, BPH with chronic lubin, Afib not on a/c presenting to ED from NH for evaluation of tachycardia and AMS. Patient with recent admission 1/2018 for sepsis 2/2 flu. History obtained from daughter and wife at bedside. Pt was doing well, in usual state of health (AAOx2, conversational) until this week when he has becoming less responsive. Today, he was significantly more unresponsive and noted to be tachycardic, prompting transfer to ED. Family also states patient had decreased UOP through lubin this morning until lubin change today at 1pm. Patient also has been on flagyl for last 1 week for "suspected PNA" per family. Family denies cough, fevers, chills, diarrhea.

## 2018-02-19 NOTE — ED ADULT NURSE NOTE - CHIEF COMPLAINT QUOTE
Patient from [facility] c/o tachycardia. As per EMS, -135. Patient family reports lubin changed today. As per EMS, patient is verbal at baseline. Hx. CVA, BPH.    addendum: Abnormal EKG, Charge RN notified.

## 2018-02-20 ENCOUNTER — TRANSCRIPTION ENCOUNTER (OUTPATIENT)
Age: 83
End: 2018-02-20

## 2018-02-20 VITALS
SYSTOLIC BLOOD PRESSURE: 92 MMHG | HEART RATE: 130 BPM | DIASTOLIC BLOOD PRESSURE: 52 MMHG | OXYGEN SATURATION: 100 % | RESPIRATION RATE: 26 BRPM

## 2018-02-20 DIAGNOSIS — A41.9 SEPSIS, UNSPECIFIED ORGANISM: ICD-10-CM

## 2018-02-20 DIAGNOSIS — N17.9 ACUTE KIDNEY FAILURE, UNSPECIFIED: ICD-10-CM

## 2018-02-20 DIAGNOSIS — Z71.89 OTHER SPECIFIED COUNSELING: ICD-10-CM

## 2018-02-20 DIAGNOSIS — N40.0 BENIGN PROSTATIC HYPERPLASIA WITHOUT LOWER URINARY TRACT SYMPTOMS: ICD-10-CM

## 2018-02-20 DIAGNOSIS — R53.81 OTHER MALAISE: ICD-10-CM

## 2018-02-20 DIAGNOSIS — I95.9 HYPOTENSION, UNSPECIFIED: ICD-10-CM

## 2018-02-20 DIAGNOSIS — E87.2 ACIDOSIS: ICD-10-CM

## 2018-02-20 DIAGNOSIS — R06.00 DYSPNEA, UNSPECIFIED: ICD-10-CM

## 2018-02-20 DIAGNOSIS — H40.9 UNSPECIFIED GLAUCOMA: ICD-10-CM

## 2018-02-20 DIAGNOSIS — K65.9 PERITONITIS, UNSPECIFIED: ICD-10-CM

## 2018-02-20 DIAGNOSIS — Z29.9 ENCOUNTER FOR PROPHYLACTIC MEASURES, UNSPECIFIED: ICD-10-CM

## 2018-02-20 DIAGNOSIS — N32.89 OTHER SPECIFIED DISORDERS OF BLADDER: ICD-10-CM

## 2018-02-20 DIAGNOSIS — I48.91 UNSPECIFIED ATRIAL FIBRILLATION: ICD-10-CM

## 2018-02-20 DIAGNOSIS — G20 PARKINSON'S DISEASE: ICD-10-CM

## 2018-02-20 DIAGNOSIS — N19 UNSPECIFIED KIDNEY FAILURE: ICD-10-CM

## 2018-02-20 LAB
APTT BLD: 24.9 SEC — LOW (ref 27.5–37.4)
B PERT DNA SPEC QL NAA+PROBE: SIGNIFICANT CHANGE UP
BASE EXCESS BLDV CALC-SCNC: -4 MMOL/L — SIGNIFICANT CHANGE UP
BLD GP AB SCN SERPL QL: NEGATIVE — SIGNIFICANT CHANGE UP
BLOOD GAS VENOUS - CREATININE: 4.16 MG/DL — HIGH (ref 0.5–1.3)
BUN SERPL-MCNC: 112 MG/DL — HIGH (ref 7–23)
C PNEUM DNA SPEC QL NAA+PROBE: NOT DETECTED — SIGNIFICANT CHANGE UP
CALCIUM SERPL-MCNC: 7 MG/DL — LOW (ref 8.4–10.5)
CHLORIDE BLDV-SCNC: 103 MMOL/L — SIGNIFICANT CHANGE UP (ref 96–108)
CHLORIDE SERPL-SCNC: 96 MMOL/L — LOW (ref 98–107)
CO2 SERPL-SCNC: 16 MMOL/L — LOW (ref 22–31)
CREAT SERPL-MCNC: 3.84 MG/DL — HIGH (ref 0.5–1.3)
FLUAV H1 2009 PAND RNA SPEC QL NAA+PROBE: NOT DETECTED — SIGNIFICANT CHANGE UP
FLUAV H1 RNA SPEC QL NAA+PROBE: NOT DETECTED — SIGNIFICANT CHANGE UP
FLUAV H3 RNA SPEC QL NAA+PROBE: NOT DETECTED — SIGNIFICANT CHANGE UP
FLUAV SUBTYP SPEC NAA+PROBE: SIGNIFICANT CHANGE UP
FLUBV RNA SPEC QL NAA+PROBE: NOT DETECTED — SIGNIFICANT CHANGE UP
GAS PNL BLDV: 123 MMOL/L — LOW (ref 136–146)
GLUCOSE BLDV-MCNC: 123 — HIGH (ref 70–99)
GLUCOSE SERPL-MCNC: 111 MG/DL — HIGH (ref 70–99)
HADV DNA SPEC QL NAA+PROBE: NOT DETECTED — SIGNIFICANT CHANGE UP
HCO3 BLDV-SCNC: 21 MMOL/L — SIGNIFICANT CHANGE UP (ref 20–27)
HCOV 229E RNA SPEC QL NAA+PROBE: NOT DETECTED — SIGNIFICANT CHANGE UP
HCOV HKU1 RNA SPEC QL NAA+PROBE: NOT DETECTED — SIGNIFICANT CHANGE UP
HCOV NL63 RNA SPEC QL NAA+PROBE: NOT DETECTED — SIGNIFICANT CHANGE UP
HCOV OC43 RNA SPEC QL NAA+PROBE: NOT DETECTED — SIGNIFICANT CHANGE UP
HCT VFR BLD CALC: 26.3 % — LOW (ref 39–50)
HCT VFR BLDV CALC: 33.1 % — LOW (ref 39–51)
HGB BLD-MCNC: 8.7 G/DL — LOW (ref 13–17)
HGB BLDV-MCNC: 10.7 G/DL — LOW (ref 13–17)
HMPV RNA SPEC QL NAA+PROBE: NOT DETECTED — SIGNIFICANT CHANGE UP
HPIV1 RNA SPEC QL NAA+PROBE: NOT DETECTED — SIGNIFICANT CHANGE UP
HPIV2 RNA SPEC QL NAA+PROBE: NOT DETECTED — SIGNIFICANT CHANGE UP
HPIV3 RNA SPEC QL NAA+PROBE: NOT DETECTED — SIGNIFICANT CHANGE UP
HPIV4 RNA SPEC QL NAA+PROBE: NOT DETECTED — SIGNIFICANT CHANGE UP
INR BLD: 2.37 — HIGH (ref 0.88–1.17)
LACTATE BLDV-MCNC: 4 MMOL/L — CRITICAL HIGH (ref 0.5–2)
M PNEUMO DNA SPEC QL NAA+PROBE: NOT DETECTED — SIGNIFICANT CHANGE UP
MAGNESIUM SERPL-MCNC: 1.9 MG/DL — SIGNIFICANT CHANGE UP (ref 1.6–2.6)
MCHC RBC-ENTMCNC: 29.1 PG — SIGNIFICANT CHANGE UP (ref 27–34)
MCHC RBC-ENTMCNC: 33.1 % — SIGNIFICANT CHANGE UP (ref 32–36)
MCV RBC AUTO: 88 FL — SIGNIFICANT CHANGE UP (ref 80–100)
NRBC # FLD: 0 — SIGNIFICANT CHANGE UP
PCO2 BLDV: 31 MMHG — LOW (ref 41–51)
PH BLDV: 7.42 PH — SIGNIFICANT CHANGE UP (ref 7.32–7.43)
PHOSPHATE SERPL-MCNC: 5 MG/DL — HIGH (ref 2.5–4.5)
PLATELET # BLD AUTO: 153 K/UL — SIGNIFICANT CHANGE UP (ref 150–400)
PMV BLD: 10.8 FL — SIGNIFICANT CHANGE UP (ref 7–13)
PO2 BLDV: 47 MMHG — HIGH (ref 35–40)
POTASSIUM BLDV-SCNC: 4.6 MMOL/L — HIGH (ref 3.4–4.5)
POTASSIUM SERPL-MCNC: 4.2 MMOL/L — SIGNIFICANT CHANGE UP (ref 3.5–5.3)
POTASSIUM SERPL-SCNC: 4.2 MMOL/L — SIGNIFICANT CHANGE UP (ref 3.5–5.3)
PROTHROM AB SERPL-ACNC: 26.8 SEC — HIGH (ref 9.8–13.1)
RBC # BLD: 2.99 M/UL — LOW (ref 4.2–5.8)
RBC # FLD: 18.2 % — HIGH (ref 10.3–14.5)
RH IG SCN BLD-IMP: POSITIVE — SIGNIFICANT CHANGE UP
RSV RNA SPEC QL NAA+PROBE: NOT DETECTED — SIGNIFICANT CHANGE UP
RV+EV RNA SPEC QL NAA+PROBE: NOT DETECTED — SIGNIFICANT CHANGE UP
SAO2 % BLDV: 79 % — SIGNIFICANT CHANGE UP (ref 60–85)
SODIUM SERPL-SCNC: 132 MMOL/L — LOW (ref 135–145)
SPECIMEN SOURCE: SIGNIFICANT CHANGE UP
SPECIMEN SOURCE: SIGNIFICANT CHANGE UP
WBC # BLD: 14.46 K/UL — HIGH (ref 3.8–10.5)
WBC # FLD AUTO: 14.46 K/UL — HIGH (ref 3.8–10.5)

## 2018-02-20 PROCEDURE — 99223 1ST HOSP IP/OBS HIGH 75: CPT

## 2018-02-20 PROCEDURE — 74176 CT ABD & PELVIS W/O CONTRAST: CPT | Mod: 26

## 2018-02-20 PROCEDURE — 99497 ADVNCD CARE PLAN 30 MIN: CPT | Mod: 25

## 2018-02-20 PROCEDURE — 99222 1ST HOSP IP/OBS MODERATE 55: CPT

## 2018-02-20 RX ORDER — TIMOLOL 0.5 %
1 DROPS OPHTHALMIC (EYE)
Qty: 0 | Refills: 0 | Status: DISCONTINUED | OUTPATIENT
Start: 2018-02-20 | End: 2018-02-20

## 2018-02-20 RX ORDER — IPRATROPIUM/ALBUTEROL SULFATE 18-103MCG
3 AEROSOL WITH ADAPTER (GRAM) INHALATION
Qty: 0 | Refills: 0 | COMMUNITY

## 2018-02-20 RX ORDER — DOXAZOSIN MESYLATE 4 MG
2 TABLET ORAL AT BEDTIME
Qty: 0 | Refills: 0 | Status: DISCONTINUED | OUTPATIENT
Start: 2018-02-20 | End: 2018-02-20

## 2018-02-20 RX ORDER — SODIUM CHLORIDE 9 MG/ML
500 INJECTION INTRAMUSCULAR; INTRAVENOUS; SUBCUTANEOUS ONCE
Qty: 0 | Refills: 0 | Status: COMPLETED | OUTPATIENT
Start: 2018-02-20 | End: 2018-02-20

## 2018-02-20 RX ORDER — SALICYLIC ACID 0.5 %
1 CLEANSER (GRAM) TOPICAL
Qty: 0 | Refills: 0 | Status: DISCONTINUED | OUTPATIENT
Start: 2018-02-20 | End: 2018-02-20

## 2018-02-20 RX ORDER — SENNA PLUS 8.6 MG/1
10 TABLET ORAL
Qty: 0 | Refills: 0 | COMMUNITY

## 2018-02-20 RX ORDER — HYDROMORPHONE HYDROCHLORIDE 2 MG/ML
0.2 INJECTION INTRAMUSCULAR; INTRAVENOUS; SUBCUTANEOUS EVERY 6 HOURS
Qty: 0 | Refills: 0 | Status: DISCONTINUED | OUTPATIENT
Start: 2018-02-20 | End: 2018-02-21

## 2018-02-20 RX ORDER — ASCORBIC ACID 60 MG
1 TABLET,CHEWABLE ORAL
Qty: 0 | Refills: 0 | COMMUNITY

## 2018-02-20 RX ORDER — METOCLOPRAMIDE HCL 10 MG
1 TABLET ORAL
Qty: 0 | Refills: 0 | COMMUNITY

## 2018-02-20 RX ORDER — TAMSULOSIN HYDROCHLORIDE 0.4 MG/1
1 CAPSULE ORAL
Qty: 0 | Refills: 0 | COMMUNITY

## 2018-02-20 RX ORDER — DORZOLAMIDE HYDROCHLORIDE 20 MG/ML
1 SOLUTION/ DROPS OPHTHALMIC
Qty: 0 | Refills: 0 | Status: DISCONTINUED | OUTPATIENT
Start: 2018-02-20 | End: 2018-02-20

## 2018-02-20 RX ORDER — AZTREONAM 2 G
500 VIAL (EA) INJECTION EVERY 8 HOURS
Qty: 0 | Refills: 0 | Status: DISCONTINUED | OUTPATIENT
Start: 2018-02-20 | End: 2018-02-21

## 2018-02-20 RX ORDER — OMEPRAZOLE 10 MG/1
1 CAPSULE, DELAYED RELEASE ORAL
Qty: 0 | Refills: 0 | COMMUNITY

## 2018-02-20 RX ORDER — SODIUM CHLORIDE 9 MG/ML
1000 INJECTION, SOLUTION INTRAVENOUS
Qty: 0 | Refills: 0 | Status: DISCONTINUED | OUTPATIENT
Start: 2018-02-20 | End: 2018-02-21

## 2018-02-20 RX ORDER — PANTOPRAZOLE SODIUM 20 MG/1
40 TABLET, DELAYED RELEASE ORAL DAILY
Qty: 0 | Refills: 0 | Status: DISCONTINUED | OUTPATIENT
Start: 2018-02-20 | End: 2018-02-20

## 2018-02-20 RX ORDER — ASCORBIC ACID 60 MG
500 TABLET,CHEWABLE ORAL DAILY
Qty: 0 | Refills: 0 | Status: DISCONTINUED | OUTPATIENT
Start: 2018-02-20 | End: 2018-02-20

## 2018-02-20 RX ORDER — TRAVOPROST 0.04 MG/ML
1 SOLUTION/ DROPS OPHTHALMIC
Qty: 0 | Refills: 0 | COMMUNITY

## 2018-02-20 RX ORDER — FINASTERIDE 5 MG/1
5 TABLET, FILM COATED ORAL DAILY
Qty: 0 | Refills: 0 | Status: DISCONTINUED | OUTPATIENT
Start: 2018-02-20 | End: 2018-02-20

## 2018-02-20 RX ORDER — NYSTATIN CREAM 100000 [USP'U]/G
1 CREAM TOPICAL THREE TIMES A DAY
Qty: 0 | Refills: 0 | Status: DISCONTINUED | OUTPATIENT
Start: 2018-02-20 | End: 2018-02-21

## 2018-02-20 RX ORDER — SODIUM CHLORIDE 9 MG/ML
1000 INJECTION INTRAMUSCULAR; INTRAVENOUS; SUBCUTANEOUS ONCE
Qty: 0 | Refills: 0 | Status: COMPLETED | OUTPATIENT
Start: 2018-02-20 | End: 2018-02-20

## 2018-02-20 RX ORDER — HYDROMORPHONE HYDROCHLORIDE 2 MG/ML
0.4 INJECTION INTRAMUSCULAR; INTRAVENOUS; SUBCUTANEOUS
Qty: 0 | Refills: 0 | Status: DISCONTINUED | OUTPATIENT
Start: 2018-02-20 | End: 2018-02-21

## 2018-02-20 RX ORDER — PILOCARPINE HCL 4 %
1 DROPS OPHTHALMIC (EYE)
Qty: 0 | Refills: 0 | COMMUNITY

## 2018-02-20 RX ORDER — LOTEPREDNOL ETABONATE 2 MG/ML
1 SUSPENSION/ DROPS OPHTHALMIC
Qty: 0 | Refills: 0 | COMMUNITY

## 2018-02-20 RX ORDER — LATANOPROST 0.05 MG/ML
1 SOLUTION/ DROPS OPHTHALMIC; TOPICAL AT BEDTIME
Qty: 0 | Refills: 0 | Status: DISCONTINUED | OUTPATIENT
Start: 2018-02-20 | End: 2018-02-20

## 2018-02-20 RX ORDER — CARBIDOPA AND LEVODOPA 25; 100 MG/1; MG/1
1.5 TABLET ORAL
Qty: 0 | Refills: 0 | COMMUNITY

## 2018-02-20 RX ORDER — MORPHINE SULFATE 50 MG/1
1 CAPSULE, EXTENDED RELEASE ORAL
Qty: 0 | Refills: 0 | Status: DISCONTINUED | OUTPATIENT
Start: 2018-02-20 | End: 2018-02-20

## 2018-02-20 RX ORDER — BUDESONIDE, MICRONIZED 100 %
2 POWDER (GRAM) MISCELLANEOUS
Qty: 0 | Refills: 0 | COMMUNITY

## 2018-02-20 RX ORDER — HYDROMORPHONE HYDROCHLORIDE 2 MG/ML
1 INJECTION INTRAMUSCULAR; INTRAVENOUS; SUBCUTANEOUS
Qty: 0 | Refills: 0 | COMMUNITY
Start: 2018-02-20

## 2018-02-20 RX ORDER — DORZOLAMIDE HYDROCHLORIDE 20 MG/ML
1 SOLUTION/ DROPS OPHTHALMIC
Qty: 0 | Refills: 0 | COMMUNITY

## 2018-02-20 RX ORDER — AZTREONAM 2 G
250 VIAL (EA) INJECTION EVERY 8 HOURS
Qty: 0 | Refills: 0 | Status: DISCONTINUED | OUTPATIENT
Start: 2018-02-20 | End: 2018-02-20

## 2018-02-20 RX ORDER — DOCUSATE SODIUM 100 MG
200 CAPSULE ORAL AT BEDTIME
Qty: 0 | Refills: 0 | Status: DISCONTINUED | OUTPATIENT
Start: 2018-02-20 | End: 2018-02-20

## 2018-02-20 RX ORDER — MIDODRINE HYDROCHLORIDE 2.5 MG/1
10 TABLET ORAL ONCE
Qty: 0 | Refills: 0 | Status: COMPLETED | OUTPATIENT
Start: 2018-02-20 | End: 2018-02-20

## 2018-02-20 RX ORDER — SENNA PLUS 8.6 MG/1
10 TABLET ORAL AT BEDTIME
Qty: 0 | Refills: 0 | Status: DISCONTINUED | OUTPATIENT
Start: 2018-02-20 | End: 2018-02-20

## 2018-02-20 RX ORDER — ASPIRIN/CALCIUM CARB/MAGNESIUM 324 MG
81 TABLET ORAL DAILY
Qty: 0 | Refills: 0 | Status: DISCONTINUED | OUTPATIENT
Start: 2018-02-20 | End: 2018-02-20

## 2018-02-20 RX ORDER — HYDROMORPHONE HYDROCHLORIDE 2 MG/ML
0.5 INJECTION INTRAMUSCULAR; INTRAVENOUS; SUBCUTANEOUS ONCE
Qty: 0 | Refills: 0 | Status: DISCONTINUED | OUTPATIENT
Start: 2018-02-20 | End: 2018-02-20

## 2018-02-20 RX ORDER — VANCOMYCIN HCL 1 G
1000 VIAL (EA) INTRAVENOUS EVERY 24 HOURS
Qty: 0 | Refills: 0 | Status: DISCONTINUED | OUTPATIENT
Start: 2018-02-20 | End: 2018-02-21

## 2018-02-20 RX ORDER — CARBIDOPA AND LEVODOPA 25; 100 MG/1; MG/1
1.5 TABLET ORAL THREE TIMES A DAY
Qty: 0 | Refills: 0 | Status: DISCONTINUED | OUTPATIENT
Start: 2018-02-20 | End: 2018-02-20

## 2018-02-20 RX ORDER — ATORVASTATIN CALCIUM 80 MG/1
1 TABLET, FILM COATED ORAL
Qty: 0 | Refills: 0 | COMMUNITY

## 2018-02-20 RX ORDER — PILOCARPINE HCL 4 %
1 DROPS OPHTHALMIC (EYE)
Qty: 0 | Refills: 0 | Status: DISCONTINUED | OUTPATIENT
Start: 2018-02-20 | End: 2018-02-20

## 2018-02-20 RX ORDER — TAMSULOSIN HYDROCHLORIDE 0.4 MG/1
0.4 CAPSULE ORAL AT BEDTIME
Qty: 0 | Refills: 0 | Status: DISCONTINUED | OUTPATIENT
Start: 2018-02-20 | End: 2018-02-20

## 2018-02-20 RX ORDER — MIDODRINE HYDROCHLORIDE 2.5 MG/1
20 TABLET ORAL THREE TIMES A DAY
Qty: 0 | Refills: 0 | Status: DISCONTINUED | OUTPATIENT
Start: 2018-02-20 | End: 2018-02-21

## 2018-02-20 RX ORDER — ASPIRIN/CALCIUM CARB/MAGNESIUM 324 MG
1 TABLET ORAL
Qty: 0 | Refills: 0 | COMMUNITY

## 2018-02-20 RX ORDER — HYDROMORPHONE HYDROCHLORIDE 2 MG/ML
0 INJECTION INTRAMUSCULAR; INTRAVENOUS; SUBCUTANEOUS
Qty: 0 | Refills: 0 | COMMUNITY
Start: 2018-02-20

## 2018-02-20 RX ORDER — FINASTERIDE 5 MG/1
1 TABLET, FILM COATED ORAL
Qty: 0 | Refills: 0 | COMMUNITY

## 2018-02-20 RX ORDER — DOCUSATE SODIUM 100 MG
20 CAPSULE ORAL
Qty: 0 | Refills: 0 | COMMUNITY

## 2018-02-20 RX ORDER — ATORVASTATIN CALCIUM 80 MG/1
80 TABLET, FILM COATED ORAL AT BEDTIME
Qty: 0 | Refills: 0 | Status: DISCONTINUED | OUTPATIENT
Start: 2018-02-20 | End: 2018-02-20

## 2018-02-20 RX ORDER — TIMOLOL 0.5 %
1 DROPS OPHTHALMIC (EYE)
Qty: 0 | Refills: 0 | COMMUNITY

## 2018-02-20 RX ORDER — SODIUM CHLORIDE 9 MG/ML
1000 INJECTION INTRAMUSCULAR; INTRAVENOUS; SUBCUTANEOUS
Qty: 0 | Refills: 0 | Status: DISCONTINUED | OUTPATIENT
Start: 2018-02-20 | End: 2018-02-20

## 2018-02-20 RX ORDER — MIDODRINE HYDROCHLORIDE 2.5 MG/1
10 TABLET ORAL THREE TIMES A DAY
Qty: 0 | Refills: 0 | Status: DISCONTINUED | OUTPATIENT
Start: 2018-02-20 | End: 2018-02-20

## 2018-02-20 RX ADMIN — MIDODRINE HYDROCHLORIDE 10 MILLIGRAM(S): 2.5 TABLET ORAL at 05:45

## 2018-02-20 RX ADMIN — MIDODRINE HYDROCHLORIDE 20 MILLIGRAM(S): 2.5 TABLET ORAL at 14:06

## 2018-02-20 RX ADMIN — NYSTATIN CREAM 1 APPLICATION(S): 100000 CREAM TOPICAL at 14:07

## 2018-02-20 RX ADMIN — SODIUM CHLORIDE 1000 MILLILITER(S): 9 INJECTION INTRAMUSCULAR; INTRAVENOUS; SUBCUTANEOUS at 02:30

## 2018-02-20 RX ADMIN — HYDROMORPHONE HYDROCHLORIDE 0.2 MILLIGRAM(S): 2 INJECTION INTRAMUSCULAR; INTRAVENOUS; SUBCUTANEOUS at 14:06

## 2018-02-20 RX ADMIN — SODIUM CHLORIDE 100 MILLILITER(S): 9 INJECTION, SOLUTION INTRAVENOUS at 13:00

## 2018-02-20 RX ADMIN — Medication 1 APPLICATION(S): at 13:00

## 2018-02-20 RX ADMIN — SODIUM CHLORIDE 1000 MILLILITER(S): 9 INJECTION INTRAMUSCULAR; INTRAVENOUS; SUBCUTANEOUS at 05:32

## 2018-02-20 RX ADMIN — HYDROMORPHONE HYDROCHLORIDE 0.2 MILLIGRAM(S): 2 INJECTION INTRAMUSCULAR; INTRAVENOUS; SUBCUTANEOUS at 16:11

## 2018-02-20 RX ADMIN — Medication 2 MILLIGRAM(S): at 17:11

## 2018-02-20 RX ADMIN — SODIUM CHLORIDE 1000 MILLILITER(S): 9 INJECTION INTRAMUSCULAR; INTRAVENOUS; SUBCUTANEOUS at 05:56

## 2018-02-20 RX ADMIN — HYDROMORPHONE HYDROCHLORIDE 0.5 MILLIGRAM(S): 2 INJECTION INTRAMUSCULAR; INTRAVENOUS; SUBCUTANEOUS at 16:11

## 2018-02-20 RX ADMIN — Medication 250 MILLIGRAM(S): at 00:30

## 2018-02-20 RX ADMIN — HYDROMORPHONE HYDROCHLORIDE 0.4 MILLIGRAM(S): 2 INJECTION INTRAMUSCULAR; INTRAVENOUS; SUBCUTANEOUS at 16:56

## 2018-02-20 RX ADMIN — HYDROMORPHONE HYDROCHLORIDE 0.4 MILLIGRAM(S): 2 INJECTION INTRAMUSCULAR; INTRAVENOUS; SUBCUTANEOUS at 15:22

## 2018-02-20 RX ADMIN — CARBIDOPA AND LEVODOPA 1.5 TABLET(S): 25; 100 TABLET ORAL at 14:06

## 2018-02-20 RX ADMIN — MIDODRINE HYDROCHLORIDE 10 MILLIGRAM(S): 2.5 TABLET ORAL at 07:38

## 2018-02-20 RX ADMIN — CARBIDOPA AND LEVODOPA 1.5 TABLET(S): 25; 100 TABLET ORAL at 05:45

## 2018-02-20 NOTE — H&P ADULT - ASSESSMENT
81 yo M with a PMH CVA with R sided residual weakness and s/p PEG placement, CAD s/p CABG, Afib (diagnosed 01/2018, not on A/C), HTN, Parkinson's, glaucoma, HTN, and BPH s/p chronic Alexander placement who presents from NH with altered mental status and tachycardia, a/w sepsis 2/2 UTI vs GI cause, marked uremia, abdominal distention, and rapid atrial fibrillation.

## 2018-02-20 NOTE — DISCHARGE NOTE ADULT - CARE PLAN
Principal Discharge DX:	Ruptured bladder with uroperitoneum  Secondary Diagnosis:	Septic shock  Secondary Diagnosis:	Rapid atrial fibrillation Principal Discharge DX:	Ruptured bladder with uroperitoneum  Goal:	comfort care measures - no surgical intervention  Assessment and plan of treatment:	comfort care measures - no surgical intervention  Secondary Diagnosis:	Septic shock  Assessment and plan of treatment:	comfort care measures - no surgical intervention  Secondary Diagnosis:	Rapid atrial fibrillation  Assessment and plan of treatment:	continue current regimen - comfort care Principal Discharge DX:	Ruptured bladder with uroperitoneum  Goal:	comfort care measures - no surgical intervention  Assessment and plan of treatment:	transfer to inpatient hospice  comfort care measures - no surgical intervention  Secondary Diagnosis:	Septic shock  Assessment and plan of treatment:	comfort care measures - no surgical intervention  Secondary Diagnosis:	Rapid atrial fibrillation  Assessment and plan of treatment:	continue current regimen - comfort care

## 2018-02-20 NOTE — PROGRESS NOTE ADULT - SUBJECTIVE AND OBJECTIVE BOX
ANILA NARVAEZ:[id number],   82yMale followed for:  penicillin (Hives)    PAST MEDICAL & SURGICAL HISTORY:  BPH (benign prostatic hyperplasia)  Atrial fibrillation  Alexander catheter in place  Oropharyngeal dysphagia  Glaucoma  CAD (coronary artery disease)  Parkinson disease  Tracheostomy in place: removed 12/2017  Hypertension  CVA (cerebral vascular accident)  H/O tracheostomy  S/P percutaneous endoscopic gastrostomy (PEG) tube placement    FAMILY HISTORY:  No pertinent family history in first degree relatives    MEDICATIONS  (STANDING):  aztreonam  IVPB 500 milliGRAM(s) IV Intermittent every 8 hours  dextrose 5% + sodium chloride 0.9%. 1000 milliLiter(s) (100 mL/Hr) IV Continuous <Continuous>  HYDROmorphone  Injectable 0.2 milliGRAM(s) IV Push every 6 hours  midodrine 20 milliGRAM(s) Oral three times a day  nystatin Powder 1 Application(s) Topical three times a day  vancomycin  IVPB 1000 milliGRAM(s) IV Intermittent every 24 hours    MEDICATIONS  (PRN):  HYDROmorphone  Injectable 0.4 milliGRAM(s) IV Push every 1 hour PRN dyspnea  LORazepam   Injectable 2 milliGRAM(s) IV Push every 2 hours PRN anxiety/agitation      Vital Signs Last 24 Hrs  T(C): 36.7 (20 Feb 2018 11:02), Max: 37.1 (19 Feb 2018 22:57)  T(F): 98 (20 Feb 2018 11:02), Max: 98.7 (19 Feb 2018 22:57)  HR: 130 (20 Feb 2018 15:29) (112 - 130)  BP: 92/52 (20 Feb 2018 15:29) (66/46 - 103/64)  BP(mean): --  RR: 26 (20 Feb 2018 15:29) (20 - 30)  SpO2: 100% (20 Feb 2018 15:29) (99% - 100%)  nc/at  s1s2  cta  soft, nt, nd no guarding or rebound  no c/c/e    CBC Full  -  ( 20 Feb 2018 03:30 )  WBC Count : 14.46 K/uL  Hemoglobin : 8.7 g/dL  Hematocrit : 26.3 %  Platelet Count - Automated : 153 K/uL  Mean Cell Volume : 88.0 fL  Mean Cell Hemoglobin : 29.1 pg  Mean Cell Hemoglobin Concentration : 33.1 %  Auto Neutrophil # : x  Auto Lymphocyte # : x  Auto Monocyte # : x  Auto Eosinophil # : x  Auto Basophil # : x  Auto Neutrophil % : x  Auto Lymphocyte % : x  Auto Monocyte % : x  Auto Eosinophil % : x  Auto Basophil % : x    02-20    132<L>  |  96<L>  |  112<H>  ----------------------------<  111<H>  4.2   |  16<L>  |  3.84<H>    Ca    7.0<L>      20 Feb 2018 03:30  Phos  5.0     02-20  Mg     1.9     02-20    TPro  6.6  /  Alb  2.1<L>  /  TBili  0.4  /  DBili  x   /  AST  28  /  ALT  9   /  AlkPhos  76  02-19    PT/INR - ( 20 Feb 2018 03:30 )   PT: 26.8 SEC;   INR: 2.37          PTT - ( 20 Feb 2018 03:30 )  PTT:24.9 SEC

## 2018-02-20 NOTE — DISCHARGE NOTE ADULT - MEDICATION SUMMARY - MEDICATIONS TO TAKE
I will START or STAY ON the medications listed below when I get home from the hospital:    finasteride 5 mg oral tablet  -- 1 tab(s) by gastrostomy tube once a day  -- Indication: For Prophylaxis    budesonide 0.25 mg/2 mL inhalation suspension  -- 2 milliliter(s) inhaled 2 times a day  -- Indication: For Prophylaxis    Flagyl 500 mg oral tablet  -- 1 tab(s) by gastrostomy tube 3 times a day  -- Indication: For Prophylaxis    aspirin 81 mg oral tablet, chewable  -- 1 tab(s) by gastrostomy tube once a day  -- Indication: For Prophylaxis    HYDROmorphone  -- 0.2mg IVP every 6 hours  -- Indication: For Prophylaxis    HYDROmorphone 0.4 mg/mL-NaCl 0.9% intravenous solution  -- 1 milliliter(s) intravenous every hour, As needed, dyspnea  -- Indication: For Prophylaxis    tamsulosin 0.4 mg oral capsule  -- 1 cap(s) by gastrostomy tube once a day  -- Indication: For Prophylaxis    Reglan 5 mg oral tablet  -- 1 tab(s) by gastrostomy tube 4 times a day (before meals and at bedtime), As Needed  -- Indication: For Prophylaxis    atorvastatin 80 mg oral tablet  -- 1 tab(s) by gastrostomy tube once a day (at bedtime)  -- Indication: For Prophylaxis    carbidopa-levodopa 25 mg-100 mg oral tablet  -- 1.5 tab(s) by gastrostomy tube 3 times a day  -- Indication: For Prophylaxis    DuoNeb 0.5 mg-2.5 mg/3 mL inhalation solution  -- 3 milliliter(s) inhaled 4 times a day, As Needed  -- Indication: For Prophylaxis    vitamin A & D topical ointment  -- Apply on skin to affected area   -- Indication: For Prophylaxis    nystatin 100,000 units/g topical powder  -- 1 application on skin 3 times a day  -- Indication: For Prophylaxis    Colace 10 mg/mL oral liquid  -- 20 milliliter(s) by gastrostomy tube once a day (at bedtime)  -- Indication: For Prophylaxis    Senna 8.8 mg/5 mL oral syrup  -- 10 milliliter(s) by gastrostomy tube once a day (at bedtime)  -- Indication: For Prophylaxis    midodrine 10 mg oral tablet  -- 1 tab(s) by mouth 3 times a day  -- Indication: For Prophylaxis    dorzolamide 2% ophthalmic solution  -- 1 drop(s) to each affected eye 2 times a day  -- Indication: For Prophylaxis    timolol maleate 0.5% ophthalmic solution  -- 1 drop(s) to each affected eye 2 times a day  -- Indication: For Prophylaxis    Lotemax 0.5% ophthalmic suspension  -- 1 drop(s) in the right eye every 48 hours  -- Indication: For Prophylaxis    pilocarpine 4% ophthalmic solution  -- 1 drop(s) in the left eye 2 times a day  -- Indication: For Prophylaxis    Travatan Z 0.004% ophthalmic solution  -- 1 drop(s) in the left eye once a day (in the evening)  -- Indication: For Prophylaxis    omeprazole 20 mg oral delayed release capsule  -- 1 cap(s) by gastrostomy tube once a day  -- Indication: For Prophylaxis    Levaquin 500 mg oral tablet  -- 1 tab(s) by gastrostomy tube every 24 hours  -- Avoid prolonged or excessive exposure to direct and/or artificial sunlight while taking this medication.  Do not take dairy products, antacids, or iron preparations within one hour of this medication.  Finish all this medication unless otherwise directed by prescriber.  May cause drowsiness or dizziness.  Medication should be taken with plenty of water.    -- Indication: For do not take at this time    Multiple Vitamins oral tablet  -- 1 tab(s) by gastrostomy tube once a day  -- Indication: For Prophylaxis    Vitamin C 500 mg oral tablet  -- 1 tab(s) by gastrostomy tube once a day  -- Indication: For Prophylaxis I will START or STAY ON the medications listed below when I get home from the hospital:    finasteride 5 mg oral tablet  -- 1 tab(s) by gastrostomy tube once a day  -- Indication: For Prophylaxis    budesonide 0.25 mg/2 mL inhalation suspension  -- 2 milliliter(s) inhaled 2 times a day  -- Indication: For Prophylaxis    Flagyl 500 mg oral tablet  -- 1 tab(s) by gastrostomy tube 3 times a day  -- Indication: For Prophylaxis    aspirin 81 mg oral tablet, chewable  -- 1 tab(s) by gastrostomy tube once a day  -- Indication: For Prophylaxis    HYDROmorphone  -- 0.2mg IVP every 6 hours  -- Indication: For Prophylaxis    HYDROmorphone 0.4 mg/mL-NaCl 0.9% intravenous solution  -- 1 milliliter(s) intravenous every hour, As needed, dyspnea  -- Indication: For Prophylaxis    tamsulosin 0.4 mg oral capsule  -- 1 cap(s) by gastrostomy tube once a day  -- Indication: For Prophylaxis    LORazepam  -- 2mg IVP every 2 hours as needed for agitation  -- Indication: For Prophylaxis    Reglan 5 mg oral tablet  -- 1 tab(s) by gastrostomy tube 4 times a day (before meals and at bedtime), As Needed  -- Indication: For Prophylaxis    atorvastatin 80 mg oral tablet  -- 1 tab(s) by gastrostomy tube once a day (at bedtime)  -- Indication: For Prophylaxis    carbidopa-levodopa 25 mg-100 mg oral tablet  -- 1.5 tab(s) by gastrostomy tube 3 times a day  -- Indication: For Prophylaxis    DuoNeb 0.5 mg-2.5 mg/3 mL inhalation solution  -- 3 milliliter(s) inhaled 4 times a day, As Needed  -- Indication: For Prophylaxis    vitamin A & D topical ointment  -- Apply on skin to affected area   -- Indication: For Prophylaxis    nystatin 100,000 units/g topical powder  -- 1 application on skin 3 times a day  -- Indication: For Prophylaxis    Colace 10 mg/mL oral liquid  -- 20 milliliter(s) by gastrostomy tube once a day (at bedtime)  -- Indication: For Prophylaxis    Senna 8.8 mg/5 mL oral syrup  -- 10 milliliter(s) by gastrostomy tube once a day (at bedtime)  -- Indication: For Prophylaxis    midodrine 10 mg oral tablet  -- 1 tab(s) by mouth 3 times a day  -- Indication: For Prophylaxis    dorzolamide 2% ophthalmic solution  -- 1 drop(s) to each affected eye 2 times a day  -- Indication: For Prophylaxis    timolol maleate 0.5% ophthalmic solution  -- 1 drop(s) to each affected eye 2 times a day  -- Indication: For Prophylaxis    Lotemax 0.5% ophthalmic suspension  -- 1 drop(s) in the right eye every 48 hours  -- Indication: For Prophylaxis    pilocarpine 4% ophthalmic solution  -- 1 drop(s) in the left eye 2 times a day  -- Indication: For Prophylaxis    Travatan Z 0.004% ophthalmic solution  -- 1 drop(s) in the left eye once a day (in the evening)  -- Indication: For Prophylaxis    omeprazole 20 mg oral delayed release capsule  -- 1 cap(s) by gastrostomy tube once a day  -- Indication: For Prophylaxis    Levaquin 500 mg oral tablet  -- 1 tab(s) by gastrostomy tube every 24 hours  -- Avoid prolonged or excessive exposure to direct and/or artificial sunlight while taking this medication.  Do not take dairy products, antacids, or iron preparations within one hour of this medication.  Finish all this medication unless otherwise directed by prescriber.  May cause drowsiness or dizziness.  Medication should be taken with plenty of water.    -- Indication: For do not take at this time    Multiple Vitamins oral tablet  -- 1 tab(s) by gastrostomy tube once a day  -- Indication: For Prophylaxis    Vitamin C 500 mg oral tablet  -- 1 tab(s) by gastrostomy tube once a day  -- Indication: For Prophylaxis

## 2018-02-20 NOTE — H&P ADULT - HISTORY OF PRESENT ILLNESS
81 yo M with a PMH CVA with R sided residual weakness and s/p PEG placement, CAD s/p CABG, Afib (diagnosed 01/2018, not on A/C), HTN, Parkinson's, glaucoma, HTN, and BPH s/p chronic Alexander placement who presents from NH with altered mental status and tachycardia. History obtained from daughter, Hodan Santana who stated that the patient had been sent from [facility] for tachycardia. The patient's daughter stated she had visited him yesterday and he was conversational but today, the nursing home noticed he was nonverbal. The nursing home has noticed continued abdominal distention (which the nursing home states is chronic) and pain at his PEG tube site with erythema. He had his Alexander catheter changed, and 300 cc urine came out.    Of note, patient 81 yo M with a PMH CVA with R sided residual weakness and s/p PEG placement, CAD s/p CABG, Afib (diagnosed 01/2018, not on A/C), HTN, Parkinson's, glaucoma, HTN, and BPH s/p chronic Alexander placement who presents from NH with altered mental status and tachycardia. History obtained from daughter, Hodan Santana who stated that the patient had been sent from [facility] for tachycardia. The patient's daughter stated she had visited him yesterday and he was conversational but today, the nursing home noticed he was nonverbal. The nursing home has noticed continued abdominal distention (which the nursing home states is chronic) and pain at his PEG tube site with erythema. He had his Alexander catheter changed, and 300 cc urine came out.    In the ED, he received Vanc/Aztreonam, and 2L NS bolus.  ED VS: Temp 98.7, -130, BP 84/60--> 103/64, RR 22--> 28, O2 100% on 1.5L NC. 81 yo M with a PMH CVA with R sided residual weakness and s/p PEG placement, CAD s/p CABG, Afib (diagnosed 01/2018, not on A/C), HTN, Parkinson's, glaucoma, HTN, and BPH s/p chronic Alexander placement who presents from NH with altered mental status and tachycardia. History obtained from daughter, Hodan Santana who stated that the patient had been sent from [facility] for tachycardia. The patient's daughter stated she had visited him yesterday and he was conversational but today, the nursing home noticed he was nonverbal. The nursing home has noticed continued abdominal distention (which the nursing home states is chronic) and pain at his PEG tube site with erythema. He had his Alexander catheter changed, and 300 cc urine came out.    Of note, he had also been on Flagyl/Levaquin for PNA treatment from his last hospitalization, but had been intermittently receiving it because his PEG tube was clogged, for which he came to the ED on 1/30/18. His Levaquin/Flagyl was restarted on 2/13 for a 1 week course to ensure completion.    In the ED, he received Vanc/Aztreonam, and 2L NS bolus.  ED VS: Temp 98.7, -130, BP 84/60--> 103/64, RR 22--> 28, O2 100% on 1.5L NC.

## 2018-02-20 NOTE — CONSULT NOTE ADULT - ASSESSMENT
#Neuro:    #CV:    #Pulm:    #GI:    #/Renal:    #Endo/Rheum:    #Heme/onc:    #ID:    #DVT ppx and dispo:   -as per discussion w/primary team, pt prognosis poor and made DNR/DNI by primary team  -not currently MICU candidate    Quiana Boyd, PGY2  [facility]  Pager: [phone]- 81 yo M with a PMH CVA with R sided residual weakness and s/p PEG placement, CAD s/p CABG, Afib (diagnosed 01/2018, not on A/C), HTN, Parkinson's, glaucoma, HTN, and BPH s/p chronic Alexander placement who presents from NH with altered mental status and tachycardia. Found to be in septic shock refractory to to fluids likely 2/2 ruptured bladder.    #CV: hypotension likely 2/2 distributive shock state  -c/w 10 midodrine as needed  -c/w broad spectrum abx  -c/w fluid resuscitation w/NS    #/Renal: UTI w/leukocytosis, SHREYAS, s/p vanc and aztreonam, likely 2/2 ruptured bladder as visualized on CT  -as per primary team, GOC discussion reveal pt not candidate for surgery, likely not candidate for urgent HD  -would tx w/abx, comfort measures  -f/u UCX  -f/u uro recs    #ID: septic shock likely 2/2 UTI w/concurrent ruptured bladder  -as per primary team, GOC discussion reveal pt not candidate for surgery   -would tx w/abx, comfort measures  -f/u CX  -f/u uro recs    #Dispo:   -as per discussion w/primary team, pt prognosis poor and made DNR/DNI by primary team, although would consider pressors  -not currently MICU candidate    Quiana Boyd, PGY2  [facility]  Pager: [phone]-

## 2018-02-20 NOTE — DISCHARGE NOTE ADULT - PATIENT PORTAL LINK FT
You can access the [facility] Patient Portal, offered by [facility], by registering with the following website: [email or web address]

## 2018-02-20 NOTE — PROGRESS NOTE ADULT - PROBLEM SELECTOR PLAN 1
DNR/DNI  Inpatient Hospice immediately  All questions and concerns appropriately answered and addressed.   Analgesics, dilaudid now, preferably morphine infusion  ativan prn  gildainol prn

## 2018-02-20 NOTE — H&P ADULT - PMH
Atrial fibrillation    BPH (benign prostatic hyperplasia)    CAD (coronary artery disease)    CVA (cerebral vascular accident)    Alexander catheter in place    Glaucoma    Hypertension    Oropharyngeal dysphagia    Parkinson disease    Tracheostomy in place  removed 12/2017

## 2018-02-20 NOTE — H&P ADULT - PROBLEM SELECTOR PLAN 1
- Patient's declining mental status most likely 2/2 uremia. Patient's uremia most likely prerenal in the setting of hypotension 2/2 sepsis. Patient has been receiving Jevity TFs at nursing home, and therefore unlikely due to poor PO intake  - Less likely obstructive, as creatinine still elevated after catheter change from nursing home and only put out 300cc reportedly  - Concern that infection may be causing underlying sxs. Will treat infection as noted below  - Strict I/Os  - Will trend creatinine Q4H and see if improves. If not, will need to discuss dialysis. 2/2 UTI and Perforated Bladder w/ Peritonitis   abx, ivf, comfort care as pt not a candidate for life saving surgery and pressors  f/u cultures to final date

## 2018-02-20 NOTE — CONSULT NOTE ADULT - PROBLEM SELECTOR RECOMMENDATION 2
Pt with RR high 20s with labored breathing on exam.  Discussed with pt's wife and grandson that low dose Dilaudid will treat both pain and dyspnea.  Family understood.  Started Dilaudid as above
Secondary to sepsis. IVF as ordered. Agree with midodrine. Hold home anti-hypertensives. Monitor BP.

## 2018-02-20 NOTE — PROGRESS NOTE ADULT - SUBJECTIVE AND OBJECTIVE BOX
Patient seen and examined at bedside  Case discussed with medical team    pt's family at bedside, including wife and extended family  pt in severe distress, dnr/dni, request inpatient hospice STAT.    All questions and concerns appropriately answered and addressed.     HPI:  81 yo M admitted for septic shock 2/2 perforated bladder    PAST MEDICAL & SURGICAL HISTORY:  BPH (benign prostatic hyperplasia)  Atrial fibrillation  Alexander catheter in place  Oropharyngeal dysphagia  Glaucoma  CAD (coronary artery disease)  Parkinson disease  Tracheostomy in place: removed 2017  Hypertension  CVA (cerebral vascular accident)  H/O tracheostomy  S/P percutaneous endoscopic gastrostomy (PEG) tube placement      penicillin (Hives)       MEDICATIONS  (STANDING):  aztreonam  IVPB 500 milliGRAM(s) IV Intermittent every 8 hours  carbidopa/levodopa  25/100 1.5 Tablet(s) Oral three times a day  dextrose 5% + sodium chloride 0.9%. 1000 milliLiter(s) (100 mL/Hr) IV Continuous <Continuous>  docusate sodium Liquid 200 milliGRAM(s) Oral at bedtime  HYDROmorphone  Injectable 0.2 milliGRAM(s) IV Push every 6 hours  HYDROmorphone  Injectable 0.5 milliGRAM(s) IV Push once  midodrine 20 milliGRAM(s) Oral three times a day  nystatin Powder 1 Application(s) Topical three times a day  vancomycin  IVPB 1000 milliGRAM(s) IV Intermittent every 24 hours  vitamin A &amp; D Ointment 1 Application(s) Topical four times a day    MEDICATIONS  (PRN):  HYDROmorphone  Injectable 0.4 milliGRAM(s) IV Push every 1 hour PRN dyspnea  LORazepam   Injectable 2 milliGRAM(s) IV Push every 2 hours PRN anxiety/agitation      REVIEW OF SYSTEMS: limited 2/2 inability to express himself 2/2 acute medical condition    ALLERGY AND IMMUNOLOGIC: No hives or eczema	    T(C): 36.7 (18 @ 11:02), Max: 37.1 (18 @ 22:57)  HR: 130 (18 @ 15:29) (112 - 130)  BP: 92/52 (18 @ 15:29) (66/46 - 103/64)  RR: 26 (18 @ 15:29) (20 - 30)  SpO2: 100% (18 @ 15:29) (99% - 100%)    PHYSICAL EXAMINATION:   Constitutional: ill appearing, grabbing his abdomen in severe pain  HEENT: NC  Respiratory:  tachypnea, using accessory muscles of respiration.  Cardiovascular:  tachycardic  Gastrointestinal: severe tenderness to palpation, (+) RT and rigidity      Labs and imaging reviewed    LABS:                        8.7    14.46 )-----------( 153      ( 2018 03:30 )             26.3         132<L>  |  96<L>  |  112<H>  ----------------------------<  111<H>  4.2   |  16<L>  |  3.84<H>    Ca    7.0<L>      2018 03:30  Phos  5.0       Mg     1.9         TPro  6.6  /  Alb  2.1<L>  /  TBili  0.4  /  DBili  x   /  AST  28  /  ALT  9   /  AlkPhos  76          PT/INR - ( 2018 03:30 )   PT: 26.8 SEC;   INR: 2.37          PTT - ( 2018 03:30 )  PTT:24.9 SEC  Urinalysis Basic - ( 2018 22:35 )    Color: PINK / Appearance: TURBID / S.022 / pH: 6.0  Gluc: NEGATIVE / Ketone: NEGATIVE  / Bili: NEGATIVE / Urobili: NORMAL mg/dL   Blood: LARGE / Protein: 30 mg/dL / Nitrite: NEGATIVE   Leuk Esterase: LARGE / RBC: >50 / WBC >50   Sq Epi: x / Non Sq Epi: x / Bacteria: x      CAPILLARY BLOOD GLUCOSE            LIVER FUNCTIONS - ( 2018 22:35 )  Alb: 2.1 g/dL / Pro: 6.6 g/dL / ALK PHOS: 76 u/L / ALT: 9 u/L / AST: 28 u/L / GGT: x               RADIOLOGY & ADDITIONAL STUDIES:

## 2018-02-20 NOTE — CONSULT NOTE ADULT - SUBJECTIVE AND OBJECTIVE BOX
CHIEF COMPLAINT:    HPI:    PAST MEDICAL & SURGICAL HISTORY:  BPH (benign prostatic hyperplasia)  Atrial fibrillation  Alexander catheter in place  Oropharyngeal dysphagia  Glaucoma  CAD (coronary artery disease)  Parkinson disease  Tracheostomy in place: removed 2017  Hypertension  CVA (cerebral vascular accident)  H/O tracheostomy  S/P percutaneous endoscopic gastrostomy (PEG) tube placement      FAMILY HISTORY:  No pertinent family history in first degree relatives      SOCIAL HISTORY:  Smoking: __ packs x ___ years  EtOH Use:  Rec drug use:  High risk sexual practices  Marital Status:  Occupation:  Recent Travel:  Advance Directives:    Allergies    penicillin (Hives)    Intolerances        HOME MEDICATIONS:    REVIEW OF SYSTEMS:  Constitutional:   Eyes:  ENT:  CV:  Resp:  GI:  :  MSK:  Integumentary:  Neurological:  Psychiatric:  Endocrine:  Hematologic/Lymphatic:  Allergic/Immunologic:  [ ] All other systems negative  [ ] Unable to assess ROS because ________    OBJECTIVE:  ICU Vital Signs Last 24 Hrs  T(C): 35.7 (2018 05:49), Max: 37.1 (2018 22:57)  T(F): 96.2 (2018 05:49), Max: 98.7 (2018 22:57)  HR: 121 (2018 05:49) (114 - 130)  BP: 99/65 (2018 05:49) (66/46 - 103/64)  BP(mean): --  ABP: --  ABP(mean): --  RR: 20 (2018 05:49) (20 - 30)  SpO2: 99% (2018 05:49) (99% - 100%)        CAPILLARY BLOOD GLUCOSE          PHYSICAL EXAM:  General:   HEENT:   Lymph Nodes:  Neck:   Respiratory:   Cardiovascular:   Abdomen:   Extremities:   Skin:   Neurological:  Psychiatry:    HOSPITAL MEDICATIONS:  MEDICATIONS  (STANDING):  aspirin  chewable 81 milliGRAM(s) Oral daily  aztreonam  IVPB 500 milliGRAM(s) IV Intermittent every 8 hours  carbidopa/levodopa  25/100 1.5 Tablet(s) Oral three times a day  docusate sodium Liquid 200 milliGRAM(s) Oral at bedtime  midodrine 10 milliGRAM(s) Oral once  midodrine 20 milliGRAM(s) Oral three times a day  nystatin Powder 1 Application(s) Topical three times a day  sodium chloride 0.9%. 1000 milliLiter(s) (75 mL/Hr) IV Continuous <Continuous>  vitamin A &amp; D Ointment 1 Application(s) Topical four times a day    MEDICATIONS  (PRN):      LABS:                        8.7    14.46 )-----------( 153      ( 2018 03:30 )             26.3         132<L>  |  96<L>  |  112<H>  ----------------------------<  111<H>  4.2   |  16<L>  |  3.84<H>    Ca    7.0<L>      2018 03:30  Phos  5.0       Mg     1.9         TPro  6.6  /  Alb  2.1<L>  /  TBili  0.4  /  DBili  x   /  AST  28  /  ALT  9   /  AlkPhos  76      PT/INR - ( 2018 03:30 )   PT: 26.8 SEC;   INR: 2.37          PTT - ( 2018 03:30 )  PTT:24.9 SEC  Urinalysis Basic - ( 2018 22:35 )    Color: PINK / Appearance: TURBID / S.022 / pH: 6.0  Gluc: NEGATIVE / Ketone: NEGATIVE  / Bili: NEGATIVE / Urobili: NORMAL mg/dL   Blood: LARGE / Protein: 30 mg/dL / Nitrite: NEGATIVE   Leuk Esterase: LARGE / RBC: >50 / WBC >50   Sq Epi: x / Non Sq Epi: x / Bacteria: x        Venous Blood Gas:   @ 02:30  7.42/31/47/21/79.0  VBG Lactate: 4.0  Venous Blood Gas:   @ 22:36  7.44/33/36/23/59.4  VBG Lactate: 4.2      MICROBIOLOGY:     RADIOLOGY:  [ ] Reviewed and interpreted by me    EKG: CHIEF COMPLAINT: AMS, tachycardia    HPI: HPI primarily obtained from chart records and primary medical team, as pt lethargic, not responding to verbal prompt.    83 yo M with a PMH CVA with R sided residual weakness and s/p PEG placement, CAD s/p CABG, Afib (diagnosed 2018, not on A/C), HTN, Parkinson's, glaucoma, HTN, and BPH s/p chronic Lubin placement who presents from NH with altered mental status and tachycardia. History obtained from daughter, Hodan Santana who stated that the patient had been sent from [facility] for tachycardia. The patient's daughter stated she had visited him yesterday and he was conversational but today, the nursing home noticed he was nonverbal. The nursing home has noticed continued abdominal distention (which the nursing home states is chronic) and pain at his PEG tube site with erythema. He had his Lubin catheter changed, and 300 cc urine came out.    MICU consulted for persistent hypotension 90s/70s and tachycardia.    ROS: unable to obtain 2/2 patient condition    PAST MEDICAL & SURGICAL HISTORY:  BPH (benign prostatic hyperplasia)  Atrial fibrillation  Lubin catheter in place  Oropharyngeal dysphagia  Glaucoma  CAD (coronary artery disease)  Parkinson disease  Tracheostomy in place: removed 2017  Hypertension  CVA (cerebral vascular accident)  H/O tracheostomy  S/P percutaneous endoscopic gastrostomy (PEG) tube placement    FAMILY HISTORY:  No pertinent family history in first degree relatives    SOCIAL HISTORY:  Lives at [facility]. Bedbound, dependent for all ADLs and iADLs.    Allergies    penicillin (Hives)    Intolerances    HOME MEDICATIONS:  · 	midodrine 10 mg oral tablet: 1 tab(s) orally 3 times a day, Last Dose Taken:    · 	Flagyl 500 mg oral tablet: 1 tab(s) by gastrostomy tube 3 times a day, Last Dose Taken:    · 	nystatin 100,000 units/g topical powder: 1 application topically 3 times a day, Last Dose Taken:    · 	Levaquin 500 mg oral tablet: 1 tab(s) by gastrostomy tube every 24 hours, Last Dose Taken:    · 	Colace 10 mg/mL oral liquid: 20 milliliter(s) by gastrostomy tube once a day (at bedtime), Last Dose Taken:    · 	Senna 8.8 mg/5 mL oral syrup: 10 milliliter(s) by gastrostomy tube once a day (at bedtime), Last Dose Taken:    · 	DuoNeb 0.5 mg-2.5 mg/3 mL inhalation solution: 3 milliliter(s) inhaled 4 times a day, As Needed, Last Dose Taken:    · 	vitamin A & D topical ointment: Apply topically to affected area , Last Dose Taken:    · 	Reglan 5 mg oral tablet: 1 tab(s) by gastrostomy tube 4 times a day (before meals and at bedtime), As Needed, Last Dose Taken:    · 	Lotemax 0.5% ophthalmic suspension: 1 drop(s) in the right eye every 48 hours, Last Dose Taken:    · 	pilocarpine 4% ophthalmic solution: 1 drop(s) in the left eye 2 times a day, Last Dose Taken:    · 	Travatan Z 0.004% ophthalmic solution: 1 drop(s) in the left eye once a day (in the evening), Last Dose Taken:    · 	omeprazole 20 mg oral delayed release capsule: 1 cap(s) by gastrostomy tube once a day, Last Dose Taken:    · 	Multiple Vitamins oral tablet: 1 tab(s) by gastrostomy tube once a day, Last Dose Taken:    · 	Vitamin C 500 mg oral tablet: 1 tab(s) by gastrostomy tube once a day, Last Dose Taken:    · 	aspirin 81 mg oral tablet, chewable: 1 tab(s) by gastrostomy tube once a day, Last Dose Taken:    · 	budesonide 0.25 mg/2 mL inhalation suspension: 2 milliliter(s) inhaled 2 times a day, Last Dose Taken:    · 	finasteride 5 mg oral tablet: 1 tab(s) by gastrostomy tube once a day, Last Dose Taken:    · 	tamsulosin 0.4 mg oral capsule: 1 cap(s) by gastrostomy tube once a day, Last Dose Taken:    · 	atorvastatin 80 mg oral tablet: 1 tab(s) by gastrostomy tube once a day (at bedtime), Last Dose Taken:    · 	carbidopa-levodopa 25 mg-100 mg oral tablet: 1.5 tab(s) by gastrostomy tube 3 times a day, Last Dose Taken:    · 	dorzolamide 2% ophthalmic solution: 1 drop(s) to each affected eye 2 times a day, Last Dose Taken:    · 	timolol maleate 0.5% ophthalmic solution: 1 drop(s) to each affected eye 2 times a day, Last Dose Taken      OBJECTIVE:  ICU Vital Signs Last 24 Hrs  T(C): 35.7 (2018 05:49), Max: 37.1 (2018 22:57)  T(F): 96.2 (2018 05:49), Max: 98.7 (2018 22:57)  HR: 121 (2018 05:49) (114 - 130)  BP: 99/65 (2018 05:49) (66/46 - 103/64)  BP(mean): --  ABP: --  ABP(mean): --  RR: 20 (2018 05:49) (20 - 30)  SpO2: 99% (2018 05:49) (99% - 100%)      PHYSICAL EXAM:  General: elderly white male, lethargic, moaning in pain  HEENT:  PERRLA, EOMI  Lymph Nodes: no cervical lymphadenopathy  Neck: supple, +trach scar, no thyromegaly  Respiratory: CTAB/L  Cardiovascular: sinus tach, no JVD  Abdomen: distended, tender to palpation suprapubically, PEG tube site C/D/I  Extremities: 1+ pitting edema B/L  Skin: no lesions/bruising/ecchymoses  Neurological: corneal reflexes intact, withdraws to pain  Psychiatry: [facility] MEDICATIONS:  MEDICATIONS  (STANDING):  aspirin  chewable 81 milliGRAM(s) Oral daily  aztreonam  IVPB 500 milliGRAM(s) IV Intermittent every 8 hours  carbidopa/levodopa  25/100 1.5 Tablet(s) Oral three times a day  docusate sodium Liquid 200 milliGRAM(s) Oral at bedtime  midodrine 10 milliGRAM(s) Oral once  midodrine 20 milliGRAM(s) Oral three times a day  nystatin Powder 1 Application(s) Topical three times a day  sodium chloride 0.9%. 1000 milliLiter(s) (75 mL/Hr) IV Continuous <Continuous>  vitamin A &amp; D Ointment 1 Application(s) Topical four times a day    MEDICATIONS  (PRN):      LABS:                        8.7    14.46 )-----------( 153      ( 2018 03:30 )             26.3     02-20    132<L>  |  96<L>  |  112<H>  ----------------------------<  111<H>  4.2   |  16<L>  |  3.84<H>    Ca    7.0<L>      2018 03:30  Phos  5.0       Mg     1.9         TPro  6.6  /  Alb  2.1<L>  /  TBili  0.4  /  DBili  x   /  AST  28  /  ALT  9   /  AlkPhos  76      PT/INR - ( 2018 03:30 )   PT: 26.8 SEC;   INR: 2.37          PTT - ( 2018 03:30 )  PTT:24.9 SEC  Urinalysis Basic - ( 2018 22:35 )    Color: PINK / Appearance: TURBID / S.022 / pH: 6.0  Gluc: NEGATIVE / Ketone: NEGATIVE  / Bili: NEGATIVE / Urobili: NORMAL mg/dL   Blood: LARGE / Protein: 30 mg/dL / Nitrite: NEGATIVE   Leuk Esterase: LARGE / RBC: >50 / WBC >50   Sq Epi: x / Non Sq Epi: x / Bacteria: x    Venous Blood Gas:   @ 02:30  7.42/31/47/21/79.0  VBG Lactate: 4.0  Venous Blood Gas:   @ 22:36  7.44/33/36/23/59.4  VBG Lactate: 4.2    MICROBIOLOGY: pending    RADIOLOGY:  [X] Reviewed and interpreted by me  CT A/P no contrast:  INTERPRETATION:  free intraperitoneal air.  moderate intrabdominal free   fluid  julia bladder rupture  lubin catheter balloon inflated in prosthatic urethra    d/w  Dr. Chappell      EKG:  Afib w/RVR 120S CHIEF COMPLAINT: AMS, tachycardia    HPI: HPI primarily obtained from chart records and primary medical team, as pt lethargic, not responding to verbal prompt.    81 yo M with a PMH CVA with R sided residual weakness and s/p PEG placement, CAD s/p CABG, Afib (diagnosed 2018, not on A/C), HTN, Parkinson's, glaucoma, HTN, and BPH s/p chronic Lubin placement who presents from NH with altered mental status and tachycardia. History obtained from daughter, Hodan Santana who stated that the patient had been sent from [facility] for tachycardia. The patient's daughter stated she had visited him yesterday and he was conversational but today, the nursing home noticed he was nonverbal. The nursing home has noticed continued abdominal distention (which the nursing home states is chronic) and pain at his PEG tube site with erythema. He had his Lubin catheter changed, and 300 cc urine came out.    Pt admitted to medicine; received vanc/aztreonam, sinemet, midodrine 10, 4L NS. MICU consulted for persistent hypotension 90s/70s and tachycardia.    ROS: unable to obtain 2/2 patient condition    PAST MEDICAL & SURGICAL HISTORY:  BPH (benign prostatic hyperplasia)  Atrial fibrillation  Lubin catheter in place  Oropharyngeal dysphagia  Glaucoma  CAD (coronary artery disease)  Parkinson disease  Tracheostomy in place: removed 2017  Hypertension  CVA (cerebral vascular accident)  H/O tracheostomy  S/P percutaneous endoscopic gastrostomy (PEG) tube placement    FAMILY HISTORY:  No pertinent family history in first degree relatives    SOCIAL HISTORY:  Lives at [facility]. Bedbound, dependent for all ADLs and iADLs.    Allergies    penicillin (Hives)    Intolerances    HOME MEDICATIONS:  · 	midodrine 10 mg oral tablet: 1 tab(s) orally 3 times a day, Last Dose Taken:    · 	Flagyl 500 mg oral tablet: 1 tab(s) by gastrostomy tube 3 times a day, Last Dose Taken:    · 	nystatin 100,000 units/g topical powder: 1 application topically 3 times a day, Last Dose Taken:    · 	Levaquin 500 mg oral tablet: 1 tab(s) by gastrostomy tube every 24 hours, Last Dose Taken:    · 	Colace 10 mg/mL oral liquid: 20 milliliter(s) by gastrostomy tube once a day (at bedtime), Last Dose Taken:    · 	Senna 8.8 mg/5 mL oral syrup: 10 milliliter(s) by gastrostomy tube once a day (at bedtime), Last Dose Taken:    · 	DuoNeb 0.5 mg-2.5 mg/3 mL inhalation solution: 3 milliliter(s) inhaled 4 times a day, As Needed, Last Dose Taken:    · 	vitamin A & D topical ointment: Apply topically to affected area , Last Dose Taken:    · 	Reglan 5 mg oral tablet: 1 tab(s) by gastrostomy tube 4 times a day (before meals and at bedtime), As Needed, Last Dose Taken:    · 	Lotemax 0.5% ophthalmic suspension: 1 drop(s) in the right eye every 48 hours, Last Dose Taken:    · 	pilocarpine 4% ophthalmic solution: 1 drop(s) in the left eye 2 times a day, Last Dose Taken:    · 	Travatan Z 0.004% ophthalmic solution: 1 drop(s) in the left eye once a day (in the evening), Last Dose Taken:    · 	omeprazole 20 mg oral delayed release capsule: 1 cap(s) by gastrostomy tube once a day, Last Dose Taken:    · 	Multiple Vitamins oral tablet: 1 tab(s) by gastrostomy tube once a day, Last Dose Taken:    · 	Vitamin C 500 mg oral tablet: 1 tab(s) by gastrostomy tube once a day, Last Dose Taken:    · 	aspirin 81 mg oral tablet, chewable: 1 tab(s) by gastrostomy tube once a day, Last Dose Taken:    · 	budesonide 0.25 mg/2 mL inhalation suspension: 2 milliliter(s) inhaled 2 times a day, Last Dose Taken:    · 	finasteride 5 mg oral tablet: 1 tab(s) by gastrostomy tube once a day, Last Dose Taken:    · 	tamsulosin 0.4 mg oral capsule: 1 cap(s) by gastrostomy tube once a day, Last Dose Taken:    · 	atorvastatin 80 mg oral tablet: 1 tab(s) by gastrostomy tube once a day (at bedtime), Last Dose Taken:    · 	carbidopa-levodopa 25 mg-100 mg oral tablet: 1.5 tab(s) by gastrostomy tube 3 times a day, Last Dose Taken:    · 	dorzolamide 2% ophthalmic solution: 1 drop(s) to each affected eye 2 times a day, Last Dose Taken:    · 	timolol maleate 0.5% ophthalmic solution: 1 drop(s) to each affected eye 2 times a day, Last Dose Taken      OBJECTIVE:  ICU Vital Signs Last 24 Hrs  T(C): 35.7 (2018 05:49), Max: 37.1 (2018 22:57)  T(F): 96.2 (2018 05:49), Max: 98.7 (2018 22:57)  HR: 121 (2018 05:49) (114 - 130)  BP: 99/65 (2018 05:49) (66/46 - 103/64)  BP(mean): --  ABP: --  ABP(mean): --  RR: 20 (2018 05:49) (20 - 30)  SpO2: 99% (2018 05:49) (99% - 100%)      PHYSICAL EXAM:  General: elderly white male, lethargic, moaning in pain  HEENT:  PERRLA, EOMI  Lymph Nodes: no cervical lymphadenopathy  Neck: supple, +trach scar, no thyromegaly  Respiratory: CTAB/L  Cardiovascular: sinus tach, no JVD  Abdomen: distended, tender to palpation suprapubically, PEG tube site C/D/I  Extremities: 1+ pitting edema B/L  Skin: no lesions/bruising/ecchymoses  Neurological: corneal reflexes intact, withdraws to pain  Psychiatry: [facility] MEDICATIONS:  MEDICATIONS  (STANDING):  aspirin  chewable 81 milliGRAM(s) Oral daily  aztreonam  IVPB 500 milliGRAM(s) IV Intermittent every 8 hours  carbidopa/levodopa  25/100 1.5 Tablet(s) Oral three times a day  docusate sodium Liquid 200 milliGRAM(s) Oral at bedtime  midodrine 10 milliGRAM(s) Oral once  midodrine 20 milliGRAM(s) Oral three times a day  nystatin Powder 1 Application(s) Topical three times a day  sodium chloride 0.9%. 1000 milliLiter(s) (75 mL/Hr) IV Continuous <Continuous>  vitamin A &amp; D Ointment 1 Application(s) Topical four times a day    MEDICATIONS  (PRN):      LABS: leukocytosis, INR 2.37, hyponatremic, SHREYAS, lactate 4, hyperphosphatemia, UA+                        8.7    14.46 )-----------( 153      ( 2018 03:30 )             26.3         132<L>  |  96<L>  |  112<H>  ----------------------------<  111<H>  4.2   |  16<L>  |  3.84<H>    Ca    7.0<L>      2018 03:30  Phos  5.0       Mg     1.9         TPro  6.6  /  Alb  2.1<L>  /  TBili  0.4  /  DBili  x   /  AST  28  /  ALT  9   /  AlkPhos  76      PT/INR - ( 2018 03:30 )   PT: 26.8 SEC;   INR: 2.37          PTT - ( 2018 03:30 )  PTT:24.9 SEC  Urinalysis Basic - ( 2018 22:35 )    Color: PINK / Appearance: TURBID / S.022 / pH: 6.0  Gluc: NEGATIVE / Ketone: NEGATIVE  / Bili: NEGATIVE / Urobili: NORMAL mg/dL   Blood: LARGE / Protein: 30 mg/dL / Nitrite: NEGATIVE   Leuk Esterase: LARGE / RBC: >50 / WBC >50   Sq Epi: x / Non Sq Epi: x / Bacteria: x    Venous Blood Gas:   @ 02:30  7.42/31/47/21/79.0  VBG Lactate: 4.0  Venous Blood Gas:   @ 22:36  7.44/33/36/23/59.4  VBG Lactate: 4.2    MICROBIOLOGY: pending    RADIOLOGY:  [X] Reviewed and interpreted by me  CT A/P no contrast:  INTERPRETATION:  free intraperitoneal air.  moderate intrabdominal free   fluid  julia bladder rupture  lubin catheter balloon inflated in prosthatic urethra    18 CXR:   Small left pleural effusion. Trace right pleural   effusion.  Follow up official report.      EKG:  Afib w/RVR 120S

## 2018-02-20 NOTE — PROVIDER CONTACT NOTE (OTHER) - SITUATION
CM visited patient for comfort, requested pain medication, nurse informed and brought for administration. ALAINA Mclaughlin called, made aware of Dr Mark not yet present for evaluation prior to
Visited by Dr Mark informing CM of approval and clearance for transfer to Hospice as requested by family. CM visited patient and family informing them of approval received from Dr Mark

## 2018-02-20 NOTE — CONSULT NOTE ADULT - ATTENDING COMMENTS
[facility] NEPHROLOGY  Luis Eduardo Harrington M.D.  Ian Hoffmann D.O.  Julia Celeste M.D.  Cristal Buenrostro, MSN, ANP-C    Telephone: [phone]  Facsimile: [phone]    [address] [place] [address]
Pt seen/examined.  Case discussed with housestaff/PA team.  Agree with above note history, physical and assessment/plan.  Confirmed w/ pt family at bedside pt is DNR/DNI and they do not want to proceed with surgery.  They understand that pt will likely  due to perforated bladder and desire hospice/palliative care.
pt seen and examined at bedside, ct scan reviewed, pt with abdominal   pain, peg in place, bladder rupture with gas seen tracking along the   abdominal wall.  Per family not a surgical candidate.  Pt on midodrine  s/p iv fluid, discussed with medical team. Conservative management.  NO need for icu management. Pain management to keep patient comfortable.

## 2018-02-20 NOTE — CONSULT NOTE ADULT - SUBJECTIVE AND OBJECTIVE BOX
HPI    "81 yo M with a PMH CVA with R sided residual weakness and s/p PEG placement, CAD s/p CABG, Afib (diagnosed 2018, not on A/C), HTN, Parkinson's, glaucoma, HTN, and BPH s/p chronic Lubin placement who presents from NH with altered mental status and tachycardia. History obtained from daughter, Hodan Santana who stated that the patient had been sent from [facility] for tachycardia. The patient's daughter stated she had visited him yesterday and he was conversational but today, the nursing home noticed he was nonverbal. The nursing home has noticed continued abdominal distention (which the nursing home states is chronic) and pain at his PEG tube site with erythema. He had his Lubin catheter changed, and 300 cc urine came out."    Had CT A/P, noted to have intraperitoneal free air and fluid, concerning for bladder perforation. Patient obtunded. History obtained from daughter and wife at bedside.  Note AMS, lack of lubin drainage since yesterday. Note chronic lubin since last summer. Denies having a urologist. Catheter changed at nursing home.    PAST MEDICAL & SURGICAL HISTORY:  BPH (benign prostatic hyperplasia)  Atrial fibrillation  Lubin catheter in place  Oropharyngeal dysphagia  Glaucoma  CAD (coronary artery disease)  Parkinson disease  Tracheostomy in place: removed 2017  Hypertension  CVA (cerebral vascular accident)  H/O tracheostomy  S/P percutaneous endoscopic gastrostomy (PEG) tube placement      MEDICATIONS  (STANDING):  aztreonam  IVPB 500 milliGRAM(s) IV Intermittent every 8 hours  carbidopa/levodopa  25/100 1.5 Tablet(s) Oral three times a day  docusate sodium Liquid 200 milliGRAM(s) Oral at bedtime  midodrine 20 milliGRAM(s) Oral three times a day  nystatin Powder 1 Application(s) Topical three times a day  sodium chloride 0.9%. 1000 milliLiter(s) (75 mL/Hr) IV Continuous <Continuous>  vitamin A &amp; D Ointment 1 Application(s) Topical four times a day    MEDICATIONS  (PRN):  morphine  - Injectable 1 milliGRAM(s) IV Push every 2 hours PRN Dyspnea  morphine  - Injectable 1 milliGRAM(s) IV Push every 2 hours PRN Severe Pain (7 - 10)      FAMILY HISTORY:  No pertinent family history in first degree relatives      Allergies    penicillin (Hives)    Intolerances        SOCIAL HISTORY: n/c    REVIEW OF SYSTEMS: Otherwise negative as stated in HPI    Physical Exam  Vital signs  T(C): 35.7 (18 @ 05:49), Max: 37.1 (18 @ 22:57)  HR: 130 (18 @ 07:38)  BP: 86/40 (18 @ 07:38)  SpO2: 100% (18 @ 07:38)  Wt(kg): --    Output    UOP    Gen: distressed  Resp: tachypneic, labored breathing  Abd: diffusely tender to palpation, peritoneal  : circumised, normal phallus, b/l descended testicles    Lubin balloon deflated, advanced. Unable to obtain urine. Lubin removed, 20F coude placed with drainage of yellow urine, mild sediment. 30cc inflated in balloon.       LABS:       @ 03:30    WBC 14.46 / Hct 26.3  / SCr 3.84      @ 22:35    WBC 17.12 / Hct 30.6  / SCr 4.27         132<L>  |  96<L>  |  112<H>  ----------------------------<  111<H>  4.2   |  16<L>  |  3.84<H>    Ca    7.0<L>      2018 03:30  Phos  5.0       Mg     1.9         TPro  6.6  /  Alb  2.1<L>  /  TBili  0.4  /  DBili  x   /  AST  28  /  ALT  9   /  AlkPhos  76      PT/INR - ( 2018 03:30 )   PT: 26.8 SEC;   INR: 2.37          PTT - ( 2018 03:30 )  PTT:24.9 SEC  Urinalysis Basic - ( 2018 22:35 )    Color: PINK / Appearance: TURBID / S.022 / pH: 6.0  Gluc: NEGATIVE / Ketone: NEGATIVE  / Bili: NEGATIVE / Urobili: NORMAL mg/dL   Blood: LARGE / Protein: 30 mg/dL / Nitrite: NEGATIVE   Leuk Esterase: LARGE / RBC: >50 / WBC >50   Sq Epi: x / Non Sq Epi: x / Bacteria: x            RADIOLOGY:    < from: CT Abdomen and Pelvis No Cont (18 @ 04:02) >  INTERPRETATION:  free intraperitoneal air.  moderate intrabdominal free   fluid  julia bladder rupture  lubin catheter balloon inflated in prosthatic urethra    < end of copied text >

## 2018-02-20 NOTE — H&P ADULT - PROBLEM SELECTOR PLAN 9
- DVT PPX: SCDs  - Diet: NPO  - Dispo: until sx improvement - DVT PPX: SCDs  - Diet: NPO  - Dispo: until sx improvement    Attempted to reach attending multiple times without response - C/w appropriate meds

## 2018-02-20 NOTE — H&P ADULT - PROBLEM SELECTOR PLAN 3
- Patient meets sepsis criteria with WBC, tachycardia, and tachypnea  - Markedly elevated lactate 4.2 likely 2/2 hypoperfusion  - Ddx includes UTI (pt has +UA in setting of chronic Alexander). However, with marked abdominal distention/rebound/TTP, must r/o bowel perforation. Patient does also have significant heel ulcers as well  - Will obtain urgent CT A/P without contrast (2/2 SHREYAS)  - received 2L NS in ED, will give 1 more liter  - Will cover with Vanc/Aztreonam for now (patient has Penicillin allergy), renally dosed  - VS Q4H - Likely in the setting of sepsis, 2/2 UTI vs GI vs heel ulcer  - Will avoid rate control unless HR persistently in 120s. Lungs are clear on exam and most recent TTE from a few weeks ago shows normal EF with minimal diastolic dysfunction, will be more aggressive with fluids

## 2018-02-20 NOTE — CONSULT NOTE ADULT - PROBLEM SELECTOR RECOMMENDATION 4
Patient with anion-gap acidosis secondary to lactic acidosis. No need for sodium bicarbonate given overall alkalemia.    Patient planned for hospice. No need for further nephrology work up at this time.

## 2018-02-20 NOTE — PROCEDURE NOTE - NSURITECHNIQUE_GU_A_CORE
The urinary drainage system is closed at the end of the procedure/Proper hand hygiene was performed/Sterile gloves were worn for the duration of the procedure/A sterile drape was used to cover all adjacent areas/All applicable medical record documentation is completed/The catheter was appropriately lubricated/The collection bag is below the level of the patient and urinary bladder

## 2018-02-20 NOTE — CONSULT NOTE ADULT - PROBLEM SELECTOR RECOMMENDATION 4
Family decided to pursue symptom management and hospice.  After GOC discussions per primary team, pt now DNR/I.  His wife's goal is for him to be comfortable and to allow a natural death.  Wife very tearful.  She was agreeable to speaking with  today and we called Rabbi Sky to see pt and family.  Pt to be transferred to [facility] inpatient hospice this afternoon 30 minutes spent with wife, daughter .  Family decided to pursue symptom management and hospice.  After GOC discussions per primary team, pt now DNR/I.  His wife's goal is for him to be comfortable and to allow a natural death.  Wife very tearful.  She was agreeable to speaking with  today and we called [facility] Sky to see pt and family.  Pt to be transferred to [facility] inpatient hospice this afternoon

## 2018-02-20 NOTE — CONSULT NOTE ADULT - ASSESSMENT
82M with PEG, trach, multiple medical comorbidities, BPH with chronic indwelling lubin, presents with tachycardia and abdomnal pain. CT with lubin balloon within prostatic urethra, concern for intraperitioneal bladder perforation.  Exam, labs also consistent with perforation.     Extensive discussion with wife and daughter. Informed that intraperitoneal bladder perforation is life threatening, requires operative intervention.  Described nature of operation.  Wife and daughter do not feel he would survive operation, feel he is too sick. Medicine team also feel he will not survive OR.  Offered operation to the family despite these concerns. Family wishes to make patient DNR/DNI.  Will go for comfort measures.    Recommend palliative measures as patient unlikely able to survive without operative intervention.      Case d/w Dr. Villanueva

## 2018-02-20 NOTE — CONSULT NOTE ADULT - SUBJECTIVE AND OBJECTIVE BOX
[facility] NEPHROLOGY- CONSULTATION NOTE    82 year old male with history of below presents with AMS. Nephrology consulted for elevated Scr.    Patient found to have SHREYAS on admission with no prior history of renal disease in setting of septic shock from ruptured bladder. Patient deemed high risk for OR given co-morbidities and thus palliative care consulted. Patient's family agreeable to discharge patient to hospice today.    REVIEW OF SYSTEMS: unable to obtain as patient not verbal    penicillin (Hives)      Home Medications Reviewed  Hospital Medications:   MEDICATIONS  (STANDING):  aztreonam  IVPB 500 milliGRAM(s) IV Intermittent every 8 hours  dextrose 5% + sodium chloride 0.9%. 1000 milliLiter(s) (100 mL/Hr) IV Continuous <Continuous>  HYDROmorphone  Injectable 0.2 milliGRAM(s) IV Push every 6 hours  midodrine 20 milliGRAM(s) Oral three times a day  nystatin Powder 1 Application(s) Topical three times a day  vancomycin  IVPB 1000 milliGRAM(s) IV Intermittent every 24 hours      PAST MEDICAL & SURGICAL HISTORY:  BPH (benign prostatic hyperplasia)  Atrial fibrillation  Alexander catheter in place  Oropharyngeal dysphagia  Glaucoma  CAD (coronary artery disease)  Parkinson disease  Tracheostomy in place: removed 2017  Hypertension  CVA (cerebral vascular accident)  H/O tracheostomy  S/P percutaneous endoscopic gastrostomy (PEG) tube placement      FAMILY HISTORY:  No pertinent family history in first degree relatives      SOCIAL HISTORY:  Denies toxic substance use     VITALS:  T(F): 98 (18 @ 11:02), Max: 98.7 (18 @ 22:57)  HR: 130 (18 @ 15:29)  BP: 92/52 (18 @ 15:29)  RR: 26 (18 @ 15:29)  SpO2: 100% (18 @ 15:29)  Wt(kg): --        PHYSICAL EXAM:  Gen: Cachectic appearing  HEENT: MMM  Neck: no JVD  Cards: RRR, +S1/S2, no M/G/R  Resp: CTA B/L  GI: TTP, + hyperactive BS  : no CVA tenderness  Vascular: no LE edema B/L  Derm: no rashes  Neuro: + R sided weakness    LABS:      132<L>  |  96<L>  |  112<H>  ----------------------------<  111<H>  4.2   |  16<L>  |  3.84<H>    Ca    7.0<L>      2018 03:30  Phos  5.0       Mg     1.9         TPro  6.6  /  Alb  2.1<L>  /  TBili  0.4  /  DBili      /  AST  28  /  ALT  9   /  AlkPhos  76      Creatinine Trend: 3.84 <--, 4.27 <--                        8.7    14.46 )-----------( 153      ( 2018 03:30 )             26.3     Urine Studies:  Urinalysis Basic - ( 2018 22:35 )    Color: PINK / Appearance: TURBID / S.022 / pH: 6.0  Gluc: NEGATIVE / Ketone: NEGATIVE  / Bili: NEGATIVE / Urobili: NORMAL mg/dL   Blood: LARGE / Protein: 30 mg/dL / Nitrite: NEGATIVE   Leuk Esterase: LARGE / RBC: >50 / WBC >50   Sq Epi:  / Non Sq Epi:  / Bacteria:         < from: CT Abdomen and Pelvis No Cont (18 @ 04:02) >  IMPRESSION:     Intraperitoneal bladder rupture.    Mild bilateral hydronephrosis.    Malpositioned Alexander catheter.    Dr. Culver discussed these findings with Dr. Chappell on 2018 5:45AM,   with read back.      < end of copied text >      RADIOLOGY & ADDITIONAL STUDIES:

## 2018-02-20 NOTE — ED ADULT NURSE REASSESSMENT NOTE - NS ED NURSE REASSESS COMMENT FT1
ER pt Adm to tele for Sepsis, pt non verbal some contraction to arm, on CM  Afib. afebrile, peg in place, lubin at bed side, beba urine moderate amount, seen by  for Hospice placement.  will con't to monitor.     Carole Ramirez RN

## 2018-02-20 NOTE — CONSULT NOTE ADULT - ASSESSMENT
81 yo M with a PMH CVA with R sided residual weakness and s/p PEG placement, CAD s/p CABG, Afib (diagnosed 01/2018, not on A/C), HTN, Parkinson's, glaucoma, HTN, and BPH s/p chronic Alexander placement who presents from NH with altered mental status and tachycardia. Found to be in septic shock refractory to fluids likely 2/2 ruptured bladder.

## 2018-02-20 NOTE — ED ADULT NURSE REASSESSMENT NOTE - NS ED NURSE REASSESS COMMENT FT1
pt BP remained to be low admitting resident called fluids ordered and giving. MICU team is evaluating the patient currently due to low BP family is with the patient. meds ordered and given awaiting further orders Will continue to monitor the pt

## 2018-02-20 NOTE — ED ADULT NURSE REASSESSMENT NOTE - NS ED NURSE REASSESS COMMENT FT1
after ct scan pt was found to have a ruptured bladder from NH after discussion with family pt became DNR/DNI. MICU team is evaluating the patient. fluids infusing awaiting further orders Will continue to monitor the pt

## 2018-02-20 NOTE — DISCHARGE NOTE ADULT - MEDICATION SUMMARY - MEDICATIONS TO STOP TAKING
I will STOP taking the medications listed below when I get home from the hospital:    Levaquin 500 mg oral tablet  -- 1 tab(s) by gastrostomy tube every 24 hours  -- Avoid prolonged or excessive exposure to direct and/or artificial sunlight while taking this medication.  Do not take dairy products, antacids, or iron preparations within one hour of this medication.  Finish all this medication unless otherwise directed by prescriber.  May cause drowsiness or dizziness.  Medication should be taken with plenty of water.

## 2018-02-20 NOTE — CONSULT NOTE ADULT - ASSESSMENT
82 year old male with history of Parkinson's disease presents with AMS found to have septic shock with rupture bladder and advanced renal failure. Nephrology consulted for elevated Scr.

## 2018-02-20 NOTE — PROVIDER CONTACT NOTE (OTHER) - ASSESSMENT
Discharge planing / Hospice care  Palliative referral to Hospice as family requested  [facility] and [facility] hospice nurse Met with patient for discharge planing with hospice. Spoke to patient wife and daughter. Information provided and all questions answered, family upset with deferred anger  projected to staff. Comfort provided with hospice service at [facility] with bed available to accept patient. CM spoke to ADS and to Dr Mark. Family agreed to transfer to [facility] after multiple calls. As  per Lissette, Dr Mark request to see patient prior to transfer. all care needs are anticipated and provided for smooth transition and comfort to [facility]. Family is in agreement with discharge plan at this time. Awaiting Dr Mark evaluation.

## 2018-02-20 NOTE — PROVIDER CONTACT NOTE (OTHER) - BACKGROUND
discharge to [facility]; will contact; as family is very anxious to transfer patient at this time
for transfer. [facility] arrangement [facility]. Transportation schedule for 4:45pm. Family are in agreement with plan

## 2018-02-20 NOTE — H&P ADULT - PROBLEM SELECTOR PLAN 2
- Likely in the setting of sepsis, 2/2 UTI vs GI vs heel ulcer  - Will avoid rate control unless HR persistently in 120s. Lungs are clear on exam and most recent TTE from a few weeks ago shows normal EF with minimal diastolic dysfunction, will be more aggressive with fluids - Patient's declining mental status most likely 2/2 uremia. Patient's uremia most likely prerenal in the setting of hypotension 2/2 sepsis. Patient has been receiving Jevity TFs at nursing home, and therefore unlikely due to poor PO intake  - Less likely obstructive, as creatinine still elevated after catheter change from nursing home and only put out 300cc reportedly  - Concern that infection may be causing underlying sxs. Will treat infection as noted below  - Strict I/Os  - Will trend creatinine Q4H and see if improves. If not, will need to discuss dialysis.

## 2018-02-20 NOTE — CHART NOTE - NSCHARTNOTEFT_GEN_A_CORE
Received signed out this morning approximately 10am by Dr. Valladares. I called palliative for consult in terms of comfort measures/pain management.   Appreciate discussion with palliative - awaiting their official note and appreciate /family discussion with inpatient hospice set up. As per , there is a bed availability today at hospice center [facility]. Spoke with attending Dr. Mark who's aware of the current status, he will come see the patient before official disposition/transport.  Summer at [facility] [id number] also made aware. will follow up.

## 2018-02-20 NOTE — DISCHARGE NOTE ADULT - PLAN OF CARE
comfort care measures - no surgical intervention continue current regimen - comfort care transfer to inpatient hospice  comfort care measures - no surgical intervention

## 2018-02-20 NOTE — ED ADULT NURSE REASSESSMENT NOTE - NS ED NURSE REASSESS COMMENT FT1
5:30PM Er transport arrive but was called back return at 6PM transport return all D/C paper given to family and transport people, report to Hospice primary RN given medicated pt for pain,   Carole Ramirez RN

## 2018-02-20 NOTE — H&P ADULT - PROBLEM SELECTOR PLAN 6
- C/w carvidopa/levodopa - Had GOC conversation over the phone with daughter, Hodan Campos, who spoke to the patient's wife, Padmaja Vazquez. I told her the seriousness of the patient's clinical status due to his tachycardia, marked abdominal distention, hypotension, and marked uremia. I noted that if there is a concern for bowel perforation, he may require surgery, to which the daughter noted he would likely not tolerate. She spoke with the patient's wife and they agreed to make him full code for now, as the patient has been removed from ventilation recently in the ICU when physicians had told them that he wouldn't. They understood the prognosis, but would like to assess improvement with antibiotics over the next few hours before making any further decisions regarding GOC. They will also return to the hospital.

## 2018-02-20 NOTE — H&P ADULT - PROBLEM SELECTOR PLAN 10
- DVT PPX: SCDs  - Diet: NPO  - Dispo: until sx improvement    Attempted to reach attending multiple times without response

## 2018-02-20 NOTE — DISCHARGE NOTE ADULT - HOSPITAL COURSE
81 yo M with a PMH CVA with R sided residual weakness and s/p PEG placement, CAD s/p CABG, Afib (diagnosed 01/2018, not on A/C), HTN, Parkinson's, glaucoma, HTN, and BPH s/p chronic Alexander placement who presented from NH with altered mental status and tachycardia, also with pain at his PEG tube site and abdominal distention. He had his Alexander changed that afternoon with 300 cc yellow urine removed. In the ED, he was found to have rapid atrial fibrillation to 130s with BPs 80s/40s, and creatinine 4 (had been 0.4 three weeks prior). Called daughter Hodan and wife Padmaja to come to the hospital, but they requested patient remain full code and to assess a few hours later if antibiotics were working. Upon admission, he was noted to have marked abdominal distention and STAT CT scan showed bladder rupture with catheter in prostatic urethra. His BPs and HR did not improve with 3.5L IVFs. After discussion with the family, he was deemed not to be a surgical candidate, and urology was called for further input. He was made DNR/DNI by family, and comfort measures initiated. _________ 83 yo M with a PMH CVA with R sided residual weakness and s/p PEG placement, CAD s/p CABG, Afib (diagnosed 01/2018, not on A/C), HTN, Parkinson's, glaucoma, HTN, and BPH s/p chronic Alexander placement who presented from NH with altered mental status and tachycardia, also with pain at his PEG tube site and abdominal distention. He had his Alexander changed that afternoon with 300 cc yellow urine removed. In the ED, he was found to have rapid atrial fibrillation to 130s with BPs 80s/40s, and creatinine 4 (had been 0.4 three weeks prior). He was started on Vanc/Aztreonam (has penicillin allergy). Called daughter Hodan and wife Padmaja to come to the hospital, but they requested patient remain full code and to assess a few hours later if antibiotics were working. Upon admission, he was noted to have marked abdominal distention and STAT CT scan showed bladder rupture with catheter in prostatic urethra. His BPs and HR did not improve with 3.5L IVFs. After discussion with the family, he was deemed not to be a surgical candidate, and urology was called for further input. He was made DNR/DNI by family, and comfort measures initiated. _________ 81 yo M with a PMH CVA with R sided residual weakness and s/p PEG placement, CAD s/p CABG, Afib (diagnosed 01/2018, not on A/C), HTN, Parkinson's, glaucoma, HTN, and BPH s/p chronic Alexander placement who presented from NH with altered mental status and tachycardia, also with pain at his PEG tube site and abdominal distention. He had his Alexander changed that afternoon with 300 cc yellow urine removed. In the ED, he was found to have rapid atrial fibrillation to 130s with BPs 80s/40s, and creatinine 4 (had been 0.4 three weeks prior). He was started on Vanc/Aztreonam (has penicillin allergy). Called daughter Hodan and wife Padmaja to come to the hospital, but they requested patient remain full code and to assess a few hours later if antibiotics were working. Upon admission, he was noted to have marked abdominal distention and STAT CT scan showed bladder rupture with catheter in prostatic urethra. His BPs and HR did not improve with 3.5L IVFs. After discussion with the family, he was deemed not to be a surgical candidate, and urology was called for further input. He was made DNR/DNI by family, and comfort measures initiated.     2/20/2018 I called and spoke with Dr. Mark who is aware of patient's status and aware that inpatient hospice to [facility] has been set up by . Dr. Mark to see patient before transport to [facility]. He agrees with plan of care 83 yo M with a PMH CVA with R sided residual weakness and s/p PEG placement, CAD s/p CABG, Afib (diagnosed 01/2018, not on A/C), HTN, Parkinson's, glaucoma, HTN, and BPH s/p chronic Alexander placement who presented from NH with altered mental status and tachycardia, also with pain at his PEG tube site and abdominal distention. He had his Alexander changed that afternoon with 300 cc yellow urine removed. In the ED, he was found to have rapid atrial fibrillation to 130s with BPs 80s/40s, and creatinine 4 (had been 0.4 three weeks prior). He was started on Vanc/Aztreonam (has penicillin allergy). Called daughter Hodan and wife Padmaja to come to the hospital, but they requested patient remain full code and to assess a few hours later if antibiotics were working. Upon admission, he was noted to have marked abdominal distention and STAT CT scan showed bladder rupture with catheter in prostatic urethra. His BPs and HR did not improve with 3.5L IVFs. After discussion with the family, he was deemed not to be a surgical candidate, and urology was called for further input. He was made DNR/DNI by family, and comfort measures initiated.

## 2018-02-20 NOTE — H&P ADULT - PROBLEM SELECTOR PLAN 8
- Had GOC conversation over the phone with daughter, Hodan Campos, who spoke to the patient's wife, Padmaja Vazquez. I told her the seriousness of the patient's clinical status due to his tahycardia, marked abdominal distention, hypotension, and marked uremia. I noted that if there is a concern for bowel perforation, he may require surgery, to which the daughter noted he would likely not tolerate. She spoke with the patient's wife and they agreed to make him full code for now, as the patient has been removed from ventilation recently in the ICU when physicians had told them that he wouldn't. They understood the prognosis, but would like to assess improvement with antibiotics over the next few hours before making any further decisions regarding GOC. They will also return to the hospital. - Had GOC conversation over the phone with daughter, Hodan Campos, who spoke to the patient's wife, Padmaja Vazquez. I told her the seriousness of the patient's clinical status due to his tachycardia, marked abdominal distention, hypotension, and marked uremia. I noted that if there is a concern for bowel perforation, he may require surgery, to which the daughter noted he would likely not tolerate. She spoke with the patient's wife and they agreed to make him full code for now, as the patient has been removed from ventilation recently in the ICU when physicians had told them that he wouldn't. They understood the prognosis, but would like to assess improvement with antibiotics over the next few hours before making any further decisions regarding GOC. They will also return to the hospital. - C/w carvidopa/levodopa

## 2018-02-20 NOTE — H&P ADULT - NSHPPHYSICALEXAM_GEN_ALL_CORE
GENERAL: ill-appearing, nonverbal  HEENT: PERRL, MM dry  NECK: supple, no stiffness, no JVD, no thyromegaly  PULM: tachypneic, clear to auscultation bilaterally, no rales, rhonchi, or wheezes  CV: tachycardic with irregular rhythm, no murmurs, gallops, or rubs  GI: abdomen distended with marked TTP, rebound but no guarding. Hypoactive bowel sounds. Feculent material on sheets.  : no genital lesions or ulcers, Alexander with 100cc tea-colored urine  MSK: unable to assess strength 2/2 mental status. No joint swelling, erythema, or warmth.  LYMPH: no anterior cervical, posterior cervical, supraclavicular, or inguinal lymphadenopathy  NEURO: A&Ox0, resting tremor  SKIN: R heel > L heel stage 2 ulcer without drainage. 2+ ankle edema bilaterally. RUE edema up to lower arm

## 2018-02-20 NOTE — H&P ADULT - NSHPLABSRESULTS_GEN_ALL_CORE
Labs personally reviewed and interpreted. Notable for marked leukocytosis 17.1 (baseline 4-5) with 91% segmented neutrophils but no bands. Hb 10 (baseline 9), and platelets 194. K .4 mild hemolysis. HCO3 19 with AG 22 and lactate 4.2. BUN/creatinine 127/4.27 (baseline 0.4 one month ago). Albumin 2.1, corrected calcium 9.6. VBG pH 7.44. UA large LE, neg nitrites, large blood, > 50 WBCs and RBCs, SG 1.022.    CXR personally reviewed and interpreted. Notable for moderate left and trace right pleural effusion. No focal consolidations, obvious cardiomegaly, or interstitial markings.    EKG personally reviewed. Atrial fibrillation. Rate 124, QRS 98, and QTc 476. Normal axis. Questionable incomplete LBBB, more pronounced from January 2018.

## 2018-02-20 NOTE — CONSULT NOTE ADULT - SUBJECTIVE AND OBJECTIVE BOX
HPI:  83 yo M with a PMH CVA with R sided residual weakness and s/p PEG placement, CAD s/p CABG, Afib (diagnosed 2018, not on A/C), HTN, Parkinson's, glaucoma, HTN, and BPH s/p chronic Alexander placement who presents from NH with altered mental status and tachycardia. History obtained from daughter, Hodan Santana who stated that the patient had been sent from [facility] nursing home for tachycardia. The patient's daughter stated she had visited him yesterday and he was conversational but today, the nursing home noticed he was nonverbal. The nursing home has noticed continued abdominal distention (which the nursing home states is chronic) and pain at his PEG tube site with erythema. He had his Alexander catheter changed, and 300 cc urine came out.  Of note, he had also been on Flagyl/Levaquin for PNA treatment from his last hospitalization, but had been intermittently receiving it because his PEG tube was clogged, for which he came to the ED on 18. His Levaquin/Flagyl was restarted on  for a 1 week course to ensure completion.    Palliative Care consult for goals of care and comfort measures.  He is non-verbal, lethargic.  Primary team concerned he is actively dying and asking for symptom management recommendations.        PERTINENT PM/SXH:   BPH (benign prostatic hyperplasia)  Atrial fibrillation  Alexander catheter in place  Oropharyngeal dysphagia  Glaucoma  CAD (coronary artery disease)  Parkinson disease  Tracheostomy in place  Hypertension  CVA (cerebral vascular accident)    H/O tracheostomy  S/P percutaneous endoscopic gastrostomy (PEG) tube placement    SOCIAL HISTORY:   Significant other/partner:  [x ] YES wife  [ ] NO               Children:  [x ] YES  [ ] NO                   Baptism/Spirituality: Quaker Anabaptism  Substance hx:  [ ] YES   [x ] NO                   Tobacco hx:  [ ] YES  [x ] NO                       Alcohol hx: [ ] YES  [x ] NO         Home Opioid hx:  [ ] YES  [x ] NO   Living Situation: [ ] Home  [x ] Long term care  [ ] Rehab [ ] Other    FAMILY HISTORY:  No pertinent family history in first degree relatives    [x ] Family history non-contributory     BASELINE (I)ADLs (prior to admission):  [place]: [ ] total  [ ] moderate [ ] dependent    ADVANCE DIRECTIVES:    DNR Yes    MOLST  [ ] YES [x ] NO                      [ ] Completed  Health Care Proxy [ ] YES  [x ] NO   [ ] Completed  Living Will  [ ] YES [x ] NO             [x ] Surrogate Wife  [ ] HCP  [ ] Guardian:                                                                  Phone#:    Allergies    penicillin (Hives)    Intolerances    MEDICATIONS  (STANDING):  aztreonam  IVPB 500 milliGRAM(s) IV Intermittent every 8 hours  dextrose 5% + sodium chloride 0.9%. 1000 milliLiter(s) (100 mL/Hr) IV Continuous <Continuous>  HYDROmorphone  Injectable 0.2 milliGRAM(s) IV Push every 6 hours  midodrine 20 milliGRAM(s) Oral three times a day  nystatin Powder 1 Application(s) Topical three times a day  vancomycin  IVPB 1000 milliGRAM(s) IV Intermittent every 24 hours    MEDICATIONS  (PRN):  HYDROmorphone  Injectable 0.4 milliGRAM(s) IV Push every 1 hour PRN dyspnea  LORazepam   Injectable 2 milliGRAM(s) IV Push every 2 hours PRN anxiety/agitation      PRESENT SYMPTOMS:  Source: [ ] Patient   [x ] Family   [x ] Team     Pain:                        [x ] No [ ] Yes             [ ] Mild [ ] Moderate [ ] Severe    Dyspnea:                [ ] No [x ] Yes             [ ] Mild [ ] Moderate [ ] Severe    Anxiety:                  [x ] No [ ] Yes             [ ] Mild [ ] Moderate [ ] Severe    Fatigue:                  [x ] No [ ] Yes             [ ] Mild [ ] Moderate [ ] Severe    Nausea:                  [x ] No [ ] Yes             [ ] Mild [ ] Moderate [ ] Severe    Loss of appetite:   [x ] No [ ] Yes             [ ] Mild [ ] Moderate [ ] Severe    Constipation:        [x ] No [ ] Yes             [ ] Mild [ ] Moderate [ ] Severe    Other Symptoms:  [ ] All other review of systems negative   [x ] Unable to obtain due to poor mentation     Karnofsky Performance Score/Palliative Performance Status Version 2:    10     %    PHYSICAL EXAM:  Vital Signs Last 24 Hrs  T(C): 36.7 (2018 11:02), Max: 37.1 (2018 22:57)  T(F): 98 (2018 11:02), Max: 98.7 (2018 22:57)  HR: 130 (2018 15:29) (112 - 130)  BP: 92/52 (2018 15:29) (66/46 - 103/64)  BP(mean): --  RR: 26 (2018 15:29) (20 - 30)  SpO2: 100% (2018 15:29) (99% - 100%) I&O's Summary      General:  [ ] Alert  [ ] Oriented x      [x ] Lethargic  [ ] Agitated   [ ] Cachexia   [ ] Unarousable  [ ] Verbal  [x ] Non-Verbal    HEENT:  [x ] Normal   [ ] Dry mouth   [ ] ET Tube    [ ] Trach  [ ] Oral lesions    Lungs:   [x ] Clear [ ] Tachypnea  [ ] Audible excessive secretions   [ ] Rhonchi        [ ] Right [ ] Left [ ] Bilateral  [ ] Crackles        [ ] Right [ ] Left [ ] Bilateral  [ ] Wheezing     [ ] Right [ ] Left [ ] Bilateral    Cardiovascular:  [ ] Regular [ ] Irregular [x ] Tachycardia   [ ] Bradycardia  [ ] Murmur [ ] Other    Abdomen: [ x] Soft  [ ] Distended   [ ] +BS  [ ] Non tender [ ] Tender  [ ]PEG   [ ]OGT/ NGT   Last BM:       Genitourinary: [ ] Normal [x ] Incontinent   [ ] Oliguria/Anuria   [ ] Alexander    Musculoskeletal:  [ ] Normal   [ ] Weakness  [x ] Bedbound/Wheelchair bound [ ] Edema    Neurological: [ ] No focal deficits  [ x] Cognitive impairment  [ ] Dysphagia [ ] Dysarthria [ ] Paresis [ ] Other     Skin: [x ] Normal   [ ] Pressure ulcer(s)                  [ ] Rash    LABS:                        8.7    14.46 )-----------( 153      ( 2018 03:30 )             26.3     02-20    132<L>  |  96<L>  |  112<H>  ----------------------------<  111<H>  4.2   |  16<L>  |  3.84<H>    Ca    7.0<L>      2018 03:30  Phos  5.0     02-20  Mg     1.9     02-20    TPro  6.6  /  Alb  2.1<L>  /  TBili  0.4  /  DBili  x   /  AST  28  /  ALT  9   /  AlkPhos  76  02-19    PT/INR - ( 2018 03:30 )   PT: 26.8 SEC;   INR: 2.37          PTT - ( 2018 03:30 )  PTT:24.9 SEC  Urinalysis Basic - ( 2018 22:35 )    Color: PINK / Appearance: TURBID / S.022 / pH: 6.0  Gluc: NEGATIVE / Ketone: NEGATIVE  / Bili: NEGATIVE / Urobili: NORMAL mg/dL   Blood: LARGE / Protein: 30 mg/dL / Nitrite: NEGATIVE   Leuk Esterase: LARGE / RBC: >50 / WBC >50   Sq Epi: x / Non Sq Epi: x / Bacteria: x      Shock: [ ] Septic [ ] Cardiogenic [ ] Neurologic [ ] Hypovolemic  Vasopressors x   Inotrophs x     Protein Calorie Malnutrition: [ ] Mild [ ] Moderate [ ] Severe    Oral Intake: [ ] Unable/mouth care only [ ] Minimal [ ] Moderate [ ] Full Capability  Diet: [ ] NPO [ ] Tube feeds [ ] TPN [ ] Other     RADIOLOGY & ADDITIONAL STUDIES:    EXAM:  CT ABDOMEN AND PELVIS        PROCEDURE DATE:  2018         INTERPRETATION:  CLINICAL INFORMATION: Uremia, abdominal distention. Rule   out perforation.    COMPARISON: CT abdomen pelvis 2018.      IMPRESSION:     Intraperitoneal bladder rupture.    Mild bilateral hydronephrosis.    Malpositioned Alexander catheter.      REFERRALS:   [ x] Chaplaincy  [x ] Hospice  [ ] Child Life  [x ] Social Work  [ ] Case management [ ] Holistic Therapy

## 2018-02-20 NOTE — H&P ADULT - PROBLEM SELECTOR PLAN 4
- As noted above, uremia most likely prerenal in the setting of hypotension. management as above, monitor VS Q4H and creatinine Q4-6H

## 2018-02-21 LAB — SPECIMEN SOURCE: SIGNIFICANT CHANGE UP

## 2018-02-22 LAB
-  AMIKACIN: SIGNIFICANT CHANGE UP
-  AZTREONAM: SIGNIFICANT CHANGE UP
-  CEFEPIME: SIGNIFICANT CHANGE UP
-  CEFTAZIDIME: SIGNIFICANT CHANGE UP
-  CIPROFLOXACIN: SIGNIFICANT CHANGE UP
-  GENTAMICIN: SIGNIFICANT CHANGE UP
-  IMIPENEM: SIGNIFICANT CHANGE UP
-  LEVOFLOXACIN: SIGNIFICANT CHANGE UP
-  MEROPENEM: SIGNIFICANT CHANGE UP
-  PIPERACILLIN/TAZOBACTAM: SIGNIFICANT CHANGE UP
-  TOBRAMYCIN: SIGNIFICANT CHANGE UP
BACTERIA UR CULT: SIGNIFICANT CHANGE UP
METHOD TYPE: SIGNIFICANT CHANGE UP
ORGANISM # SPEC MICROSCOPIC CNT: SIGNIFICANT CHANGE UP

## 2018-02-22 NOTE — ED POST DISCHARGE NOTE - RESULT SUMMARY
Jorge Tirado MD PGY4: I received a call from microbiology regarding resistant organism that grew in urine culture. I called [facility] to relay this finding only to find that the pt had  yesterday.

## 2018-02-24 LAB
BACTERIA BLD CULT: SIGNIFICANT CHANGE UP
BACTERIA BLD CULT: SIGNIFICANT CHANGE UP

## 2018-04-12 NOTE — H&P ADULT - ATTENDING COMMENTS
Agree with resident H&P and plan as edited above.     Briefly, 82 -year-old male with CVA 7/2017 complicated by hemiplegia, trach/PEG (s/p trach removal 2 weeks ago and started on dysphagia pleasure feeds at NH), oropharyngeal dysphagia, BPH with chronic lubin with history of UTI, HLD, HTN, CAD, Parkinson's, sacral decub, presenting from [facility] with noted temp of 100.4 and /58 on 01/08/17.  UCx collected 01/05/18 in chart noted to be growing >100K Proteus mirabilis sensitive to cephalosporins.  Patient being treated for sepsis secondary to UTI +/- aspiration pneumonia with LLL infiltrate noted on imaging with Vancomycin and Cefepime (PCN allergy; Pseudomonas growing in sputum cx from prior admission). Patient made NPO with aspiration precautions, tube feeds/all meds through PEG.  BCx/UCx pending.  Patient aphasic on exam, prior admission had waxing/waning mental status; was grunting during exam and hiccuping (problem of aphasia listed in [facility] notes), will monitor on antibiotics for improvement of toxic encephalopathy.  Wound care consult for sacral decub.  Floor RN was preparing to exchange lubin catheter after my interview, lubin draining pink clear fluid with white sediment.  Wife no longer at bedside on my exam, per resident and RN wife reported patient is typically conversant, has been less interactive over the past few days.  Per notes from NH, no report of antibiotics started as yet for UTI.  I will reach out to wife in AM to clarify HPI. Problem #11 Sacral Decub  wound care consult  nutrition consult  turn and position Q2H    Agree with resident H&P and plan as edited above.     Briefly, 82 -year-old male with CVA 7/2017 complicated by hemiplegia, trach/PEG (s/p trach removal 2 weeks ago and started on dysphagia pleasure feeds at NH), oropharyngeal dysphagia, BPH with chronic lubin with history of UTI, HLD, HTN, CAD, Parkinson's, sacral decub, presenting from [facility] with noted temp of 100.4 and /58 on 01/08/17.  UCx collected 01/05/18 in chart noted to be growing >100K Proteus mirabilis sensitive to cephalosporins.  Patient being treated for sepsis secondary to UTI +/- aspiration pneumonia with LLL infiltrate noted on imaging with Vancomycin and Cefepime (PCN allergy; Pseudomonas growing in sputum cx from prior admission). Patient made NPO with aspiration precautions, tube feeds/all meds through PEG.  BCx/UCx pending.  Patient aphasic on exam, prior admission had waxing/waning mental status; was grunting during exam and hiccuping (problem of aphasia listed in [facility] notes), will monitor on antibiotics for improvement of toxic encephalopathy.  Wound care consult for sacral decub.  Floor RN was preparing to exchange lubin catheter after my interview, lubin draining pink clear fluid with white sediment.  Wife no longer at bedside on my exam, per resident and RN wife reported patient is typically conversant, has been less interactive over the past few days.  Per notes from NH, no report of antibiotics started as yet for UTI.  I will reach out to wife in AM to clarify HPI. Problem #11 Sacral Decub  wound care consult  nutrition consult  turn and position Q2H    Agree with resident H&P and plan as edited above.     Briefly, 82 -year-old male with CVA 7/2017 complicated by hemiplegia, trach/PEG (s/p trach removal 2 weeks ago and started on dysphagia pleasure feeds at NH), oropharyngeal dysphagia, BPH with chronic lubin with history of UTI, HLD, HTN, CAD, Parkinson's, sacral decub, presenting from [facility] with noted temp of 100.4 and /58 on 01/08/17.  UCx collected 01/05/18 in chart noted to be growing >100K Proteus mirabilis sensitive to cephalosporins.  Patient being treated for sepsis secondary to UTI +/- aspiration pneumonia with LLL infiltrate noted on imaging with Vancomycin and Cefepime (PCN allergy; Pseudomonas growing in sputum cx from prior admission). Patient made NPO with aspiration precautions, tube feeds/all meds through PEG.  BCx/UCx pending.  Patient aphasic on exam, prior admission had waxing/waning mental status; was grunting during exam and hiccuping (problem of aphasia listed in [facility] notes), will monitor on antibiotics for improvement of toxic encephalopathy.  Wound care consult for sacral decub.  Floor RN was preparing to exchange lubin catheter after my interview, lubin draining pink clear fluid with white sediment.  Wife no longer at bedside on my exam, per resident and RN wife reported patient is typically conversant, has been less interactive over the past few days.  Per notes from NH, no report of antibiotics started as yet for UTI.  Called wife in AM to update on plan/status this AM. Secondary Intention Text (Leave Blank If You Do Not Want): The defect will heal with secondary intention.
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
## 2018-11-22 NOTE — PATIENT PROFILE ADULT. - NS TRANSFER PATIENT BELONGINGS
Ventricular Rate : 92   Atrial Rate : 92   P-R Interval : 116   QRS Duration : 76   Q-T Interval : 342   QTC Calculation(Bezet) : 422   P Axis : 58   R Axis : 64   T Axis : 38   Diagnosis : ** ** ** ** * Pediatric ECG Analysis * ** ** ** **~Normal sinus rhythm with sinus arrhythmia~Normal ECG~No previous ECGs available~Confirmed by RAVEN VIDES, JIMY ([id number]) on 11/7/2017 8:44:48 AM     
None

## 2019-10-08 NOTE — ED ADULT NURSE NOTE - PSH
ua is ordered as requested. Pt.told that Dr. Lees is not in the office today and that this would be addressed tomorrow when she is back. This is okay with pt.   S/P percutaneous endoscopic gastrostomy (PEG) tube placement

## 2019-12-31 NOTE — PROGRESS NOTE ADULT - PROBLEM/PLAN-6
Detail Level: Generalized
DISPLAY PLAN FREE TEXT
Detail Level: Zone

## 2020-07-20 NOTE — PATIENT PROFILE ADULT. - CURRENT SWALLOWING
(0) swallows foods and liquids w/o difficulty Xeljuan danielz Pregnancy And Lactation Text: This medication is Pregnancy Category D and is not considered safe during pregnancy.  The risk during breast feeding is also uncertain. NPO/not appropriate

## 2022-03-02 NOTE — SWALLOW VFSS/MBS ASSESSMENT ADULT - ORAL PHASE
Within normal limits for age- retiredno ADL issues,immunizations up to date, no depression ,no cognitive impairment  Colonoscopy up to date 2017 divertic repeat 2027  Eye exam up to date  Exercises as tolerated    No living will but does not want resuscitation info given    Findings and recommendations discussed with Pt Delayed oral transit time/Reduced anterior - posterior transport

## 2022-07-03 NOTE — ED ADULT NURSE NOTE - NS PRO PASSIVE SMOKE EXP
Keep the wound covered for the next week with a Band-Aid or with gauze.  Change the dressing once a day.      Continue the over the counter antibiotic ointment.     Avoid scratching or rubbing the wound.      Fill the printed prescription for Doxycycline if you see any spreading redness or red streaks from the wound.      Apply something warm over the wound for 15 minutes at a time, every 3 hours while awake.      Follow up if not better in 10 days.   
Unknown

## 2022-10-27 NOTE — ED ADULT TRIAGE NOTE - NS ED NURSE DIRECT TO ROOM YN
No For information on Fall & Injury Prevention, visit: [email or web address] OR  [email or web address] OR  https://www.cdc.gov/steadi/patient.html

## 2023-03-07 NOTE — ED PROVIDER NOTE - DURATION
[FreeTextEntry1] : 49 y/o male here today with 5th MPJ pain. Notes inserts have provided some help with foot pain. no h/o of trauma  day(s)

## 2023-03-08 NOTE — PROVIDER CONTACT NOTE (CRITICAL VALUE NOTIFICATION) - ACTION/TREATMENT ORDERED:
No actions necessary. Continue with plan of care. A-T Advancement Flap Text: The defect edges were debeveled with a #15 scalpel blade.  Given the location of the defect, shape of the defect and the proximity to free margins an A-T advancement flap was deemed most appropriate.  Using a sterile surgical marker, an appropriate advancement flap was drawn incorporating the defect and placing the expected incisions within the relaxed skin tension lines where possible.    The area thus outlined was incised deep to adipose tissue with a #15 scalpel blade.  The skin margins were undermined to an appropriate distance in all directions utilizing iris scissors.

## 2023-12-02 NOTE — DISCHARGE NOTE ADULT - ADMISSION DATE +STARTOFVISITDATE
SW/CM Discharge Plan    The pt is anticipated to dc home, this date (12/2/23). No transportation needs identified.     No home health care services recommended, appears that the pt remains at his functional baseline status.     No SW/CM dc needs identifed. SW remains available to assist with any needs that arise.     MADISON Arriola, [facility]  Medical Social Worker   Ph: [phone]             Statement Selected

## 2025-04-21 NOTE — CONSULT NOTE ADULT - PROBLEM SELECTOR RECOMMENDATION 3
PPSV2 10%
With bilateral hydro and no surgical intervention planned by urology as patient high risk for OR. Continue with IV antibiotics. Agree with hospice.
Patient

## 2025-07-28 NOTE — ED ADULT TRIAGE NOTE - SOURCE OF INFORMATION
"  Patient is requesting additional labs to be added to upcoming lab appointment. Patient stated  "Can a CMP. Micro albumin. A1c. CBC be added to the labs. It has been awhile since those have been done." Sent Staff message to Dr. Wheat's staff letting them know patient is requesting additional labs.   "
EMS